# Patient Record
Sex: MALE | Race: WHITE | Employment: OTHER | URBAN - METROPOLITAN AREA
[De-identification: names, ages, dates, MRNs, and addresses within clinical notes are randomized per-mention and may not be internally consistent; named-entity substitution may affect disease eponyms.]

---

## 2022-03-14 ENCOUNTER — HOSPITAL ENCOUNTER (INPATIENT)
Age: 62
LOS: 16 days | Discharge: HOME | DRG: 853 | End: 2022-03-30
Attending: EMERGENCY MEDICINE | Admitting: STUDENT IN AN ORGANIZED HEALTH CARE EDUCATION/TRAINING PROGRAM
Payer: COMMERCIAL

## 2022-03-14 ENCOUNTER — APPOINTMENT (OUTPATIENT)
Dept: GENERAL RADIOLOGY | Age: 62
DRG: 853 | End: 2022-03-14
Payer: COMMERCIAL

## 2022-03-14 ENCOUNTER — APPOINTMENT (OUTPATIENT)
Dept: CT IMAGING | Age: 62
DRG: 853 | End: 2022-03-14
Payer: COMMERCIAL

## 2022-03-14 DIAGNOSIS — G89.18 ACUTE POST-OPERATIVE PAIN: ICD-10-CM

## 2022-03-14 DIAGNOSIS — R50.9 FEBRILE ILLNESS: ICD-10-CM

## 2022-03-14 DIAGNOSIS — Z98.890 S/P MVR (MITRAL VALVE REPAIR): ICD-10-CM

## 2022-03-14 DIAGNOSIS — I48.91 ATRIAL FIBRILLATION WITH RAPID VENTRICULAR RESPONSE (HCC): Primary | ICD-10-CM

## 2022-03-14 LAB
ALBUMIN SERPL-MCNC: 3.4 G/DL (ref 3.5–5.2)
ALP BLD-CCNC: 117 U/L (ref 40–129)
ALT SERPL-CCNC: 35 U/L (ref 0–40)
AMORPHOUS: ABNORMAL
ANION GAP SERPL CALCULATED.3IONS-SCNC: 13 MMOL/L (ref 7–16)
APTT: 30.2 SEC (ref 24.5–35.1)
AST SERPL-CCNC: 22 U/L (ref 0–39)
BACTERIA: ABNORMAL /HPF
BASOPHILS ABSOLUTE: 0.02 E9/L (ref 0–0.2)
BASOPHILS RELATIVE PERCENT: 0.2 % (ref 0–2)
BILIRUB SERPL-MCNC: 1 MG/DL (ref 0–1.2)
BILIRUBIN DIRECT: 0.4 MG/DL (ref 0–0.3)
BILIRUBIN URINE: NEGATIVE
BILIRUBIN, INDIRECT: 0.6 MG/DL (ref 0–1)
BLOOD, URINE: NEGATIVE
BUN BLDV-MCNC: 20 MG/DL (ref 6–23)
C-REACTIVE PROTEIN: 5.9 MG/DL (ref 0–0.4)
CALCIUM SERPL-MCNC: 9 MG/DL (ref 8.6–10.2)
CHLORIDE BLD-SCNC: 97 MMOL/L (ref 98–107)
CLARITY: CLEAR
CO2: 24 MMOL/L (ref 22–29)
COLOR: YELLOW
CREAT SERPL-MCNC: 1.3 MG/DL (ref 0.7–1.2)
EOSINOPHILS ABSOLUTE: 0.02 E9/L (ref 0.05–0.5)
EOSINOPHILS RELATIVE PERCENT: 0.2 % (ref 0–6)
FINE CASTS, UA: ABNORMAL /LPF (ref 0–2)
GFR AFRICAN AMERICAN: >60
GFR NON-AFRICAN AMERICAN: 56 ML/MIN/1.73
GLUCOSE BLD-MCNC: 156 MG/DL (ref 74–99)
GLUCOSE URINE: NEGATIVE MG/DL
HCT VFR BLD CALC: 33.4 % (ref 37–54)
HEMOGLOBIN: 10.7 G/DL (ref 12.5–16.5)
IMMATURE GRANULOCYTES #: 0.08 E9/L
IMMATURE GRANULOCYTES %: 0.7 % (ref 0–5)
INR BLD: 1.5
KETONES, URINE: NEGATIVE MG/DL
LACTIC ACID, SEPSIS: 1.4 MMOL/L (ref 0.5–1.9)
LACTIC ACID, SEPSIS: 2.6 MMOL/L (ref 0.5–1.9)
LEUKOCYTE ESTERASE, URINE: NEGATIVE
LYMPHOCYTES ABSOLUTE: 0.75 E9/L (ref 1.5–4)
LYMPHOCYTES RELATIVE PERCENT: 6.1 % (ref 20–42)
MAGNESIUM: 2.4 MG/DL (ref 1.6–2.6)
MCH RBC QN AUTO: 27.6 PG (ref 26–35)
MCHC RBC AUTO-ENTMCNC: 32 % (ref 32–34.5)
MCV RBC AUTO: 86.1 FL (ref 80–99.9)
MONOCYTES ABSOLUTE: 0.5 E9/L (ref 0.1–0.95)
MONOCYTES RELATIVE PERCENT: 4.1 % (ref 2–12)
NEUTROPHILS ABSOLUTE: 10.85 E9/L (ref 1.8–7.3)
NEUTROPHILS RELATIVE PERCENT: 88.7 % (ref 43–80)
NITRITE, URINE: NEGATIVE
PDW BLD-RTO: 13.8 FL (ref 11.5–15)
PH UA: 5 (ref 5–9)
PLATELET # BLD: 193 E9/L (ref 130–450)
PMV BLD AUTO: 11.3 FL (ref 7–12)
POTASSIUM REFLEX MAGNESIUM: 4.5 MMOL/L (ref 3.5–5)
PRO-BNP: 6000 PG/ML (ref 0–125)
PROCALCITONIN: 0.31 NG/ML (ref 0–0.08)
PROTEIN UA: ABNORMAL MG/DL
PROTHROMBIN TIME: 15.9 SEC (ref 9.3–12.4)
RBC # BLD: 3.88 E12/L (ref 3.8–5.8)
RBC UA: ABNORMAL /HPF (ref 0–2)
SARS-COV-2, NAAT: NOT DETECTED
SODIUM BLD-SCNC: 134 MMOL/L (ref 132–146)
SPECIFIC GRAVITY UA: 1.02 (ref 1–1.03)
TOTAL PROTEIN: 7.9 G/DL (ref 6.4–8.3)
TROPONIN, HIGH SENSITIVITY: 42 NG/L (ref 0–11)
TROPONIN, HIGH SENSITIVITY: 43 NG/L (ref 0–11)
UROBILINOGEN, URINE: 4 E.U./DL
WBC # BLD: 12.2 E9/L (ref 4.5–11.5)
WBC UA: ABNORMAL /HPF (ref 0–5)

## 2022-03-14 PROCEDURE — 6360000004 HC RX CONTRAST MEDICATION: Performed by: RADIOLOGY

## 2022-03-14 PROCEDURE — 6360000002 HC RX W HCPCS: Performed by: STUDENT IN AN ORGANIZED HEALTH CARE EDUCATION/TRAINING PROGRAM

## 2022-03-14 PROCEDURE — 71045 X-RAY EXAM CHEST 1 VIEW: CPT

## 2022-03-14 PROCEDURE — 83605 ASSAY OF LACTIC ACID: CPT

## 2022-03-14 PROCEDURE — 85610 PROTHROMBIN TIME: CPT

## 2022-03-14 PROCEDURE — 86140 C-REACTIVE PROTEIN: CPT

## 2022-03-14 PROCEDURE — 87040 BLOOD CULTURE FOR BACTERIA: CPT

## 2022-03-14 PROCEDURE — 83735 ASSAY OF MAGNESIUM: CPT

## 2022-03-14 PROCEDURE — 84145 PROCALCITONIN (PCT): CPT

## 2022-03-14 PROCEDURE — 6370000000 HC RX 637 (ALT 250 FOR IP): Performed by: STUDENT IN AN ORGANIZED HEALTH CARE EDUCATION/TRAINING PROGRAM

## 2022-03-14 PROCEDURE — 2140000000 HC CCU INTERMEDIATE R&B

## 2022-03-14 PROCEDURE — 96366 THER/PROPH/DIAG IV INF ADDON: CPT

## 2022-03-14 PROCEDURE — 87635 SARS-COV-2 COVID-19 AMP PRB: CPT

## 2022-03-14 PROCEDURE — 80048 BASIC METABOLIC PNL TOTAL CA: CPT

## 2022-03-14 PROCEDURE — 0202U NFCT DS 22 TRGT SARS-COV-2: CPT

## 2022-03-14 PROCEDURE — 2500000003 HC RX 250 WO HCPCS: Performed by: STUDENT IN AN ORGANIZED HEALTH CARE EDUCATION/TRAINING PROGRAM

## 2022-03-14 PROCEDURE — 96375 TX/PRO/DX INJ NEW DRUG ADDON: CPT

## 2022-03-14 PROCEDURE — 85025 COMPLETE CBC W/AUTO DIFF WBC: CPT

## 2022-03-14 PROCEDURE — 80076 HEPATIC FUNCTION PANEL: CPT

## 2022-03-14 PROCEDURE — 96365 THER/PROPH/DIAG IV INF INIT: CPT

## 2022-03-14 PROCEDURE — 36415 COLL VENOUS BLD VENIPUNCTURE: CPT

## 2022-03-14 PROCEDURE — 84443 ASSAY THYROID STIM HORMONE: CPT

## 2022-03-14 PROCEDURE — 96376 TX/PRO/DX INJ SAME DRUG ADON: CPT

## 2022-03-14 PROCEDURE — 99285 EMERGENCY DEPT VISIT HI MDM: CPT

## 2022-03-14 PROCEDURE — 96374 THER/PROPH/DIAG INJ IV PUSH: CPT

## 2022-03-14 PROCEDURE — 87088 URINE BACTERIA CULTURE: CPT

## 2022-03-14 PROCEDURE — 83880 ASSAY OF NATRIURETIC PEPTIDE: CPT

## 2022-03-14 PROCEDURE — 2580000003 HC RX 258: Performed by: STUDENT IN AN ORGANIZED HEALTH CARE EDUCATION/TRAINING PROGRAM

## 2022-03-14 PROCEDURE — 85730 THROMBOPLASTIN TIME PARTIAL: CPT

## 2022-03-14 PROCEDURE — 81001 URINALYSIS AUTO W/SCOPE: CPT

## 2022-03-14 PROCEDURE — 71275 CT ANGIOGRAPHY CHEST: CPT

## 2022-03-14 PROCEDURE — 93005 ELECTROCARDIOGRAM TRACING: CPT | Performed by: STUDENT IN AN ORGANIZED HEALTH CARE EDUCATION/TRAINING PROGRAM

## 2022-03-14 PROCEDURE — 84484 ASSAY OF TROPONIN QUANT: CPT

## 2022-03-14 PROCEDURE — 85651 RBC SED RATE NONAUTOMATED: CPT

## 2022-03-14 RX ORDER — DILTIAZEM HYDROCHLORIDE 5 MG/ML
10 INJECTION INTRAVENOUS ONCE
Status: COMPLETED | OUTPATIENT
Start: 2022-03-14 | End: 2022-03-14

## 2022-03-14 RX ORDER — SODIUM CHLORIDE 9 MG/ML
25 INJECTION, SOLUTION INTRAVENOUS PRN
Status: DISCONTINUED | OUTPATIENT
Start: 2022-03-14 | End: 2022-03-19 | Stop reason: SDUPTHER

## 2022-03-14 RX ORDER — ONDANSETRON 2 MG/ML
4 INJECTION INTRAMUSCULAR; INTRAVENOUS EVERY 6 HOURS PRN
Status: DISCONTINUED | OUTPATIENT
Start: 2022-03-14 | End: 2022-03-23

## 2022-03-14 RX ORDER — ACETAMINOPHEN 325 MG/1
650 TABLET ORAL ONCE
Status: COMPLETED | OUTPATIENT
Start: 2022-03-14 | End: 2022-03-14

## 2022-03-14 RX ORDER — SODIUM CHLORIDE 0.9 % (FLUSH) 0.9 %
5-40 SYRINGE (ML) INJECTION EVERY 12 HOURS SCHEDULED
Status: DISCONTINUED | OUTPATIENT
Start: 2022-03-14 | End: 2022-03-19 | Stop reason: SDUPTHER

## 2022-03-14 RX ORDER — SODIUM CHLORIDE 0.9 % (FLUSH) 0.9 %
5-40 SYRINGE (ML) INJECTION PRN
Status: DISCONTINUED | OUTPATIENT
Start: 2022-03-14 | End: 2022-03-19 | Stop reason: SDUPTHER

## 2022-03-14 RX ORDER — ASPIRIN 81 MG/1
81 TABLET ORAL DAILY
Status: DISCONTINUED | OUTPATIENT
Start: 2022-03-15 | End: 2022-03-15

## 2022-03-14 RX ORDER — POLYETHYLENE GLYCOL 3350 17 G/17G
17 POWDER, FOR SOLUTION ORAL DAILY PRN
Status: DISCONTINUED | OUTPATIENT
Start: 2022-03-14 | End: 2022-03-23

## 2022-03-14 RX ORDER — 0.9 % SODIUM CHLORIDE 0.9 %
1000 INTRAVENOUS SOLUTION INTRAVENOUS ONCE
Status: COMPLETED | OUTPATIENT
Start: 2022-03-14 | End: 2022-03-14

## 2022-03-14 RX ORDER — ACETAMINOPHEN 650 MG/1
650 SUPPOSITORY RECTAL EVERY 6 HOURS PRN
Status: DISCONTINUED | OUTPATIENT
Start: 2022-03-14 | End: 2022-03-23

## 2022-03-14 RX ORDER — ONDANSETRON 4 MG/1
4 TABLET, ORALLY DISINTEGRATING ORAL EVERY 8 HOURS PRN
Status: DISCONTINUED | OUTPATIENT
Start: 2022-03-14 | End: 2022-03-23

## 2022-03-14 RX ORDER — HYDROXYCHLOROQUINE SULFATE 200 MG/1
200 TABLET, FILM COATED ORAL
Status: ON HOLD | COMMUNITY
End: 2022-03-15

## 2022-03-14 RX ORDER — MAGNESIUM SULFATE IN WATER 40 MG/ML
4000 INJECTION, SOLUTION INTRAVENOUS ONCE
Status: COMPLETED | OUTPATIENT
Start: 2022-03-14 | End: 2022-03-14

## 2022-03-14 RX ORDER — ATORVASTATIN CALCIUM 40 MG/1
40 TABLET, FILM COATED ORAL NIGHTLY
Status: DISCONTINUED | OUTPATIENT
Start: 2022-03-14 | End: 2022-03-26

## 2022-03-14 RX ORDER — POTASSIUM CHLORIDE 7.45 MG/ML
10 INJECTION INTRAVENOUS PRN
Status: DISCONTINUED | OUTPATIENT
Start: 2022-03-14 | End: 2022-03-23

## 2022-03-14 RX ORDER — MAGNESIUM SULFATE IN WATER 40 MG/ML
2000 INJECTION, SOLUTION INTRAVENOUS PRN
Status: DISCONTINUED | OUTPATIENT
Start: 2022-03-14 | End: 2022-03-23

## 2022-03-14 RX ORDER — ACETAMINOPHEN 325 MG/1
650 TABLET ORAL EVERY 6 HOURS PRN
Status: DISCONTINUED | OUTPATIENT
Start: 2022-03-14 | End: 2022-03-23

## 2022-03-14 RX ADMIN — DEXTROSE MONOHYDRATE 10 MG/HR: 50 INJECTION, SOLUTION INTRAVENOUS at 21:30

## 2022-03-14 RX ADMIN — CEFEPIME HYDROCHLORIDE 2000 MG: 2 INJECTION, POWDER, FOR SOLUTION INTRAVENOUS at 23:24

## 2022-03-14 RX ADMIN — DEXTROSE MONOHYDRATE 12.5 MG/HR: 50 INJECTION, SOLUTION INTRAVENOUS at 22:50

## 2022-03-14 RX ADMIN — MAGNESIUM SULFATE HEPTAHYDRATE 4000 MG: 40 INJECTION, SOLUTION INTRAVENOUS at 20:23

## 2022-03-14 RX ADMIN — SODIUM CHLORIDE 1000 ML: 9 INJECTION, SOLUTION INTRAVENOUS at 20:33

## 2022-03-14 RX ADMIN — ACETAMINOPHEN 650 MG: 325 TABLET ORAL at 20:55

## 2022-03-14 RX ADMIN — IOPAMIDOL 75 ML: 755 INJECTION, SOLUTION INTRAVENOUS at 22:38

## 2022-03-14 RX ADMIN — DEXTROSE MONOHYDRATE 2.5 MG/HR: 50 INJECTION, SOLUTION INTRAVENOUS at 20:23

## 2022-03-14 RX ADMIN — DILTIAZEM HYDROCHLORIDE 10 MG: 5 INJECTION INTRAVENOUS at 20:22

## 2022-03-14 ASSESSMENT — ENCOUNTER SYMPTOMS
ABDOMINAL PAIN: 0
VOMITING: 0
SINUS PRESSURE: 0
SHORTNESS OF BREATH: 0
DIARRHEA: 0
NAUSEA: 0
BACK PAIN: 0
SORE THROAT: 0
WHEEZING: 0
EYE PAIN: 0
COUGH: 0
EYE DISCHARGE: 0

## 2022-03-14 NOTE — LETTER
David Ga  Phone: [phone]        March 22, 2022     Patient: Peggy Qucik   YOB: 1960   Date of Visit: 3/14/2022       To Whom it May Concern:    Peggy Quick was present at this facility for a procedure on 3/23/22 with an extensive hospital stay.     Sincerely,

## 2022-03-15 PROBLEM — N17.9 AKI (ACUTE KIDNEY INJURY) (HCC): Status: ACTIVE | Noted: 2022-03-15

## 2022-03-15 PROBLEM — A41.9 SEPSIS (HCC): Status: ACTIVE | Noted: 2022-03-15

## 2022-03-15 LAB
ACINETOBACTER CALCOAC BAUMANNII COMPLEX BY PCR: NOT DETECTED
ADENOVIRUS BY PCR: NOT DETECTED
ANION GAP SERPL CALCULATED.3IONS-SCNC: 12 MMOL/L (ref 7–16)
BACTEROIDES FRAGILIS BY PCR: NOT DETECTED
BASOPHILS ABSOLUTE: 0.02 E9/L (ref 0–0.2)
BASOPHILS RELATIVE PERCENT: 0.2 % (ref 0–2)
BORDETELLA PARAPERTUSSIS BY PCR: NOT DETECTED
BORDETELLA PERTUSSIS BY PCR: NOT DETECTED
BOTTLE TYPE: ABNORMAL
BUN BLDV-MCNC: 19 MG/DL (ref 6–23)
CALCIUM SERPL-MCNC: 8.1 MG/DL (ref 8.6–10.2)
CANDIDA ALBICANS BY PCR: NOT DETECTED
CANDIDA AURIS BY PCR: NOT DETECTED
CANDIDA GLABRATA BY PCR: NOT DETECTED
CANDIDA KRUSEI BY PCR: NOT DETECTED
CANDIDA PARAPSILOSIS BY PCR: NOT DETECTED
CANDIDA TROPICALIS BY PCR: NOT DETECTED
CHLAMYDOPHILIA PNEUMONIAE BY PCR: NOT DETECTED
CHLORIDE BLD-SCNC: 101 MMOL/L (ref 98–107)
CHOLESTEROL, TOTAL: 133 MG/DL (ref 0–199)
CO2: 21 MMOL/L (ref 22–29)
CORONAVIRUS 229E BY PCR: NOT DETECTED
CORONAVIRUS HKU1 BY PCR: NOT DETECTED
CORONAVIRUS NL63 BY PCR: NOT DETECTED
CORONAVIRUS OC43 BY PCR: NOT DETECTED
CREAT SERPL-MCNC: 1.1 MG/DL (ref 0.7–1.2)
CRYPTOCOCCUS NEOFORMANS/GATTII BY PCR: NOT DETECTED
EKG ATRIAL RATE: 122 BPM
EKG Q-T INTERVAL: 264 MS
EKG QRS DURATION: 86 MS
EKG QTC CALCULATION (BAZETT): 406 MS
EKG R AXIS: 36 DEGREES
EKG T AXIS: 66 DEGREES
EKG VENTRICULAR RATE: 142 BPM
ENTEROBACTER CLOACAE COMPLEX BY PCR: NOT DETECTED
ENTEROBACTERALES BY PCR: NOT DETECTED
ENTEROCOCCUS FAECALIS BY PCR: NOT DETECTED
ENTEROCOCCUS FAECIUM BY PCR: NOT DETECTED
EOSINOPHILS ABSOLUTE: 0.11 E9/L (ref 0.05–0.5)
EOSINOPHILS RELATIVE PERCENT: 0.9 % (ref 0–6)
ESCHERICHIA COLI BY PCR: NOT DETECTED
GFR AFRICAN AMERICAN: >60
GFR NON-AFRICAN AMERICAN: >60 ML/MIN/1.73
GLUCOSE BLD-MCNC: 129 MG/DL (ref 74–99)
HAEMOPHILUS INFLUENZAE BY PCR: NOT DETECTED
HBA1C MFR BLD: 5.6 % (ref 4–5.6)
HCT VFR BLD CALC: 28.4 % (ref 37–54)
HDLC SERPL-MCNC: 20 MG/DL
HEMOGLOBIN: 9.2 G/DL (ref 12.5–16.5)
HUMAN METAPNEUMOVIRUS BY PCR: NOT DETECTED
HUMAN RHINOVIRUS/ENTEROVIRUS BY PCR: NOT DETECTED
IMMATURE GRANULOCYTES #: 0.09 E9/L
IMMATURE GRANULOCYTES %: 0.7 % (ref 0–5)
INFLUENZA A BY PCR: NOT DETECTED
INFLUENZA B BY PCR: NOT DETECTED
KLEBSIELLA AEROGENES BY PCR: NOT DETECTED
KLEBSIELLA OXYTOCA BY PCR: NOT DETECTED
KLEBSIELLA PNEUMONIAE GROUP BY PCR: NOT DETECTED
LDL CHOLESTEROL CALCULATED: 90 MG/DL (ref 0–99)
LISTERIA MONOCYTOGENES BY PCR: NOT DETECTED
LV EF: 53 %
LVEF MODALITY: NORMAL
LYMPHOCYTES ABSOLUTE: 1.25 E9/L (ref 1.5–4)
LYMPHOCYTES RELATIVE PERCENT: 10.3 % (ref 20–42)
MCH RBC QN AUTO: 27.6 PG (ref 26–35)
MCHC RBC AUTO-ENTMCNC: 32.4 % (ref 32–34.5)
MCV RBC AUTO: 85.3 FL (ref 80–99.9)
MONOCYTES ABSOLUTE: 0.96 E9/L (ref 0.1–0.95)
MONOCYTES RELATIVE PERCENT: 7.9 % (ref 2–12)
MYCOPLASMA PNEUMONIAE BY PCR: NOT DETECTED
NEISSERIA MENINGITIDIS BY PCR: NOT DETECTED
NEUTROPHILS ABSOLUTE: 9.68 E9/L (ref 1.8–7.3)
NEUTROPHILS RELATIVE PERCENT: 80 % (ref 43–80)
ORDER NUMBER: ABNORMAL
PARAINFLUENZA VIRUS 1 BY PCR: NOT DETECTED
PARAINFLUENZA VIRUS 2 BY PCR: NOT DETECTED
PARAINFLUENZA VIRUS 3 BY PCR: NOT DETECTED
PARAINFLUENZA VIRUS 4 BY PCR: NOT DETECTED
PDW BLD-RTO: 13.5 FL (ref 11.5–15)
PLATELET # BLD: 175 E9/L (ref 130–450)
PMV BLD AUTO: 10.8 FL (ref 7–12)
POTASSIUM REFLEX MAGNESIUM: 4.2 MMOL/L (ref 3.5–5)
PROCALCITONIN: 0.23 NG/ML (ref 0–0.08)
PROTEUS SPECIES BY PCR: NOT DETECTED
PSEUDOMONAS AERUGINOSA BY PCR: NOT DETECTED
RBC # BLD: 3.33 E12/L (ref 3.8–5.8)
RESPIRATORY SYNCYTIAL VIRUS BY PCR: NOT DETECTED
SALMONELLA SPECIES BY PCR: NOT DETECTED
SARS-COV-2, PCR: NOT DETECTED
SEDIMENTATION RATE, ERYTHROCYTE: 97 MM/HR (ref 0–15)
SERRATIA MARCESCENS BY PCR: NOT DETECTED
SODIUM BLD-SCNC: 134 MMOL/L (ref 132–146)
SOURCE OF BLOOD CULTURE: ABNORMAL
STAPHYLOCOCCUS AUREUS BY PCR: NOT DETECTED
STAPHYLOCOCCUS EPIDERMIDIS BY PCR: NOT DETECTED
STAPHYLOCOCCUS LUGDUNENSIS BY PCR: NOT DETECTED
STAPHYLOCOCCUS SPECIES BY PCR: NOT DETECTED
STENOTROPHOMONAS MALTOPHILIA BY PCR: NOT DETECTED
STREPTOCOCCUS AGALACTIAE BY PCR: NOT DETECTED
STREPTOCOCCUS PNEUMONIAE BY PCR: NOT DETECTED
STREPTOCOCCUS PYOGENES  BY PCR: NOT DETECTED
STREPTOCOCCUS SPECIES BY PCR: DETECTED
TRIGL SERPL-MCNC: 115 MG/DL (ref 0–149)
TSH SERPL DL<=0.05 MIU/L-ACNC: 1.88 UIU/ML (ref 0.27–4.2)
VLDLC SERPL CALC-MCNC: 23 MG/DL
WBC # BLD: 12.1 E9/L (ref 4.5–11.5)

## 2022-03-15 PROCEDURE — 80048 BASIC METABOLIC PNL TOTAL CA: CPT

## 2022-03-15 PROCEDURE — 85025 COMPLETE CBC W/AUTO DIFF WBC: CPT

## 2022-03-15 PROCEDURE — 6370000000 HC RX 637 (ALT 250 FOR IP): Performed by: NURSE PRACTITIONER

## 2022-03-15 PROCEDURE — 2580000003 HC RX 258: Performed by: INTERNAL MEDICINE

## 2022-03-15 PROCEDURE — 2580000003 HC RX 258: Performed by: STUDENT IN AN ORGANIZED HEALTH CARE EDUCATION/TRAINING PROGRAM

## 2022-03-15 PROCEDURE — 6360000002 HC RX W HCPCS: Performed by: INTERNAL MEDICINE

## 2022-03-15 PROCEDURE — 99255 IP/OBS CONSLTJ NEW/EST HI 80: CPT | Performed by: INTERNAL MEDICINE

## 2022-03-15 PROCEDURE — 6360000002 HC RX W HCPCS: Performed by: NURSE PRACTITIONER

## 2022-03-15 PROCEDURE — 2700000000 HC OXYGEN THERAPY PER DAY

## 2022-03-15 PROCEDURE — 93306 TTE W/DOPPLER COMPLETE: CPT

## 2022-03-15 PROCEDURE — 2500000003 HC RX 250 WO HCPCS: Performed by: STUDENT IN AN ORGANIZED HEALTH CARE EDUCATION/TRAINING PROGRAM

## 2022-03-15 PROCEDURE — 83036 HEMOGLOBIN GLYCOSYLATED A1C: CPT

## 2022-03-15 PROCEDURE — 87150 DNA/RNA AMPLIFIED PROBE: CPT

## 2022-03-15 PROCEDURE — 6370000000 HC RX 637 (ALT 250 FOR IP): Performed by: INTERNAL MEDICINE

## 2022-03-15 PROCEDURE — 2140000000 HC CCU INTERMEDIATE R&B

## 2022-03-15 PROCEDURE — 87077 CULTURE AEROBIC IDENTIFY: CPT

## 2022-03-15 PROCEDURE — 87040 BLOOD CULTURE FOR BACTERIA: CPT

## 2022-03-15 PROCEDURE — 80061 LIPID PANEL: CPT

## 2022-03-15 PROCEDURE — 87186 SC STD MICRODIL/AGAR DIL: CPT

## 2022-03-15 PROCEDURE — APPSS60 APP SPLIT SHARED TIME 46-60 MINUTES: Performed by: NURSE PRACTITIONER

## 2022-03-15 PROCEDURE — 6360000002 HC RX W HCPCS: Performed by: STUDENT IN AN ORGANIZED HEALTH CARE EDUCATION/TRAINING PROGRAM

## 2022-03-15 PROCEDURE — 36415 COLL VENOUS BLD VENIPUNCTURE: CPT

## 2022-03-15 PROCEDURE — 84145 PROCALCITONIN (PCT): CPT

## 2022-03-15 PROCEDURE — 93010 ELECTROCARDIOGRAM REPORT: CPT | Performed by: INTERNAL MEDICINE

## 2022-03-15 RX ORDER — METOPROLOL SUCCINATE 25 MG/1
25 TABLET, EXTENDED RELEASE ORAL 2 TIMES DAILY
Status: DISCONTINUED | OUTPATIENT
Start: 2022-03-15 | End: 2022-03-16

## 2022-03-15 RX ADMIN — Medication 10 ML: at 00:51

## 2022-03-15 RX ADMIN — VANCOMYCIN HYDROCHLORIDE 1250 MG: 10 INJECTION, POWDER, LYOPHILIZED, FOR SOLUTION INTRAVENOUS at 15:58

## 2022-03-15 RX ADMIN — METOPROLOL SUCCINATE 25 MG: 25 TABLET, EXTENDED RELEASE ORAL at 11:18

## 2022-03-15 RX ADMIN — DEXTROSE MONOHYDRATE 15 MG/HR: 50 INJECTION, SOLUTION INTRAVENOUS at 04:05

## 2022-03-15 RX ADMIN — ASPIRIN 81 MG: 81 TABLET, COATED ORAL at 09:15

## 2022-03-15 RX ADMIN — ENOXAPARIN SODIUM 80 MG: 100 INJECTION SUBCUTANEOUS at 20:08

## 2022-03-15 RX ADMIN — DEXTROSE MONOHYDRATE 12.5 MG/HR: 50 INJECTION, SOLUTION INTRAVENOUS at 11:21

## 2022-03-15 RX ADMIN — DEXTROSE MONOHYDRATE 10 MG/HR: 50 INJECTION, SOLUTION INTRAVENOUS at 14:36

## 2022-03-15 RX ADMIN — ATORVASTATIN CALCIUM 40 MG: 40 TABLET, FILM COATED ORAL at 20:08

## 2022-03-15 RX ADMIN — ATORVASTATIN CALCIUM 40 MG: 40 TABLET, FILM COATED ORAL at 00:25

## 2022-03-15 RX ADMIN — ENOXAPARIN SODIUM 40 MG: 100 INJECTION SUBCUTANEOUS at 09:15

## 2022-03-15 RX ADMIN — METOPROLOL SUCCINATE 25 MG: 25 TABLET, EXTENDED RELEASE ORAL at 20:08

## 2022-03-15 RX ADMIN — VANCOMYCIN HYDROCHLORIDE 1500 MG: 10 INJECTION, POWDER, LYOPHILIZED, FOR SOLUTION INTRAVENOUS at 00:47

## 2022-03-15 RX ADMIN — DEXTROSE MONOHYDRATE 15 MG/HR: 50 INJECTION, SOLUTION INTRAVENOUS at 00:09

## 2022-03-15 RX ADMIN — DEXTROSE MONOHYDRATE 10 MG/HR: 50 INJECTION, SOLUTION INTRAVENOUS at 20:01

## 2022-03-15 RX ADMIN — ENOXAPARIN SODIUM 40 MG: 100 INJECTION SUBCUTANEOUS at 11:18

## 2022-03-15 ASSESSMENT — PAIN SCALES - GENERAL
PAINLEVEL_OUTOF10: 0

## 2022-03-15 NOTE — PROGRESS NOTES
Notified Dr. Crispin Almazan of patient's positive blood cultures.  4/6 bottles gram+ cocci in chains

## 2022-03-15 NOTE — CARE COORDINATION
Care Coordination: attempted to see pt x 2 today to discuss transition of care upon discharge, currently with bedside echo being performed. Will follow for dc needs. Pt is listed as [facility], No pcp on file and is listed as meds to beds.      Rudy Mae

## 2022-03-15 NOTE — PROGRESS NOTES
Comprehensive Nutrition Assessment    Type and Reason for Visit:  Initial,Positive Nutrition Screen    Nutrition Recommendations/Plan: Continue current diet and start ONS to promote adequate oral intake; will continue to monitor. Nutrition Assessment:  pt adm d/t fatigue & AFIB; hx of tabacco use; per discussion w/ the pt he states he has had about 15-20# wt loss in ~1-3 months (no evidence in the EMR to support this); pt states his appetite is \"great\" and that he is not in pain when eating or experiencing N/V; he says he eats well but keeps losing wt; note EMR stating pt losing taste and smell 2/1; pt willing to try Boost and MC on trays; will start ONS & monitor. Malnutrition Assessment:  Malnutrition Status: At risk for malnutrition (Comment)    Context:  Acute Illness     Findings of the 6 clinical characteristics of malnutrition:  Energy Intake:  Mild decrease in energy intake (Comment)  Weight Loss:  Unable to assess (d/t no measured wt hx in EMR; note subjective wt loss of 15-20# in 1-3 months)     Body Fat Loss:  No significant body fat loss     Muscle Mass Loss:  No significant muscle mass loss    Fluid Accumulation:  No significant fluid accumulation     Strength:  Not Performed    Estimated Daily Nutrient Needs:  Energy (kcal):  7899-4692; Weight Used for Energy Requirements:  Current     Protein (g):  1.2-1. 4xIBW=90-105g; Weight Used for Protein Requirements:  Ideal        Fluid (ml/day):  0719-6029; Method Used for Fluid Requirements:  1 ml/kcal      Nutrition Related Findings:  alert; abd WNL; no edema; +I/O      Wounds:  None       Current Nutrition Therapies:    ADULT DIET; Regular  ADULT ORAL NUTRITION SUPPLEMENT; Breakfast, Lunch;  Fortified Pudding Oral Supplement  ADULT ORAL NUTRITION SUPPLEMENT; Lunch, Dinner; Frozen Oral Supplement    Anthropometric Measures:  · Height: 5' 9\" (175.3 cm)  · Current Body Weight: 179 lb (81.2 kg) (3/15- [facility])   · Admission Body Weight: 179 lb (81.2 kg) (3/15- [facility]scale; first measured)    · Usual Body Weight:  (UTO d/t lack of wt hx per EMR)     · Ideal Body Weight: 160 lbs; % Ideal Body Weight 111.9 %   · BMI: 26.4  · Adjusted Body Weight:  ; No Adjustment    · BMI Categories: Overweight (BMI 25.0-29. 9)       Nutrition Diagnosis:   · Inadequate oral intake related to inadequate protein-energy intake as evidenced by poor intake prior to admission      Nutrition Interventions:   Food and/or Nutrient Delivery:  Continue Current Diet,Start Oral Nutrition Supplement (Boost BID and magic cup BID)  Nutrition Education/Counseling:  Education not indicated   Coordination of Nutrition Care:  Continue to monitor while inpatient    Goals:  Consumes >50% meals/ONS. Nutrition Monitoring and Evaluation:   Behavioral-Environmental Outcomes:  None Identified   Food/Nutrient Intake Outcomes:  Food and Nutrient Intake,Supplement Intake  Physical Signs/Symptoms Outcomes:  Biochemical Data,Nutrition Focused Physical Findings,Skin,Weight,Chewing or Swallowing,GI Status,Fluid Status or Edema     Discharge Planning:     Too soon to determine     Electronically signed by Debra Montalvo RD on 3/15/22 at 2:02 PM EDT    Contact: [phone]

## 2022-03-15 NOTE — ED PROVIDER NOTES
52-year-old male presented emerged from for A. fib RVR, has been feeling fatigued for the last month, went to an urgent care today, found to be in A. fib, sent here for further evaluation. He denies being in A. fib before, he has some dyspnea on exertion associated with the fatigue, symptoms moderate in severity, nothing make his symptoms better, worsened with exertion, have been persistent, gradual in onset. Denies any history of A. fib, denies any recent travel, injuries, surgeries, history of blood clots, denies seeing a cardiologist in the past.           Review of Systems   Constitutional: Positive for fatigue. Negative for chills and fever. HENT: Negative for ear pain, sinus pressure and sore throat. Eyes: Negative for pain and discharge. Respiratory: Negative for cough, shortness of breath and wheezing. Cardiovascular: Negative for chest pain. Gastrointestinal: Negative for abdominal pain, diarrhea, nausea and vomiting. Genitourinary: Negative for dysuria and frequency. Musculoskeletal: Negative for arthralgias and back pain. Skin: Negative for rash and wound. Neurological: Negative for weakness and headaches. Hematological: Negative for adenopathy. All other systems reviewed and are negative. Physical Exam  Vitals and nursing note reviewed. Constitutional:       Appearance: He is well-developed. HENT:      Head: Normocephalic and atraumatic. Right Ear: External ear normal.      Left Ear: External ear normal.      Nose: Nose normal.      Mouth/Throat:      Mouth: Mucous membranes are moist.   Eyes:      Extraocular Movements: Extraocular movements intact. Conjunctiva/sclera: Conjunctivae normal.      Pupils: Pupils are equal, round, and reactive to light. Cardiovascular:      Rate and Rhythm: Tachycardia present. Rhythm irregular. Heart sounds: Normal heart sounds. No murmur heard.       Pulmonary:      Effort: Pulmonary effort is normal. No respiratory distress. Breath sounds: Normal breath sounds. No wheezing or rales. Abdominal:      General: Bowel sounds are normal.      Palpations: Abdomen is soft. Tenderness: There is no abdominal tenderness. There is no guarding or rebound. Musculoskeletal:         General: No tenderness or deformity. Cervical back: Normal range of motion and neck supple. Skin:     General: Skin is warm and dry. Neurological:      Mental Status: He is alert and oriented to person, place, and time. Cranial Nerves: No cranial nerve deficit. Coordination: Coordination normal.          Procedures     MDM     ED Course as of 03/14/22 2315   Mon Mar 14, 2022   2017 EKG: This EKG is signed by emergency department physician. Rate: 142  Rhythm: Atrial fibrillation  Interpretation: A. fib RVR with PVCs  Comparison: stable as compared to patient's most recent EKG      [JG]   2130 Patient had to be febrile as well, given Tylenol, will check sepsis labs [JG]   2305 Patient heart rate in the 120s, will admit, CTAs negative, patient agreeable to admission, informed of plan and findings today. [JG]   2313 Spoke to Dr. Sanjeev Kowalski accepted patient for admission, he recommended to give the patient antibiotics due to sepsis concerns. [JG]   8785 71-year-old male presented emergency department for irregular heartbeat, sent from [facility] due to a EKG which showed A. fib RVR, he has not been in A. fib before, patient states he is only complaining of fatigue for the last month. Found a heart rate in the 160s, started on Cardizem and given mag, heart rate down to the 120s, he is also found to be febrile, obtain 3 blood cultures drawn endocardial cause of fever as patient was in new rhythm with a fever, however patient was not immunocompromise no IV drug use, patient was given Vanco and cefepime. Started on a Cardizem drip, CTA did not show any PE, but in the hospital for your help management of A. fib RVR.    [JG]      ED Course User Index  [JG] Raina Giordano MD      72-year-old male presented emergency department for irregular heartbeat, sent from [facility] due to a EKG which showed A. fib RVR, he has not been in A. fib before, patient states he is only complaining of fatigue for the last month. Found a heart rate in the 160s, started on Cardizem and given mag, heart rate down to the 120s, he is also found to be febrile, obtain 3 blood cultures drawn endocardial cause of fever as patient was in new rhythm with a fever, however patient was not immunocompromise no IV drug use, patient was given Vanco and cefepime. Started on a Cardizem drip, CTA did not show any PE, but in the hospital for your help management of A. fib RVR. ED Course as of 22 2316   Mon Mar 14, 2022   2017 EKG: This EKG is signed by emergency department physician. Rate: 142  Rhythm: Atrial fibrillation  Interpretation: A. fib RVR with PVCs  Comparison: stable as compared to patient's most recent EKG      [JG]   2130 Patient had to be febrile as well, given Tylenol, will check sepsis labs [JG]   2305 Patient heart rate in the 120s, will admit, CTAs negative, patient agreeable to admission, informed of plan and findings today. [JG]   2313 Spoke to Dr. Evone Apgar accepted patient for admission, he recommended to give the patient antibiotics due to sepsis concerns. [JG]    72-year-old male presented emergency department for irregular heartbeat, sent from [facility] due to a EKG which showed A. fib RVR, he has not been in A. fib before, patient states he is only complaining of fatigue for the last month. Found a heart rate in the 160s, started on Cardizem and given mag, heart rate down to the 120s, he is also found to be febrile, obtain 3 blood cultures drawn endocardial cause of fever as patient was in new rhythm with a fever, however patient was not immunocompromise no IV drug use, patient was given Vanco and cefepime.   Started on a Cardizem drip, CTA did not show any PE, but in the hospital for your help management of ROSE oconnell RVR. [JG]      ED Course User Index  [JG] Peter Altamirano MD       --------------------------------------------- PAST HISTORY ---------------------------------------------  Past Medical History:  has no past medical history on file. Past Surgical History:  has no past surgical history on file. Social History:  reports that he has never smoked. He has never used smokeless tobacco. He reports current alcohol use. He reports that he does not use drugs. Family History: family history is not on file. The patients home medications have been reviewed. Allergies: Patient has no known allergies.     -------------------------------------------------- RESULTS -------------------------------------------------    Lab  Results for orders placed or performed during the hospital encounter of 03/14/22   COVID-19, Rapid    Specimen: Nasopharyngeal Swab   Result Value Ref Range    SARS-CoV-2, NAAT Not Detected Not Detected   CBC with Auto Differential   Result Value Ref Range    WBC 12.2 (H) 4.5 - 11.5 E9/L    RBC 3.88 3.80 - 5.80 E12/L    Hemoglobin 10.7 (L) 12.5 - 16.5 g/dL    Hematocrit 33.4 (L) 37.0 - 54.0 %    MCV 86.1 80.0 - 99.9 fL    MCH 27.6 26.0 - 35.0 pg    MCHC 32.0 32.0 - 34.5 %    RDW 13.8 11.5 - 15.0 fL    Platelets 748 311 - 177 E9/L    MPV 11.3 7.0 - 12.0 fL    Neutrophils % 88.7 (H) 43.0 - 80.0 %    Immature Granulocytes % 0.7 0.0 - 5.0 %    Lymphocytes % 6.1 (L) 20.0 - 42.0 %    Monocytes % 4.1 2.0 - 12.0 %    Eosinophils % 0.2 0.0 - 6.0 %    Basophils % 0.2 0.0 - 2.0 %    Neutrophils Absolute 10.85 (H) 1.80 - 7.30 E9/L    Immature Granulocytes # 0.08 E9/L    Lymphocytes Absolute 0.75 (L) 1.50 - 4.00 E9/L    Monocytes Absolute 0.50 0.10 - 0.95 E9/L    Eosinophils Absolute 0.02 (L) 0.05 - 0.50 E9/L    Basophils Absolute 0.02 0.00 - 0.20 P5/O   Basic Metabolic Panel w/ Reflex to MG   Result Value Ref Range    Sodium 134 132 - 146 mmol/L    Potassium reflex Magnesium 4.5 3.5 - 5.0 mmol/L    Chloride 97 (L) 98 - 107 mmol/L    CO2 24 22 - 29 mmol/L    Anion Gap 13 7 - 16 mmol/L    Glucose 156 (H) 74 - 99 mg/dL    BUN 20 6 - 23 mg/dL    CREATININE 1.3 (H) 0.7 - 1.2 mg/dL    GFR Non-African American 56 >=60 mL/min/1.73    GFR African American >60     Calcium 9.0 8.6 - 10.2 mg/dL   Hepatic Function Panel   Result Value Ref Range    Total Protein 7.9 6.4 - 8.3 g/dL    Albumin 3.4 (L) 3.5 - 5.2 g/dL    Alkaline Phosphatase 117 40 - 129 U/L    ALT 35 0 - 40 U/L    AST 22 0 - 39 U/L    Total Bilirubin 1.0 0.0 - 1.2 mg/dL    Bilirubin, Direct 0.4 (H) 0.0 - 0.3 mg/dL    Bilirubin, Indirect 0.6 0.0 - 1.0 mg/dL   Troponin   Result Value Ref Range    Troponin, High Sensitivity 42 (H) 0 - 11 ng/L   Brain Natriuretic Peptide   Result Value Ref Range    Pro-BNP 6,000 (H) 0 - 125 pg/mL   Magnesium   Result Value Ref Range    Magnesium 2.4 1.6 - 2.6 mg/dL   Protime-INR   Result Value Ref Range    Protime 15.9 (H) 9.3 - 12.4 sec    INR 1.5    APTT   Result Value Ref Range    aPTT 30.2 24.5 - 35.1 sec   Lactate, Sepsis   Result Value Ref Range    Lactic Acid, Sepsis 2.6 (H) 0.5 - 1.9 mmol/L   Lactate, Sepsis   Result Value Ref Range    Lactic Acid, Sepsis 1.4 0.5 - 1.9 mmol/L   C-Reactive Protein   Result Value Ref Range    CRP 5.9 (H) 0.0 - 0.4 mg/dL   Procalcitonin   Result Value Ref Range    Procalcitonin 0.31 (H) 0.00 - 0.08 ng/mL   Urinalysis with Microscopic   Result Value Ref Range    Color, UA Yellow Straw/Yellow    Clarity, UA Clear Clear    Glucose, Ur Negative Negative mg/dL    Bilirubin Urine Negative Negative    Ketones, Urine Negative Negative mg/dL    Specific Gravity, UA 1.025 1.005 - 1.030    Blood, Urine Negative Negative    pH, UA 5.0 5.0 - 9.0    Protein, UA TRACE Negative mg/dL    Urobilinogen, Urine 4.0 (A) <2.0 E.U./dL    Nitrite, Urine Negative Negative    Leukocyte Esterase, Urine Negative Negative    Fine Casts, UA 0-2 0 - 2 /LPF    WBC, UA 0-1 0 - 5 /HPF    RBC, UA 0-1 0 - 2 /HPF    Bacteria, UA MODERATE (A) None Seen /HPF    Amorphous, UA FEW    Troponin   Result Value Ref Range    Troponin, High Sensitivity 43 (H) 0 - 11 ng/L       Radiology  CTA PULMONARY W CONTRAST   Final Result   No evidence of pulmonary embolism or acute pulmonary abnormality. RECOMMENDATIONS:   Unavailable         XR CHEST PORTABLE   Final Result   No acute airspace opacity.                 ------------------------- NURSING NOTES AND VITALS REVIEWED ---------------------------  Date / Time Roomed:  3/14/2022  8:06 PM  ED Bed Assignment:  21/21    The nursing notes within the ED encounter and vital signs as below have been reviewed. Patient Vitals for the past 24 hrs:   BP Temp Temp src Pulse Resp SpO2 Height Weight   03/14/22 2249 122/77 -- -- 122 20 96 % -- --   03/14/22 2128 (!) 116/94 -- -- 129 17 97 % -- --   03/14/22 2035 106/81 101.1 °F (38.4 °C) Oral -- -- -- -- --   03/14/22 2019 110/89 -- -- 162 24 97 % 5' 9\" (1.753 m) 170 lb (77.1 kg)       Oxygen Saturation Interpretation: Normal      ------------------------------------------ PROGRESS NOTES ------------------------------------------      I have spoken with the patient and discussed todays results, in addition to providing specific details for the plan of care and counseling regarding the diagnosis and prognosis. Their questions are answered at this time and they are agreeable with the plan.      --------------------------------- ADDITIONAL PROVIDER NOTES ---------------------------------  Consultations:  Spoke with Dr. Omid Ford,  They will admit this patient.     This patient's ED course included: a personal history and physicial examination, re-evaluation prior to disposition, multiple bedside re-evaluations, IV medications, cardiac monitoring, continuous pulse oximetry and complex medical decision making and emergency management    This patient has remained hemodynamically stable and been closely monitored during their ED course. Please note that the withdrawal or failure to initiate urgent interventions for this patient would likely result in a life threatening deterioration or permanent disability. Accordingly this patient received 33 minutes of critical care time, excluding separately billable procedures. Clinical Impression  1. Atrial fibrillation with rapid ventricular response ([address]    2. Febrile illness          Disposition  Patient's disposition: Admit to telemetry  Patient's condition is stable.          Tricia Vang MD  Resident  03/14/22 8729

## 2022-03-15 NOTE — PROGRESS NOTES
 Vancomycin has been discontinued   [address] to [address] Physician to re-consult pharmacy if future dosing is needed    Thank you for the consult,  Ginna Melgar.  Nick Canales PharmD 3/15/2022 1:18 PM

## 2022-03-15 NOTE — PROGRESS NOTES
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 CT surgery consult and ANITRA tomorrow.     Riri Reyes MD

## 2022-03-15 NOTE — CONSULTS
Inpatient Cardiology Consultation      Reason for Consult: New onset of A. fib with RVR    Consulting Physician: Dr. Rah Antonio    Requesting Physician: Dr. Yasir Reddy    Date of Consultation: 3/15/2022    HISTORY OF PRESENT ILLNESS:   Mr. Laron Carbajal is a 62year old male who is new to [facility] cardiology physicians. Patient has not followed with a PCP > 20 years. PMH: Tobacco use (2-3 cigars per day) and arthritis. Patient reports that on 2/1/2022 he began having symptoms of fever, chills and body aches. 1 day later he began to lose his sense of taste and smell with episodes of lightheadedness and generalized weakness. After 4 days his symptoms of fever and chills had subsided but he continued to have generalized weakness and a new onset of mild FULTON (ambulating up and down his basement steps). He stated that prior to February 2022 he worked out daily (3 sets of jumping jacks, push-ups, body squats and arm weights) without any prior symptoms of FULTON or chest pain. He stated that over the past month he has had intermittent episodes of \"fluttering in my chest\" with symptoms lasting anywhere from 5-10 minutes. He denies any lightheadedness or dizziness, near-syncope or syncope. He does report having a 20 pound weight loss (unintentional) over the past 3 weeks despite having a normal appetite. He denies any hematuria, dysuria, black bloody or tarry stools. Due to worsening symptoms of generalized weakness and progressive shortness of breath with mild exertion he presented to [facility] on 3/14/2022. Upon arrival to the ED: Blood pressure 110/89, temperature 101.1 °F, 97% on room air. Labs: Sodium 134, potassium 4.5, BUN 20, creatinine 1.3>>1.1 (today), magnesium 2.4, lactic acid 2.6>> repeat 1.4. Procalcitonin 0.31. CRP 5.9 sed rate 97. proBNP 6000 High-sensitivity troponin 42, 43. Albumin 3.4. TSH 1.880 Albumin 3.4. Hemoglobin A1c 5.6%.   WBC 12.2>>12.1 (today), H/H 10.7/33.4>>9.2/28.4 (today), platelet count 193.  Blood cultures pending. Respiratory panel: Negative. SARS-CoV-2: Negative. UA: Moderate bacteria Cholesterol panel: Total cholesterol 133, HDL 20, LDL 90, triglycerides 115. CXR: No acute airspace opacity. CTA pulmonary with contrast: No evidence of PE or acute pulmonary abnormality. EKG: A. fib with RVR, rate 142 bpm.  ER medications: 1 L IV fluid bolus, Tylenol, cefepime IV, magnesium sulfate 4 g. Patient was started on IV Cardizem drip with bolus. He was admitted to a telemetry monitoring for further evaluation and management. Cardiology was consulted for new onset of Afib with RVR. Patient is currently sitting up in bed and appears to be in no acute distress. He denies any chest pain or shortness of breath. Telemetry currently showing A. fib with intermittent RVR and is on Cardizem IV drip at 15 mg/hr. Please note: past medical records were reviewed per electronic medical record (EMR) - see detailed reports under Past Medical/ Surgical History. Past Medical History:    1. Tobacco use: 2-3 cigars per day x 30 years. 2. No prior history of HTN, HLD, DM, CVA, MI, Blood clots or surgical history. Medications Prior to admit:  Prior to Admission medications    Medication Sig Start Date End Date Taking?  Authorizing Provider   Ascorbic Acid (VITAMIN C PO) Take by mouth daily   Yes Historical Provider, MD   VITAMIN D PO Take by mouth daily   Yes Historical Provider, MD   ZINC PO Take by mouth daily   Yes Historical Provider, MD       Current Medications:    Current Facility-Administered Medications: enoxaparin (LOVENOX) injection 40 mg, 40 mg, SubCUTAneous, Daily  [COMPLETED] dilTIAZem injection 10 mg, 10 mg, IntraVENous, Once **FOLLOWED BY** dilTIAZem 100 mg in dextrose 5 % 100 mL infusion ([facility]), 2.5-15 mg/hr, IntraVENous, Continuous  sodium chloride flush 0.9 % injection 5-40 mL, 5-40 mL, IntraVENous, 2 times per day  sodium chloride flush 0.9 % injection 5-40 mL, 5-40 mL, IntraVENous, PRN  0.9 % sodium chloride infusion, 25 mL, IntraVENous, PRN  ondansetron (ZOFRAN-ODT) disintegrating tablet 4 mg, 4 mg, Oral, Q8H PRN **OR** ondansetron (ZOFRAN) injection 4 mg, 4 mg, IntraVENous, Q6H PRN  polyethylene glycol (GLYCOLAX) packet 17 g, 17 g, Oral, Daily PRN  acetaminophen (TYLENOL) tablet 650 mg, 650 mg, Oral, Q6H PRN **OR** acetaminophen (TYLENOL) suppository 650 mg, 650 mg, Rectal, Q6H PRN  potassium chloride 10 mEq/100 mL IVPB (Peripheral Line), 10 mEq, IntraVENous, PRN  magnesium sulfate 2000 mg in 50 mL IVPB premix, 2,000 mg, IntraVENous, PRN  perflutren lipid microspheres (DEFINITY) injection 1.65 mg, 1.5 mL, IntraVENous, ONCE PRN  aspirin EC tablet 81 mg, 81 mg, Oral, Daily  atorvastatin (LIPITOR) tablet 40 mg, 40 mg, Oral, Nightly    Allergies:  Patient has no known allergies. Social History:    Lives in a ranch style home alone  Denies using a walker or cane   He has 13 steps that go into his basement (with no CP but occasionally feels SOB). Denies illicit drug use   ETOH: 1-2 drinks per month (beer)  Tobacco use: smokes 2-3 cigars per day x 30 years. Family History: Mother: No known medical history   Father: CHF (diagnosed early 52's), Lung CA. Siblings: No known history. REVIEW OF SYSTEMS:     · Constitutional: See HPI. · Eyes: Denies visual changes or drainage  · ENT: Denies headaches or hearing loss. No mouth sores or sore throat. No epistaxis   · Cardiovascular: See HPI. No lower extremity swelling. · Respiratory: +FULTON. + cough, orthopnea or PND. No hemoptysis   · Gastrointestinal: Denies hematemesis or anorexia. No hematochezia or melena    · Genitourinary: Denies urgency, dysuria or hematuria. · Musculoskeletal: Denies gait disturbance, weakness or joint complaints  · Integumentary: Denies rash, hives or pruritis   · Neurological: Denies dizziness, headaches or seizures.  No numbness or tingling  · Psychiatric: Denies anxiety or depression. · Endocrine: Denies temperature intolerance. +20 lb weight loss x 3 weeks   · Hematologic/Lymphatic: Denies abnormal bruising or bleeding. No swollen lymph nodes    PHYSICAL EXAM:   /77   Pulse 102   Temp 98.9 °F (37.2 °C) (Oral)   Resp 18   Ht 5' 9\" (1.753 m)   Wt 170 lb (77.1 kg)   SpO2 99%   BMI 25.10 kg/m²   CONST:  Well developed, well nourished middle aged male who appears of stated age. Awake, alert and cooperative. No apparent distress. HEENT:   Head- Normocephalic, atraumatic   Eyes- Conjunctivae pink, anicteric  Throat- Oral mucosa pink and moist  Neck-  No stridor, trachea midline, no jugular venous distention. No carotid bruit. CHEST: Chest symmetrical and non-tender to palpation. No accessory muscle use or intercostal retractions  RESPIRATORY: Lung sounds - clear throughout fields. On RA. CARDIOVASCULAR:     Heart Inspection- shows no noted pulsations  Heart Palpation- no heaves or thrills; PMI is non-displaced   Heart Ausculation- Tachycardic, IRR, 3/6 EARNESTINE. No s3 or rub   PV: No lower extremity edema. No varicosities. Pedal pulses palpable, no clubbing or cyanosis   ABDOMEN: Soft, non-tender to light palpation. Bowel sounds present. No palpable masses no organomegaly; no abdominal bruit  MS: Good muscle strength and tone. No atrophy or abnormal movements. : Deferred  SKIN: Warm and dry no statis dermatitis or ulcers   NEURO / PSYCH: Oriented to person, place and time. Speech clear and appropriate. Follows all commands. Pleasant affect     DATA:    ECG: AF with RVR, rate 142 bpm.   Tele strips: AF with intermittent RVR (rate 110-120's) while on Cardizem gtt at 15 mg/hr.    Diagnostic:      Intake/Output Summary (Last 24 hours) at 3/15/2022 0706  Last data filed at 3/14/2022 2249  Gross per 24 hour   Intake 1100 ml   Output --   Net 1100 ml       Labs:   CBC:   Recent Labs     03/14/22  2017 03/15/22  0322   WBC 12.2* 12.1*   HGB 10.7* 9.2*   HCT 33.4* 28.4*    175     BMP:   Recent Labs     03/14/22  2017 03/15/22  0331    134   K 4.5 4.2   CO2 24 21*   BUN 20 19   CREATININE 1.3* 1.1   LABGLOM 56 >60   CALCIUM 9.0 8.1*     Mag:   Recent Labs     03/14/22 2017   MG 2.4     Phos: No results for input(s): PHOS in the last 72 hours. TFT:   Lab Results   Component Value Date    TSH 1.880 03/14/2022      HgA1c:   Lab Results   Component Value Date    LABA1C 5.6 03/15/2022     No results found for: EAG  proBNP:   Recent Labs     03/14/22 2017   PROBNP 6,000*     PT/INR:   Recent Labs     03/14/22 2017   PROTIME 15.9*   INR 1.5     APTT:  Recent Labs     03/14/22 2017   APTT 30.2     CARDIAC ENZYMES:  Recent Labs     03/14/22 2017 03/14/22  2213   TROPHS 42* 43*     FASTING LIPID PANEL:  Lab Results   Component Value Date    CHOL 133 03/15/2022    HDL 20 03/15/2022    LDLCALC 90 03/15/2022    TRIG 115 03/15/2022     LIVER PROFILE:  Recent Labs     03/14/22 2017   AST 22   ALT 35   LABALBU 3.4*       CXR: 3/14/2022 No acute airspace opacity. CTA pulmonary with contrast: 3/14/2022 No evidence of PE or acute pulmonary abnormality. ECHO: pending. The 10-year ASCVD risk score (Mickie De Jesus., et al., 2013) is: 8.7%    Values used to calculate the score:      Age: 62 years      Sex: Male      Is Non- : No      Diabetic: No      Tobacco smoker: No      Systolic Blood Pressure: 591 mmHg      Is BP treated: No      HDL Cholesterol: 20 mg/dL      Total Cholesterol: 133 mg/dL      Assessment/Plan:  1. New onset of AF with RVR (duration unknown, captured on EKG 3/14/2022). · [id number] score at least 0.   · Currently on IV Cardizem gtt  · TSH: normal.   2. Borderline elevated hs-cTnT in the setting of AF with RVR. Pattern not consistent with ACS. No acute EKG changes. 3. +Murmur on clinical exam   4. No evidence of hypervolemia despite given elevated proBNP (6000)  5. ASCVD risk 8.7%.    6. Anemia: (baseline unknown): Hgb 10.7>>9.2 (today) 7. Leukocytosis  8. Lactic acidosis   9. JEANA: resolved. 10. Tobacco use: (2-3 cigars per day x 30 years)        · Start Toprol-XL 25 mg BID: Up-titrate as BP/HR allow. · Wean off Cardizem IV gtt as tolerated   · Start Lovenox 1 mg/kg BID   · Check ECHO to assess LV function and VHD  · Pending clinical course, consider ANITRA/DCCV if patient does not spontaneously convert to SR. · Continue statin therapy   · Tobacco cessation   · Will follow       The above assessment and plan was made in collaboration with Dr. Caio Vega. Electronically signed by ROX Quiñones CNP on 3/15/22 at 10:19 AM EDT    ______________________________________________________________________  Cardiology attending attestation:  I have independently interviewed and examined the patient. I personally reviewed pertinent laboratory values and diagnostic testing (including, if applicable, chest xray, electrocardiogram, telemetry, echocardiogram, stress testing, and coronary angiography). I have reviewed the above documentation completed by ROX Cope CNP including past medical, social, and family history and agree with the findings, assessment and plan except where noted. Plan formulated under my direct supervision. I participated in all aspects of the medical decision making. Please see my additional contributions to the HPI, physical exam, and assessment / medical decision making:  _______________________________________________________________________    56-year-old male presenting with history of fevers chills and abnormal sense of smell about a month and a half ago. Treated himself with a \"Trump cocktail\" of ivermectin and hydroxychloroquine. Since then feeling weak, fatigue, shortness breath with exertion which became severe. Presented for evaluation found to be in A. fib RVR. He denies chest pain or palpitations. Start on diltiazem infusion. Was febrile on admission at 101.   Inflammatory markers elevated. Denies recent dental work, denies IV drug use, no skin lesions rashes or abscesses. Has not seen a doctor in many years. Mentions long history of heart murmur but has never been evaluated never had an echo. Physical Exam:  BP 98/79   Pulse 87   Temp 97.9 °F (36.6 °C) (Temporal)   Resp 20   Ht 5' 9\" (1.753 m)   Wt 179 lb (81.2 kg)   SpO2 96%   BMI 26.43 kg/m²   General appearance: Awake, alert, no acute respiratory distress  Skin: Intact, no rash  ENMT: Moist mucous membranes  Neck: Supple, no carotid bruits. Elevated jugular venous pressure  Lungs: Clear to auscultation bilaterally. No wheezes, rales, or rhonchi  Cardiac: Irregular rhythm with a tachycardic rate, normal S1&S2, no murmurs apparent. Prominent apical impulse. 3/6 holosystolic murmur at left sternal border. Abdomen: Soft, positive bowel sounds, nontender  Extremities: Moves all extremities x 4, no lower extremity edema  Neurologic: No focal motor deficits apparent, normal mood and affect  No peripheral exam findings of endocarditis    Telemetry: Atrial fibrillation 100s 120s  EKG:  A. fib RVR    Impression:   1. New onset A. fib RVR, unknown duration  2. 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 Streptococcal bacteremia, unclear source  4. Fever, sepsis    Recommendations:  Suspect A. fib due to valvular disease due to endocarditis.  Transthoracic today, may need ANITRA subsequently   Consider ID consult   Oral metoprolol   Continue diltiazem infusion, wean as able   Anticoagulation for now though [id number] score is low   Aggressive risk factor modification    Above explained at length to patient and family member at the bedside    Thank you for the consultation. Please do not hesitate to call with questions.     Davonte Shafer MD, [facility] Cardiology

## 2022-03-15 NOTE — PROGRESS NOTES
Pharmacy Consultation Note  (Antibiotic Dosing and Monitoring)    Initial consult date: 3/15/2022  Consulting physician/provider: Ev Ramirez MD   Drug: Vancomycin  Indication: Sepsis of Unknown Etiology    Age/  Gender Height Weight IBW  Allergy Information   61 y.o./male 5' 9\" (175.3 cm) 170 lb (77.1 kg)     Ideal body weight: 70.7 kg (155 lb 13.8 oz)  Adjusted ideal body weight: 73.3 kg (161 lb 8.3 oz)   Patient has no known allergies. Renal Function:  Recent Labs     03/14/22  2017   BUN 20   CREATININE 1.3*       Intake/Output Summary (Last 24 hours) at 3/15/2022 0002  Last data filed at 3/14/2022 2249  Gross per 24 hour   Intake 1100 ml   Output --   Net 1100 ml       Vancomycin Monitoring:  Trough:  No results for input(s): VANCOTROUGH in the last 72 hours. Random:  No results for input(s): VANCORANDOM in the last 72 hours. Vancomycin Administration Times:  Recent vancomycin administrations      No vancomycin IV orders with administrations found. Orders not given:          vancomycin 1500 mg in dextrose 5% 300 mL IVPB                Assessment:  · Patient is a 62 y.o. male who has been initiated on vancomycin  · Estimated Creatinine Clearance: 60 mL/min (A) (based on SCr of 1.3 mg/dL (H)).   Plan:  · Will check vancomycin levels when appropriate  · Will continue to monitor renal function   · Clinical pharmacy to follow      Myra Dinero PharmD, [facility], BCPS 3/15/2022 12:03 AM

## 2022-03-15 NOTE — PROGRESS NOTES
Patient admitted to floor with Afib. Cardizem IV currently running at 15ml/hr. Admission completed. Patient A&Ox4, denies pain, feels pretty good right now, just fatigued. Vital signs stable at this time.

## 2022-03-15 NOTE — PROGRESS NOTES
Hospitalist Progress Note      Synopsis: Patient admitted on 114/2022 69-year-old male with no known past medical history except childhood murmur, went to the urgent care today for evaluation of fatigue and was found to have A. fib with RVR and was sent to the main hospital emergency room. Patient states his symptoms of fatigue were on and off since past 1 month. Denies any palpitations. However did report some exertional shortness of breath. Patient consulted, as per him, telemedicine doctor, who prescribed him ivermectin and hydroxychloroquine earlier this month for suspicion of COVID-19 infection. Patient is unvaccinated against Covid. Patient completed 5 days of ivermectin, however, did not finish 14-day course of hydroxychloroquine which he stopped about 3 days ago. Patient denies any recent fever, chills, cough, shortness of breath, chest pain, abdominal pain, nausea, vomiting, diarrhea constipation. No presyncopal or syncopal event. Upon arrival in the emergency room, patient was noted to be febrile with T-max 101.1 °F, WBC count slightly elevated at 12.2, CRP elevated at 5.9, proBNP elevated at 6000, lactate level elevated at 2.6, troponin elevated at 42, 43. Patient was started on Cardizem drip. Patient had CTA chest done which did not reveal any acute PE.       Subjective    Clinically improving. Feeling better. Stable overnight. No other overnight issues reported. No CP, SOB, palpitations, blurred vision, HA, lightheadedness, LOC or focal neurological deficits    Exam:  /62   Pulse 95   Temp 97.7 °F (36.5 °C) (Oral)   Resp 15   Ht 5' 9\" (1.753 m)   Wt 170 lb (77.1 kg)   SpO2 97%   BMI 25.10 kg/m²   General appearance: No apparent distress, appears stated age and cooperative. HEENT: Pupils equal, round, and reactive to light. Conjunctivae/corneas clear. Neck: Supple. No jugular venous distention. Trachea midline. Respiratory:  Normal respiratory effort.  Clear to auscultation, bilaterally without Rales/Wheezes/Rhonchi. Cardiovascular: Regular rate and rhythm with normal S1/S2 without murmurs, rubs or gallops. Abdomen: Soft, non-tender, non-distended with normal bowel sounds. Musculoskeletal: No clubbing, cyanosis or edema bilaterally. Brisk capillary refill. 2+ lower extremity pulses (dorsalis pedis). Skin:  No rashes    Neurologic: awake, alert and following commands     Medications:  Reviewed    Infusion Medications    dilTIAZem 15 mg/hr (03/15/22 0405)    sodium chloride       Scheduled Medications    enoxaparin  40 mg SubCUTAneous Daily    [START ON 3/16/2022] vancomycin  1,500 mg IntraVENous Q24H    sodium chloride flush  5-40 mL IntraVENous 2 times per day    aspirin  81 mg Oral Daily    atorvastatin  40 mg Oral Nightly     PRN Meds: sodium chloride flush, sodium chloride, ondansetron **OR** ondansetron, polyethylene glycol, acetaminophen **OR** acetaminophen, potassium chloride, magnesium sulfate, perflutren lipid microspheres    I/O    Intake/Output Summary (Last 24 hours) at 3/15/2022 0909  Last data filed at 3/14/2022 2249  Gross per 24 hour   Intake 1100 ml   Output --   Net 1100 ml       Labs:   Recent Labs     03/14/22  2017 03/15/22  0322   WBC 12.2* 12.1*   HGB 10.7* 9.2*   HCT 33.4* 28.4*    175       Recent Labs     03/14/22  2017 03/15/22  0331    134   K 4.5 4.2   CL 97* 101   CO2 24 21*   BUN 20 19   CREATININE 1.3* 1.1   CALCIUM 9.0 8.1*       Recent Labs     03/14/22 2017   PROT 7.9   ALKPHOS 117   ALT 35   AST 22   BILITOT 1.0       Recent Labs     03/14/22  2017   INR 1.5       No results for input(s): Darshan Martinez in the last 72 hours.     Chronic labs:  Lab Results   Component Value Date    CHOL 133 03/15/2022    TRIG 115 03/15/2022    HDL 20 03/15/2022    LDLCALC 90 03/15/2022    TSH 1.880 03/14/2022    INR 1.5 03/14/2022    LABA1C 5.6 03/15/2022       Radiology:  Imaging studies reviewed today.    ASSESSMENT:    Principal Problem:    Atrial fibrillation with rapid ventricular response (HCC)  Active Problems:    Sepsis ([address] present on admission    JANA (acute kidney injury) ([facility] [address] [facility]    Bacteremia  Resolved Problems:    * No resolved hospital problems. *       PLAN:    Atrial fibrillation RVR   admit to intermediate care  Monitor on telemetry  Rate control diltiazem drip  Anticoagulation  Echocardiogram  Cardiology consult    Sepsis  Fever 101.1 on admission, afebrile since  Treated cefepime and vancomycin-  CTA chest without evidence of pneumonia  UA without evidence of UTI  Urine culture  Blood cultures 2 of 2+ GPC in chains  CRP 5.9  Procalcitonin 0.31, repeat  Infectious disease consult    JANA  IVF  Hold nephrotoxins  Urine labs  Monitor renal function, electrolytes, urine output    Medications for other co morbidities cont as appropriate w dosage adjustments as necessary       Diet: Diet NPO Exceptions are: Sips of Water with Meds  Code Status: Full Code  PT/OT Eval Status: As needed     DVT Prophylaxis:   Lovenox  Recommended disposition at discharge:   Anticipate home 24 to 48 hours    +++++++++++++++++++++++++++++++++++++++++++++++++  Cherie Dior MD   [facility].  +++++++++++++++++++++++++++++++++++++++++++++++++  NOTE: This report was transcribed using voice recognition software. Every effort was made to ensure accuracy; however, inadvertent computerized transcription errors may be present.

## 2022-03-15 NOTE — H&P
Hospitalist History & Physical      PCP: No primary care provider on file. Date of Admission: 3/14/2022    Date of Service: Pt seen/examined on 3/14/2022 and is admitted to Inpatient with expected LOS greater than two midnights due to medical therapy. Chief Complaint:  had concerns including Irregular Heart Beat (sent from [facility] for afib rvr new onset). History Of Present Illness:      Patient, 28-year-old male with no known past medical history except childhood murmur, went to the urgent care today for evaluation of fatigue and was found to have A. fib with RVR and was sent to the [facility] hospital emergency room. Patient states his symptoms of fatigue were on and off since past 1 month. Denies any palpitations. However did report some exertional shortness of breath. Patient consulted, as per him, telemedicine doctor, who prescribed him ivermectin and hydroxychloroquine earlier this month for suspicion of COVID-19 infection. Patient is unvaccinated against Covid. Patient completed 5 days of ivermectin, however, did not finish 14-day course of hydroxychloroquine which he stopped about 3 days ago. Patient denies any recent fever, chills, cough, shortness of breath, chest pain, abdominal pain, nausea, vomiting, diarrhea constipation. No presyncopal or syncopal event. Upon arrival in the emergency room, patient was noted to be febrile with T-max 101.1 °F, WBC count slightly elevated at 12.2, CRP elevated at 5.9, proBNP elevated at 6000, lactate level elevated at 2.6, troponin elevated at 42, 43. Patient was started on Cardizem drip. Patient had CTA chest done which did not reveal any acute PE. Past Medical History:    History reviewed. No pertinent past medical history. Past Surgical History:    History reviewed. No pertinent surgical history. Medications Prior to Admission:      Prior to Admission medications    Medication Sig Start Date End Date Taking?  Authorizing Provider   hydroxychloroquine (PLAQUENIL) 200 MG tablet Take 200 mg by mouth    Yes Historical Provider, MD   Ascorbic Acid (VITAMIN C PO) Take by mouth daily   Yes Historical Provider, MD   VITAMIN D PO Take by mouth daily   Yes Historical Provider, MD   ZINC PO Take by mouth daily   Yes Historical Provider, MD       Allergies:  Patient has no known allergies. Social History:    TOBACCO:   reports that he has never smoked. He has never used smokeless tobacco.  ETOH:   reports current alcohol use. Family History:    Reviewed in detail and negative for DM, CAD, Cancer, CVA. Positive as follows\"  History reviewed. No pertinent family history. REVIEW OF SYSTEMS:   Pertinent positives as noted in the HPI. All other systems reviewed and negative. PHYSICAL EXAM:  /77   Pulse 122   Temp 101.1 °F (38.4 °C) (Oral)   Resp 20   Ht 5' 9\" (1.753 m)   Wt 170 lb (77.1 kg)   SpO2 96%   BMI 25.10 kg/m²   General appearance: No apparent distress, appears stated age and cooperative. HEENT: Normal cephalic, atraumatic without obvious deformity. Neck: Supple  Respiratory: Bilateral air entry fair. No obvious wheezing or crackles. Cardiovascular: S1-S2, tachycardic, irregularly irregular, systolic murmur grade 4/6  Abdomen: Soft, bowel sounds present, nontender, nondistended  Musculoskeletal: No peripheral edema  Neurologic: AAOx3. No gross focal neurological deficits      CBC:   Recent Labs     03/14/22 2017   WBC 12.2*   RBC 3.88   HGB 10.7*   HCT 33.4*   MCV 86.1   RDW 13.8        BMP:   Recent Labs     03/14/22 2017      K 4.5   CL 97*   CO2 24   BUN 20   CREATININE 1.3*   MG 2.4     LFT:  Recent Labs     03/14/22 2017   PROT 7.9   ALKPHOS 117   ALT 35   AST 22   BILITOT 1.0     CE:  No results for input(s): Zo Divine in the last 72 hours. PT/INR:   Recent Labs     03/14/22 2017   INR 1.5   APTT 30.2     BNP: No results for input(s): BNP in the last 72 hours.   ESR: No results found for: SEDRATE  CRP:   Lab Results   Component Value Date    CRP 5.9 (H) 03/14/2022     D Dimer: No results found for: DDIMER   Folate and B12: No results found for: [id number], No results found for: FOLATE  Lactic Acid: No results found for: LACTA  Thyroid Studies: No results found for: TSH, Durel Bud    Oupatient labs:  Lab Results   Component Value Date    INR 1.5 03/14/2022       Urinalysis:    Lab Results   Component Value Date    NITRU Negative 03/14/2022    WBCUA 0-1 03/14/2022    BACTERIA MODERATE 03/14/2022    RBCUA 0-1 03/14/2022    BLOODU Negative 03/14/2022    SPECGRAV 1.025 03/14/2022    GLUCOSEU Negative 03/14/2022       Imaging:  XR CHEST PORTABLE    Result Date: 3/14/2022  EXAMINATION: ONE XRAY VIEW OF THE CHEST 3/14/2022 8:53 pm COMPARISON: None. HISTORY: ORDERING SYSTEM PROVIDED HISTORY: r/o pna TECHNOLOGIST PROVIDED HISTORY: Reason for exam:->r/o pna What reading provider will be dictating this exam?->CRC FINDINGS: There is cardiomegaly. The lungs are clear without focal consolidation or howard edema. No pneumothorax or pleural effusion. No acute airspace opacity. CTA PULMONARY W CONTRAST    Result Date: 3/14/2022  EXAMINATION: CTA OF THE CHEST 3/14/2022 10:22 pm TECHNIQUE: CTA of the chest was performed after the administration of intravenous contrast.  Multiplanar reformatted images are provided for review. MIP images are provided for review. Dose modulation, iterative reconstruction, and/or weight based adjustment of the mA/kV was utilized to reduce the radiation dose to as low as reasonably achievable. COMPARISON: None.  HISTORY: ORDERING SYSTEM PROVIDED HISTORY: r/o pe TECHNOLOGIST PROVIDED HISTORY: Reason for exam:->r/o pe Decision Support Exception - unselect if not a suspected or confirmed emergency medical condition->Emergency Medical Condition (MA) What reading provider will be dictating this exam?->CRC FINDINGS: Pulmonary Arteries: Pulmonary arteries are adequately opacified for evaluation. No evidence of intraluminal filling defect to suggest pulmonary embolism. Main pulmonary artery is normal in caliber. Mediastinum: No evidence of mediastinal lymphadenopathy. The heart and pericardium demonstrate no acute abnormality. There is no acute abnormality of the thoracic aorta. Lungs/pleura: The lungs are without acute process. No focal consolidation or pulmonary edema. No evidence of pleural effusion or pneumothorax. Upper Abdomen: Limited images of the upper abdomen are unremarkable. Soft Tissues/Bones: No acute bone or soft tissue abnormality. No evidence of pulmonary embolism or acute pulmonary abnormality. RECOMMENDATIONS: Unavailable     Assessment and plan    Patient, 52-year-old male with no known past medical history went to the urgent care today for evaluation of fatigue and was found to have A. fib with RVR and was sent to the [facility] hospital emergency room. Upon arrival in the emergency room, patient was noted to be febrile with T-max 101.1 °F, WBC count slightly elevated at 12.2, CRP elevated at 5.9, random blood sugar elevated at 156 mg/dL, proBNP elevated at 6000, lactate level elevated at 2.6, troponin elevated at 42, 43. Patient was started on Cardizem drip. CTA chest did not reveal any acute PE      ASSESSMENT:    Atrial fibrillation with RVR-new onset  JANA  Sepsis of unknown origin  Hyperglycemia  Recent use of ivermectin/hydroxychloroquine    PLAN:    Continue Cardizem drip  Telemetry monitoring  Monitor renal panel. Avoid nephrotoxins. Patient did receive 1 L NS bolus. N.p.o. past midnight  Cardiology evaluation. Defer decision of anticoagulation to cardiology. Calculated [id number] score given available data is 0. Start aspirin 81 mg p.o. daily. Start Lipitor 40 mg p.o. nightly  Check echocardiogram  Check hemoglobin A1c. Check lipid panel  Monitor electrolytes  Start empiric IV antibiotics cefepime/vancomycin.   Blood cultures x2 sent from the ED. Discontinue antibiotic if cultures are negative. Procalcitonin level minimally elevated 0.31. DVT prophylaxis-Lovenox subcu    Diet: Diet NPO Exceptions are: Sips of Water with Meds  Code Status: Full Code    Surrogate decision maker confirmed with patient:  Primary Emergency Contact: Adam Damon    DVT Prophylaxis: [x]Lovenox []Heparin []PCD [] [address] []Encouraged ambulation    Disposition: []Med/Surg [x] Intermediate [] ICU/CCU  Admit status: [] Observation [x] Inpatient     +++++++++++++++++++++++++++++++++++++++++++++++++  Vanesa Cisneros MD  [address]  +++++++++++++++++++++++++++++++++++++++++++++++++  NOTE: This report was transcribed using voice recognition software. Every effort was made to ensure accuracy; however, inadvertent computerized transcription errors may be present.

## 2022-03-15 NOTE — PLAN OF CARE
Problem: Infection:  Goal: Will remain free from infection  Description: Will remain free from infection  Outcome: Ongoing     Problem: Safety:  Goal: Free from accidental physical injury  Description: Free from accidental physical injury  Outcome: Ongoing  Goal: Free from intentional harm  Description: Free from intentional harm  Outcome: Ongoing     Problem: Daily Care:  Goal: Daily care needs are met  Description: Daily care needs are met  Outcome: Ongoing     Problem: Pain:  Goal: Patient's pain/discomfort is manageable  Description: Patient's pain/discomfort is manageable  Outcome: Met This Shift     Problem: Discharge Planning:  Goal: Patients continuum of care needs are met  Description: Patients continuum of care needs are met  Outcome: Ongoing

## 2022-03-15 NOTE — CONSULTS
Department of Internal Medicine  Infectious Diseases   Consult Note      Reason for Consult:  Bacteremia , fever       Requesting Physician:  Dr Pérez Middleton:               This is a 62 yrs old male who went to the urgent care 1 day ago  due to fatigue and weakness . He reported SOB with exertion , denied chest pain, cough or sputum expectoration. Apparently, he was empirically treated with ivermectin and hydroxychloroquine for suspected COVID infection earlier this month . Abbey Urban  He was found to be in A fib with RVR - he was sent to the ER   He had temp of  101 F , WBC was 12 K   Lactic acid 2.7  Blood cx - GPC in chains - 2 out of 2 sets of blood cx       Past Medical History:      Heart murmur     Past Surgical History:      None     Current Medications:      Current Facility-Administered Medications   Medication Dose Route Frequency Provider Last Rate Last Admin    enoxaparin (LOVENOX) injection 80 mg  1 mg/kg SubCUTAneous BID ROX Jarvis - DIMITRIS        metoprolol succinate (TOPROL XL) extended release tablet 25 mg  25 mg Oral BID ROX Jarvis - CNP   25 mg at 03/15/22 1118    dilTIAZem 100 mg in dextrose 5 % 100 mL infusion (ADD-Bosler)  2.5-15 mg/hr IntraVENous Continuous Bobbi Landry MD 12.5 mL/hr at 03/15/22 1121 12.5 mg/hr at 03/15/22 1121    sodium chloride flush 0.9 % injection 5-40 mL  5-40 mL IntraVENous 2 times per day Da Esquivel MD   10 mL at 03/15/22 0051    sodium chloride flush 0.9 % injection 5-40 mL  5-40 mL IntraVENous PRN Da Esquivel MD        0.9 % sodium chloride infusion  25 mL IntraVENous PRN Da Esquivel MD        ondansetron (ZOFRAN-ODT) disintegrating tablet 4 mg  4 mg Oral Q8H PRN Da Esquivel MD        Or    ondansetron (ZOFRAN) injection 4 mg  4 mg IntraVENous Q6H PRN Da Esquivel MD        polyethylene glycol (GLYCOLAX) packet 17 g  17 g Oral Daily PRN Da Esquivel MD        acetaminophen (TYLENOL) tablet 650 mg  650 mg Oral Q6H PRN Donna Guevara MD        Or    acetaminophen (TYLENOL) suppository 650 mg  650 mg Rectal Q6H PRN Donna Guevara MD        potassium chloride 10 mEq/100 mL IVPB (Peripheral Line)  10 mEq IntraVENous PRN Donna Guevara MD        magnesium sulfate 2000 mg in 50 mL IVPB premix  2,000 mg IntraVENous PRN Donna Guevara MD        perflutren lipid microspheres (DEFINITY) injection 1.65 mg  1.5 mL IntraVENous ONCE PRN Donna Guevara MD        atorvastatin (LIPITOR) tablet 40 mg  40 mg Oral Nightly Donna Guevara MD   40 mg at 03/15/22 0025       Allergies:  Patient has no known allergies. Social History:      Denies tobacco use or alcohol use             Family History:     F - heart disease     REVIEW OF SYSTEMS:    CONSTITUTIONAL:  Denies fever, chill or rigors. HEENT: denies blurring of vision or double vision, denies hearing problem  RESPIRATORY: SOB with exertion   CARDIOVASCULAR:  Denies palpitation  GASTROINTESTINAL:  Denies abdomen pain, diarrhea or constipation. GENITOURINARY:  Denies burning urination or frequency of urination  INTEGUMENT: denies wound , rash  HEMATOLOGIC/LYMPHATIC:  Denies lymph node swelling, gum bleeding or easy bruising. MUSCULOSKELETAL:  Denies leg pain , joint pain , joint swelling  NEUROLOGICAL:  Fatigue , weakness     PHYSICAL EXAM:      Vitals:     BP 98/79   Pulse 87   Temp 97.9 °F (36.6 °C) (Temporal)   Resp 20   Ht 5' 9\" (1.753 m)   Wt 179 lb (81.2 kg)   SpO2 96%   BMI 26.43 kg/m²       General Appearance:    Awake, alert , no acute distress. Head:    Normocephalic, atraumatic   Eyes:    No pallor, no icterus,   Ears:    No obvious deformity or drainage.    Nose:   No nasal drainage   Throat:   Mucosa moist, no oral thrush   Neck:   Supple, no lymphadenopathy   Back:     no CVA tenderness   Lungs:     Clear to auscultation bilaterally, no wheeze    Heart:    Tachycardic, irregular, systolic murmur    Abdomen:     Soft, non-tender, bowel sounds present Extremities:   No edema, no cyanosis    Pulses:   Dorsalis pedis palpable    Skin:   no rashes       Lab Results   Component Value Date    WBC 12.1 03/15/2022    RBC 3.33 03/15/2022    HGB 9.2 03/15/2022    HCT 28.4 03/15/2022     03/15/2022    MCV 85.3 03/15/2022    MCH 27.6 03/15/2022    MCHC 32.4 03/15/2022    RDW 13.5 03/15/2022    LYMPHOPCT 10.3 03/15/2022    MONOPCT 7.9 03/15/2022    BASOPCT 0.2 03/15/2022    MONOSABS 0.96 03/15/2022    LYMPHSABS 1.25 03/15/2022    EOSABS 0.11 03/15/2022    BASOSABS 0.02 03/15/2022       CMP     Lab Results   Component Value Date     03/15/2022    K 4.2 03/15/2022     03/15/2022    CO2 21 03/15/2022    BUN 19 03/15/2022    CREATININE 1.1 03/15/2022    GFRAA >60 03/15/2022    LABGLOM >60 03/15/2022    GLUCOSE 129 03/15/2022    PROT 7.9 03/14/2022    LABALBU 3.4 03/14/2022    CALCIUM 8.1 03/15/2022    BILITOT 1.0 03/14/2022    ALKPHOS 117 03/14/2022    AST 22 03/14/2022    ALT 35 03/14/2022         Hepatic Function Panel:    Lab Results   Component Value Date    ALKPHOS 117 03/14/2022    ALT 35 03/14/2022    AST 22 03/14/2022    PROT 7.9 03/14/2022    BILITOT 1.0 03/14/2022    BILIDIR 0.4 03/14/2022    IBILI 0.6 03/14/2022    LABALBU 3.4 03/14/2022       PT/INR:    Lab Results   Component Value Date    PROTIME 15.9 03/14/2022    INR 1.5 03/14/2022       TSH:    Lab Results   Component Value Date    TSH 1.880 03/14/2022       U/A:    Lab Results   Component Value Date    COLORU Yellow 03/14/2022    PHUR 5.0 03/14/2022    WBCUA 0-1 03/14/2022    RBCUA 0-1 03/14/2022    BACTERIA MODERATE 03/14/2022    CLARITYU Clear 03/14/2022    SPECGRAV 1.025 03/14/2022    LEUKOCYTESUR Negative 03/14/2022    UROBILINOGEN 4.0 03/14/2022    BILIRUBINUR Negative 03/14/2022    BLOODU Negative 03/14/2022    GLUCOSEU Negative 03/14/2022    AMORPHOUS FEW 03/14/2022       ABG:  No results found for: LEE0DOY, BEART, Y9AZLUJK, PHART, THGBART, IQN3TPD, PO2ART, NPD2NBW    MICROBIOLOGY:    Blood culture - GBC in chain       Radiology :    CTA chest       Impression:        No evidence of pulmonary embolism or acute pulmonary abnormality. IMPRESSION:     1.  GPC (chain ) bacteremia, heart murmur ( old ) ,fever r/o endocarditis     RECOMMENDATIONS:      1. Vancomycin 1250 mg IV q 12 hrs , await final blood cx, follow up blood cx   2. Echo     Thank you Dr Sonja Howard for the consult

## 2022-03-16 ENCOUNTER — APPOINTMENT (OUTPATIENT)
Dept: ULTRASOUND IMAGING | Age: 62
DRG: 853 | End: 2022-03-16
Payer: COMMERCIAL

## 2022-03-16 ENCOUNTER — APPOINTMENT (OUTPATIENT)
Dept: CARDIAC CATH/INVASIVE PROCEDURES | Age: 62
DRG: 853 | End: 2022-03-16
Payer: COMMERCIAL

## 2022-03-16 ENCOUNTER — ANESTHESIA (OUTPATIENT)
Dept: CARDIAC CATH/INVASIVE PROCEDURES | Age: 62
DRG: 853 | End: 2022-03-16
Payer: COMMERCIAL

## 2022-03-16 ENCOUNTER — APPOINTMENT (OUTPATIENT)
Dept: INTERVENTIONAL RADIOLOGY/VASCULAR | Age: 62
DRG: 853 | End: 2022-03-16
Payer: COMMERCIAL

## 2022-03-16 ENCOUNTER — TELEPHONE (OUTPATIENT)
Dept: CARDIOLOGY CLINIC | Age: 62
End: 2022-03-16

## 2022-03-16 ENCOUNTER — ANESTHESIA EVENT (OUTPATIENT)
Dept: CARDIAC CATH/INVASIVE PROCEDURES | Age: 62
DRG: 853 | End: 2022-03-16
Payer: COMMERCIAL

## 2022-03-16 VITALS
RESPIRATION RATE: 28 BRPM | DIASTOLIC BLOOD PRESSURE: 64 MMHG | SYSTOLIC BLOOD PRESSURE: 86 MMHG | OXYGEN SATURATION: 96 %

## 2022-03-16 LAB
LV EF: 55 %
LVEF MODALITY: NORMAL

## 2022-03-16 PROCEDURE — 2580000003 HC RX 258

## 2022-03-16 PROCEDURE — 93325 DOPPLER ECHO COLOR FLOW MAPG: CPT

## 2022-03-16 PROCEDURE — 2709999900 HC NON-CHARGEABLE SUPPLY

## 2022-03-16 PROCEDURE — 6360000002 HC RX W HCPCS: Performed by: INTERNAL MEDICINE

## 2022-03-16 PROCEDURE — 93312 ECHO TRANSESOPHAGEAL: CPT

## 2022-03-16 PROCEDURE — 2580000003 HC RX 258: Performed by: INTERNAL MEDICINE

## 2022-03-16 PROCEDURE — 6370000000 HC RX 637 (ALT 250 FOR IP): Performed by: INTERNAL MEDICINE

## 2022-03-16 PROCEDURE — 93325 DOPPLER ECHO COLOR FLOW MAPG: CPT | Performed by: INTERNAL MEDICINE

## 2022-03-16 PROCEDURE — 93321 DOPPLER ECHO F-UP/LMTD STD: CPT

## 2022-03-16 PROCEDURE — 6360000002 HC RX W HCPCS

## 2022-03-16 PROCEDURE — 93922 UPR/L XTREMITY ART 2 LEVELS: CPT

## 2022-03-16 PROCEDURE — 2500000003 HC RX 250 WO HCPCS: Performed by: STUDENT IN AN ORGANIZED HEALTH CARE EDUCATION/TRAINING PROGRAM

## 2022-03-16 PROCEDURE — 93312 ECHO TRANSESOPHAGEAL: CPT | Performed by: INTERNAL MEDICINE

## 2022-03-16 PROCEDURE — 2140000000 HC CCU INTERMEDIATE R&B

## 2022-03-16 PROCEDURE — 94729 DIFFUSING CAPACITY: CPT

## 2022-03-16 PROCEDURE — 99222 1ST HOSP IP/OBS MODERATE 55: CPT | Performed by: STUDENT IN AN ORGANIZED HEALTH CARE EDUCATION/TRAINING PROGRAM

## 2022-03-16 PROCEDURE — 99291 CRITICAL CARE FIRST HOUR: CPT | Performed by: INTERNAL MEDICINE

## 2022-03-16 PROCEDURE — 94375 RESPIRATORY FLOW VOLUME LOOP: CPT

## 2022-03-16 PROCEDURE — 93320 DOPPLER ECHO COMPLETE: CPT | Performed by: INTERNAL MEDICINE

## 2022-03-16 PROCEDURE — 93880 EXTRACRANIAL BILAT STUDY: CPT

## 2022-03-16 PROCEDURE — 93880 EXTRACRANIAL BILAT STUDY: CPT | Performed by: RADIOLOGY

## 2022-03-16 PROCEDURE — 3700000000 HC ANESTHESIA ATTENDED CARE

## 2022-03-16 PROCEDURE — 6360000002 HC RX W HCPCS: Performed by: NURSE PRACTITIONER

## 2022-03-16 PROCEDURE — 3700000001 HC ADD 15 MINUTES (ANESTHESIA)

## 2022-03-16 RX ORDER — PROPOFOL 10 MG/ML
INJECTION, EMULSION INTRAVENOUS PRN
Status: DISCONTINUED | OUTPATIENT
Start: 2022-03-16 | End: 2022-03-16 | Stop reason: SDUPTHER

## 2022-03-16 RX ORDER — MAGNESIUM SULFATE 1 G/100ML
INJECTION INTRAVENOUS
Status: DISPENSED
Start: 2022-03-16 | End: 2022-03-16

## 2022-03-16 RX ORDER — SODIUM CHLORIDE 9 MG/ML
INJECTION, SOLUTION INTRAVENOUS CONTINUOUS PRN
Status: DISCONTINUED | OUTPATIENT
Start: 2022-03-16 | End: 2022-03-16 | Stop reason: SDUPTHER

## 2022-03-16 RX ORDER — METOPROLOL SUCCINATE 50 MG/1
50 TABLET, EXTENDED RELEASE ORAL 2 TIMES DAILY
Status: DISCONTINUED | OUTPATIENT
Start: 2022-03-16 | End: 2022-03-19

## 2022-03-16 RX ORDER — DIGOXIN 0.25 MG/ML
125 INJECTION INTRAMUSCULAR; INTRAVENOUS ONCE
Status: COMPLETED | OUTPATIENT
Start: 2022-03-16 | End: 2022-03-16

## 2022-03-16 RX ADMIN — ACETAMINOPHEN 650 MG: 325 TABLET ORAL at 22:47

## 2022-03-16 RX ADMIN — ENOXAPARIN SODIUM 80 MG: 100 INJECTION SUBCUTANEOUS at 08:42

## 2022-03-16 RX ADMIN — METOPROLOL SUCCINATE 50 MG: 50 TABLET, EXTENDED RELEASE ORAL at 08:43

## 2022-03-16 RX ADMIN — VANCOMYCIN HYDROCHLORIDE 1250 MG: 10 INJECTION, POWDER, LYOPHILIZED, FOR SOLUTION INTRAVENOUS at 03:36

## 2022-03-16 RX ADMIN — ATORVASTATIN CALCIUM 40 MG: 40 TABLET, FILM COATED ORAL at 21:08

## 2022-03-16 RX ADMIN — DEXTROSE MONOHYDRATE 15 MG/HR: 50 INJECTION, SOLUTION INTRAVENOUS at 17:04

## 2022-03-16 RX ADMIN — Medication 10 ML: at 08:43

## 2022-03-16 RX ADMIN — DEXTROSE MONOHYDRATE 15 MG/HR: 50 INJECTION, SOLUTION INTRAVENOUS at 04:52

## 2022-03-16 RX ADMIN — VANCOMYCIN HYDROCHLORIDE 1250 MG: 10 INJECTION, POWDER, LYOPHILIZED, FOR SOLUTION INTRAVENOUS at 16:30

## 2022-03-16 RX ADMIN — DEXTROSE MONOHYDRATE 12.5 MG/HR: 50 INJECTION, SOLUTION INTRAVENOUS at 08:43

## 2022-03-16 RX ADMIN — PROPOFOL 450 MG: 10 INJECTION, EMULSION INTRAVENOUS at 13:39

## 2022-03-16 RX ADMIN — METOPROLOL SUCCINATE 50 MG: 50 TABLET, EXTENDED RELEASE ORAL at 21:07

## 2022-03-16 RX ADMIN — SODIUM CHLORIDE: 9 INJECTION, SOLUTION INTRAVENOUS at 13:30

## 2022-03-16 RX ADMIN — ENOXAPARIN SODIUM 80 MG: 100 INJECTION SUBCUTANEOUS at 21:08

## 2022-03-16 RX ADMIN — METOPROLOL SUCCINATE 50 MG: 50 TABLET, EXTENDED RELEASE ORAL at 01:44

## 2022-03-16 RX ADMIN — DIGOXIN 125 MCG: 0.25 INJECTION INTRAMUSCULAR; INTRAVENOUS at 01:45

## 2022-03-16 ASSESSMENT — ENCOUNTER SYMPTOMS
ALLERGIC/IMMUNOLOGIC NEGATIVE: 1
RESPIRATORY NEGATIVE: 1
EYES NEGATIVE: 1
GASTROINTESTINAL NEGATIVE: 1

## 2022-03-16 ASSESSMENT — PAIN SCALES - GENERAL
PAINLEVEL_OUTOF10: 0

## 2022-03-16 NOTE — PROGRESS NOTES
INPATIENT CARDIOLOGY FOLLOW-UP    Name: Parker Mckeon    Age: 62 y.o. Date of Admission: 3/14/2022  8:06 PM    Date of Service: 3/16/2022    Primary Cardiologist: Known to me    Chief Complaint: Follow-up for Severe MR, endocarditis, AF    Interim History:  Patient anxious and states he needs to leave the hospital to take care of some things before he can have any sort of surgery. He remains in atrial fibrillation on 15 mg/h diltiazem. He denies shortness of breath or chest pain. He remains hemodynamically stable despite findings on his echo yesterday showing a partial flail mitral valve with very severe eccentric MR with a ruptured cord and likely vegetation.     Review of Systems:   Negative except as described above    Problem List:  Patient Active Problem List   Diagnosis    [facility]rial fibrillation with rapid ventricular response ([facility]    Sepsis ([facility] present on admission    JANA (acute kidney injury) ([facility]    Bacteremia       Current Medications:    Current Facility-Administered Medications:     metoprolol succinate (TOPROL XL) extended release tablet 50 mg, 50 mg, Oral, BID, Soren Pisano DO, 50 mg at 03/16/22 0144    enoxaparin (LOVENOX) injection 80 mg, 1 mg/kg, SubCUTAneous, BID, Delight Gaastra, APRN - CNP, 80 mg at 03/15/22 2008    vancomycin (VANCOCIN) 1,250 mg in dextrose 5 % 250 mL IVPB, 1,250 mg, IntraVENous, Q12H, Faustino Archer MD, Stopped at 03/16/22 0510    [COMPLETED] dilTIAZem injection 10 mg, 10 mg, IntraVENous, Once, 10 mg at 03/14/22 2022 **FOLLOWED BY** dilTIAZem 100 mg in dextrose 5 % 100 mL infusion (ADD-Lagrange), 2.5-15 mg/hr, IntraVENous, Continuous, Andi Arnold MD, Last Rate: 15 mL/hr at 03/16/22 0452, 15 mg/hr at 03/16/22 0452    sodium chloride flush 0.9 % injection 5-40 mL, 5-40 mL, IntraVENous, 2 times per day, Gwendolyn Galaviz MD, 10 mL at 03/15/22 0051    sodium chloride flush 0.9 % injection 5-40 mL, 5-40 mL, IntraVENous, PRN, Gwendolyn Galaviz MD    0.9 % sodium chloride infusion, 25 mL, IntraVENous, PRN, Jade Mcfarlane MD    ondansetron (ZOFRAN-ODT) disintegrating tablet 4 mg, 4 mg, Oral, Q8H PRN **OR** ondansetron (ZOFRAN) injection 4 mg, 4 mg, IntraVENous, Q6H PRN, Jade Mcfarlane MD    polyethylene glycol (GLYCOLAX) packet 17 g, 17 g, Oral, Daily PRN, Jade Mcfarlane MD    acetaminophen (TYLENOL) tablet 650 mg, 650 mg, Oral, Q6H PRN **OR** acetaminophen (TYLENOL) suppository 650 mg, 650 mg, Rectal, Q6H PRN, Jade Mcfarlane MD    potassium chloride 10 mEq/100 mL IVPB (Peripheral Line), 10 mEq, IntraVENous, PRN, Jade Mcfarlane MD    magnesium sulfate 2000 mg in 50 mL IVPB premix, 2,000 mg, IntraVENous, PRN, Jade Mcfarlane MD    perflutren lipid microspheres (DEFINITY) injection 1.65 mg, 1.5 mL, IntraVENous, ONCE PRN, Jade Mcfarlane MD    atorvastatin (LIPITOR) tablet 40 mg, 40 mg, Oral, Nightly, Jade Mcfarlane MD, 40 mg at 03/15/22 2008    Physical Exam:  /88   Pulse 147   Temp 98.9 °F (37.2 °C) (Oral)   Resp 20   Ht 5' 9\" (1.753 m)   Wt 179 lb (81.2 kg)   SpO2 99%   BMI 26.43 kg/m²   Wt Readings from Last 3 Encounters:   03/15/22 179 lb (81.2 kg)     Appearance: Awake, alert, no acute respiratory distress  Skin: Intact, no rash  Head: Normocephalic, atraumatic  Eyes: EOMI, no conjunctival erythema  ENMT: No pharyngeal erythema, MMM, no rhinorrhea  Neck: Supple, no elevated JVP, no carotid bruits  Lungs: Trace basilar rales  Cardiac: PMI nondisplaced, irregular rhythm with a tachycardic rate, S1 & S2 normal, 4/6 holosystolic murmur left sternal border  Abdomen: Soft, nontender, +bowel sounds  Extremities: Moves all extremities x 4, no lower extremity edema  Neurologic: No focal motor deficits apparent, normal mood and affect  Peripheral Pulses: Intact posterior tibial pulses bilaterally    Intake/Output:    Intake/Output Summary (Last 24 hours) at 3/16/2022 0735  Last data filed at 3/16/2022 0152  Gross per 24 hour   Intake 0 ml   Output 950 ml   Net -950 ml     No intake/output data recorded. Laboratory Tests:  Recent Labs     03/14/22  2017 03/15/22  0331    134   K 4.5 4.2   CL 97* 101   CO2 24 21*   BUN 20 19   CREATININE 1.3* 1.1   GLUCOSE 156* 129*   CALCIUM 9.0 8.1*     Lab Results   Component Value Date    MG 2.4 03/14/2022     Recent Labs     03/14/22 2017   ALKPHOS 117   ALT 35   AST 22   PROT 7.9   BILITOT 1.0   BILIDIR 0.4*   LABALBU 3.4*     Recent Labs     03/14/22  2017 03/15/22  0322   WBC 12.2* 12.1*   RBC 3.88 3.33*   HGB 10.7* 9.2*   HCT 33.4* 28.4*   MCV 86.1 85.3   MCH 27.6 27.6   MCHC 32.0 32.4   RDW 13.8 13.5    175   MPV 11.3 10.8     No results found for: CKTOTAL, CKMB, CKMBINDEX, TROPONINI  Lab Results   Component Value Date    INR 1.5 03/14/2022    PROTIME 15.9 (H) 03/14/2022     Lab Results   Component Value Date    TSH 1.880 03/14/2022     Lab Results   Component Value Date    LABA1C 5.6 03/15/2022     No results found for: EAG  Lab Results   Component Value Date    CHOL 133 03/15/2022     Lab Results   Component Value Date    TRIG 115 03/15/2022     Lab Results   Component Value Date    HDL 20 03/15/2022     Lab Results   Component Value Date    LDLCALC 90 03/15/2022     Lab Results   Component Value Date    LABVLDL 23 03/15/2022     No results found for: CHOLHDLRATIO  Recent Labs     03/14/22 2017   PROBNP 6,000*       Cardiac Tests:    EKG:   3/14/2022: Atrial fibrillation  bpm.  PVC or aberrant complexes     Telemetry: Atrial fibrillation 100 120s    Chest X-ray:   Impression   No evidence of pulmonary embolism or acute pulmonary abnormality. Echocardiogram:   TTE 3/15/22   Summary   Atrial fibrillation noted. Ejection fraction is visually estimated at 50-55%. Right ventricle global systolic function is reduced. Severe biatrial dilation. Mild aortic valve regurgitation. Mild tricuspid regurgitation.       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 Absolutely torrential, eccentric mitral regurgitation directed anteriorly. There is a small mobile echodensity noted on the ventricular aspect of the   posterior mitral valve, likely ruptured chordae or papillary muscle,   though in the context of positive blood cultures a vegetation is also   suspected which likely led to chordal rupture. Recommend: CT surgery consult, ANITRA    Stress test:      Cardiac catheterization:     ----------------------------------------------------------------------------------------------------------------------------------------------------------------  IMPRESSION:  1. 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 Streptococcal bacteremia, unclear source  3. New onset atrial fibrillation due to the above. [id number] 0-1.  4. Sepsis    RECOMMENDATIONS:  Patient again stating he is adamant that he needs to leave and take care of some things before he can come back to the hospital in a few days for further treatment. I stated this is extremely high risk, and is at high risk of decompensation and death. He is currently hemodynamically stable, and tolerating the severe MR due to relatively young age and good physical condition, however with currently low normal LV function (EF less than 65% is considered a high risk finding with a flail leaflet).      He is agreeable for ANITRA today   CT surgery consult has been placed, I discussed with Dr. Perry Hollins diltiazem infusion, though would not strive for very strict rate control at this moment   Continue anticoagulation for now   Will further discuss with Dr. Merril Hatchet once ANITRA complete, but if he leaves the hospital I feel it would have to be [address] [place] [address] Drive Monitor closely for decompensation   ID input appreciated   Discussed with Dr. Lory Roe    Due to the immediate potential for life-threatening deterioration due to flail mitral leaflet with severe MR, I spent 31 minutes providing critical care. Baldev Ash MD, [facility]    NOTE: This report was transcribed using voice recognition software. Every effort was made to ensure accuracy; however, inadvertent computerized transcription errors may be present.

## 2022-03-16 NOTE — PROGRESS NOTES
Spoke to patient's sister Juve Sands with an update at her request, and with the patient consent. She was very concerned that the patient was considering leaving AMA. After the ANITRA today, I personally showed the pictures of his flail mitral valve to the patient and reiterated how serious this problem is, and strongly urged him to stay in the hospital for further treatment. He seemed agreeable to stay when I last spoke to him.     Irena Vogel MD

## 2022-03-16 NOTE — PROCEDURES
PRELIMINARY TRANSESOPHAGEAL ECHOCARDIOGRAPHY REPORT    Date of Procedure: 3/16/2022    Indication:  MR    Sedation: Propofol    Complications: None    Preliminary findings:  Low normal LV function  Mild TR/AR    Severe prolapse of the entire posterior mitral valve leaflet with failure of leaflet coaptation  Flail segment at P1/P2 likely due to ruptured chordae  Small mobile echodensity can not rule out vegetation at P1/P2  Very severe eccentric, anteriorly directed mitral regurgitation    No other valvular vegetations    Full report to follow    Alaina Franco MD, [address] [facility]

## 2022-03-16 NOTE — PROGRESS NOTES
Department of Internal Medicine  Infectious Diseases  Progress  Note      C/C : Streptococcus bacteremia , fever     Denied fever , chills   SOB with exertion   Afebrile     Current Facility-Administered Medications   Medication Dose Route Frequency Provider Last Rate Last Admin    metoprolol succinate (TOPROL XL) extended release tablet 50 mg  50 mg Oral BID Elham StefanDO   50 mg at 03/16/22 0843    magnesium sulfate 1-5 GM/100ML-% IVPB (premix)             enoxaparin (LOVENOX) injection 80 mg  1 mg/kg SubCUTAneous BID ROX Martin - CNP   80 mg at 03/16/22 0842    vancomycin (VANCOCIN) 1,250 mg in dextrose 5 % 250 mL IVPB  1,250 mg IntraVENous Q12H Triston Alonso MD   Stopped at 03/16/22 0510    dilTIAZem 100 mg in dextrose 5 % 100 mL infusion ([place])  2.5-15 mg/hr IntraVENous Continuous Sanjana Ward MD 15 mL/hr at 03/16/22 1328 15 mg/hr at 03/16/22 1328    sodium chloride flush 0.9 % injection 5-40 mL  5-40 mL IntraVENous 2 times per day Vanesa Cisneros MD   10 mL at 03/16/22 0843    sodium chloride flush 0.9 % injection 5-40 mL  5-40 mL IntraVENous PRN Vanesa Cisneros MD        0.9 % sodium chloride infusion  25 mL IntraVENous PRN Vanesa Cisneros MD        ondansetron (ZOFRAN-ODT) disintegrating tablet 4 mg  4 mg Oral Q8H PRN Vanesa Cisneros MD        Or    ondansetron (ZOFRAN) injection 4 mg  4 mg IntraVENous Q6H PRN Vanesa Cisneros MD        polyethylene glycol (GLYCOLAX) packet 17 g  17 g Oral Daily PRN Vanesa Cisneros MD        acetaminophen (TYLENOL) tablet 650 mg  650 mg Oral Q6H PRN Vanesa Cisneros MD        Or    acetaminophen (TYLENOL) suppository 650 mg  650 mg Rectal Q6H PRN Vanesa Cisneros MD        potassium chloride 10 mEq/100 mL IVPB (Peripheral Line)  10 mEq IntraVENous PRN Vanesa Cisneros MD        magnesium sulfate 2000 mg in 50 mL IVPB premix  2,000 mg IntraVENous PRN Vanesa Cisneros MD        perflutren lipid microspheres (DEFINITY) injection 1.65 mg  1.5 mL IntraVENous ONCE PRN Bear Gupta MD        atorvastatin (LIPITOR) tablet 40 mg  40 mg Oral Nightly Bear Gupta MD   40 mg at 03/15/22 2008         REVIEW OF SYSTEMS:    CONSTITUTIONAL:  Denies fever, chill or rigors. HEENT: denies blurring of vision or double vision, denies hearing problem  RESPIRATORY: SOB with exertion   CARDIOVASCULAR:  Denies palpitation  GASTROINTESTINAL:  Denies abdomen pain, diarrhea or constipation. GENITOURINARY:  Denies burning urination or frequency of urination  INTEGUMENT: denies wound , rash  HEMATOLOGIC/LYMPHATIC:  Denies lymph node swelling, gum bleeding or easy bruising. MUSCULOSKELETAL:  Denies leg pain , joint pain , joint swelling  NEUROLOGICAL:  Fatigue , weakness     PHYSICAL EXAM:      Vitals:     /68   Pulse 90   Temp 97.4 °F (36.3 °C) (Temporal)   Resp 20   Ht 5' 9\" (1.753 m)   Wt 179 lb (81.2 kg)   SpO2 96%   BMI 26.43 kg/m²       General Appearance:    Awake, alert , no acute distress. Head:    Normocephalic, atraumatic   Eyes:    No pallor, no icterus,   Ears:    No obvious deformity or drainage.    Nose:   No nasal drainage   Throat:   Mucosa moist, no oral thrush   Neck:   Supple, no lymphadenopathy   Back:     no CVA tenderness   Lungs:     Clear to auscultation bilaterally, no wheeze    Heart:    Tachycardic, irregular, systolic murmur    Abdomen:     Soft, non-tender, bowel sounds present    Extremities:   No edema, no cyanosis    Pulses:   Dorsalis pedis palpable    Skin:   no rashes       Lab Results   Component Value Date    WBC 12.1 03/15/2022    RBC 3.33 03/15/2022    HGB 9.2 03/15/2022    HCT 28.4 03/15/2022     03/15/2022    MCV 85.3 03/15/2022    MCH 27.6 03/15/2022    MCHC 32.4 03/15/2022    RDW 13.5 03/15/2022    LYMPHOPCT 10.3 03/15/2022    MONOPCT 7.9 03/15/2022    BASOPCT 0.2 03/15/2022    MONOSABS 0.96 03/15/2022    LYMPHSABS 1.25 03/15/2022    EOSABS 0.11 03/15/2022    BASOSABS 0.02 03/15/2022       CMP     Lab Results   Component Value Date     03/15/2022    K 4.2 03/15/2022     03/15/2022    CO2 21 03/15/2022    BUN 19 03/15/2022    CREATININE 1.1 03/15/2022    GFRAA >60 03/15/2022    LABGLOM >60 03/15/2022    GLUCOSE 129 03/15/2022    PROT 7.9 03/14/2022    LABALBU 3.4 03/14/2022    CALCIUM 8.1 03/15/2022    BILITOT 1.0 03/14/2022    ALKPHOS 117 03/14/2022    AST 22 03/14/2022    ALT 35 03/14/2022         Hepatic Function Panel:    Lab Results   Component Value Date    ALKPHOS 117 03/14/2022    ALT 35 03/14/2022    AST 22 03/14/2022    PROT 7.9 03/14/2022    BILITOT 1.0 03/14/2022    BILIDIR 0.4 03/14/2022    IBILI 0.6 03/14/2022    LABALBU 3.4 03/14/2022       PT/INR:    Lab Results   Component Value Date    PROTIME 15.9 03/14/2022    INR 1.5 03/14/2022       TSH:    Lab Results   Component Value Date    TSH 1.880 03/14/2022       U/A:    Lab Results   Component Value Date    COLORU Yellow 03/14/2022    PHUR 5.0 03/14/2022    WBCUA 0-1 03/14/2022    RBCUA 0-1 03/14/2022    BACTERIA MODERATE 03/14/2022    CLARITYU Clear 03/14/2022    SPECGRAV 1.025 03/14/2022    LEUKOCYTESUR Negative 03/14/2022    UROBILINOGEN 4.0 03/14/2022    BILIRUBINUR Negative 03/14/2022    BLOODU Negative 03/14/2022    GLUCOSEU Negative 03/14/2022    AMORPHOUS FEW 03/14/2022       ABG:  No results found for: WLV7VTQ, BEART, H8TSQXUF, PHART, THGBART, XGO6UBF, PO2ART, SJK2VTJ    MICROBIOLOGY:    Blood culture - GBC in chain       Radiology :    CTA chest       Impression:        No evidence of pulmonary embolism or acute pulmonary abnormality. Echo -  Summary    Atrial fibrillation noted.    Ejection fraction is visually estimated at 50-55%.  Right ventricle global systolic function is reduced.    Severe biatrial dilation.    Mild aortic valve regurgitation.    Mild tricuspid regurgitation. IMPRESSION:     1.   Streptococcus bacteremia, heart murmur ( old ) ,fever r/o endocarditis     RECOMMENDATIONS:      1. Vancomycin 1250 mg IV q 12 hrs- vanco trough , follow up cx

## 2022-03-16 NOTE — PLAN OF CARE
Problem: Infection:  Goal: Will remain free from infection  Description: Will remain free from infection  Outcome: Ongoing     Problem: Safety:  Goal: Free from accidental physical injury  Description: Free from accidental physical injury  Outcome: Ongoing  Goal: Free from intentional harm  Description: Free from intentional harm  Outcome: Ongoing     Problem: Daily Care:  Goal: Daily care needs are met  Description: Daily care needs are met  Outcome: Ongoing     Problem: Pain:  Goal: Patient's pain/discomfort is manageable  Description: Patient's pain/discomfort is manageable  Outcome: Ongoing     Problem: Discharge Planning:  Goal: Patients continuum of care needs are met  Description: Patients continuum of care needs are met  Outcome: Ongoing     Problem: Cardiac:  Goal: Ability to maintain an adequate cardiac output will improve  Description: Ability to maintain an adequate cardiac output will improve  Outcome: Ongoing  Goal: Complications related to the disease process, condition or treatment will be avoided or minimized  Description: Complications related to the disease process, condition or treatment will be avoided or minimized  Outcome: Ongoing  Goal: Hemodynamic stability will improve  Description: Hemodynamic stability will improve  Outcome: Ongoing

## 2022-03-16 NOTE — CARE COORDINATION
Care Coordination: Attempted to visit pt again, pt is currently out of room for [facility]. Per notes on chart, planning for continued inpatient stay and LHC in preparation for surgical  Intervention.  Will continue to follow    Mohit Butler

## 2022-03-16 NOTE — PROGRESS NOTES
Hospitalist Progress Note      Synopsis: Patient admitted on 114/2022 52-year-old male with no known past medical history except childhood murmur, went to the urgent care today for evaluation of fatigue and was found to have A. fib with RVR and was sent to the main hospital emergency room. Patient states his symptoms of fatigue were on and off since past 1 month. Denies any palpitations. However did report some exertional shortness of breath. Patient consulted, as per him, telemedicine doctor, who prescribed him ivermectin and hydroxychloroquine earlier this month for suspicion of COVID-19 infection. Patient is unvaccinated against Covid. Patient completed 5 days of ivermectin, however, did not finish 14-day course of hydroxychloroquine which he stopped about 3 days ago. Patient denies any recent fever, chills, cough, shortness of breath, chest pain, abdominal pain, nausea, vomiting, diarrhea constipation. No presyncopal or syncopal event. Upon arrival in the emergency room, patient was noted to be febrile with T-max 101.1 °F, WBC count slightly elevated at 12.2, CRP elevated at 5.9, proBNP elevated at 6000, lactate level elevated at 2.6, troponin elevated at 42, 43. Patient was started on Cardizem drip. Patient had CTA chest done which did not reveal acute PE. Blood cultures positive for GPC's in chains.       Subjective    Clinically improving. Feeling better. No complaints  Stable overnight. No other overnight issues reported. No CP, SOB, palpitations, blurred vision, HA, lightheadedness, LOC or focal neurological deficits    Exam:  /65   Pulse 115   Temp 98.7 °F (37.1 °C) (Oral)   Resp 18   Ht 5' 9\" (1.753 m)   Wt 179 lb (81.2 kg)   SpO2 99%   BMI 26.43 kg/m²   General appearance: No apparent distress, appears stated age and cooperative. HEENT: Pupils equal, round, and reactive to light. Conjunctivae/corneas clear. Neck: Supple. No jugular venous distention.  Trachea midline. Respiratory:  Normal respiratory effort. Clear to auscultation, bilaterally without Rales/Wheezes/Rhonchi. Cardiovascular: Regular rate and rhythm with normal S1/S2 +3 of 6 EARNESTINE murmurs, rubs or gallops. Abdomen: Soft, non-tender, non-distended with normal bowel sounds. Musculoskeletal: No clubbing, cyanosis or edema bilaterally. Brisk capillary refill. 2+ lower extremity pulses (dorsalis pedis). Skin:  No rashes    Neurologic: awake, alert and following commands     Medications:  Reviewed    Infusion Medications    dilTIAZem 10 mg/hr (03/16/22 0844)    sodium chloride       Scheduled Medications    metoprolol succinate  50 mg Oral BID    magnesium sulfate        enoxaparin  1 mg/kg SubCUTAneous BID    vancomycin  1,250 mg IntraVENous Q12H    sodium chloride flush  5-40 mL IntraVENous 2 times per day    atorvastatin  40 mg Oral Nightly     PRN Meds: sodium chloride flush, sodium chloride, ondansetron **OR** ondansetron, polyethylene glycol, acetaminophen **OR** acetaminophen, potassium chloride, magnesium sulfate, perflutren lipid microspheres    I/O    Intake/Output Summary (Last 24 hours) at 3/16/2022 0911  Last data filed at 3/16/2022 0152  Gross per 24 hour   Intake 0 ml   Output 950 ml   Net -950 ml       Labs:   Recent Labs     03/14/22  2017 03/15/22  0322   WBC 12.2* 12.1*   HGB 10.7* 9.2*   HCT 33.4* 28.4*    175       Recent Labs     03/14/22  2017 03/15/22  0331    134   K 4.5 4.2   CL 97* 101   CO2 24 21*   BUN 20 19   CREATININE 1.3* 1.1   CALCIUM 9.0 8.1*       Recent Labs     03/14/22 2017   PROT 7.9   ALKPHOS 117   ALT 35   AST 22   BILITOT 1.0       Recent Labs     03/14/22 2017   INR 1.5       No results for input(s): Sudeep Tran in the last 72 hours.     Chronic labs:  Lab Results   Component Value Date    CHOL 133 03/15/2022    TRIG 115 03/15/2022    HDL 20 03/15/2022    LDLCALC 90 03/15/2022    TSH 1.880 03/14/2022    INR 1.5 03/14/2022    LABA1C 5.6 03/15/2022       Radiology:  Imaging studies reviewed today. ASSESSMENT:    Principal Problem:    Atrial fibrillation with rapid ventricular response (HCC)  Active Problems:    Sepsis ([address] present on admission    JANA (acute kidney injury) ([address]    Bacteremia  Resolved Problems:    * No resolved hospital problems. *       PLAN:    Atrial fibrillation RVR   admit to intermediate care  Monitor on telemetry  Rate control diltiazem drip  Anticoagulation Lovenox  Echocardiogram  Cardiology consult    Sepsis, bacteremia, suspected endocarditis  Fever 101.1 on admission, afebrile since  Treated cefepime and vancomycin-continue vancomycin  Echocardiogram with partial flail mitral valve, ruptured chordae and likely vegetation  ANITRA today  Urine culture  Blood cultures 2 of 2+ GPC in chains--streptococcal species by PCR  CRP 5.9  Procalcitonin 0.31, repeat  Infectious disease consult    Mitral regurgitation  Ruptured chordae, sever MR, ?vegitation TTE  ANITRA today  CTS surgery    JANA  IVF  Hold nephrotoxins  Urine labs  Monitor renal function, electrolytes, urine output    Medications for other co morbidities cont as appropriate w dosage adjustments as necessary       Diet: Diet NPO Exceptions are: Sips of Water with Meds  Code Status: Full Code  PT/OT Eval Status: Ordered  DVT Prophylaxis:   Lovenox  Recommended disposition at discharge:   Anticipate home 24 to 48 hours    +++++++++++++++++++++++++++++++++++++++++++++++++  Celi Romano MD   [facility].  +++++++++++++++++++++++++++++++++++++++++++++++++  NOTE: This report was transcribed using voice recognition software. Every effort was made to ensure accuracy; however, inadvertent computerized transcription errors may be present.

## 2022-03-16 NOTE — CONSULTS
CTS Consult    Patient name: Mariola Huizar    Reason for consult: flail posterior MV leaflet, torrential MR, endocarditis    Referring Physician: Ramiro Leigh MD    Primary Care Physician: No primary care provider on file. Date of service: 3/16/2022    Chief Complaint: fatigue    HPI: 66yo M with no significant PMH presented to the ED 3/14/2022 with CC of 1 month history of fatigue. While in the ED, he was noted to have fever of 101.1F, WBC 12, BNP 6000, lactic acidosis 2.6. He was found to be in atrial fibrillation with RVR as well as bacteremic with Streptococcus. He was started on a diltiazem gtt. Cardiology and ID were consulted for recommendations. CTA chest did not reveal PE. Since admission, underwent TTE which revealed severe MR with possible flail posterior MV leaflet, possible endocarditis. He is awaiting ANITRA. Currently, he denies complaints. He does admit that he was ill in the beginning of February, felt better for a short period of time, then developed worsening fatigue. He denies any drug use including inhalants or IVDU. Denies source of infection including dental or skin. He has not seen a dentist in quite a while. He does have some bruising under the nail of the right great toe which does not appear to be a source of infection. He is extremely anxious and states that he must leave the hospital for \"3 days\" to handle personal issues. He would not further elaborate and denied social work assistance. He requested clearance for discharge which I stated I would not be willing to do. Advised that leaving the hospital would be against medical advice.      Allergies: No Known Allergies    Home medications:    Current Facility-Administered Medications   Medication Dose Route Frequency Provider Last Rate Last Admin    metoprolol succinate (TOPROL XL) extended release tablet 50 mg  50 mg Oral BID Yoandy Almeida DO   50 mg at 03/16/22 0843    magnesium sulfate 1-5 GM/100ML-% IVPB (premix)             enoxaparin (LOVENOX) injection 80 mg  1 mg/kg SubCUTAneous BID ROX Mayberry - CNP   80 mg at 03/16/22 0842    vancomycin (VANCOCIN) 1,250 mg in dextrose 5 % 250 mL IVPB  1,250 mg IntraVENous Q12H Efren Farmer MD   Stopped at 03/16/22 0510    dilTIAZem 100 mg in dextrose 5 % 100 mL infusion (ADD-[place])  2.5-15 mg/hr IntraVENous Continuous Farhat Rothman MD 12.5 mL/hr at 03/16/22 1012 12.5 mg/hr at 03/16/22 1012    sodium chloride flush 0.9 % injection 5-40 mL  5-40 mL IntraVENous 2 times per day Vivian Correa MD   10 mL at 03/16/22 0843    sodium chloride flush 0.9 % injection 5-40 mL  5-40 mL IntraVENous PRN Vivian Correa MD        0.9 % sodium chloride infusion  25 mL IntraVENous PRN Vivian Correa MD        ondansetron (ZOFRAN-ODT) disintegrating tablet 4 mg  4 mg Oral Q8H PRN Vivian Correa MD        Or    ondansetron (ZOFRAN) injection 4 mg  4 mg IntraVENous Q6H PRN Vivian Correa MD        polyethylene glycol (GLYCOLAX) packet 17 g  17 g Oral Daily PRN Vivian Correa MD        acetaminophen (TYLENOL) tablet 650 mg  650 mg Oral Q6H PRN Vivian Correa MD        Or    acetaminophen (TYLENOL) suppository 650 mg  650 mg Rectal Q6H PRN Vivian Correa MD        potassium chloride 10 mEq/100 mL IVPB (Peripheral Line)  10 mEq IntraVENous PRN Vivian Correa MD        magnesium sulfate 2000 mg in 50 mL IVPB premix  2,000 mg IntraVENous PRN Vivian Correa MD        perflutren lipid microspheres (DEFINITY) injection 1.65 mg  1.5 mL IntraVENous ONCE PRN Vivian Correa MD        atorvastatin (LIPITOR) tablet 40 mg  40 mg Oral Nightly Vivian Correa MD   40 mg at 03/15/22 2008       Past Medical History:  History reviewed. No pertinent past medical history. Past Surgical History:  History reviewed. No pertinent surgical history.     Social History:  Social History     Socioeconomic History    Marital status: Single     Spouse name: Not on file    Number of children: Not on file    Years of education: Not on file    Highest education level: Not on file   Occupational History    Not on file   Tobacco Use    Smoking status: Never Smoker    Smokeless tobacco: Never Used   Substance and Sexual Activity    Alcohol use: Yes     Comment: rarely    Drug use: Never    Sexual activity: Not on file   Other Topics Concern    Not on file   Social History Narrative    Not on file     Social Determinants of Health     Financial Resource Strain:     Difficulty of Paying Living Expenses: Not on file   Food Insecurity:     Worried About Running Out of Food in the Last Year: Not on file    Laureen of Food in the Last Year: Not on file   Transportation Needs:     Lack of Transportation (Medical): Not on file    Lack of Transportation (Non-Medical): Not on file   Physical Activity:     Days of Exercise per Week: Not on file    Minutes of Exercise per Session: Not on file   Stress:     Feeling of Stress : Not on file   Social Connections:     Frequency of Communication with Friends and Family: Not on file    Frequency of Social Gatherings with Friends and Family: Not on file    Attends Je[employer] witness Services: Not on file    Active Member of [address] [place] [address] or Organizations: Not on file    Attends Club or Organization Meetings: Not on file    Marital Status: Not on file   Intimate Partner Violence:     Fear of Current or Ex-Partner: Not on file    Emotionally Abused: Not on file    Physically Abused: Not on file    Sexually Abused: Not on file   Housing Stability:     Unable to Pay for Housing in the Last Year: Not on file    Number of [place] in the Last Year: Not on file    Unstable Housing in the Last Year: Not on file       Family History:  History reviewed. No pertinent family history. Review of Systems   Constitutional: Positive for fatigue and fever. HENT: Negative. Eyes: Negative. Respiratory: Negative. Cardiovascular: Negative. Gastrointestinal: Negative. Endocrine: Negative. Genitourinary: Negative. Musculoskeletal: Negative. Skin: Negative. Allergic/Immunologic: Negative. Neurological: Negative. Hematological: Negative. Psychiatric/Behavioral: The patient is nervous/anxious. Labs:  Recent Labs     03/14/22  2017 03/15/22  0322   WBC 12.2* 12.1*   HGB 10.7* 9.2*   HCT 33.4* 28.4*    175      Recent Labs     03/14/22  2017 03/15/22  0331   BUN 20 19   CREATININE 1.3* 1.1       Objective:  Vitals /65   Pulse 115   Temp 98.7 °F (37.1 °C) (Oral)   Resp 18   Ht 5' 9\" (1.753 m)   Wt 179 lb (81.2 kg)   SpO2 99%   BMI 26.43 kg/m²   Physical Exam       Transthoracic Echocardiogram 3/15/22  Transthoracic Echocardiography Report (TTE)      Demographics      Patient Name       [facility]        Gender               Male                      [address] [facility] Record     [id number]       Room Number          7761   Number      Account #          [de-identified]      Procedure Date       03/15/2022      Corporate ID                      Ordering Physician      Accession Number   [id number]     Referring Physician      Date of Birth      1960     [address] [facility]      Age                62 year(s)     Interpreting         Tera Zaman MD                                     Physician                                        Any Other     Procedure     Type of Study      TTE procedure:Echo Complete W/Doppler & Color Flow. Procedure Date  Date: 03/15/2022 Start: 03:04 PM     Study Location: Portable  Technical Quality: Adequate visualization     Indications:R/O Endocarditis.     Patient Status: Routine     Height: 69 inches Weight: 179 pounds BSA: 1.97 m^2 BMI: 26.43 kg/m^2      Findings      Left Ventricle   Left ventricle is mildly dilated. LVEDD 6.2 cm. LV systolic function is low normal.   Ejection fraction is visually estimated at 50-55%. Indeterminate diastolic function. No regional wall motion abnormalities seen.    Normal left ventricular wall thickness. Right Ventricle   Normal right ventricular size. Right ventricle global systolic function is reduced. TAPSE 1.3 cm. Left Atrium   The left atrium is severely dilated. MARVIN 93 ml/m2. The interatrial septum appears intact. Right Atrium   Markedly enlarged right atrium. Mitral Valve   Myxomatous degeneration of the mitral valve. Partial flail posterior leaflet with failure of leaflet coaptation, with   evidence of ruptured chordae tendineae. There is a small mobile echodensity noted on the ventricular aspect of the   posterior mitral valve, vegetation vs ruptured chordae or papillary   muscle. Absolutely torrential, eccentric mitral regurgitation directed anteriorly. Tricuspid Valve   The tricuspid valve appears structurally normal.   Mild tricuspid regurgitation. RVSP is 40 mmHg. Aortic Valve   Structurally normal aortic valve. The aortic valve is trileaflet. No hemodynamically significant aortic stenosis is present. Mild aortic valve regurgitation. Pulmonic Valve   The pulmonic valve was not well visualized. No evidence of pulmonic valve stenosis. Mild pulmonic regurgitation. Pericardial Effusion   No evidence of pericardial effusion. Aorta   Aortic root dimension within normal limits. Miscellaneous   Normal Inferior Vena Cava diameter and respiratory variation. Conclusions      Summary   Atrial fibrillation noted. Ejection fraction is visually estimated at 50-55%. Right ventricle global systolic function is reduced. Severe biatrial dilation. Mild aortic valve regurgitation. Mild tricuspid regurgitation. *Critical findings*   Partial flail posterior mitral leaflet with failure of leaflet coaptation,   and evidence of ruptured chordae tendineae. Absolutely torrential, eccentric mitral regurgitation directed anteriorly.    There is a small mobile echodensity noted on the ventricular aspect of the   posterior mitral valve, likely ruptured chordae or papillary muscle,   though in the context of positive blood cultures a vegetation is also   suspected which likely led to chordal rupture. Recommend: CT surgery consult, ANITRA       Left Heart Catheterization pending    Assessment: 66yo M with no significant PMH presented to the ED 3/14/2022 with CC of 1 month history of fatigue. He was found to have Streptococcus bacteremia and severe MR with possible flail posterior MV leaflet, possible endocarditis. CTS was consulted for additional recommendations.       Plan:   No emergent surgical intervention as patient is currently fairly well compensated without overt symptoms of heart failure   However, given unclear timing of findings (acute vs subacute), would recommend continued inpatient work-up and monitoring; will defer current management to ID and cardiology  Will follow-up findings of ANITRA  Recommend LHC in preparation for possible surgical intervention, discussed with cardiology  Will order remaining pre-operative testing  Discussed with patient, if decides to leave hospital will be against medical advice    Electronically signed by Odalys Godinez DO on 3/16/2022 at 10:17 AM

## 2022-03-16 NOTE — TELEPHONE ENCOUNTER
Patient's sister would like you to contact her to discuss his case. Patient adament about leaving [facility] may be reached at [phone]

## 2022-03-16 NOTE — ANESTHESIA POSTPROCEDURE EVALUATION
Department of Anesthesiology  Postprocedure Note    Patient: Sridhar Gasca  MRN: [id number]  YOB: 1960  Date of evaluation: 3/16/2022  Time:  2:22 PM     Procedure Summary     Date: 03/16/22 Room / Location: [facility] CATH LAB; [facility] ECHO    Anesthesia Start: 4622 Anesthesia Stop:     Procedure: SEY ANITRA Diagnosis:     Scheduled Providers: Janie Omalley DO; ROX Castaneda - CRNA Responsible Provider: Janie Omalley DO    Anesthesia Type: MAC ASA Status: 3          Anesthesia Type: No value filed. Alexa Phase I:      Alexa Phase II:      Last vitals: Reviewed and per EMR flowsheets.        Anesthesia Post Evaluation    Patient location during evaluation: bedside  Patient participation: complete - patient cannot participate  Level of consciousness: awake and alert  Airway patency: patent  Nausea & Vomiting: no nausea and no vomiting  Complications: no  Cardiovascular status: blood pressure returned to baseline  Respiratory status: acceptable  Hydration status: euvolemic

## 2022-03-16 NOTE — ANESTHESIA PRE PROCEDURE
Department of Anesthesiology  Preprocedure Note       Name:  Delia Torres   Age:  62 y.o.  :  1960                                          MRN:  [id number]         Date:  3/16/2022      Surgeon: * No surgeons listed *    Procedure: ANITRA    Medications prior to admission:   Prior to Admission medications    Medication Sig Start Date End Date Taking?  Authorizing Provider   Ascorbic Acid (VITAMIN C PO) Take by mouth daily   Yes Historical Provider, MD   VITAMIN D PO Take by mouth daily   Yes Historical Provider, MD   ZINC PO Take by mouth daily   Yes Historical Provider, MD       Current medications:    Current Facility-Administered Medications   Medication Dose Route Frequency Provider Last Rate Last Admin    metoprolol succinate (TOPROL XL) extended release tablet 50 mg  50 mg Oral BID Nguyễn Chiquito, DO   50 mg at 22 0843    magnesium sulfate 1-5 GM/100ML-% IVPB (premix)             enoxaparin (LOVENOX) injection 80 mg  1 mg/kg SubCUTAneous BID Renella Rene, APRN - CNP   80 mg at 22 0842    vancomycin (VANCOCIN) 1,250 mg in dextrose 5 % 250 mL IVPB  1,250 mg IntraVENous Q12H Raiza Rosa MD   Stopped at 22 0510    dilTIAZem 100 mg in dextrose 5 % 100 mL infusion (ADD-[place])  2.5-15 mg/hr IntraVENous Continuous Edilberto Rubin MD 15 mL/hr at 22 1100 15 mg/hr at 22 1100    sodium chloride flush 0.9 % injection 5-40 mL  5-40 mL IntraVENous 2 times per day Yandy Zamudio MD   10 mL at 22 0843    sodium chloride flush 0.9 % injection 5-40 mL  5-40 mL IntraVENous PRN Yandy Zamudio MD        0.9 % sodium chloride infusion  25 mL IntraVENous PRN Yandy Zamudio MD        ondansetron (ZOFRAN-ODT) disintegrating tablet 4 mg  4 mg Oral Q8H PRN Yandy Zamudio MD        Or    ondansetron (ZOFRAN) injection 4 mg  4 mg IntraVENous Q6H PRN Yandy Zamudio MD        polyethylene glycol (GLYCOLAX) packet 17 g  17 g Oral Daily PRN Yandy Zamudio MD        acetaminophen (TYLENOL) tablet 650 mg  650 mg Oral Q6H PRN Deloris Khan MD        Or    acetaminophen (TYLENOL) suppository 650 mg  650 mg Rectal Q6H PRN Deloris Khan MD        potassium chloride 10 mEq/100 mL IVPB (Peripheral Line)  10 mEq IntraVENous PRN Deloris Khan MD        magnesium sulfate 2000 mg in 50 mL IVPB premix  2,000 mg IntraVENous PRN Deloris Khan MD        perflutren lipid microspheres (DEFINITY) injection 1.65 mg  1.5 mL IntraVENous ONCE PRN Deloris Khan MD        atorvastatin (LIPITOR) tablet 40 mg  40 mg Oral Nightly Deloris Khan MD   40 mg at 03/15/22 2008       Allergies:  No Known Allergies    Problem List:    Patient Active Problem List   Diagnosis Code    Atrial fibrillation with rapid ventricular response (HCC) I48.91    Sepsis ([facility] [address] present on admission A41.9    JANA (acute kidney injury) ([facility] [address] N17.9    Bacteremia R78.81       Past Medical History:  History reviewed. No pertinent past medical history. Past Surgical History:  History reviewed. No pertinent surgical history. Social History:    Social History     Tobacco Use    Smoking status: Never Smoker    Smokeless tobacco: Never Used   Substance Use Topics    Alcohol use: Yes     Comment: rarely                                Counseling given: Not Answered      Vital Signs (Current):   Vitals:    03/16/22 0144 03/16/22 0700 03/16/22 0840 03/16/22 1220   BP:   107/65 (!) 132/90   Pulse: 147 100 115 121   Resp:   18 16   Temp:   37.1 °C (98.7 °F) 36.3 °C (97.4 °F)   TempSrc:   Oral Temporal   SpO2:   99% 98%   Weight:       Height:                                                  BP Readings from Last 3 Encounters:   03/16/22 (!) 132/90       NPO Status:  > 12 hours                                                                                BMI:   Wt Readings from Last 3 Encounters:   03/15/22 179 lb (81.2 kg)     Body mass index is 26.43 kg/m².     CBC:   Lab Results   Component Value Date    WBC 12.1 03/15/2022    RBC 3.33 03/15/2022 HGB 9.2 03/15/2022    HCT 28.4 03/15/2022    MCV 85.3 03/15/2022    RDW 13.5 03/15/2022     03/15/2022       CMP:   Lab Results   Component Value Date     03/15/2022    K 4.2 03/15/2022     03/15/2022    CO2 21 03/15/2022    BUN 19 03/15/2022    CREATININE 1.1 03/15/2022    GFRAA >60 03/15/2022    LABGLOM >60 03/15/2022    GLUCOSE 129 03/15/2022    PROT 7.9 03/14/2022    CALCIUM 8.1 03/15/2022    BILITOT 1.0 03/14/2022    ALKPHOS 117 03/14/2022    AST 22 03/14/2022    ALT 35 03/14/2022       POC Tests: No results for input(s): POCGLU, POCNA, POCK, POCCL, POCBUN, POCHEMO, POCHCT in the last 72 hours. Coags:   Lab Results   Component Value Date    PROTIME 15.9 03/14/2022    INR 1.5 03/14/2022    APTT 30.2 03/14/2022       HCG (If Applicable): No results found for: PREGTESTUR, PREGSERUM, HCG, HCGQUANT     ABGs: No results found for: PHART, PO2ART, AFS8RFU, JQJ9ZZQ, BEART, X7QXBZTR     Type & Screen (If Applicable):  No results found for: LABABO, LABRH    Drug/Infectious Status (If Applicable):  No results found for: HIV, HEPCAB    COVID-19 Screening (If Applicable):   Lab Results   Component Value Date    COVID19 Not Detected 03/14/2022    COVID19 Not Detected 03/14/2022           Anesthesia Evaluation  Patient summary reviewed and Nursing notes reviewed no history of anesthetic complications:   Airway: Mallampati: II  TM distance: >3 FB   Neck ROM: full  Mouth opening: > = 3 FB Dental: normal exam         Pulmonary:Negative Pulmonary ROS                              Cardiovascular:  Exercise tolerance: poor (<4 METS),   (+) valvular problems/murmurs:, dysrhythmias (RVR): atrial fibrillation, CHF:,       ECG reviewed  Rhythm: irregular  Rate: abnormal  Echocardiogram reviewed                  Neuro/Psych:   Negative Neuro/Psych ROS              GI/Hepatic/Renal:   (+) renal disease: ARF,           Endo/Other:                     Abdominal:             Vascular:           Other Findings: Anesthesia Plan      MAC     ASA 3       Induction: intravenous. Anesthetic plan and risks discussed with patient. Plan discussed with attending.                   ROX Delgado - CRNA   3/16/2022

## 2022-03-17 ENCOUNTER — APPOINTMENT (OUTPATIENT)
Dept: GENERAL RADIOLOGY | Age: 62
DRG: 853 | End: 2022-03-17
Payer: COMMERCIAL

## 2022-03-17 LAB
ANION GAP SERPL CALCULATED.3IONS-SCNC: 11 MMOL/L (ref 7–16)
BUN BLDV-MCNC: 24 MG/DL (ref 6–23)
CALCIUM SERPL-MCNC: 8.3 MG/DL (ref 8.6–10.2)
CHLORIDE BLD-SCNC: 103 MMOL/L (ref 98–107)
CO2: 22 MMOL/L (ref 22–29)
CREAT SERPL-MCNC: 1.2 MG/DL (ref 0.7–1.2)
CULTURE, BLOOD 2: ABNORMAL
GFR AFRICAN AMERICAN: >60
GFR NON-AFRICAN AMERICAN: >60 ML/MIN/1.73
GLUCOSE BLD-MCNC: 128 MG/DL (ref 74–99)
ORGANISM: ABNORMAL
ORGANISM: ABNORMAL
POTASSIUM SERPL-SCNC: 4.2 MMOL/L (ref 3.5–5)
PRO-BNP: 3436 PG/ML (ref 0–125)
SODIUM BLD-SCNC: 136 MMOL/L (ref 132–146)
URINE CULTURE, ROUTINE: NORMAL
VANCOMYCIN TROUGH: 19.4 MCG/ML (ref 5–16)

## 2022-03-17 PROCEDURE — 99291 CRITICAL CARE FIRST HOUR: CPT | Performed by: INTERNAL MEDICINE

## 2022-03-17 PROCEDURE — 2580000003 HC RX 258: Performed by: INTERNAL MEDICINE

## 2022-03-17 PROCEDURE — 2700000000 HC OXYGEN THERAPY PER DAY

## 2022-03-17 PROCEDURE — 80202 ASSAY OF VANCOMYCIN: CPT

## 2022-03-17 PROCEDURE — 6360000002 HC RX W HCPCS: Performed by: INTERNAL MEDICINE

## 2022-03-17 PROCEDURE — 6370000000 HC RX 637 (ALT 250 FOR IP): Performed by: INTERNAL MEDICINE

## 2022-03-17 PROCEDURE — 2500000003 HC RX 250 WO HCPCS: Performed by: STUDENT IN AN ORGANIZED HEALTH CARE EDUCATION/TRAINING PROGRAM

## 2022-03-17 PROCEDURE — 36415 COLL VENOUS BLD VENIPUNCTURE: CPT

## 2022-03-17 PROCEDURE — 80048 BASIC METABOLIC PNL TOTAL CA: CPT

## 2022-03-17 PROCEDURE — 2140000000 HC CCU INTERMEDIATE R&B

## 2022-03-17 PROCEDURE — 6360000002 HC RX W HCPCS: Performed by: NURSE PRACTITIONER

## 2022-03-17 PROCEDURE — 71045 X-RAY EXAM CHEST 1 VIEW: CPT

## 2022-03-17 PROCEDURE — 87040 BLOOD CULTURE FOR BACTERIA: CPT

## 2022-03-17 PROCEDURE — 83880 ASSAY OF NATRIURETIC PEPTIDE: CPT

## 2022-03-17 PROCEDURE — 99232 SBSQ HOSP IP/OBS MODERATE 35: CPT | Performed by: STUDENT IN AN ORGANIZED HEALTH CARE EDUCATION/TRAINING PROGRAM

## 2022-03-17 RX ORDER — HYDRALAZINE HYDROCHLORIDE 10 MG/1
10 TABLET, FILM COATED ORAL ONCE
Status: COMPLETED | OUTPATIENT
Start: 2022-03-17 | End: 2022-03-17

## 2022-03-17 RX ORDER — FUROSEMIDE 10 MG/ML
40 INJECTION INTRAMUSCULAR; INTRAVENOUS ONCE
Status: COMPLETED | OUTPATIENT
Start: 2022-03-17 | End: 2022-03-17

## 2022-03-17 RX ORDER — HYDRALAZINE HYDROCHLORIDE 10 MG/1
10 TABLET, FILM COATED ORAL EVERY 8 HOURS SCHEDULED
Status: DISCONTINUED | OUTPATIENT
Start: 2022-03-17 | End: 2022-03-19

## 2022-03-17 RX ADMIN — DEXTROSE MONOHYDRATE 15 MG/HR: 50 INJECTION, SOLUTION INTRAVENOUS at 21:54

## 2022-03-17 RX ADMIN — VANCOMYCIN HYDROCHLORIDE 1000 MG: 1 INJECTION, POWDER, LYOPHILIZED, FOR SOLUTION INTRAVENOUS at 17:14

## 2022-03-17 RX ADMIN — HYDRALAZINE HYDROCHLORIDE 10 MG: 10 TABLET, FILM COATED ORAL at 14:11

## 2022-03-17 RX ADMIN — ATORVASTATIN CALCIUM 40 MG: 40 TABLET, FILM COATED ORAL at 20:46

## 2022-03-17 RX ADMIN — ENOXAPARIN SODIUM 80 MG: 100 INJECTION SUBCUTANEOUS at 08:40

## 2022-03-17 RX ADMIN — DEXTROSE MONOHYDRATE 15 MG/HR: 50 INJECTION, SOLUTION INTRAVENOUS at 13:53

## 2022-03-17 RX ADMIN — METOPROLOL SUCCINATE 50 MG: 50 TABLET, EXTENDED RELEASE ORAL at 20:46

## 2022-03-17 RX ADMIN — FUROSEMIDE 40 MG: 10 INJECTION, SOLUTION INTRAMUSCULAR; INTRAVENOUS at 10:36

## 2022-03-17 RX ADMIN — METOPROLOL SUCCINATE 50 MG: 50 TABLET, EXTENDED RELEASE ORAL at 08:40

## 2022-03-17 RX ADMIN — DEXTROSE MONOHYDRATE 15 MG/HR: 50 INJECTION, SOLUTION INTRAVENOUS at 06:40

## 2022-03-17 RX ADMIN — HYDRALAZINE HYDROCHLORIDE 10 MG: 10 TABLET, FILM COATED ORAL at 10:36

## 2022-03-17 RX ADMIN — VANCOMYCIN HYDROCHLORIDE 1250 MG: 10 INJECTION, POWDER, LYOPHILIZED, FOR SOLUTION INTRAVENOUS at 04:54

## 2022-03-17 RX ADMIN — ENOXAPARIN SODIUM 80 MG: 100 INJECTION SUBCUTANEOUS at 20:45

## 2022-03-17 ASSESSMENT — PAIN SCALES - GENERAL: PAINLEVEL_OUTOF10: 0

## 2022-03-17 NOTE — PROGRESS NOTES
Department of Internal Medicine  Infectious Diseases  Progress  Note      C/C : Streptococcus bacteremia , fever     Denied fever , chills   SOB with exertion   Afebrile     Current Facility-Administered Medications   Medication Dose Route Frequency Provider Last Rate Last Admin    hydrALAZINE (APRESOLINE) tablet 10 mg  10 mg Oral 3 times per day Lore Wong MD        metoprolol succinate (TOPROL XL) extended release tablet 50 mg  50 mg Oral BID Yamil Douglas, DO   50 mg at 03/17/22 0840    enoxaparin (LOVENOX) injection 80 mg  1 mg/kg SubCUTAneous BID Yvette ThompsonROX - CNP   80 mg at 03/17/22 0840    vancomycin (VANCOCIN) 1,250 mg in dextrose 5 % 250 mL IVPB  1,250 mg IntraVENous Q12H Fausitno Archer .7 mL/hr at 03/17/22 0454 1,250 mg at 03/17/22 0454    dilTIAZem 100 mg in dextrose 5 % 100 mL infusion ([facility])  2.5-15 mg/hr IntraVENous Continuous Brittney MD Blue 15 mL/hr at 03/17/22 1353 15 mg/hr at 03/17/22 1353    sodium chloride flush 0.9 % injection 5-40 mL  5-40 mL IntraVENous 2 times per day Reyna Snyder MD   10 mL at 03/16/22 0843    sodium chloride flush 0.9 % injection 5-40 mL  5-40 mL IntraVENous PRN Reyna Snyder MD        0.9 % sodium chloride infusion  25 mL IntraVENous PRN Reyna Snyder MD        ondansetron (ZOFRAN-ODT) disintegrating tablet 4 mg  4 mg Oral Q8H PRN Reyna Snyder MD        Or    ondansetron (ZOFRAN) injection 4 mg  4 mg IntraVENous Q6H PRN Reyna Snyder MD        polyethylene glycol (GLYCOLAX) packet 17 g  17 g Oral Daily PRN Reyna Snyder MD        acetaminophen (TYLENOL) tablet 650 mg  650 mg Oral Q6H PRN Reyna Snyder MD   650 mg at 03/16/22 2247    Or    acetaminophen (TYLENOL) suppository 650 mg  650 mg Rectal Q6H PRN Reyna Snyder MD        potassium chloride 10 mEq/100 mL IVPB (Peripheral Line)  10 mEq IntraVENous PRN Reyna Snyder MD        magnesium sulfate 2000 mg in 50 mL IVPB premix  2,000 mg IntraVENous PRN Reyna Snyder MD  perflutren lipid microspheres (DEFINITY) injection 1.65 mg  1.5 mL IntraVENous ONCE PRN Vivian Correa MD        atorvastatin (LIPITOR) tablet 40 mg  40 mg Oral Nightly Vivian Correa MD   40 mg at 03/16/22 2108         REVIEW OF SYSTEMS:    CONSTITUTIONAL:  Denies fever, chill or rigors. HEENT: denies blurring of vision or double vision, denies hearing problem  RESPIRATORY: SOB with exertion   CARDIOVASCULAR:  Denies palpitation  GASTROINTESTINAL:  Denies abdomen pain, diarrhea or constipation. GENITOURINARY:  Denies burning urination or frequency of urination  INTEGUMENT: denies wound , rash  HEMATOLOGIC/LYMPHATIC:  Denies lymph node swelling, gum bleeding or easy bruising. MUSCULOSKELETAL:  Denies leg pain , joint pain , joint swelling  NEUROLOGICAL:  Fatigue , weakness     PHYSICAL EXAM:      Vitals:     /77   Pulse 84   Temp 99 °F (37.2 °C) (Oral)   Resp 18   Ht 5' 9\" (1.753 m)   Wt 179 lb (81.2 kg)   SpO2 97%   BMI 26.43 kg/m²       General Appearance:    Awake, alert , no acute distress. Head:    Normocephalic, atraumatic   Eyes:    No pallor, no icterus,   Ears:    No obvious deformity or drainage.    Nose:   No nasal drainage   Throat:   Mucosa moist, no oral thrush   Neck:   Supple, no lymphadenopathy   Back:     no CVA tenderness   Lungs:     Clear to auscultation bilaterally, no wheeze    Heart:    Tachycardic, irregular, systolic murmur    Abdomen:     Soft, non-tender, bowel sounds present    Extremities:   No edema, no cyanosis    Pulses:   Dorsalis pedis palpable    Skin:   no rashes       Lab Results   Component Value Date    WBC 12.1 03/15/2022    RBC 3.33 03/15/2022    HGB 9.2 03/15/2022    HCT 28.4 03/15/2022     03/15/2022    MCV 85.3 03/15/2022    MCH 27.6 03/15/2022    MCHC 32.4 03/15/2022    RDW 13.5 03/15/2022    LYMPHOPCT 10.3 03/15/2022    MONOPCT 7.9 03/15/2022    BASOPCT 0.2 03/15/2022    MONOSABS 0.96 03/15/2022    LYMPHSABS 1.25 03/15/2022    EOSABS 0.11 03/15/2022    BASOSABS 0.02 03/15/2022       CMP     Lab Results   Component Value Date     03/17/2022    K 4.2 03/17/2022    K 4.2 03/15/2022     03/17/2022    CO2 22 03/17/2022    BUN 24 03/17/2022    CREATININE 1.2 03/17/2022    GFRAA >60 03/17/2022    LABGLOM >60 03/17/2022    GLUCOSE 128 03/17/2022    PROT 7.9 03/14/2022    LABALBU 3.4 03/14/2022    CALCIUM 8.3 03/17/2022    BILITOT 1.0 03/14/2022    ALKPHOS 117 03/14/2022    AST 22 03/14/2022    ALT 35 03/14/2022         Hepatic Function Panel:    Lab Results   Component Value Date    ALKPHOS 117 03/14/2022    ALT 35 03/14/2022    AST 22 03/14/2022    PROT 7.9 03/14/2022    BILITOT 1.0 03/14/2022    BILIDIR 0.4 03/14/2022    IBILI 0.6 03/14/2022    LABALBU 3.4 03/14/2022       PT/INR:    Lab Results   Component Value Date    PROTIME 15.9 03/14/2022    INR 1.5 03/14/2022       TSH:    Lab Results   Component Value Date    TSH 1.880 03/14/2022       U/A:    Lab Results   Component Value Date    COLORU Yellow 03/14/2022    PHUR 5.0 03/14/2022    WBCUA 0-1 03/14/2022    RBCUA 0-1 03/14/2022    BACTERIA MODERATE 03/14/2022    CLARITYU Clear 03/14/2022    SPECGRAV 1.025 03/14/2022    LEUKOCYTESUR Negative 03/14/2022    UROBILINOGEN 4.0 03/14/2022    BILIRUBINUR Negative 03/14/2022    BLOODU Negative 03/14/2022    GLUCOSEU Negative 03/14/2022    AMORPHOUS FEW 03/14/2022       ABG:  No results found for: VJR0JTH, BEART, W1SEVEXC, PHART, THGBART, SJS7JMP, PO2ART, RDX8ZSB    MICROBIOLOGY:    Blood culture - Strep mitis /oralis       Radiology :    CTA chest       Impression:        No evidence of pulmonary embolism or acute pulmonary abnormality.         Echo -       Ejection fraction is visually estimated at 55%.    Severe holosystolic prolapse of the posterior leaflet with a flail P1/P2    segment due to ruptured chordae tendineae.    Small mobile echodensity suspicious for vegetation on ventricular surface    of posterior leaflet.    Failure of leaflet coaptation.    No definitive papillary muscle rupture.    Torrential mitral regurgitation, eccentric jet directed anteriorly. IMPRESSION:     1.   Streptococcus bacteremia ( 3/14 and 3/15) ,    RECOMMENDATIONS:      1. Vancomycin to 1000 mg IV q 12 hrs-  , follow up cx

## 2022-03-17 NOTE — PROGRESS NOTES
INPATIENT CARDIOLOGY FOLLOW-UP    Name: Nusrat Marr    Age: 62 y.o. Date of Admission: 3/14/2022  8:06 PM    Date of Service: 3/17/2022    Primary Cardiologist: Known to me from his admission    Chief Complaint: Follow-up for Severe MR with flail leaflet, endocarditis, AF    Interim History:  Has no shortness of breath last night, currently on nasal cannula feels better this morning. He realizes he needs to stay in the hospital and now states he does not know what he was thinking what he was talking about leaving. Remains on diltiazem infusion with fairly well-controlled heart rates.     Culture showing strep mitis/oralis, and admits to having some dental problems but not seeing a dentist.    Review of Systems:   Negative except as described above    Problem List:  Patient Active Problem List   Diagnosis    [facility] ventricular [facility]    Sepsis ([facility] present on admission    JANA (acute kidney injury) ([facility]    Bacteremia    Severe mitral regurgitation    Suspected endocarditis    Subacute bacterial endocarditis    Ruptured chordae tendineae (HCC)       Current Medications:    Current Facility-Administered Medications:     metoprolol succinate (TOPROL XL) extended release tablet 50 mg, 50 mg, Oral, BID, Chewelah Chloe, DO, 50 mg at 03/16/22 2107    enoxaparin (LOVENOX) injection 80 mg, 1 mg/kg, SubCUTAneous, BID, ROX Quiñones CNP, 80 mg at 03/16/22 2108    vancomycin (VANCOCIN) 1,250 mg in dextrose 5 % 250 mL IVPB, 1,250 mg, IntraVENous, Q12H, Faustino Archer MD, Last Rate: 166.7 mL/hr at 03/17/22 0454, 1,250 mg at 03/17/22 0454    [COMPLETED] dilTIAZem injection 10 mg, 10 mg, IntraVENous, Once, 10 mg at 03/14/22 2022 **FOLLOWED BY** dilTIAZem 100 mg in dextrose 5 % 100 mL infusion ([facility]), 2.5-15 mg/hr, IntraVENous, Continuous, Ty Contreras MD, Last Rate: 15 mL/hr at 03/17/22 0640, 15 mg/hr at 03/17/22 0640    sodium chloride flush 0.9 % injection 5-40 mL, 5-40 mL, IntraVENous, 2 times per day, Drew Edmonds MD, 10 mL at 03/16/22 0843    sodium chloride flush 0.9 % injection 5-40 mL, 5-40 mL, IntraVENous, PRN, Drew Edmonds MD    0.9 % sodium chloride infusion, 25 mL, IntraVENous, PRN, Drew Edmonds MD    ondansetron (ZOFRAN-ODT) disintegrating tablet 4 mg, 4 mg, Oral, Q8H PRN **OR** ondansetron (ZOFRAN) injection 4 mg, 4 mg, IntraVENous, Q6H PRN, Drew Edmonds MD    polyethylene glycol (GLYCOLAX) packet 17 g, 17 g, Oral, Daily PRN, Drwe Edmonds MD    acetaminophen (TYLENOL) tablet 650 mg, 650 mg, Oral, Q6H PRN, 650 mg at 03/16/22 2247 **OR** acetaminophen (TYLENOL) suppository 650 mg, 650 mg, Rectal, Q6H PRN, Drew Edmonds MD    potassium chloride 10 mEq/100 mL IVPB (Peripheral Line), 10 mEq, IntraVENous, PRN, Drew Edmonds MD    magnesium sulfate 2000 mg in 50 mL IVPB premix, 2,000 mg, IntraVENous, PRN, Drew Edmonds MD    perflutren lipid microspheres (DEFINITY) injection 1.65 mg, 1.5 mL, IntraVENous, ONCE PRN, Drew Edmonds MD    atorvastatin (LIPITOR) tablet 40 mg, 40 mg, Oral, Nightly, Drew Edmonds MD, 40 mg at 03/16/22 2108    Physical Exam:  /77   Pulse 84   Temp 99 °F (37.2 °C) (Oral)   Resp 18   Ht 5' 9\" (1.753 m)   Wt 179 lb (81.2 kg)   SpO2 97%   BMI 26.43 kg/m²   Wt Readings from Last 3 Encounters:   03/15/22 179 lb (81.2 kg)     Appearance: Awake, alert, no acute respiratory distress  Skin: Intact, no rash  Head: Normocephalic, atraumatic  Eyes: EOMI, no conjunctival erythema  ENMT: No pharyngeal erythema, MMM, no rhinorrhea  Neck: Supple, no elevated JVP, no carotid bruits  Lungs: Trace basilar rales  Cardiac: PMI nondisplaced, irregular rhythm with a tachycardic rate, S1 & S2 normal, 4/6 holosystolic murmur left sternal border  Abdomen: Soft, nontender, +bowel sounds  Extremities: Moves all extremities x 4, no lower extremity edema  Neurologic: No focal motor deficits apparent, normal mood and affect  Peripheral Pulses: Intact posterior tibial pulses bilaterally    Intake/Output:    Intake/Output Summary (Last 24 hours) at 3/17/2022 0822  Last data filed at 3/17/2022 0456  Gross per 24 hour   Intake 450 ml   Output 350 ml   Net 100 ml     No intake/output data recorded. Laboratory Tests:  Recent Labs     03/14/22  2017 03/15/22  0331    134   K 4.5 4.2   CL 97* 101   CO2 24 21*   BUN 20 19   CREATININE 1.3* 1.1   GLUCOSE 156* 129*   CALCIUM 9.0 8.1*     Lab Results   Component Value Date    MG 2.4 03/14/2022     Recent Labs     03/14/22 2017   ALKPHOS 117   ALT 35   AST 22   PROT 7.9   BILITOT 1.0   BILIDIR 0.4*   LABALBU 3.4*     Recent Labs     03/14/22  2017 03/15/22  0322   WBC 12.2* 12.1*   RBC 3.88 3.33*   HGB 10.7* 9.2*   HCT 33.4* 28.4*   MCV 86.1 85.3   MCH 27.6 27.6   MCHC 32.0 32.4   RDW 13.8 13.5    175   MPV 11.3 10.8     No results found for: CKTOTAL, CKMB, CKMBINDEX, TROPONINI  Lab Results   Component Value Date    INR 1.5 03/14/2022    PROTIME 15.9 (H) 03/14/2022     Lab Results   Component Value Date    TSH 1.880 03/14/2022     Lab Results   Component Value Date    LABA1C 5.6 03/15/2022     No results found for: EAG  Lab Results   Component Value Date    CHOL 133 03/15/2022     Lab Results   Component Value Date    TRIG 115 03/15/2022     Lab Results   Component Value Date    HDL 20 03/15/2022     Lab Results   Component Value Date    LDLCALC 90 03/15/2022     Lab Results   Component Value Date    LABVLDL 23 03/15/2022     No results found for: CHOLHDLRATIO  Recent Labs     03/14/22 2017   PROBNP 6,000*       Cardiac Tests:    EKG:   3/14/2022: Atrial fibrillation  bpm.  PVC or aberrant complexes     Telemetry: Atrial fibrillation 100 120s    Chest X-ray:   Impression   No evidence of pulmonary embolism or acute pulmonary abnormality. Echocardiogram:   TTE 3/15/22   Summary   Atrial fibrillation noted. Ejection fraction is visually estimated at 50-55%. Right ventricle global systolic function is reduced. Severe biatrial dilation. Mild aortic valve regurgitation. Mild tricuspid regurgitation. 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 Absolutely torrential, eccentric mitral regurgitation directed anteriorly. There is a small mobile echodensity noted on the ventricular aspect of the   posterior mitral valve, likely ruptured chordae or papillary muscle,   though in the context of positive blood cultures a vegetation is also   suspected which likely led to chordal rupture. Recommend: CT surgery consult, ANITRA    ANITRA 3/16/22   Summary   Ejection fraction is visually estimated at 55%. Severe holosystolic prolapse of the posterior leaflet with a flail P1/P2   segment due to ruptured chordae tendineae. Small mobile echodensity suspicious for vegetation on ventricular surface   of posterior leaflet. Failure of leaflet coaptation. No definitive papillary muscle rupture. Torrential mitral regurgitation, eccentric jet directed anteriorly. Stress test:      Cardiac catheterization:     ----------------------------------------------------------------------------------------------------------------------------------------------------------------  IMPRESSION:  1. Flail posterior mitral leaflet with very severe eccentric MR likely due to ruptured chordae due to endocarditis  2. Streptococcal mitis/oralis bacteremia, likely from dental source  3. New onset atrial fibrillation due to the above. [id number] 1 (developing CHF)  4. Acute HFpEF due to above  5. Sepsis    RECOMMENDATIONS:  Patient now seems to realize the severity of his problem and has agreed to remain inpatient for ongoing treatment. Currently remains hemodynamically stable but tenuous, but certainly remains at high risk of decompensation.      Left heart catheterization planned for tomorrow, to rule out CAD prior to surgery   Timing of mitral valve surgery to be determined   Ongoing discussion with Dr. Efren De La Cruz Will touch base with ID regarding heart catheterization for tomorrow   Give a dose of IV furosemide   Start low-dose hydralazine for afterload reduction   Chest x-ray, BMP and proBNP this morning   Continue anticoagulation for now   Monitor closely for decompensation, low threshold to transfer to higher level of care and/or for intravenous nitroprusside   Dental consult pending    Due to the immediate potential for life-threatening deterioration due to flail mitral leaflet with severe MR, I spent 31 minutes providing critical care. Chaz Bolton MD, [id number]    NOTE: This report was transcribed using voice recognition software. Every effort was made to ensure accuracy; however, inadvertent computerized transcription errors may be present.

## 2022-03-17 NOTE — PROGRESS NOTES
CC: fatigue    Brief HPI:  Awake, alert. No complaints. History reviewed. No pertinent past medical history. Past Surgical History:   Procedure Laterality Date    TRANSESOPHAGEAL ECHOCARDIOGRAM  03/16/2022    Dr. Dowell Linear History     Socioeconomic History    Marital status: Single     Spouse name: Not on file    Number of children: Not on file    Years of education: Not on file    Highest education level: Not on file   Occupational History    Not on file   Tobacco Use    Smoking status: Never Smoker    Smokeless tobacco: Never Used   Substance and Sexual Activity    Alcohol use: Yes     Comment: rarely    Drug use: Never    Sexual activity: Not on file   Other Topics Concern    Not on file   Social History Narrative    Not on file     Social Determinants of Health     Financial Resource Strain:     Difficulty of Paying Living Expenses: Not on file   Food Insecurity:     Worried About Running Out of Food in the Last Year: Not on file    Laureen of Food in the Last Year: Not on file   Transportation Needs:     Lack of Transportation (Medical): Not on file    Lack of Transportation (Non-Medical):  Not on file   Physical Activity:     Days of Exercise per Week: Not on file    Minutes of Exercise per Session: Not on file   Stress:     Feeling of Stress : Not on file   Social Connections:     Frequency of Communication with Friends and Family: Not on file    Frequency of Social Gatherings with Friends and Family: Not on file    Attends Synagogue Services: Not on file    Active Member of Clubs or Organizations: Not on file    Attends Club or Organization Meetings: Not on file    Marital Status: Not on file   Intimate Partner Violence:     Fear of Current or Ex-Partner: Not on file    Emotionally Abused: Not on file    Physically Abused: Not on file    Sexually Abused: Not on file   Housing Stability:     Unable to Pay for Housing in the Last Year: Not on file    Number of Places Lived in the Last Year: Not on file    Unstable Housing in the Last Year: Not on file     History reviewed. No pertinent family history. Vitals:    03/16/22 2000 03/16/22 2245 03/17/22 0500 03/17/22 0800   BP: 107/73  95/73 106/77   Pulse: 89  57 84   Resp: 20  18 18   Temp: 99.9 °F (37.7 °C) 100.6 °F (38.1 °C) 99.9 °F (37.7 °C) 99 °F (37.2 °C)   TempSrc: Oral  Oral Oral   SpO2: 94%  98% 97%   Weight:       Height:             Intake/Output Summary (Last 24 hours) at 3/17/2022 1232  Last data filed at 3/17/2022 0800  Gross per 24 hour   Intake 690 ml   Output 350 ml   Net 340 ml       Recent Labs     03/14/22  2017 03/15/22  0322   WBC 12.2* 12.1*   HGB 10.7* 9.2*   HCT 33.4* 28.4*    175      Recent Labs     03/14/22  2017 03/15/22  0331 03/17/22  1003   BUN 20 19 24*   CREATININE 1.3* 1.1 1.2       ROS:   Negative for CP, palpitations, SOB at rest, dizziness/lightheadedness. Physical Exam   Constitutional: Oriented to person, place, and time. Appears well-developed. No distress. CARDIOVASCULAR:  Normal apical impulse, regular rate and rhythm, normal S1 and S2, no S3 or S4, and no murmur noted  Pulmonary/Chest: Effort normal. No respiratory distress. Abdominal: Soft. Bowel sounds are normal.   Musculoskeletal: Normal range of motion. Neurological: alert and oriented to person, place, and time. Skin: Skin is warm and dry. Psychiatric: normal mood and affect.        ANITRA 3/16  Transesophageal Echocardiography Report (ANITRA)      Demographics      Patient Name       [facility]        Gender               Male                      [address] [place] [address]     [id number]       Room Number          1590   Number      Account #          [de-identified]      Procedure Date       03/16/2022      Corporate ID                      Ordering Physician   Vania Navarro MD      Accession Number   [id number]     Referring Physician      Date of Birth      1960     [address]      Age 61 year(s)     Interpreting         Lexa King MD                                     Physician                                        Any Other     Procedure     Type of Study      ANITRA procedure:Transesophageal Echo (ANITRA). Procedure Date  Date: 03/16/2022 Start: 01:31 PM     Study Location: Portable  Technical Quality: Good visualization     Indications:Mitral regurgitation.     Patient Status: Routine     Contrast Medium: Bubble Study.     Height: 69 inches Weight: 179 pounds BSA: 1.97 m^2 BMI: 26.43 kg/m^2     ANITRA Performed By: the attending and the sonographer      Type of Anesthesia: Moderate sedation     Allergies    - No known allergies.      Findings      Left Ventricle   Normal left ventricular size and systolic function. Ejection fraction is visually estimated at 55%. Right Ventricle   Right ventricle global systolic function is mildly reduced. Left Atrium   Dilated left atrium. No evidence of thrombus within left atrium or appendage. The interatrial septum appears intact. Agitated saline injected for shunt evaluation. No evidence of atrial septal defect or PFO. Right Atrium   Dilated right atrium. No evidence of thrombus or mass in the right atrium. Mitral Valve   Severe holosystolic prolapse of the posterior leaflet with a flail P1/P2   segment due to ruptured chordae tendineae. Small mobile echodensity suspicious for vegetation on ventricular surface   of posterior leaflet. Failure of leaflet coaptation. No definitive papillary muscle rupture. Torrential mitral regurgitation, eccentric jet directed anteriorly. Tricuspid Valve   The tricuspid valve appears structurally normal.   Mild tricuspid regurgitation. No vegetation. Aortic Valve   Structurally normal aortic valve. The aortic valve is trileaflet. No hemodynamically significant aortic stenosis is present. Mild aortic valve regurgitation. No vegetation.       Pulmonic Valve The pulmonic valve was not well visualized. No evidence of pulmonic valve stenosis. Physiologic and/or trace pulmonic regurgitation present. No vegetation. Pericardial Effusion   No evidence of pericardial effusion. Aorta   The aorta is within normal limits. Miscellaneous   Prominent systolic flow reversal 4/4 pulmonary veins. Conclusions      Summary   Ejection fraction is visually estimated at 55%. Severe holosystolic prolapse of the posterior leaflet with a flail P1/P2   segment due to ruptured chordae tendineae. Small mobile echodensity suspicious for vegetation on ventricular surface   of posterior leaflet. Failure of leaflet coaptation. No definitive papillary muscle rupture. Torrential mitral regurgitation, eccentric jet directed anteriorly. ASSESSMENT:   Patient Active Problem List   Diagnosis    Atrial fibrillation with rapid ventricular response ([address]    Sepsis ([address] present on admission    JANA (acute kidney injury) ([address]    Bacteremia    Severe mitral regurgitation    Suspected endocarditis    Subacute bacterial endocarditis    Ruptured chordae tendineae ([address]         66yo M with no significant PMH presented to the ED 3/14/2022 with CC of 1 month history of fatigue. He was found to have Streptococcus bacteremia and severe MR with possible flail posterior MV leaflet, possible endocarditis. CTS was consulted for additional recommendations.       PLAN:  No emergent surgical intervention as patient is currently fairly well compensated without overt symptoms of heart failure   However, given unclear timing of findings (acute vs subacute), would recommend continued inpatient work-up and monitoring; will defer current management to ID and cardiology  ANITRA report above  [facility] tomorrow  Pre-operative testing ordered, dental clearance pending, suspect dental source  Tentative plans for OR Wednesday (not yet discussed with patient) pending clinical course, work-up    Note: 25 minutes was spent providing face-to-face patient care, including:  and coordinating care, reviewing the chart, labs, and diagnostics, as well as medical decision making. Greater than 50% of this time was spent instructing and counseling the patient face to face regarding findings and recommendations.

## 2022-03-17 NOTE — PROGRESS NOTES
Hospitalist Progress Note      Synopsis: Patient admitted on 114/2022 62-year-old male with no known past medical history except childhood murmur, went to the urgent care today for evaluation of fatigue and was found to have A. fib with RVR and was sent to the main hospital emergency room. Patient states his symptoms of fatigue were on and off since past 1 month. Denies any palpitations. However did report some exertional shortness of breath. Patient consulted, as per him, telemedicine doctor, who prescribed him ivermectin and hydroxychloroquine earlier this month for suspicion of COVID-19 infection. Patient is unvaccinated against Covid. Patient completed 5 days of ivermectin, however, did not finish 14-day course of hydroxychloroquine which he stopped about 3 days ago. Patient denies any recent fever, chills, cough, shortness of breath, chest pain, abdominal pain, nausea, vomiting, diarrhea constipation. No presyncopal or syncopal event. Upon arrival in the emergency room, patient was noted to be febrile with T-max 101.1 °F, WBC count slightly elevated at 12.2, CRP elevated at 5.9, proBNP elevated at 6000, lactate level elevated at 2.6, troponin elevated at 42, 43. Patient was started on Cardizem drip. Patient had CTA chest done which did not reveal acute PE. Blood cultures positive for GPC's in chains.       Subjective    Clinically improving. Feeling better. No complaints  Stable overnight. No other overnight issues reported. No CP, SOB, palpitations, blurred vision, HA, lightheadedness, LOC or focal neurological deficits    Exam:  /77   Pulse 84   Temp 99 °F (37.2 °C) (Oral)   Resp 18   Ht 5' 9\" (1.753 m)   Wt 179 lb (81.2 kg)   SpO2 97%   BMI 26.43 kg/m²   General appearance: No apparent distress, appears stated age and cooperative. HEENT: Pupils equal, round, and reactive to light. Conjunctivae/corneas clear. Neck: Supple. No jugular venous distention.  Trachea midline. Respiratory:  Normal respiratory effort. Clear to auscultation, bilaterally without Rales/Wheezes/Rhonchi. Cardiovascular: Regular rate and rhythm with normal S1/S2 +3 of 6 EARNESTINE murmurs, rubs or gallops. Abdomen: Soft, non-tender, non-distended with normal bowel sounds. Musculoskeletal: No clubbing, cyanosis or edema bilaterally. Brisk capillary refill. 2+ lower extremity pulses (dorsalis pedis). Skin:  No rashes    Neurologic: awake, alert and following commands     Medications:  Reviewed    Infusion Medications    dilTIAZem 15 mg/hr (03/17/22 0640)    sodium chloride       Scheduled Medications    metoprolol succinate  50 mg Oral BID    enoxaparin  1 mg/kg SubCUTAneous BID    vancomycin  1,250 mg IntraVENous Q12H    sodium chloride flush  5-40 mL IntraVENous 2 times per day    atorvastatin  40 mg Oral Nightly     PRN Meds: sodium chloride flush, sodium chloride, ondansetron **OR** ondansetron, polyethylene glycol, acetaminophen **OR** acetaminophen, potassium chloride, magnesium sulfate, perflutren lipid microspheres    I/O    Intake/Output Summary (Last 24 hours) at 3/17/2022 0812  Last data filed at 3/17/2022 0456  Gross per 24 hour   Intake 450 ml   Output 350 ml   Net 100 ml       Labs:   Recent Labs     03/14/22  2017 03/15/22  0322   WBC 12.2* 12.1*   HGB 10.7* 9.2*   HCT 33.4* 28.4*    175       Recent Labs     03/14/22  2017 03/15/22  0331    134   K 4.5 4.2   CL 97* 101   CO2 24 21*   BUN 20 19   CREATININE 1.3* 1.1   CALCIUM 9.0 8.1*       Recent Labs     03/14/22 2017   PROT 7.9   ALKPHOS 117   ALT 35   AST 22   BILITOT 1.0       Recent Labs     03/14/22 2017   INR 1.5       No results for input(s): Estrellita Bryant in the last 72 hours.     Chronic labs:  Lab Results   Component Value Date    CHOL 133 03/15/2022    TRIG 115 03/15/2022    HDL 20 03/15/2022    LDLCALC 90 03/15/2022    TSH 1.880 03/14/2022    INR 1.5 03/14/2022    LABA1C 5.6 03/15/2022 Radiology:  Imaging studies reviewed today. ASSESSMENT:    Principal Problem:    Atrial fibrillation with rapid ventricular response (HCC)  Active Problems:    Sepsis ([address] present on admission    JANA (acute kidney injury) ([address]    Bacteremia    Severe mitral regurgitation    Suspected endocarditis    Subacute bacterial endocarditis    Ruptured chordae tendineae (HCC)  Resolved Problems:    * No resolved hospital problems. *       PLAN:    Atrial fibrillation RVR   admit to intermediate care  Monitor on telemetry  Rate control diltiazem drip  Anticoagulation Lovenox  Echocardiogram  Cardiology Consult appreciated discussed case    Sepsis, bacteremia, suspected endocarditis  Fever 101.1 on admission, then afebrile until T-max 100.6 today  Treated cefepime and vancomycin-continue vancomycin  Echocardiogram with partial flail mitral valve, ruptured chordae and likely vegetation  ANITRA 3/16 confirming severe MR with flail leaflet and ruptured chordae possible small vegetation  Urine culture  Blood cultures 2 of 2+ GPC in chains--alpha streptococcus-strep mitis/oralis  CRP 5.9  Procalcitonin 0.31, repeat  Infectious disease Consult appreciated-discussed case    Mitral regurgitation-severe  ANITRA ruptured chordae, sever MR, suspected vegitation   [facility] tomorrow   CTS Consult appreciated possibly valve repair/replacement Wednesday today    JANA  IVF  Hold nephrotoxins  Urine labs  Monitor renal function, electrolytes, urine output    Medications for other co morbidities cont as appropriate w dosage adjustments as necessary       Diet: ADULT DIET; Regular;  Low Sodium (2 gm)  Code Status: Full Code  PT/OT Eval Status: Ordered  DVT Prophylaxis:   Lovenox  Recommended disposition at discharge:   Anticipate home 24 to 48 hours    +++++++++++++++++++++++++++++++++++++++++++++++++  Elio Flores MD   [facility].  +++++++++++++++++++++++++++++++++++++++++++++++++  NOTE: This report was transcribed using voice recognition software. Every effort was made to ensure accuracy; however, inadvertent computerized transcription errors may be present.

## 2022-03-17 NOTE — PROGRESS NOTES
Reached out to Cardiology via perfect serve to notify that patient is complaining of SOB and patients heart rhythm is going in and out of afib/ aflutter. Vitals signs stable, tylenol given for temp of 100.6. No new orders given. Will continue to monitor patient.  Cardiology to see patient in am.

## 2022-03-18 PROCEDURE — 2580000003 HC RX 258: Performed by: INTERNAL MEDICINE

## 2022-03-18 PROCEDURE — C1894 INTRO/SHEATH, NON-LASER: HCPCS

## 2022-03-18 PROCEDURE — 4A023N7 MEASUREMENT OF CARDIAC SAMPLING AND PRESSURE, LEFT HEART, PERCUTANEOUS APPROACH: ICD-10-PCS | Performed by: INTERNAL MEDICINE

## 2022-03-18 PROCEDURE — 2500000003 HC RX 250 WO HCPCS

## 2022-03-18 PROCEDURE — B2111ZZ FLUOROSCOPY OF MULTIPLE CORONARY ARTERIES USING LOW OSMOLAR CONTRAST: ICD-10-PCS | Performed by: INTERNAL MEDICINE

## 2022-03-18 PROCEDURE — 2700000000 HC OXYGEN THERAPY PER DAY

## 2022-03-18 PROCEDURE — 2140000000 HC CCU INTERMEDIATE R&B

## 2022-03-18 PROCEDURE — 6360000002 HC RX W HCPCS

## 2022-03-18 PROCEDURE — 2709999900 HC NON-CHARGEABLE SUPPLY

## 2022-03-18 PROCEDURE — 93458 L HRT ARTERY/VENTRICLE ANGIO: CPT

## 2022-03-18 PROCEDURE — 2500000003 HC RX 250 WO HCPCS: Performed by: INTERNAL MEDICINE

## 2022-03-18 PROCEDURE — 6370000000 HC RX 637 (ALT 250 FOR IP): Performed by: INTERNAL MEDICINE

## 2022-03-18 PROCEDURE — C1769 GUIDE WIRE: HCPCS

## 2022-03-18 PROCEDURE — 2500000003 HC RX 250 WO HCPCS: Performed by: STUDENT IN AN ORGANIZED HEALTH CARE EDUCATION/TRAINING PROGRAM

## 2022-03-18 PROCEDURE — 99233 SBSQ HOSP IP/OBS HIGH 50: CPT | Performed by: INTERNAL MEDICINE

## 2022-03-18 PROCEDURE — 6360000002 HC RX W HCPCS: Performed by: INTERNAL MEDICINE

## 2022-03-18 PROCEDURE — C1887 CATHETER, GUIDING: HCPCS

## 2022-03-18 RX ORDER — ACETAMINOPHEN 325 MG/1
650 TABLET ORAL EVERY 4 HOURS PRN
Status: DISCONTINUED | OUTPATIENT
Start: 2022-03-18 | End: 2022-03-18 | Stop reason: SDUPTHER

## 2022-03-18 RX ADMIN — HYDRALAZINE HYDROCHLORIDE 10 MG: 10 TABLET, FILM COATED ORAL at 14:43

## 2022-03-18 RX ADMIN — Medication 10 ML: at 14:30

## 2022-03-18 RX ADMIN — ENOXAPARIN SODIUM 80 MG: 100 INJECTION SUBCUTANEOUS at 20:02

## 2022-03-18 RX ADMIN — ATORVASTATIN CALCIUM 40 MG: 40 TABLET, FILM COATED ORAL at 20:01

## 2022-03-18 RX ADMIN — DEXTROSE MONOHYDRATE 12.5 MG/HR: 50 INJECTION, SOLUTION INTRAVENOUS at 14:30

## 2022-03-18 RX ADMIN — Medication 10 ML: at 21:00

## 2022-03-18 RX ADMIN — AMPICILLIN SODIUM 2000 MG: 2 INJECTION, POWDER, FOR SOLUTION INTRAVENOUS at 18:51

## 2022-03-18 RX ADMIN — DEXTROSE MONOHYDRATE 12.5 MG/HR: 50 INJECTION, SOLUTION INTRAVENOUS at 22:00

## 2022-03-18 RX ADMIN — AMPICILLIN SODIUM 2000 MG: 2 INJECTION, POWDER, FOR SOLUTION INTRAVENOUS at 22:00

## 2022-03-18 RX ADMIN — AMPICILLIN SODIUM 2000 MG: 2 INJECTION, POWDER, FOR SOLUTION INTRAVENOUS at 14:30

## 2022-03-18 RX ADMIN — DEXTROSE MONOHYDRATE 12.5 MG/HR: 50 INJECTION, SOLUTION INTRAVENOUS at 05:21

## 2022-03-18 RX ADMIN — SODIUM CHLORIDE, PRESERVATIVE FREE 10 ML: 5 INJECTION INTRAVENOUS at 15:23

## 2022-03-18 ASSESSMENT — PAIN SCALES - GENERAL
PAINLEVEL_OUTOF10: 0

## 2022-03-18 NOTE — PROGRESS NOTES
Hospitalist Progress Note      Synopsis: Patient admitted on 114/2022 50-year-old male with no known past medical history except childhood murmur, went to the urgent care today for evaluation of fatigue and was found to have A. fib with RVR and was sent to the main hospital emergency room. Patient states his symptoms of fatigue were on and off since past 1 month. Denies any palpitations. However did report some exertional shortness of breath. Patient consulted, as per him, telemedicine doctor, who prescribed him ivermectin and hydroxychloroquine earlier this month for suspicion of COVID-19 infection. Patient is unvaccinated against Covid. Patient completed 5 days of ivermectin, however, did not finish 14-day course of hydroxychloroquine which he stopped about 3 days ago. Patient denies any recent fever, chills, cough, shortness of breath, chest pain, abdominal pain, nausea, vomiting, diarrhea constipation. No presyncopal or syncopal event. Upon arrival in the emergency room, patient was noted to be febrile with T-max 101.1 °F, WBC count slightly elevated at 12.2, CRP elevated at 5.9, proBNP elevated at 6000, lactate level elevated at 2.6, troponin elevated at 42, 43. Patient was started on Cardizem drip. Patient had CTA chest done which did not reveal acute PE. Blood cultures positive for strep mitis. Echocardiogram reveals severe mitral regurgitation with flail leaflet and ruptured chordae tendon line and concern for vegetation. Patient had left heart catheterization demonstrating no significant coronary artery disease. Plan for valve replacement 3/23.       Subjective    Clinically improving. Feeling better. No complaints  Stable overnight. No other overnight issues reported.      No CP, SOB, palpitations, blurred vision, HA, lightheadedness, LOC or focal neurological deficits    Exam:  /73   Pulse 109   Temp 97.9 °F (36.6 °C) (Oral)   Resp 16   Ht 5' 9\" (1.753 m)   Wt 179 lb (81.2 kg)   SpO2 95%   BMI 26.43 kg/m²   General appearance: No apparent distress, appears stated age and cooperative. HEENT: Pupils equal, round, and reactive to light. Conjunctivae/corneas clear. Neck: Supple. No jugular venous distention. Trachea midline. Respiratory:  Normal respiratory effort. Clear to auscultation, bilaterally without Rales/Wheezes/Rhonchi. Cardiovascular: Regular rate and rhythm with normal S1/S2 +3 of 6 EARNESTINE murmurs, rubs or gallops. Abdomen: Soft, non-tender, non-distended with normal bowel sounds. Musculoskeletal: No clubbing, cyanosis or edema bilaterally. Brisk capillary refill. 2+ lower extremity pulses (dorsalis pedis). Skin:  No rashes    Neurologic: awake, alert and following commands     Medications:  Reviewed    Infusion Medications    dilTIAZem 12.5 mg/hr (03/18/22 0521)    sodium chloride       Scheduled Medications    hydrALAZINE  10 mg Oral 3 times per day    vancomycin  1,000 mg IntraVENous Q12H    metoprolol succinate  50 mg Oral BID    enoxaparin  1 mg/kg SubCUTAneous BID    sodium chloride flush  5-40 mL IntraVENous 2 times per day    atorvastatin  40 mg Oral Nightly     PRN Meds: sodium chloride flush, sodium chloride, ondansetron **OR** ondansetron, polyethylene glycol, acetaminophen **OR** acetaminophen, potassium chloride, magnesium sulfate, perflutren lipid microspheres    I/O    Intake/Output Summary (Last 24 hours) at 3/18/2022 0907  Last data filed at 3/18/2022 0805  Gross per 24 hour   Intake 620 ml   Output 2300 ml   Net -1680 ml       Labs:   No results for input(s): WBC, HGB, HCT, PLT in the last 72 hours. Recent Labs     03/17/22  1003      K 4.2      CO2 22   BUN 24*   CREATININE 1.2   CALCIUM 8.3*       No results for input(s): PROT, ALB, ALKPHOS, ALT, AST, BILITOT, AMYLASE, LIPASE in the last 72 hours. No results for input(s): INR in the last 72 hours.     No results for input(s): Ethelene Soulier in the last 72 hours.    Chronic labs:  Lab Results   Component Value Date    CHOL 133 03/15/2022    TRIG 115 03/15/2022    HDL 20 03/15/2022    LDLCALC 90 03/15/2022    TSH 1.880 03/14/2022    INR 1.5 03/14/2022    LABA1C 5.6 03/15/2022       Radiology:  Imaging studies reviewed today. ASSESSMENT:    Principal Problem:    Atrial fibrillation with rapid ventricular response (HCC)  Active Problems:    Sepsis ([facility] [facility] present on admission    JANA (acute kidney injury) ([facility] [address]    Bacteremia    Severe mitral regurgitation    Suspected endocarditis    Subacute bacterial endocarditis    Ruptured chordae tendineae (HCC)  Resolved Problems:    * No resolved hospital problems. *       PLAN:    Atrial fibrillation RVR   admit to intermediate care  Monitor on telemetry  Rate control diltiazem drip--> Toprol-XL  Anticoagulation Lovenox  Cardiology Consult appreciated discussed case    Sepsis, bacteremia, suspected endocarditis  Fever 101.1 on admission, then afebrile until T-max 100.6 today  Treated cefepime and vancomycin-continue vancomycin  Echocardiogram with partial flail mitral valve, ruptured chordae and likely vegetation  ANITRA 3/16 confirming severe MR with flail leaflet and ruptured chordae possible small vegetation  Urine culture  Blood cultures 2 of 2+ GPC in chains--alpha streptococcus-strep mitis/oralis  CRP 5.9  Procalcitonin 0.31, repeat  Infectious disease Consult appreciated-discussed case    Mitral regurgitation-severe  ANITRA ruptured chordae, sever MR, suspected vegitation   [facility] tomorrow   CTS Consult appreciated possibly valve repair/replacement Wednesday today  Dental consult pending--no consult note, will reconsult     JANA-improving  IVF completed  Hold nephrotoxins  Urine labs  Monitor renal function, electrolytes, urine output    Medications for other co morbidities cont as appropriate w dosage adjustments as necessary       Diet: ADULT DIET; Regular;  Low Sodium (2 gm)  Code Status: Full Code  PT/OT Eval Status: Ordered  DVT Prophylaxis:   Lovenox  Recommended disposition at discharge:       +++++++++++++++++++++++++++++++++++++++++++++++++  Faby Can MD   [facility].  +++++++++++++++++++++++++++++++++++++++++++++++++  NOTE: This report was transcribed using voice recognition software. Every effort was made to ensure accuracy; however, inadvertent computerized transcription errors may be present.

## 2022-03-18 NOTE — PLAN OF CARE
Problem: Infection:  Goal: Will remain free from infection  Description: Will remain free from infection  Outcome: Met This Shift     Problem: Safety:  Goal: Free from accidental physical injury  Description: Free from accidental physical injury  Outcome: Met This Shift  Goal: Free from intentional harm  Description: Free from intentional harm  Outcome: Met This Shift     Problem: Daily Care:  Goal: Daily care needs are met  Description: Daily care needs are met  Outcome: Met This Shift     Problem: Pain:  Goal: Patient's pain/discomfort is manageable  Description: Patient's pain/discomfort is manageable  Outcome: Met This Shift     Problem: Discharge Planning:  Goal: Patients continuum of care needs are met  Description: Patients continuum of care needs are met  Outcome: Met This Shift     Problem: Cardiac:  Goal: Ability to maintain an adequate cardiac output will improve  Description: Ability to maintain an adequate cardiac output will improve  Outcome: Met This Shift  Goal: Complications related to the disease process, condition or treatment will be avoided or minimized  Description: Complications related to the disease process, condition or treatment will be avoided or minimized  Outcome: Met This Shift  Goal: Hemodynamic stability will improve  Description: Hemodynamic stability will improve  Outcome: Met This Shift

## 2022-03-18 NOTE — PROGRESS NOTES
Spoke with Pat Boyd in the dental clinic regarding consult and need for evaluation. Per Pat Boyd she will speak to doctor regarding consult.

## 2022-03-18 NOTE — CARE COORDINATION
Met with patient at bedside for transition of care planning. Patient lives alone in a home, is independent without a device. No home DME, or hx of home care or JARROD. Uses [facility] in [facility]. Patient currently has no PCP and agreeable to be set up prior to discharge. Patient plans to discharge to his sister, Grazyna's home and she will provide transport. Home care offered; patient states he will not need it.     Brynn Mandujano, MSW, LSW [phone]

## 2022-03-18 NOTE — PROGRESS NOTES
CC:fatigue     Brief HPI:  Patient seen   Awake, alert. No complaints. History reviewed. No pertinent past medical history. Past Surgical History:   Procedure Laterality Date    TRANSESOPHAGEAL ECHOCARDIOGRAM  03/16/2022    Dr. Felton Weldon History     Socioeconomic History    Marital status: Single     Spouse name: Not on file    Number of children: Not on file    Years of education: Not on file    Highest education level: Not on file   Occupational History    Not on file   Tobacco Use    Smoking status: Never Smoker    Smokeless tobacco: Never Used   Substance and Sexual Activity    Alcohol use: Yes     Comment: rarely    Drug use: Never    Sexual activity: Not on file   Other Topics Concern    Not on file   Social History Narrative    Not on file     Social Determinants of Health     Financial Resource Strain:     Difficulty of Paying Living Expenses: Not on file   Food Insecurity:     Worried About Running Out of Food in the Last Year: Not on file    Laureen of Food in the Last Year: Not on file   Transportation Needs:     Lack of Transportation (Medical): Not on file    Lack of Transportation (Non-Medical):  Not on file   Physical Activity:     Days of Exercise per Week: Not on file    Minutes of Exercise per Session: Not on file   Stress:     Feeling of Stress : Not on file   Social Connections:     Frequency of Communication with Friends and Family: Not on file    Frequency of Social Gatherings with Friends and Family: Not on file    Attends Buddhism Services: Not on file    Active Member of Clubs or Organizations: Not on file    Attends Club or Organization Meetings: Not on file    Marital Status: Not on file   Intimate Partner Violence:     Fear of Current or Ex-Partner: Not on file    Emotionally Abused: Not on file    Physically Abused: Not on file    Sexually Abused: Not on file   Housing Stability:     Unable to Pay for Housing in the Last Year: Not on file    Number of Places Lived in the Last Year: Not on file    Unstable Housing in the Last Year: Not on file     History reviewed. No pertinent family history. Vitals:    03/17/22 2250 03/18/22 0317 03/18/22 0345 03/18/22 0545   BP: 104/77 102/62 109/82 112/84   Pulse: 83 90 101 100   Resp: 20 18 18   Temp: 97.7 °F (36.5 °C)  97.9 °F (36.6 °C)    TempSrc: Oral  Oral    SpO2: 98%  98% 98%   Weight:       Height:               Intake/Output Summary (Last 24 hours) at 3/18/2022 0726  Last data filed at 3/17/2022 2250  Gross per 24 hour   Intake 860 ml   Output 1900 ml   Net -1040 ml         No results for input(s): WBC, HGB, HCT, PLT in the last 72 hours. Recent Labs     03/17/22  1003   BUN 24*   CREATININE 1.2         ROS:   Negative for CP, palpitations, SOB at rest, dizziness/lightheadedness. Physical Exam   Constitutional: Oriented to person, place, and time. Appears well-developed. No distress. Cardiovascular: Normal rate, regular rhythm and normal heart sounds positive murmur  Pulmonary/Chest: Effort normal. No respiratory distress. Abdominal: Soft. Bowel sounds are normal.   Musculoskeletal: Normal range of motion. Neurological: alert and oriented to person, place, and time. Skin: Skin is warm and dry. Psychiatric: normal mood and affect. ANITRA Performed By: the attending and the sonographer      Type of Anesthesia: Moderate sedation     Allergies    - No known allergies.      Findings      Left Ventricle   Normal left ventricular size and systolic function.   Ejection fraction is visually estimated at 55%.      Right Ventricle   Right ventricle global systolic function is mildly reduced.      Left Atrium   Dilated left atrium.   No evidence of thrombus within left atrium or appendage.   The interatrial septum appears intact.   Agitated saline injected for shunt evaluation.   No evidence of atrial septal defect or PFO.       Right Atrium   Dilated right atrium.   No evidence of thrombus or mass in the right atrium.      Mitral Valve   Severe holosystolic prolapse of the posterior leaflet with a flail P1/P2   segment due to ruptured chordae tendineae.   Small mobile echodensity suspicious for vegetation on ventricular surface   of posterior leaflet.   Failure of leaflet coaptation.   No definitive papillary muscle rupture.   Torrential mitral regurgitation, eccentric jet directed anteriorly.      Tricuspid Valve   The tricuspid valve appears structurally normal.   Mild tricuspid regurgitation.   No vegetation.      Aortic Valve   Structurally normal aortic valve.   The aortic valve is trileaflet.   No hemodynamically significant aortic stenosis is present.   Mild aortic valve regurgitation.   No vegetation.      Pulmonic Valve   The pulmonic valve was not well visualized.   No evidence of pulmonic valve stenosis.   Physiologic and/or trace pulmonic regurgitation present.   No vegetation.      Pericardial Effusion   No evidence of pericardial effusion.      Aorta   The aorta is within normal limits.   Miscellaneous   Prominent systolic flow reversal 4/4 pulmonary veins.      Conclusions      Summary   Ejection fraction is visually estimated at 55%.   Severe holosystolic prolapse of the posterior leaflet with a flail P1/P2   segment due to ruptured chordae tendineae.   Small mobile echodensity suspicious for vegetation on ventricular surface   of posterior leaflet.   Failure of leaflet coaptation.   No definitive papillary muscle rupture.   Torrential mitral regurgitation, eccentric jet directed anteriorly.     ASSESSMENT:  Patient Active Problem List   Diagnosis    Atrial fibrillation with rapid ventricular response ([address]    Sepsis ([address] present on admission    JANA (acute kidney injury) ([address]    Bacteremia    Severe mitral regurgitation    Suspected endocarditis    Subacute bacterial endocarditis    Ruptured chordae tendineae ([address]     66yo M with no significant PMH presented to the ED 3/14/2022 with CC of 1 month history of fatigue. He was found to have Streptococcus bacteremia and severe MR with possible flail posterior MV leaflet, possible endocarditis. CTS was consulted for additional recommendations. PLAN:  No emergent surgical intervention as patient is currently fairly well compensated without overt symptoms of heart failure   [facility] today  Pre-operative testing ordered, dental clearance pending, suspect dental source  Tentative plans for OR Wednesdaypending clinical course, work-up  Abx per ID         Pre-operative testing review:   --carotids with no significant stenosis  --viet with good flow bilaterally  --ct chest reviewed  --ANITRA findings above   --pfts - WILL NEED REVIEWED   --hgA1c 5.6         Recent Labs     03/15/22  0322   HGB 9.2*   HCT 28.4*        Recent Labs     03/17/22  1003   BUN 24*   CREATININE 1.2     Lab Results   Component Value Date/Time    LABURIN Growth not present 03/14/2022 08:45 PM         [id number] Score for Atrial Fibrillation Stroke Risk   Risk   Factors  Component Value   C CHF  0   H HTN  0   A2 Age >= 75  0   D DM  0   S2 Prior Stroke/TIA  0   V Vascular Disease  0   A Age 74-69  0   Sc Sex  0    [id number]  Score  0       Disclaimer:   Definition and scores for [id number]:      [id number] acronym     Score  Congestive HF      1  Hypertension       1  Age ? 75 years      2  Diabetes mellitus      1  Stroke/TIA/TE      2  Vascular disease (prior MI, PAD, or aortic plaque) 1  Age 72 to 76 years      1  Sex category       female=1, male=0    Maximum score      9    Risk Score calculation is dependent on accuracy of patient problem list and past encounter diagnosis. Note: 25 minutes was spent providing face-to-face patient care, including:  and coordinating care, reviewing the chart, labs, and diagnostics, as well as medical decision making.  Greater than 50% of this time was spent instructing and counseling the patient face to face regarding findings and recommendations.

## 2022-03-18 NOTE — PROGRESS NOTES
INPATIENT CARDIOLOGY FOLLOW-UP    Name: Alisa Mesa    Age: 62 y.o. Date of Admission: 3/14/2022  8:06 PM    Date of Service: 3/18/2022    Primary Cardiologist: Known to me from his admission    Chief Complaint: Follow-up for Severe MR with flail leaflet, endocarditis, AF    Interim History:  Feeling better. Off oxygen, breathing improved. Decent diuresis with furosemide. Remains on diltiazem infusion with fairly well-controlled heart rates.     Culture showing strep mitis/oralis, and admits to having some dental problems but not seeing a dentist.    Review of Systems:   Negative except as described above    Problem List:  Patient Active Problem List   Diagnosis    Atrial fibrillation with rapid ventricular response ([facility]    Sepsis ([facility] present on admission    JANA (acute kidney injury) ([facility]    Bacteremia    Severe mitral regurgitation    Suspected endocarditis    Subacute bacterial endocarditis    Ruptured chordae tendineae (HCC)       Current Medications:    Current Facility-Administered Medications:     hydrALAZINE (APRESOLINE) tablet 10 mg, 10 mg, Oral, 3 times per day, Severiano Gilmore, MD, 10 mg at 03/17/22 1411    vancomycin 1000 mg IVPB in 250 mL D5W addavial, 1,000 mg, IntraVENous, Q12H, Faustino Archer MD, Stopped at 03/17/22 1836    metoprolol succinate (TOPROL XL) extended release tablet 50 mg, 50 mg, Oral, BID, Veronica Olivia DO, 50 mg at 03/17/22 2046    enoxaparin (LOVENOX) injection 80 mg, 1 mg/kg, SubCUTAneous, BID, ROX Katz CNP, 80 mg at 03/17/22 2045    [COMPLETED] dilTIAZem injection 10 mg, 10 mg, IntraVENous, Once, 10 mg at 03/14/22 2022 **FOLLOWED BY** dilTIAZem 100 mg in dextrose 5 % 100 mL infusion ([facility]), 2.5-15 mg/hr, IntraVENous, Continuous, Zoe Saleem MD, Last Rate: 12.5 mL/hr at 03/18/22 0521, 12.5 mg/hr at 03/18/22 0521    sodium chloride flush 0.9 % injection 5-40 mL, 5-40 mL, IntraVENous, 2 times per day, Jean Edwards MD, 10 mL at 03/16/22 0843    sodium chloride flush 0.9 % injection 5-40 mL, 5-40 mL, IntraVENous, PRN, Jade Mcfarlane MD    0.9 % sodium chloride infusion, 25 mL, IntraVENous, PRN, Jade Mcfarlane MD    ondansetron (ZOFRAN-ODT) disintegrating tablet 4 mg, 4 mg, Oral, Q8H PRN **OR** ondansetron (ZOFRAN) injection 4 mg, 4 mg, IntraVENous, Q6H PRN, Jdae Mcfarlane MD    polyethylene glycol (GLYCOLAX) packet 17 g, 17 g, Oral, Daily PRN, Jade Mcfarlane MD    acetaminophen (TYLENOL) tablet 650 mg, 650 mg, Oral, Q6H PRN, 650 mg at 03/16/22 2247 **OR** acetaminophen (TYLENOL) suppository 650 mg, 650 mg, Rectal, Q6H PRN, Jade Mcfarlane MD    potassium chloride 10 mEq/100 mL IVPB (Peripheral Line), 10 mEq, IntraVENous, PRN, Jade Mcfarlane MD    magnesium sulfate 2000 mg in 50 mL IVPB premix, 2,000 mg, IntraVENous, PRN, Jade Mcfarlane MD    perflutren lipid microspheres (DEFINITY) injection 1.65 mg, 1.5 mL, IntraVENous, ONCE PRN, Jade Mcfarlane MD    atorvastatin (LIPITOR) tablet 40 mg, 40 mg, Oral, Nightly, Jade Mcfarlane MD, 40 mg at 03/17/22 2046    Physical Exam:  /73   Pulse 109   Temp 97.9 °F (36.6 °C) (Oral)   Resp 16   Ht 5' 9\" (1.753 m)   Wt 179 lb (81.2 kg)   SpO2 95%   BMI 26.43 kg/m²   Wt Readings from Last 3 Encounters:   03/15/22 179 lb (81.2 kg)     Appearance: Awake, alert, no acute respiratory distress  Skin: Intact, no rash  Head: Normocephalic, atraumatic  Eyes: EOMI, no conjunctival erythema  ENMT: No pharyngeal erythema, MMM, no rhinorrhea.   Oral cavity with poor dentition  Neck: Supple, no elevated JVP, no carotid bruits  Lungs: Trace basilar rales  Cardiac: PMI nondisplaced, irregular rhythm with a tachycardic rate, S1 & S2 normal, 4/6 holosystolic murmur left sternal border  Abdomen: Soft, nontender, +bowel sounds  Extremities: Moves all extremities x 4, no lower extremity edema  Neurologic: No focal motor deficits apparent, normal mood and affect  Peripheral Pulses: Intact posterior tibial pulses bilaterally    Intake/Output:    Intake/Output Summary (Last 24 hours) at 3/18/2022 0851  Last data filed at 3/18/2022 0805  Gross per 24 hour   Intake 620 ml   Output 2300 ml   Net -1680 ml     I/O this shift:  In: -   Out: 400 [Urine:400]    Laboratory Tests:  Recent Labs     03/17/22  1003      K 4.2      CO2 22   BUN 24*   CREATININE 1.2   GLUCOSE 128*   CALCIUM 8.3*     Lab Results   Component Value Date    MG 2.4 03/14/2022     No results for input(s): ALKPHOS, ALT, AST, PROT, BILITOT, BILIDIR, LABALBU in the last 72 hours. No results for input(s): WBC, RBC, HGB, HCT, MCV, MCH, MCHC, RDW, PLT, MPV in the last 72 hours. No results found for: CKTOTAL, CKMB, CKMBINDEX, TROPONINI  Lab Results   Component Value Date    INR 1.5 03/14/2022    PROTIME 15.9 (H) 03/14/2022     Lab Results   Component Value Date    TSH 1.880 03/14/2022     Lab Results   Component Value Date    LABA1C 5.6 03/15/2022     No results found for: EAG  Lab Results   Component Value Date    CHOL 133 03/15/2022     Lab Results   Component Value Date    TRIG 115 03/15/2022     Lab Results   Component Value Date    HDL 20 03/15/2022     Lab Results   Component Value Date    LDLCALC 90 03/15/2022     Lab Results   Component Value Date    LABVLDL 23 03/15/2022     No results found for: CHOLHDLRATIO  Recent Labs     03/17/22  1003   PROBNP 3,436*       Cardiac Tests:    EKG:   3/14/2022: Atrial fibrillation  bpm.  PVC or aberrant complexes     Telemetry: Atrial fibrillation 100 120s    Chest X-ray:   Impression   No evidence of pulmonary embolism or acute pulmonary abnormality. Echocardiogram:   TTE 3/15/22   Summary   Atrial fibrillation noted. Ejection fraction is visually estimated at 50-55%. Right ventricle global systolic function is reduced. Severe biatrial dilation. Mild aortic valve regurgitation. Mild tricuspid regurgitation.       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 Absolutely torrential, eccentric mitral regurgitation directed anteriorly. There is a small mobile echodensity noted on the ventricular aspect of the   posterior mitral valve, likely ruptured chordae or papillary muscle,   though in the context of positive blood cultures a vegetation is also   suspected which likely led to chordal rupture. Recommend: CT surgery consult, ANITRA    ANITRA 3/16/22   Summary   Ejection fraction is visually estimated at 55%. Severe holosystolic prolapse of the posterior leaflet with a flail P1/P2   segment due to ruptured chordae tendineae. Small mobile echodensity suspicious for vegetation on ventricular surface   of posterior leaflet. Failure of leaflet coaptation. No definitive papillary muscle rupture. Torrential mitral regurgitation, eccentric jet directed anteriorly. Stress test:      Cardiac catheterization:     ----------------------------------------------------------------------------------------------------------------------------------------------------------------  IMPRESSION:  1. Flail posterior mitral leaflet with very severe eccentric MR likely due to ruptured chordae due to endocarditis  2. Streptococcal mitis/oralis bacteremia, likely from dental source. Blood cultures from 3/14 and 3/15+. Repeat blood cultures have been negative to date  3. New onset atrial fibrillation due to the above. [id number] 1 (developing CHF)  4. Mild acute HFpEF due to above  5. Sepsis    RECOMMENDATIONS:  Remains compensated from a hemodynamic standpoint.      Left heart catheterization today (repeat blood cultures have been negative)   Timing of mitral valve surgery tentatively next Wednesday, discussed with Dr. Lacie Nino Vancomycin per ID   Diuretics as needed   Continue low-dose hydralazine for afterload reduction   Continue anticoagulation for now   Monitor closely for decompensation, low threshold to transfer to higher level of care and/or for intravenous nitroprusside   Dental consult pending   Aggressive risk factor modification   Discussed with Dr. Antonio Jamison    Risks and benefits of left heart catheterization explained to patient, including risk of MI, CVA, death, bleeding complications, vascular injury, renal failure requiring dialysis, and requiring emergency surgery. Possible outcomes including need for medical management, PCI, bypass surgery explained to patient. Patient voiced understanding and agrees to proceed. AUC criteria: Preop valve surgery    Memo Brown MD, [address] [place] [address] Cardiology    NOTE: This report was transcribed using voice recognition software. Every effort was made to ensure accuracy; however, inadvertent computerized transcription errors may be present.

## 2022-03-18 NOTE — PROGRESS NOTES
Vasc band removed at this time. Site clean, dry, intact with no bruising/bleeding/swelling/discoloration noted. RUE with +2 radial pulse, warm, and full sensation. 2x2 gauze, op-site, and arm board applied. Explained post procedure restrictions. Pt verbalized an understanding at this time.

## 2022-03-18 NOTE — PROGRESS NOTES
Spoke with Dr. Aida Dinh regarding pt BP post-cath and pt HR while on cardizem gtt at 12.5ml/hr.  Per Dr. Aida Dinh okay to hold am dose of toprol

## 2022-03-18 NOTE — PROCEDURES
Procedure:    1. Left heart cath    Physician: Mónica Champion DO. Assistant: none    Indication: Mitral regurgitation  AUC: 7  AUC indication: 70    Complication: None. Sedation: Intravenous Versed. Anesthesia: Xylocaine, fentanyl     Sedation time: I was present for sedation and ministration at 09 53 and I ended sedation at 1007 for a total face-to-face sedation time of 14 minutes. Estimated blood loss: Minimal    Specimens: none    Contrast used: 45 cc    Hemodynamics:  Opening Aortic pressure: 20/37  LV systolic pressure: 95  LVEDP: 6  No significant gradient across the aortic valve    Angiographic Results/findings:  Left Main: No angiographically significant stenosis. LAD: No angiographically significant stenosis. D1: Bifurcating vessel. No angiographically significant stenosis. Cx: Dominant vessel. Mid to distal diffuse 10% stenosis. OM1: Bifurcating vessel. No angiographically significant stenosis. OM2: Proximal 10% stenosis. OM 3: No angiographically significant stenosis. OM 4: No angiographically significant stenosis. Ramus: Absent. RCA: non-Dominant. Mild diffuse luminal irregularities. Procedure:   After obtaining informed consent the patient was taken to the cardiac Cath Lab where the area over the right radial artery was prepped and draped in a sterile fashion. Using ultrasound guidance and a micropuncture technique a 6 Welsh slender rain sheath was placed in the right radial artery. This was aspirated & flushed several times throughout the procedure. This was medicated with verapamil and nitroglycerin. They were given heparin systemically. A 5 [place] TIG catheter was advanced over a wire to the root of the aorta. It was aspirated & flushed with saline. Pressures were obtained. It was filled with contrast.  This was then manipulated into the left main coronary artery. 5 orthogonal views were obtained.   A order for catheter was then manipulated into the right coronary artery and an LEXA view was obtained. A 5 Chinese angled pigtail was then advanced & manipulated into the left ventricle. This was then aspirated & flushed with saline & pressures were obtained. An REINOSO view was then obtained. The catheter was then aspirated & flushed with saline once again & pull back pressures were then obtained across the aortic vlave. The right radial artery sheath was removed and a vasc band was then placed with good patent hemostasis. They tolerated the procedure well with no complications. Note: This report was completed using computerized voice recognition software. Every effort has been made to ensure accuracy, however; and invert and computerized transcription errors may be present.

## 2022-03-18 NOTE — PROGRESS NOTES
Department of Internal Medicine  Infectious Diseases  Progress  Note      C/C : Streptococcus bacteremia , fever     Denied fever , chills   SOB with exertion   Afebrile     Current Facility-Administered Medications   Medication Dose Route Frequency Provider Last Rate Last Admin    hydrALAZINE (APRESOLINE) tablet 10 mg  10 mg Oral 3 times per day [facility] Son, DO   10 mg at 03/17/22 1411    vancomycin 1000 mg IVPB in 250 mL D5W addavial  1,000 mg IntraVENous Q12H [facility] Son, DO   Stopped at 03/17/22 1836    metoprolol succinate (TOPROL XL) extended release tablet 50 mg  50 mg Oral BID [facility] Son, DO   50 mg at 03/17/22 2046    enoxaparin (LOVENOX) injection 80 mg  1 mg/kg SubCUTAneous BID [facility] Son, DO   80 mg at 03/17/22 2045    dilTIAZem 100 mg in dextrose 5 % 100 mL infusion ([facility])  2.5-15 mg/hr IntraVENous Continuous [facility], [facility] 12.5 mL/hr at 03/18/22 0521 12.5 mg/hr at 03/18/22 0521    sodium chloride flush 0.9 % injection 5-40 mL  5-40 mL IntraVENous 2 times per day [facility], [facility]   10 mL at 03/16/22 1789    sodium chloride flush 0.9 % injection 5-40 mL  5-40 mL IntraVENous PRN [facility] Son, DO        0.9 % sodium chloride infusion  25 mL IntraVENous PRN [facility] Son, DO        ondansetron (ZOFRAN-ODT) disintegrating tablet 4 mg  4 mg Oral Q8H PRN [facility] Son, DO        Or    ondansetron [facility]) injection 4 mg  4 mg IntraVENous Q6H PRN [facility] Son, DO        polyethylene glycol (GLYCOLAX) packet 17 g  17 g Oral Daily PRN [facility] Son, DO        acetaminophen (TYLENOL) tablet 650 mg  650 mg Oral Q6H PRN [facility] Son, DO   650 mg at 03/16/22 2247    Or    acetaminophen (TYLENOL) suppository 650 mg  650 mg Rectal Q6H PRN [facility] Son, DO        potassium chloride 10 mEq/100 mL IVPB (Peripheral Line)  10 mEq IntraVENous PRN [facility] Son, DO        magnesium sulfate 2000 mg in 50 mL IVPB premix  2,000 mg IntraVENous PRN Luli Needs, DO        perflutren lipid microspheres (DEFINITY) injection 1.65 mg  1.5 mL IntraVENous ONCE PRN Luli Needs, DO        atorvastatin (LIPITOR) tablet 40 mg  40 mg Oral Nightly Luli Needs, DO   40 mg at 03/17/22 2046         REVIEW OF SYSTEMS:    CONSTITUTIONAL:  Denies fever, chill or rigors. HEENT: denies blurring of vision or double vision, denies hearing problem  RESPIRATORY: SOB with exertion   CARDIOVASCULAR:  Denies palpitation  GASTROINTESTINAL:  Denies abdomen pain, diarrhea or constipation. GENITOURINARY:  Denies burning urination or frequency of urination  INTEGUMENT: denies wound , rash  HEMATOLOGIC/LYMPHATIC:  Denies lymph node swelling, gum bleeding or easy bruising. MUSCULOSKELETAL:  Denies leg pain , joint pain , joint swelling  NEUROLOGICAL:  Fatigue , weakness     PHYSICAL EXAM:      Vitals:     /80   Pulse 110   Temp 98.8 °F (37.1 °C) (Oral)   Resp 16   Ht 5' 9\" (1.753 m)   Wt 179 lb (81.2 kg)   SpO2 97%   BMI 26.43 kg/m²       General Appearance:    Awake, alert , no acute distress. Head:    Normocephalic, atraumatic   Eyes:    No pallor, no icterus,   Ears:    No obvious deformity or drainage.    Nose:   No nasal drainage   Throat:   Mucosa moist, no oral thrush   Neck:   Supple, no lymphadenopathy   Back:     no CVA tenderness   Lungs:     Clear to auscultation bilaterally, no wheeze    Heart:    Tachycardic, irregular, systolic murmur    Abdomen:     Soft, non-tender, bowel sounds present    Extremities:   No edema, no cyanosis    Pulses:   Dorsalis pedis palpable    Skin:   no rashes       Lab Results   Component Value Date    WBC 12.1 03/15/2022    RBC 3.33 03/15/2022    HGB 9.2 03/15/2022    HCT 28.4 03/15/2022     03/15/2022    MCV 85.3 03/15/2022    MCH 27.6 03/15/2022    MCHC 32.4 03/15/2022    RDW 13.5 03/15/2022    LYMPHOPCT 10.3 03/15/2022    MONOPCT 7.9 03/15/2022    BASOPCT 0.2 03/15/2022    MONOSABS 0.96 03/15/2022    LYMPHSABS 1.25 03/15/2022    EOSABS 0.11 03/15/2022    BASOSABS 0.02 03/15/2022       CMP     Lab Results   Component Value Date     03/17/2022    K 4.2 03/17/2022    K 4.2 03/15/2022     03/17/2022    CO2 22 03/17/2022    BUN 24 03/17/2022    CREATININE 1.2 03/17/2022    GFRAA >60 03/17/2022    LABGLOM >60 03/17/2022    GLUCOSE 128 03/17/2022    PROT 7.9 03/14/2022    LABALBU 3.4 03/14/2022    CALCIUM 8.3 03/17/2022    BILITOT 1.0 03/14/2022    ALKPHOS 117 03/14/2022    AST 22 03/14/2022    ALT 35 03/14/2022         Hepatic Function Panel:    Lab Results   Component Value Date    ALKPHOS 117 03/14/2022    ALT 35 03/14/2022    AST 22 03/14/2022    PROT 7.9 03/14/2022    BILITOT 1.0 03/14/2022    BILIDIR 0.4 03/14/2022    IBILI 0.6 03/14/2022    LABALBU 3.4 03/14/2022       PT/INR:    Lab Results   Component Value Date    PROTIME 15.9 03/14/2022    INR 1.5 03/14/2022       TSH:    Lab Results   Component Value Date    TSH 1.880 03/14/2022       U/A:    Lab Results   Component Value Date    COLORU Yellow 03/14/2022    PHUR 5.0 03/14/2022    WBCUA 0-1 03/14/2022    RBCUA 0-1 03/14/2022    BACTERIA MODERATE 03/14/2022    CLARITYU Clear 03/14/2022    SPECGRAV 1.025 03/14/2022    LEUKOCYTESUR Negative 03/14/2022    UROBILINOGEN 4.0 03/14/2022    BILIRUBINUR Negative 03/14/2022    BLOODU Negative 03/14/2022    GLUCOSEU Negative 03/14/2022    AMORPHOUS FEW 03/14/2022       ABG:  No results found for: RCF1ZBU, BEART, E1SLVCML, PHART, THGBART, NIB2ZNM, PO2ART, WXF6GJD    MICROBIOLOGY:    Blood culture -     Streptococcus mitis / Streptococcus oralis (1)    Antibiotic Interpretation Microscan  Method Status    ampicillin Sensitive <=^0.25 mcg/mL BACTERIAL SUSCEPTIBILITY PANEL BY LANEY     benzylpenicillin Sensitive <=^0.06 mcg/mL BACTERIAL SUSCEPTIBILITY PANEL BY LANEY     cefotaxime Sensitive <=^0.12 mcg/mL BACTERIAL SUSCEPTIBILITY PANEL BY LANEY     cefTRIAXone Sensitive <=^0.12 mcg/mL BACTERIAL SUSCEPTIBILITY PANEL BY LANEY clindamycin Sensitive <=^0.25 mcg/mL BACTERIAL SUSCEPTIBILITY PANEL BY LANEY     levofloxacin Sensitive ^0.5 mcg/mL BACTERIAL SUSCEPTIBILITY PANEL BY LANEY     linezolid Sensitive <=^2 mcg/mL BACTERIAL SUSCEPTIBILITY PANEL BY LANEY     vancomycin Sensitive ^0.5 mcg/mL BACTERIAL SUSCEPTIBILITY PANEL BY LANEY             Radiology :    CTA chest       Impression:        No evidence of pulmonary embolism or acute pulmonary abnormality. Echo -       Ejection fraction is visually estimated at 55%. Severe holosystolic prolapse of the posterior leaflet with a flail P1/P2   segment due to ruptured chordae tendineae. Small mobile echodensity suspicious for vegetation on ventricular surface   of posterior leaflet. Failure of leaflet coaptation. No definitive papillary muscle rupture. Torrential mitral regurgitation, eccentric jet directed anteriorly. Signature    IMPRESSION:     1. Streptococcus bacteremia ( 3/14 and 3/15) , Mitral valve endocarditis ,    RECOMMENDATIONS:      1. Stop vancomycin   2.  Ampicillin 2 grams IV q 4 hrs  ( PCN LANEY <0.06 )   3, PICC line tomorrow   4, Dental consult

## 2022-03-19 ENCOUNTER — APPOINTMENT (OUTPATIENT)
Dept: GENERAL RADIOLOGY | Age: 62
DRG: 853 | End: 2022-03-19
Payer: COMMERCIAL

## 2022-03-19 LAB
ANION GAP SERPL CALCULATED.3IONS-SCNC: 14 MMOL/L (ref 7–16)
BUN BLDV-MCNC: 19 MG/DL (ref 6–23)
CALCIUM SERPL-MCNC: 8.4 MG/DL (ref 8.6–10.2)
CHLORIDE BLD-SCNC: 100 MMOL/L (ref 98–107)
CO2: 21 MMOL/L (ref 22–29)
CREAT SERPL-MCNC: 1 MG/DL (ref 0.7–1.2)
GFR AFRICAN AMERICAN: >60
GFR NON-AFRICAN AMERICAN: >60 ML/MIN/1.73
GLUCOSE BLD-MCNC: 121 MG/DL (ref 74–99)
HCT VFR BLD CALC: 34.3 % (ref 37–54)
HEMOGLOBIN: 11 G/DL (ref 12.5–16.5)
MCH RBC QN AUTO: 27.7 PG (ref 26–35)
MCHC RBC AUTO-ENTMCNC: 32.1 % (ref 32–34.5)
MCV RBC AUTO: 86.4 FL (ref 80–99.9)
PDW BLD-RTO: 14.3 FL (ref 11.5–15)
PLATELET # BLD: 300 E9/L (ref 130–450)
PMV BLD AUTO: 10.8 FL (ref 7–12)
POTASSIUM SERPL-SCNC: 4 MMOL/L (ref 3.5–5)
RBC # BLD: 3.97 E12/L (ref 3.8–5.8)
SODIUM BLD-SCNC: 135 MMOL/L (ref 132–146)
WBC # BLD: 11.2 E9/L (ref 4.5–11.5)

## 2022-03-19 PROCEDURE — 2500000003 HC RX 250 WO HCPCS: Performed by: INTERNAL MEDICINE

## 2022-03-19 PROCEDURE — 2580000003 HC RX 258: Performed by: INTERNAL MEDICINE

## 2022-03-19 PROCEDURE — 36415 COLL VENOUS BLD VENIPUNCTURE: CPT

## 2022-03-19 PROCEDURE — 71045 X-RAY EXAM CHEST 1 VIEW: CPT

## 2022-03-19 PROCEDURE — 6370000000 HC RX 637 (ALT 250 FOR IP): Performed by: INTERNAL MEDICINE

## 2022-03-19 PROCEDURE — 80048 BASIC METABOLIC PNL TOTAL CA: CPT

## 2022-03-19 PROCEDURE — 6360000002 HC RX W HCPCS: Performed by: INTERNAL MEDICINE

## 2022-03-19 PROCEDURE — 76937 US GUIDE VASCULAR ACCESS: CPT

## 2022-03-19 PROCEDURE — 2140000000 HC CCU INTERMEDIATE R&B

## 2022-03-19 PROCEDURE — 36569 INSJ PICC 5 YR+ W/O IMAGING: CPT

## 2022-03-19 PROCEDURE — 85027 COMPLETE CBC AUTOMATED: CPT

## 2022-03-19 PROCEDURE — 99232 SBSQ HOSP IP/OBS MODERATE 35: CPT | Performed by: STUDENT IN AN ORGANIZED HEALTH CARE EDUCATION/TRAINING PROGRAM

## 2022-03-19 PROCEDURE — C1751 CATH, INF, PER/CENT/MIDLINE: HCPCS

## 2022-03-19 PROCEDURE — 99233 SBSQ HOSP IP/OBS HIGH 50: CPT | Performed by: INTERNAL MEDICINE

## 2022-03-19 RX ORDER — HEPARIN SODIUM (PORCINE) LOCK FLUSH IV SOLN 100 UNIT/ML 100 UNIT/ML
3 SOLUTION INTRAVENOUS EVERY 12 HOURS SCHEDULED
Status: DISCONTINUED | OUTPATIENT
Start: 2022-03-19 | End: 2022-03-23

## 2022-03-19 RX ORDER — SODIUM CHLORIDE 0.9 % (FLUSH) 0.9 %
5-40 SYRINGE (ML) INJECTION PRN
Status: DISCONTINUED | OUTPATIENT
Start: 2022-03-19 | End: 2022-03-23

## 2022-03-19 RX ORDER — LIDOCAINE HYDROCHLORIDE 10 MG/ML
5 INJECTION, SOLUTION EPIDURAL; INFILTRATION; INTRACAUDAL; PERINEURAL ONCE
Status: COMPLETED | OUTPATIENT
Start: 2022-03-19 | End: 2022-03-19

## 2022-03-19 RX ORDER — HEPARIN SODIUM (PORCINE) LOCK FLUSH IV SOLN 100 UNIT/ML 100 UNIT/ML
3 SOLUTION INTRAVENOUS PRN
Status: DISCONTINUED | OUTPATIENT
Start: 2022-03-19 | End: 2022-03-23

## 2022-03-19 RX ORDER — SODIUM CHLORIDE 0.9 % (FLUSH) 0.9 %
5-40 SYRINGE (ML) INJECTION EVERY 12 HOURS SCHEDULED
Status: DISCONTINUED | OUTPATIENT
Start: 2022-03-19 | End: 2022-03-23

## 2022-03-19 RX ORDER — SODIUM CHLORIDE 9 MG/ML
25 INJECTION, SOLUTION INTRAVENOUS PRN
Status: DISCONTINUED | OUTPATIENT
Start: 2022-03-19 | End: 2022-03-23

## 2022-03-19 RX ADMIN — HEPARIN 300 UNITS: 100 SYRINGE at 20:23

## 2022-03-19 RX ADMIN — LIDOCAINE HYDROCHLORIDE 5 ML: 10 INJECTION, SOLUTION EPIDURAL; INFILTRATION; INTRACAUDAL; PERINEURAL at 13:30

## 2022-03-19 RX ADMIN — METOPROLOL SUCCINATE 75 MG: 50 TABLET, EXTENDED RELEASE ORAL at 20:22

## 2022-03-19 RX ADMIN — AMPICILLIN SODIUM 2000 MG: 2 INJECTION, POWDER, FOR SOLUTION INTRAVENOUS at 22:30

## 2022-03-19 RX ADMIN — Medication 10 ML: at 21:54

## 2022-03-19 RX ADMIN — ATORVASTATIN CALCIUM 40 MG: 40 TABLET, FILM COATED ORAL at 20:23

## 2022-03-19 RX ADMIN — AMPICILLIN SODIUM 2000 MG: 2 INJECTION, POWDER, FOR SOLUTION INTRAVENOUS at 10:33

## 2022-03-19 RX ADMIN — Medication 10 ML: at 08:52

## 2022-03-19 RX ADMIN — AMPICILLIN SODIUM 2000 MG: 2 INJECTION, POWDER, FOR SOLUTION INTRAVENOUS at 02:35

## 2022-03-19 RX ADMIN — AMPICILLIN SODIUM 2000 MG: 2 INJECTION, POWDER, FOR SOLUTION INTRAVENOUS at 05:46

## 2022-03-19 RX ADMIN — SODIUM CHLORIDE, PRESERVATIVE FREE 10 ML: 5 INJECTION INTRAVENOUS at 18:37

## 2022-03-19 RX ADMIN — AMPICILLIN SODIUM 2000 MG: 2 INJECTION, POWDER, FOR SOLUTION INTRAVENOUS at 15:51

## 2022-03-19 RX ADMIN — DEXTROSE MONOHYDRATE 12.5 MG/HR: 50 INJECTION, SOLUTION INTRAVENOUS at 11:30

## 2022-03-19 RX ADMIN — METOPROLOL SUCCINATE 75 MG: 50 TABLET, EXTENDED RELEASE ORAL at 08:53

## 2022-03-19 RX ADMIN — AMPICILLIN SODIUM 2000 MG: 2 INJECTION, POWDER, FOR SOLUTION INTRAVENOUS at 18:37

## 2022-03-19 RX ADMIN — DEXTROSE MONOHYDRATE 12.5 MG/HR: 50 INJECTION, SOLUTION INTRAVENOUS at 03:59

## 2022-03-19 RX ADMIN — ENOXAPARIN SODIUM 80 MG: 100 INJECTION SUBCUTANEOUS at 20:23

## 2022-03-19 RX ADMIN — DEXTROSE MONOHYDRATE 12.5 MG/HR: 50 INJECTION, SOLUTION INTRAVENOUS at 20:42

## 2022-03-19 RX ADMIN — SODIUM CHLORIDE, PRESERVATIVE FREE 10 ML: 5 INJECTION INTRAVENOUS at 15:52

## 2022-03-19 RX ADMIN — ENOXAPARIN SODIUM 80 MG: 100 INJECTION SUBCUTANEOUS at 08:53

## 2022-03-19 ASSESSMENT — PAIN SCALES - GENERAL
PAINLEVEL_OUTOF10: 0

## 2022-03-19 NOTE — PROGRESS NOTES
Department of Internal Medicine  Infectious Diseases  Progress  Note      C/C : Streptococcus bacteremia , fever     Denied fever , chills   SOB with exertion   Afebrile     Current Facility-Administered Medications   Medication Dose Route Frequency Provider Last Rate Last Admin    metoprolol succinate (TOPROL XL) extended release tablet 75 mg  75 mg Oral BID Zelda Meckel, MD   75 mg at 03/19/22 0853    lidocaine PF 1 % injection 5 mL  5 mL IntraDERmal Once Ihsan Trinh MD        sodium chloride flush 0.9 % injection 5-40 mL  5-40 mL IntraVENous 2 times per day Ihsan Trinh MD        sodium chloride flush 0.9 % injection 5-40 mL  5-40 mL IntraVENous PRN Faustino P MD Gianni        0.9 % sodium chloride infusion  25 mL IntraVENous PRN Faustino P MD Gianni        heparin flush 100 UNIT/ML injection 300 Units  3 mL IntraVENous 2 times per day Ihsan Trinh MD        heparin flush 100 UNIT/ML injection 300 Units  3 mL IntraCATHeter PRN Ihsan Trinh MD        ampicillin 2000 mg ivpb mini bag  2,000 mg IntraVENous 6 times per day Ihsan Trinh  mL/hr at 03/19/22 1033 2,000 mg at 03/19/22 1033    enoxaparin (LOVENOX) injection 80 mg  1 mg/kg SubCUTAneous BID Fred Sas, DO   80 mg at 03/19/22 0853    dilTIAZem 100 mg in dextrose 5 % 100 mL infusion ([facility])  2.5-15 mg/hr IntraVENous Continuous Fred Sas, DO 12.5 mL/hr at 03/19/22 0359 12.5 mg/hr at 03/19/22 0359    ondansetron (ZOFRAN-ODT) disintegrating tablet 4 mg  4 mg Oral Q8H PRN Fred Sas, DO        Or    ondansetron [facility]) injection 4 mg  4 mg IntraVENous Q6H PRN Fred Sas, DO        polyethylene glycol (GLYCOLAX) packet 17 g  17 g Oral Daily PRN Fred Sas, DO        acetaminophen (TYLENOL) tablet 650 mg  650 mg Oral Q6H PRN Fred Sas, DO   650 mg at 03/16/22 2477    Or    acetaminophen (TYLENOL) suppository 650 mg  650 mg Rectal Q6H PRN Fred Sas, DO        potassium chloride 10 mEq/100 mL IVPB (Peripheral Line)  10 mEq IntraVENous PRN Jessenia Charlie, DO        magnesium sulfate 2000 mg in 50 mL IVPB premix  2,000 mg IntraVENous PRN Jessenia Charlie, DO        perflutren lipid microspheres (DEFINITY) injection 1.65 mg  1.5 mL IntraVENous ONCE PRN Jessenia Charlie, DO        atorvastatin (LIPITOR) tablet 40 mg  40 mg Oral Nightly Jessenia Charlie, DO   40 mg at 03/18/22 2001         REVIEW OF SYSTEMS:    CONSTITUTIONAL:  Denies fever, chill or rigors. HEENT: denies blurring of vision or double vision, denies hearing problem  RESPIRATORY: SOB with exertion   CARDIOVASCULAR:  Denies palpitation  GASTROINTESTINAL:  Denies abdomen pain, diarrhea or constipation. GENITOURINARY:  Denies burning urination or frequency of urination  INTEGUMENT: denies wound , rash  HEMATOLOGIC/LYMPHATIC:  Denies lymph node swelling, gum bleeding or easy bruising. MUSCULOSKELETAL:  Denies leg pain , joint pain , joint swelling  NEUROLOGICAL:  Fatigue , weakness     PHYSICAL EXAM:      Vitals:     /76   Pulse 109   Temp 99.3 °F (37.4 °C) (Oral)   Resp 16   Ht 5' 9\" (1.753 m)   Wt 179 lb (81.2 kg)   SpO2 98%   BMI 26.43 kg/m²       General Appearance:    Awake, alert , no acute distress. Head:    Normocephalic, atraumatic   Eyes:    No pallor, no icterus,   Ears:    No obvious deformity or drainage.    Nose:   No nasal drainage   Throat:   Mucosa moist, multiple dental caries    Neck:   Supple, no lymphadenopathy   Back:     no CVA tenderness   Lungs:     Clear to auscultation bilaterally, no wheeze    Heart:     Irregular, systolic murmur    Abdomen:     Soft, non-tender, bowel sounds present    Extremities:   No edema, no cyanosis    Pulses:   Dorsalis pedis palpable    Skin:   no rashes       Lab Results   Component Value Date    WBC 11.2 03/19/2022    RBC 3.97 03/19/2022    HGB 11.0 03/19/2022    HCT 34.3 03/19/2022     03/19/2022    MCV 86.4 03/19/2022    MCH 27.7 03/19/2022    [facility] 32.1 03/19/2022    RDW 14.3 03/19/2022    LYMPHOPCT 10.3 03/15/2022    MONOPCT 7.9 03/15/2022    BASOPCT 0.2 03/15/2022    MONOSABS 0.96 03/15/2022    LYMPHSABS 1.25 03/15/2022    EOSABS 0.11 03/15/2022    BASOSABS 0.02 03/15/2022       CMP     Lab Results   Component Value Date     03/19/2022    K 4.0 03/19/2022    K 4.2 03/15/2022     03/19/2022    CO2 21 03/19/2022    BUN 19 03/19/2022    CREATININE 1.0 03/19/2022    GFRAA >60 03/19/2022    LABGLOM >60 03/19/2022    GLUCOSE 121 03/19/2022    PROT 7.9 03/14/2022    LABALBU 3.4 03/14/2022    CALCIUM 8.4 03/19/2022    BILITOT 1.0 03/14/2022    ALKPHOS 117 03/14/2022    AST 22 03/14/2022    ALT 35 03/14/2022         Hepatic Function Panel:    Lab Results   Component Value Date    ALKPHOS 117 03/14/2022    ALT 35 03/14/2022    AST 22 03/14/2022    PROT 7.9 03/14/2022    BILITOT 1.0 03/14/2022    BILIDIR 0.4 03/14/2022    IBILI 0.6 03/14/2022    LABALBU 3.4 03/14/2022       PT/INR:    Lab Results   Component Value Date    PROTIME 15.9 03/14/2022    INR 1.5 03/14/2022       TSH:    Lab Results   Component Value Date    TSH 1.880 03/14/2022       U/A:    Lab Results   Component Value Date    COLORU Yellow 03/14/2022    PHUR 5.0 03/14/2022    WBCUA 0-1 03/14/2022    RBCUA 0-1 03/14/2022    BACTERIA MODERATE 03/14/2022    CLARITYU Clear 03/14/2022    SPECGRAV 1.025 03/14/2022    LEUKOCYTESUR Negative 03/14/2022    UROBILINOGEN 4.0 03/14/2022    BILIRUBINUR Negative 03/14/2022    BLOODU Negative 03/14/2022    GLUCOSEU Negative 03/14/2022    AMORPHOUS FEW 03/14/2022       ABG:  No results found for: QFV5XOI, BEART, E1GKJCDQ, PHART, THGBART, WRX1TDH, PO2ART, WQT6DVB    MICROBIOLOGY:    Blood culture -     Streptococcus mitis / Streptococcus oralis (1)    Antibiotic Interpretation Microscan  Method Status    ampicillin Sensitive <=^0.25 mcg/mL BACTERIAL SUSCEPTIBILITY PANEL BY LANEY     benzylpenicillin Sensitive <=^0.06 mcg/mL BACTERIAL SUSCEPTIBILITY PANEL BY LANEY cefotaxime Sensitive <=^0.12 mcg/mL BACTERIAL SUSCEPTIBILITY PANEL BY LANEY     cefTRIAXone Sensitive <=^0.12 mcg/mL BACTERIAL SUSCEPTIBILITY PANEL BY LANEY     clindamycin Sensitive <=^0.25 mcg/mL BACTERIAL SUSCEPTIBILITY PANEL BY LANEY     levofloxacin Sensitive ^0.5 mcg/mL BACTERIAL SUSCEPTIBILITY PANEL BY LANEY     linezolid Sensitive <=^2 mcg/mL BACTERIAL SUSCEPTIBILITY PANEL BY LANEY     vancomycin Sensitive ^0.5 mcg/mL BACTERIAL SUSCEPTIBILITY PANEL BY LANEY             Radiology :    CTA chest       Impression:        No evidence of pulmonary embolism or acute pulmonary abnormality. Echo -       Ejection fraction is visually estimated at 55%. Severe holosystolic prolapse of the posterior leaflet with a flail P1/P2   segment due to ruptured chordae tendineae. Small mobile echodensity suspicious for vegetation on ventricular surface   of posterior leaflet. Failure of leaflet coaptation. No definitive papillary muscle rupture. Torrential mitral regurgitation, eccentric jet directed anteriorly. IMPRESSION:     1. Streptococcus bacteremia ( 3/14 and 3/15) , Mitral valve endocarditis ,follow up blood cx neg on ( 3/17)     RECOMMENDATIONS:      1. Ampicillin 2 grams IV q 4 hrs  ( PCN LANEY <0.06 )   2.  PICC insertion

## 2022-03-19 NOTE — PROGRESS NOTES
CC:fatigue     Brief HPI:  Patient seen   Awake, alert. No complaints. History reviewed. No pertinent past medical history. Past Surgical History:   Procedure Laterality Date    TRANSESOPHAGEAL ECHOCARDIOGRAM  03/16/2022    Dr. Zina Alcazar History     Socioeconomic History    Marital status: Single     Spouse name: Not on file    Number of children: Not on file    Years of education: Not on file    Highest education level: Not on file   Occupational History    Not on file   Tobacco Use    Smoking status: Never Smoker    Smokeless tobacco: Never Used   Substance and Sexual Activity    Alcohol use: Yes     Comment: rarely    Drug use: Never    Sexual activity: Not on file   Other Topics Concern    Not on file   Social History Narrative    Not on file     Social Determinants of Health     Financial Resource Strain:     Difficulty of Paying Living Expenses: Not on file   Food Insecurity:     Worried About Running Out of Food in the Last Year: Not on file    Laureen of Food in the Last Year: Not on file   Transportation Needs:     Lack of Transportation (Medical): Not on file    Lack of Transportation (Non-Medical):  Not on file   Physical Activity:     Days of Exercise per Week: Not on file    Minutes of Exercise per Session: Not on file   Stress:     Feeling of Stress : Not on file   Social Connections:     Frequency of Communication with Friends and Family: Not on file    Frequency of Social Gatherings with Friends and Family: Not on file    Attends Caoda[employer] Services: Not on file    Active Member of Clubs or Organizations: Not on file    Attends Club or Organization Meetings: Not on file    Marital Status: Not on file   Intimate Partner Violence:     Fear of Current or Ex-Partner: Not on file    Emotionally Abused: Not on file    Physically Abused: Not on file    Sexually Abused: Not on file   Housing Stability:     Unable to Pay for Housing in the Last Year: Not on file    Number of Places Lived in the Last Year: Not on file    Unstable Housing in the Last Year: Not on file     History reviewed. No pertinent family history. Vitals:    03/19/22 0230 03/19/22 0400 03/19/22 0545 03/19/22 0850   BP: 98/66 104/70 101/67 104/76   Pulse: 100 97 93 109   Resp:   18 16   Temp:   98.8 °F (37.1 °C) 99.3 °F (37.4 °C)   TempSrc:   Oral Oral   SpO2:   97% 98%   Weight:       Height:               Intake/Output Summary (Last 24 hours) at 3/19/2022 1141  Last data filed at 3/19/2022 0901  Gross per 24 hour   Intake 1420 ml   Output 1075 ml   Net 345 ml         Recent Labs     03/19/22  0837   WBC 11.2   HGB 11.0*   HCT 34.3*         Recent Labs     03/17/22  1003 03/19/22  0837   BUN 24* 19   CREATININE 1.2 1.0         ROS:   Negative for CP, palpitations, SOB at rest, dizziness/lightheadedness. Physical Exam   Constitutional: Oriented to person, place, and time. Appears well-developed. No distress. Cardiovascular: Normal rate, regular rhythm and normal heart sounds positive murmur  Pulmonary/Chest: Effort normal. No respiratory distress. Abdominal: Soft. Bowel sounds are normal.   Musculoskeletal: Normal range of motion. Neurological: alert and oriented to person, place, and time. Skin: Skin is warm and dry. Psychiatric: normal mood and affect. ANITRA Performed By: the attending and the sonographer      Type of Anesthesia: Moderate sedation     Allergies    - No known allergies.      Findings      Left Ventricle   Normal left ventricular size and systolic function.   Ejection fraction is visually estimated at 55%.      Right Ventricle   Right ventricle global systolic function is mildly reduced.      Left Atrium   Dilated left atrium.   No evidence of thrombus within left atrium or appendage.   The interatrial septum appears intact.   Agitated saline injected for shunt evaluation.   No evidence of atrial septal defect or PFO.       Right Atrium   Dilated right atrium.   No evidence of thrombus or mass in the right atrium.      Mitral Valve   Severe holosystolic prolapse of the posterior leaflet with a flail P1/P2   segment due to ruptured chordae tendineae.   Small mobile echodensity suspicious for vegetation on ventricular surface   of posterior leaflet.   Failure of leaflet coaptation.   No definitive papillary muscle rupture.   Torrential mitral regurgitation, eccentric jet directed anteriorly.      Tricuspid Valve   The tricuspid valve appears structurally normal.   Mild tricuspid regurgitation.   No vegetation.      Aortic Valve   Structurally normal aortic valve.   The aortic valve is trileaflet.   No hemodynamically significant aortic stenosis is present.   Mild aortic valve regurgitation.   No vegetation.      Pulmonic Valve   The pulmonic valve was not well visualized.   No evidence of pulmonic valve stenosis.   Physiologic and/or trace pulmonic regurgitation present.   No vegetation.      Pericardial Effusion   No evidence of pericardial effusion.      Aorta   The aorta is within normal limits.   Miscellaneous   Prominent systolic flow reversal 4/4 pulmonary veins.      Conclusions      Summary   Ejection fraction is visually estimated at 55%.   Severe holosystolic prolapse of the posterior leaflet with a flail P1/P2   segment due to ruptured chordae tendineae.   Small mobile echodensity suspicious for vegetation on ventricular surface   of posterior leaflet.   Failure of leaflet coaptation.   No definitive papillary muscle rupture.   Torrential mitral regurgitation, eccentric jet directed anteriorly. [facility] 3/18  Procedure:    1. Left heart cath     Physician: Ravin Gomes.  Apoorva Bacon DO.     Assistant: none     Indication: Mitral regurgitation  AUC: 7  AUC indication: 70     Complication: None.     Sedation: Intravenous Versed.     Anesthesia: Xylocaine, fentanyl      Sedation time: I was present for sedation and ministration at 09 53 and I ended sedation at 1007 for a total face-to-face sedation time of 14 minutes.     Estimated blood loss: Minimal     Specimens: none     Contrast used: 45 cc     Hemodynamics:  Opening Aortic pressure: 19/55  LV systolic pressure: 95  LVEDP: 6  No significant gradient across the aortic valve     Angiographic Results/findings:  Left Main: No angiographically significant stenosis. LAD: No angiographically significant stenosis. D1: Bifurcating vessel. No angiographically significant stenosis. Cx: Dominant vessel. Mid to distal diffuse 10% stenosis. OM1: Bifurcating vessel. No angiographically significant stenosis. OM2: Proximal 10% stenosis. OM 3: No angiographically significant stenosis. OM 4: No angiographically significant stenosis. Ramus: Absent. RCA: non-Dominant. Mild diffuse luminal irregularities. ASSESSMENT:  Patient Active Problem List   Diagnosis    Atrial fibrillation with rapid ventricular response ([address]    Sepsis ([address] present on admission    JANA (acute kidney injury) ([address]    Bacteremia    Severe mitral regurgitation    Suspected endocarditis    Subacute bacterial endocarditis    Ruptured chordae tendineae ([address]     64yo M with no significant PMH presented to the ED 3/14/2022 with CC of 1 month history of fatigue. He was found to have Streptococcus bacteremia and severe MR with possible flail posterior MV leaflet, possible endocarditis. CTS was consulted for additional recommendations.         PLAN:  No emergent surgical intervention as patient is currently fairly well compensated without overt symptoms of heart failure   [facility] with mild CAD, report above  Pre-operative testing ordered, dental clearance pending, suspect dental source  Tentative plans for OR Wednesday, pending clinical course, work-up and dental clearance  Abx per ID         Pre-operative testing review:   --carotids with no significant stenosis  --viet with good flow bilaterally  --ct chest reviewed  --ANITRA findings above   --pfts - WILL NEED REVIEWED --hgA1c 5.6         Recent Labs     03/19/22  0837   HGB 11.0*   HCT 34.3*        Recent Labs     03/19/22  0837   BUN 19   CREATININE 1.0     Lab Results   Component Value Date/Time    LABURIN Growth not present 03/14/2022 08:45 PM         [id number] Score for Atrial Fibrillation Stroke Risk   Risk   Factors  Component Value   C CHF  0   H HTN  0   A2 Age >= 75  0   D DM  0   S2 Prior Stroke/TIA  0   V Vascular Disease  0   A Age 74-69  0   Sc Sex  0    [id number]  Score  0       Disclaimer:   Definition and scores for [id number]:      [id number] acronym     Score  Congestive HF      1  Hypertension       1  Age ? 75 years      2  Diabetes mellitus      1  Stroke/TIA/TE      2  Vascular disease (prior MI, PAD, or aortic plaque) 1  Age 72 to 76 years      1  Sex category       female=1, male=0    Maximum score      9    Risk Score calculation is dependent on accuracy of patient problem list and past encounter diagnosis. Note: 25 minutes was spent providing face-to-face patient care, including:  and coordinating care, reviewing the chart, labs, and diagnostics, as well as medical decision making. Greater than 50% of this time was spent instructing and counseling the patient face to face regarding findings and recommendations.

## 2022-03-19 NOTE — PROGRESS NOTES
Notified Nimo Astorga with cardiology that PICC was placed in right arm which patient had a heart cath in yesterday.

## 2022-03-19 NOTE — PROGRESS NOTES
INPATIENT CARDIOLOGY FOLLOW-UP    Name: Bethany Vuong    Age: 62 y.o. Date of Admission: 3/14/2022  8:06 PM    Date of Service: 3/19/2022    Chief Complaint: Follow-up for mitral valve disease secondary to endocarditis, paroxysmal atrial fibrillation, acute diastolic heart failure    Interim History: The patient presently remains compensated from cardiovascular standpoint with no active cardiovascular symptomatology. Coronary angiography demonstrated no evidence of significant coronary atherosclerosis as part of assessment prior to his mitral valve intervention. He remains in atrial fibrillation presently with ongoing needs of intravenous diltiazem for rate stabilization. He remains afebrile in the face of antibiotic treatment of his suspected endocarditis. Review of Systems: The remainder of a complete multisystem review including consitutional, central nervous, respiratory, circulatory, gastrointestinal, genitourinary, endocrinologic, hematologic, musculoskeletal and psychiatric are negative.     Problem List:  Patient Active Problem List   Diagnosis    Atrial fibrillation with rapid ventricular response ([address]    Sepsis ([address] present on admission    JANA (acute kidney injury) ([address]    Bacteremia    Severe mitral regurgitation    Suspected endocarditis    Subacute bacterial endocarditis    Ruptured chordae tendineae (HCC)       Allergies:  No Known Allergies    Current Medications:  Current Facility-Administered Medications   Medication Dose Route Frequency Provider Last Rate Last Admin    ampicillin 2000 mg ivpb mini bag  2,000 mg IntraVENous 6 times per day Osiel Rudd MD   Stopped at 03/19/22 0622    hydrALAZINE (APRESOLINE) tablet 10 mg  10 mg Oral 3 times per day Amparo Hasting, DO   10 mg at 03/18/22 1443    metoprolol succinate (TOPROL XL) extended release tablet 50 mg  50 mg Oral BID Amparo Hasting, DO   50 mg at 03/17/22 2046    enoxaparin (LOVENOX) injection 80 mg  1 mg/kg SubCUTAneous BID Evita Given, DO   80 mg at 03/18/22 2002    dilTIAZem 100 mg in dextrose 5 % 100 mL infusion ([facility])  2.5-15 mg/hr IntraVENous Continuous Evita Given, DO 12.5 mL/hr at 03/19/22 0359 12.5 mg/hr at 03/19/22 0359    sodium chloride flush 0.9 % injection 5-40 mL  5-40 mL IntraVENous 2 times per day Evita Given, DO   10 mL at 03/18/22 2100    sodium chloride flush 0.9 % injection 5-40 mL  5-40 mL IntraVENous PRN Evita Given, DO   10 mL at 03/18/22 1523    0.9 % sodium chloride infusion  25 mL IntraVENous PRN Evita Given, DO        ondansetron (ZOFRAN-ODT) disintegrating tablet 4 mg  4 mg Oral Q8H PRN Evita Given, DO        Or    ondansetron [facility]) injection 4 mg  4 mg IntraVENous Q6H PRN Evita Given, DO        polyethylene glycol [facility]) packet 17 g  17 g Oral Daily PRN Evita Given, DO        acetaminophen (TYLENOL) tablet 650 mg  650 mg Oral Q6H PRN Evita Given, DO   650 mg at 03/16/22 2247    Or    acetaminophen (TYLENOL) suppository 650 mg  650 mg Rectal Q6H PRN Evita Given, DO        potassium chloride 10 mEq/100 mL IVPB (Peripheral Line)  10 mEq IntraVENous PRN Evita Given, DO        magnesium sulfate 2000 mg in 50 mL IVPB premix  2,000 mg IntraVENous PRN Evita Given, DO        perflutren lipid microspheres (DEFINITY) injection 1.65 mg  1.5 mL IntraVENous ONCE PRN Evita Given, DO        atorvastatin (LIPITOR) tablet 40 mg  40 mg Oral Nightly Evita Given, DO   40 mg at 03/18/22 2001      dilTIAZem 12.5 mg/hr (03/19/22 0359)    sodium chloride         Physical Exam:  /67   Pulse 93   Temp 98.8 °F (37.1 °C) (Oral)   Resp 18   Ht 5' 9\" (1.753 m)   Wt 179 lb (81.2 kg)   SpO2 97%   BMI 26.43 kg/m²   Weight change: Wt Readings from Last 3 Encounters:   03/15/22 179 lb (81.2 kg)     The patient is awake, alert and in no discomfort or distress. No gross musculoskeletal deformity is present.  No significant skin or nail changes are present. Gross examination of head, eyes, nose and throat are negative. Jugular venous pressure is normal and no carotid bruits are present. Normal respiratory effort is noted with no accessory muscle usage present. Lung fields are clear to ascultation. Cardiac examination is notable for an irregular rhythm with no palpable thrill. No gallop rhythm and an apical holosystolic murmur are identified. A benign abdominal examination is present with no masses or organomegaly. Intact pulses are present throughout all extremities and no peripheral edema is present. No focal neurologic deficits are present. Intake/Output:    Intake/Output Summary (Last 24 hours) at 3/19/2022 0738  Last data filed at 3/19/2022 0002  Gross per 24 hour   Intake 690 ml   Output 850 ml   Net -160 ml     No intake/output data recorded. Laboratory Tests:  Lab Results   Component Value Date    CREATININE 1.2 03/17/2022    BUN 24 (H) 03/17/2022     03/17/2022    K 4.2 03/17/2022     03/17/2022    CO2 22 03/17/2022     No results for input(s): CKTOTAL, CKMB in the last 72 hours. Invalid input(s): TROPONONI  No results found for: BNP  Lab Results   Component Value Date    WBC 12.1 03/15/2022    RBC 3.33 03/15/2022    HGB 9.2 03/15/2022    HCT 28.4 03/15/2022    MCV 85.3 03/15/2022    MCH 27.6 03/15/2022    MCHC 32.4 03/15/2022    RDW 13.5 03/15/2022     03/15/2022    MPV 10.8 03/15/2022     No results for input(s): ALKPHOS, ALT, AST, PROT, BILITOT, BILIDIR, LABALBU in the last 72 hours.   Lab Results   Component Value Date    MG 2.4 03/14/2022     Lab Results   Component Value Date    PROTIME 15.9 03/14/2022    INR 1.5 03/14/2022     Lab Results   Component Value Date    TSH 1.880 03/14/2022     No components found for: CHLPL  Lab Results   Component Value Date    TRIG 115 03/15/2022     Lab Results   Component Value Date    HDL 20 03/15/2022     Lab Results   Component Value Date [address] [employer] 03/15/2022       Cardiac Tests:  Telemetry findings reviewed: atrial fibrillation with a mean ventricular response of approximately  bpm, no new tachy/bradyarrhythmias overnight      ASSESSMENT / PLAN: Clinical basis, the patient present remains compensated from a cardiovascular standpoint with persistent suboptimal rate control of atrial fibrillation necessitating continued intravenous diltiazem for stabilization in the face of his severe mitral regurgitation. Additional optimization of his medical regimen with further modification of his beta-blocker dosage and discontinuation of hydralazine will be initiated to assist ongoing arrhythmia stabilization. Coronary angiography demonstrated no evidence of significant coronary atherosclerosis with ongoing assessment including that of further dental evaluation pending mitral valve intervention. Continue careful monitoring of his volume status and compensation in the face of his severe mitral regurgitation will remain necessary. On long-term basis, appropriate risk factor modification of blood pressure and serum lipids will remain essential to reducing risk of future atherosclerotic development in addition to that of ongoing antibiotic prophylaxis particular time of dental interventions to reduce risk of recurrent bacterial endocarditis. Note: This report was completed utilizing computer voice recognition software. Every effort has been made to ensure accuracy, however; inadvertent computerized transcription errors may be present. Etienne Sanchez.  Temitope Fernandez, [facility]

## 2022-03-19 NOTE — PROGRESS NOTES
Hospitalist Progress Note      Synopsis: Patient admitted on 114/2022 71-year-old male with no known past medical history except childhood murmur, went to the urgent care today for evaluation of fatigue and was found to have A. fib with RVR and was sent to the main hospital emergency room. Patient states his symptoms of fatigue were on and off since past 1 month. Denies any palpitations. However did report some exertional shortness of breath. Patient consulted, as per him, telemedicine doctor, who prescribed him ivermectin and hydroxychloroquine earlier this month for suspicion of COVID-19 infection. Patient is unvaccinated against Covid. Patient completed 5 days of ivermectin, however, did not finish 14-day course of hydroxychloroquine which he stopped about 3 days ago. Patient denies any recent fever, chills, cough, shortness of breath, chest pain, abdominal pain, nausea, vomiting, diarrhea constipation. No presyncopal or syncopal event. Upon arrival in the emergency room, patient was noted to be febrile with T-max 101.1 °F, WBC count slightly elevated at 12.2, CRP elevated at 5.9, proBNP elevated at 6000, lactate level elevated at 2.6, troponin elevated at 42, 43. Patient was started on Cardizem drip. Patient had CTA chest done which did not reveal acute PE. Blood cultures positive for strep mitis. Echocardiogram reveals severe mitral regurgitation with flail leaflet and ruptured chordae tendon line and concern for vegetation. Patient had left heart catheterization demonstrating no significant coronary artery disease. Plan for valve replacement 3/23.       Subjective    Clinically improving. Feeling better. No complaints  Stable overnight. No other overnight issues reported.      No CP, SOB, palpitations, blurred vision, HA, lightheadedness, LOC or focal neurological deficits    Exam:  /67   Pulse 93   Temp 98.8 °F (37.1 °C) (Oral)   Resp 18   Ht 5' 9\" (1.753 m)   Wt 179 lb (81.2 kg)   SpO2 97%   BMI 26.43 kg/m²   General appearance: No apparent distress, appears stated age and cooperative. HEENT: Pupils equal, round, and reactive to light. Conjunctivae/corneas clear. Neck: Supple. No jugular venous distention. Trachea midline. Respiratory:  Normal respiratory effort. Clear to auscultation, bilaterally without Rales/Wheezes/Rhonchi. Cardiovascular: Regular rate and rhythm with normal S1/S2 +3 of 6 EARNESTINE murmurs, rubs or gallops. Abdomen: Soft, non-tender, non-distended with normal bowel sounds. Musculoskeletal: No clubbing, cyanosis or edema bilaterally. Brisk capillary refill. 2+ lower extremity pulses (dorsalis pedis). Skin:  No rashes    Neurologic: awake, alert and following commands     Medications:  Reviewed    Infusion Medications    dilTIAZem 12.5 mg/hr (03/19/22 0359)    sodium chloride       Scheduled Medications    metoprolol succinate  75 mg Oral BID    ampicillin IV  2,000 mg IntraVENous 6 times per day    enoxaparin  1 mg/kg SubCUTAneous BID    sodium chloride flush  5-40 mL IntraVENous 2 times per day    atorvastatin  40 mg Oral Nightly     PRN Meds: sodium chloride flush, sodium chloride, ondansetron **OR** ondansetron, polyethylene glycol, acetaminophen **OR** acetaminophen, potassium chloride, magnesium sulfate, perflutren lipid microspheres    I/O    Intake/Output Summary (Last 24 hours) at 3/19/2022 0827  Last data filed at 3/19/2022 0002  Gross per 24 hour   Intake 690 ml   Output 450 ml   Net 240 ml       Labs:   No results for input(s): WBC, HGB, HCT, PLT in the last 72 hours. Recent Labs     03/17/22  1003      K 4.2      CO2 22   BUN 24*   CREATININE 1.2   CALCIUM 8.3*       No results for input(s): PROT, ALB, ALKPHOS, ALT, AST, BILITOT, AMYLASE, LIPASE in the last 72 hours. No results for input(s): INR in the last 72 hours. No results for input(s): Zo Divine in the last 72 hours.     Chronic labs:  Lab Results   Component Value Date    CHOL 133 03/15/2022    TRIG 115 03/15/2022    HDL 20 03/15/2022    LDLCALC 90 03/15/2022    TSH 1.880 03/14/2022    INR 1.5 03/14/2022    LABA1C 5.6 03/15/2022       Radiology:  Imaging studies reviewed today. ASSESSMENT:    Principal Problem:    Atrial fibrillation with rapid ventricular response (HCC)  Active Problems:    Sepsis ([facility] [address] present on admission    JANA (acute kidney injury) ([facility] [address]    Bacteremia    Severe mitral regurgitation    Suspected endocarditis    Subacute bacterial endocarditis    Ruptured chordae tendineae (HCC)  Resolved Problems:    * No resolved hospital problems. *       PLAN:    Atrial fibrillation RVR   admit to intermediate care  Monitor on telemetry  Rate control diltiazem drip--> Toprol-XL  Anticoagulation Lovenox  Cardiology Consult appreciated discussed case    Sepsis, bacteremia, suspected endocarditis  Fever 101.1 on admission, then afebrile until T-max 100.6 today  Treated cefepime and vancomycin-> vancomycin--> ampicillin  Echocardiogram with partial flail mitral valve, ruptured chordae and likely vegetation  ANITRA 3/16 confirming severe MR with flail leaflet and ruptured chordae possible small vegetation  Urine culture  Blood cultures 2 of 2+ GPC in chains--alpha streptococcus-strep mitis/oralis  CRP 5.9  Procalcitonin 0.31, repeat 0.23  Infectious disease Consult appreciated-discussed case    Mitral regurgitation-severe  ANITRA flail leaflet, ruptured chordae, sever MR, suspected vegitation   [facility] 3/18 without evidence of significant CAD  CTS Consult appreciated possibly valve repair/replacement Wednesday 3/23  Dental consult pending--no consult note, will reconsult     JANA-improving  IVF completed  Avoid nephrotoxins  Urine labs  Monitor renal function, electrolytes, urine output    Medications for other co morbidities cont as appropriate w dosage adjustments as necessary       Diet: ADULT DIET; Regular;  Low Sodium (2 gm)  Code Status: Full Code  PT/OT Eval Status: Ordered  DVT Prophylaxis:   Lovenox  Recommended disposition at discharge:   Pending valve repair/ further medical stabilization    +++++++++++++++++++++++++++++++++++++++++++++++++  Nolan Garcia MD   [facility].  +++++++++++++++++++++++++++++++++++++++++++++++++  NOTE: This report was transcribed using voice recognition software. Every effort was made to ensure accuracy; however, inadvertent computerized transcription errors may be present.

## 2022-03-19 NOTE — PROGRESS NOTES
Chg double lumen picc Placement 3/19/2022    Product number: [id number]   Lot Number: [id number]      Ultrasound: yes   Right Brachial vein:                Upper Arm Circumference: 30cm    Size: 45cm    Exposed Length: 10cm    Internal Length: 35cm   Cut: 10cm   Vein Measurement: 0.60cm    Ethan Dexter RN  3/19/2022  1:20 PM    A-fib, enlarged heart  Chest xray

## 2022-03-20 LAB
BLOOD CULTURE, ROUTINE: NORMAL
CULTURE, BLOOD 2: NORMAL
CULTURE, BLOOD 3: NORMAL

## 2022-03-20 PROCEDURE — 2500000003 HC RX 250 WO HCPCS: Performed by: INTERNAL MEDICINE

## 2022-03-20 PROCEDURE — 6370000000 HC RX 637 (ALT 250 FOR IP): Performed by: INTERNAL MEDICINE

## 2022-03-20 PROCEDURE — 87081 CULTURE SCREEN ONLY: CPT

## 2022-03-20 PROCEDURE — 2580000003 HC RX 258: Performed by: INTERNAL MEDICINE

## 2022-03-20 PROCEDURE — 6360000002 HC RX W HCPCS: Performed by: INTERNAL MEDICINE

## 2022-03-20 PROCEDURE — 2140000000 HC CCU INTERMEDIATE R&B

## 2022-03-20 PROCEDURE — 99233 SBSQ HOSP IP/OBS HIGH 50: CPT | Performed by: INTERNAL MEDICINE

## 2022-03-20 PROCEDURE — 99232 SBSQ HOSP IP/OBS MODERATE 35: CPT | Performed by: STUDENT IN AN ORGANIZED HEALTH CARE EDUCATION/TRAINING PROGRAM

## 2022-03-20 RX ORDER — FUROSEMIDE 10 MG/ML
20 INJECTION INTRAMUSCULAR; INTRAVENOUS ONCE
Status: COMPLETED | OUTPATIENT
Start: 2022-03-20 | End: 2022-03-20

## 2022-03-20 RX ADMIN — DEXTROSE MONOHYDRATE 7.5 MG/HR: 50 INJECTION, SOLUTION INTRAVENOUS at 15:45

## 2022-03-20 RX ADMIN — AMPICILLIN SODIUM 2000 MG: 2 INJECTION, POWDER, FOR SOLUTION INTRAVENOUS at 18:51

## 2022-03-20 RX ADMIN — Medication 10 ML: at 09:59

## 2022-03-20 RX ADMIN — AMPICILLIN SODIUM 2000 MG: 2 INJECTION, POWDER, FOR SOLUTION INTRAVENOUS at 06:20

## 2022-03-20 RX ADMIN — SODIUM CHLORIDE, PRESERVATIVE FREE 10 ML: 5 INJECTION INTRAVENOUS at 14:23

## 2022-03-20 RX ADMIN — AMPICILLIN SODIUM 2000 MG: 2 INJECTION, POWDER, FOR SOLUTION INTRAVENOUS at 02:30

## 2022-03-20 RX ADMIN — Medication 10 ML: at 19:51

## 2022-03-20 RX ADMIN — METOPROLOL SUCCINATE 75 MG: 50 TABLET, EXTENDED RELEASE ORAL at 09:58

## 2022-03-20 RX ADMIN — ATORVASTATIN CALCIUM 40 MG: 40 TABLET, FILM COATED ORAL at 19:51

## 2022-03-20 RX ADMIN — SODIUM CHLORIDE, PRESERVATIVE FREE 10 ML: 5 INJECTION INTRAVENOUS at 10:07

## 2022-03-20 RX ADMIN — AMPICILLIN SODIUM 2000 MG: 2 INJECTION, POWDER, FOR SOLUTION INTRAVENOUS at 22:27

## 2022-03-20 RX ADMIN — SODIUM CHLORIDE, PRESERVATIVE FREE 10 ML: 5 INJECTION INTRAVENOUS at 10:06

## 2022-03-20 RX ADMIN — DEXTROSE MONOHYDRATE 12.5 MG/HR: 50 INJECTION, SOLUTION INTRAVENOUS at 04:43

## 2022-03-20 RX ADMIN — SODIUM CHLORIDE, PRESERVATIVE FREE 10 ML: 5 INJECTION INTRAVENOUS at 18:51

## 2022-03-20 RX ADMIN — FUROSEMIDE 20 MG: 20 INJECTION, SOLUTION INTRAMUSCULAR; INTRAVENOUS at 09:57

## 2022-03-20 RX ADMIN — HEPARIN 300 UNITS: 100 SYRINGE at 19:52

## 2022-03-20 RX ADMIN — HEPARIN 300 UNITS: 100 SYRINGE at 09:59

## 2022-03-20 RX ADMIN — ENOXAPARIN SODIUM 80 MG: 100 INJECTION SUBCUTANEOUS at 19:52

## 2022-03-20 RX ADMIN — SODIUM CHLORIDE, PRESERVATIVE FREE 10 ML: 5 INJECTION INTRAVENOUS at 10:05

## 2022-03-20 RX ADMIN — AMPICILLIN SODIUM 2000 MG: 2 INJECTION, POWDER, FOR SOLUTION INTRAVENOUS at 14:23

## 2022-03-20 RX ADMIN — POLYETHYLENE GLYCOL 3350 17 G: 17 POWDER, FOR SOLUTION ORAL at 19:52

## 2022-03-20 RX ADMIN — AMPICILLIN SODIUM 2000 MG: 2 INJECTION, POWDER, FOR SOLUTION INTRAVENOUS at 10:00

## 2022-03-20 RX ADMIN — ENOXAPARIN SODIUM 80 MG: 100 INJECTION SUBCUTANEOUS at 09:58

## 2022-03-20 RX ADMIN — METOPROLOL SUCCINATE 75 MG: 50 TABLET, EXTENDED RELEASE ORAL at 19:51

## 2022-03-20 ASSESSMENT — PAIN SCALES - GENERAL
PAINLEVEL_OUTOF10: 0

## 2022-03-20 NOTE — PROGRESS NOTES
Hospitalist Progress Note      Synopsis: Patient admitted on 114/2022 62-year-old male with no known past medical history except childhood murmur, went to the urgent care today for evaluation of fatigue and was found to have A. fib with RVR and was sent to the main hospital emergency room. Patient states his symptoms of fatigue were on and off since past 1 month. Denies any palpitations. However did report some exertional shortness of breath. Patient consulted, as per him, telemedicine doctor, who prescribed him ivermectin and hydroxychloroquine earlier this month for suspicion of COVID-19 infection. Patient is unvaccinated against Covid. Patient completed 5 days of ivermectin, however, did not finish 14-day course of hydroxychloroquine which he stopped about 3 days ago. Patient denies any recent fever, chills, cough, shortness of breath, chest pain, abdominal pain, nausea, vomiting, diarrhea constipation. No presyncopal or syncopal event. Upon arrival in the emergency room, patient was noted to be febrile with T-max 101.1 °F, WBC count slightly elevated at 12.2, CRP elevated at 5.9, proBNP elevated at 6000, lactate level elevated at 2.6, troponin elevated at 42, 43. Patient was started on Cardizem drip. Patient had CTA chest done which did not reveal acute PE. Blood cultures positive for strep mitis. Echocardiogram reveals severe mitral regurgitation with flail leaflet and ruptured chordae tendon line and concern for vegetation. Patient had left heart catheterization demonstrating no significant coronary artery disease.   Plan for valve replacement 3/23.       Subjective    Feeling good no complaints  No CP or SOB  No fever or chills   No uncontrolled pain  No vomiting or diarrhea   No events reported overnight     Exam:  /76   Pulse 91   Temp 98.2 °F (36.8 °C) (Oral)   Resp 17   Ht 5' 9\" (1.753 m)   Wt 179 lb (81.2 kg)   SpO2 98%   BMI 26.43 kg/m²   General appearance: No apparent distress, appears stated age and cooperative. HEENT: Pupils equal, round, and reactive to light. Conjunctivae/corneas clear. Neck: Supple. No jugular venous distention. Trachea midline. Respiratory:  Normal respiratory effort. Clear to auscultation, bilaterally without Rales/Wheezes/Rhonchi. Cardiovascular: Regular rate and rhythm with normal S1/S2 +3 of 6 EARNESTINE murmurs, rubs or gallops. Abdomen: Soft, non-tender, non-distended with normal bowel sounds. Musculoskeletal: No clubbing, cyanosis or edema bilaterally. Brisk capillary refill. 2+ lower extremity pulses (dorsalis pedis). Skin:  No rashes    Neurologic: awake, alert and following commands     Medications:  Reviewed    Infusion Medications    sodium chloride      dilTIAZem 10 mg/hr (03/20/22 0619)     Scheduled Medications    metoprolol succinate  75 mg Oral BID    sodium chloride flush  5-40 mL IntraVENous 2 times per day    heparin flush  3 mL IntraVENous 2 times per day    ampicillin IV  2,000 mg IntraVENous 6 times per day    enoxaparin  1 mg/kg SubCUTAneous BID    atorvastatin  40 mg Oral Nightly     PRN Meds: sodium chloride flush, sodium chloride, heparin flush, ondansetron **OR** ondansetron, polyethylene glycol, acetaminophen **OR** acetaminophen, potassium chloride, magnesium sulfate, perflutren lipid microspheres    I/O    Intake/Output Summary (Last 24 hours) at 3/20/2022 1017  Last data filed at 3/20/2022 1007  Gross per 24 hour   Intake 2350 ml   Output 1650 ml   Net 700 ml       Labs:   Recent Labs     03/19/22  0837   WBC 11.2   HGB 11.0*   HCT 34.3*          Recent Labs     03/19/22  0837      K 4.0      CO2 21*   BUN 19   CREATININE 1.0   CALCIUM 8.4*       No results for input(s): PROT, ALB, ALKPHOS, ALT, AST, BILITOT, AMYLASE, LIPASE in the last 72 hours. No results for input(s): INR in the last 72 hours. No results for input(s): Skippy Gault in the last 72 hours.     Chronic labs:  Lab Results Component Value Date    CHOL 133 03/15/2022    TRIG 115 03/15/2022    HDL 20 03/15/2022    LDLCALC 90 03/15/2022    TSH 1.880 03/14/2022    INR 1.5 03/14/2022    LABA1C 5.6 03/15/2022       Radiology:  Imaging studies reviewed today. ASSESSMENT:    Principal Problem:    Atrial fibrillation with rapid ventricular response (HCC)  Active Problems:    Sepsis ([address] present on admission    JANA (acute kidney injury) ([address]    Bacteremia    Severe mitral regurgitation    Suspected endocarditis    Subacute bacterial endocarditis    Ruptured chordae tendineae (HCC)  Resolved Problems:    * No resolved hospital problems. *       PLAN:    Atrial fibrillation RVR   admit to intermediate care  Monitor on telemetry  Rate control diltiazem drip--> Toprol-XL  Anticoagulation Lovenox  Cardiology Consult appreciated discussed case    Sepsis, bacteremia, suspected endocarditis  Fever 101.1 on admission, then afebrile until T-max 100.6 today  Treated cefepime and vancomycin-> vancomycin--> ampicillin  Echocardiogram with partial flail mitral valve, ruptured chordae and likely vegetation  ANITRA 3/16 confirming severe MR with flail leaflet and ruptured chordae possible small vegetation  Urine culture  Blood cultures 2 of 2+ GPC in chains--alpha streptococcus-strep mitis/oralis  CRP 5.9  Procalcitonin 0.31, repeat 0.23  Infectious disease Consult appreciated-discussed case    Mitral regurgitation-severe  ANITRA flail leaflet, ruptured chordae, sever MR, suspected vegitation   [facility] 3/18 without evidence of significant CAD  CTS Consult appreciated possibly valve repair/replacement Wednesday 3/23  Dental consult pending--no consult note, will reconsult     JANA-improved  IVF completed  Avoid nephrotoxins  Urine labs  Monitor renal function, electrolytes, urine output    Medications for other co morbidities cont as appropriate w dosage adjustments as necessary       Diet: ADULT DIET; Regular;  Low Sodium (2 gm)  Code Status: Full Code  PT/OT Eval Status: Ordered  DVT Prophylaxis:   Lovenox  Recommended disposition at discharge:   Pending valve repair/ further medical stabilization    Addendum: Received message the patient would like to be transferred to the [facility]. I contacted the [facility] transfer line and was notified that they are not set accepting any transfers to the main [place] due to their high census. I notify patient of this. I offered transfer to other [facility]. Patient declined saying that no he just wanted to see about the [facility] main and is not interested in other options. He will continue on with the current plan of valve surgery here.  +++++++++++++++++++++++++++++++++++++++++++++++++  Yamilet Pierre MD   [facility].  +++++++++++++++++++++++++++++++++++++++++++++++++  NOTE: This report was transcribed using voice recognition software. Every effort was made to ensure accuracy; however, inadvertent computerized transcription errors may be present.

## 2022-03-20 NOTE — PROGRESS NOTES
Notified Dr. Thomas Comes that patient would like transferred to [facility] for a second opinion.

## 2022-03-20 NOTE — PROGRESS NOTES
Pt HR maintaining upper 90's consistently. Unable to down titrate cardizem gtt any more at this time. Will continue to monitor.

## 2022-03-20 NOTE — PROGRESS NOTES
Department of Internal Medicine  Infectious Diseases  Progress  Note      C/C : Streptococcus bacteremia , fever     Denied fever , chills   SOB with exertion   Afebrile     Current Facility-Administered Medications   Medication Dose Route Frequency Provider Last Rate Last Admin    metoprolol succinate (TOPROL XL) extended release tablet 75 mg  75 mg Oral BID China Segura MD   75 mg at 03/20/22 0958    sodium chloride flush 0.9 % injection 5-40 mL  5-40 mL IntraVENous 2 times per day Xin Summers MD   10 mL at 03/20/22 0959    sodium chloride flush 0.9 % injection 5-40 mL  5-40 mL IntraVENous PRN Faustino Archer MD   10 mL at 03/20/22 1007    0.9 % sodium chloride infusion  25 mL IntraVENous PRN Faustino Archer MD        heparin flush 100 UNIT/ML injection 300 Units  3 mL IntraVENous 2 times per day Xin Summers MD   300 Units at 03/20/22 0959    heparin flush 100 UNIT/ML injection 300 Units  3 mL IntraCATHeter PRN Faustino Archer MD        ampicillin 2000 mg ivpb mini bag  2,000 mg IntraVENous 6 times per day Melany Archer  mL/hr at 03/20/22 1000 2,000 mg at 03/20/22 1000    enoxaparin (LOVENOX) injection 80 mg  1 mg/kg SubCUTAneous BID Princella Argue, DO   80 mg at 03/20/22 7298    dilTIAZem 100 mg in dextrose 5 % 100 mL infusion ([facility])  2.5-15 mg/hr IntraVENous Continuous Princella Argue, DO 10 mL/hr at 03/20/22 0619 10 mg/hr at 03/20/22 7898    ondansetron (ZOFRAN-ODT) disintegrating tablet 4 mg  4 mg Oral Q8H PRN Princella Argue, DO        Or    ondansetron [facility]) injection 4 mg  4 mg IntraVENous Q6H PRN Princella Argue, DO        polyethylene glycol [facility]) packet 17 g  17 g Oral Daily PRN Princella Argue, DO        acetaminophen (TYLENOL) tablet 650 mg  650 mg Oral Q6H PRN Princella Argue, DO   650 mg at 03/16/22 2247    Or    acetaminophen (TYLENOL) suppository 650 mg  650 mg Rectal Q6H PRN Princella Argue, DO        potassium chloride 10 mEq/100 mL IVPB (Peripheral Line)  10 mEq IntraVENous PRN Evita Given, DO        magnesium sulfate 2000 mg in 50 mL IVPB premix  2,000 mg IntraVENous PRN Evita Given, DO        perflutren lipid microspheres (DEFINITY) injection 1.65 mg  1.5 mL IntraVENous ONCE PRN Evita Given, DO        atorvastatin (LIPITOR) tablet 40 mg  40 mg Oral Nightly Evita Given, DO   40 mg at 03/19/22 2023         REVIEW OF SYSTEMS:    CONSTITUTIONAL:  Denies fever, chill or rigors. HEENT: denies blurring of vision or double vision, denies hearing problem  RESPIRATORY: SOB with exertion   CARDIOVASCULAR:  Denies palpitation  GASTROINTESTINAL:  Denies abdomen pain, diarrhea or constipation. GENITOURINARY:  Denies burning urination or frequency of urination  INTEGUMENT: denies wound , rash  HEMATOLOGIC/LYMPHATIC:  Denies lymph node swelling, gum bleeding or easy bruising. MUSCULOSKELETAL:  Denies leg pain , joint pain , joint swelling  NEUROLOGICAL:  Fatigue , weakness     PHYSICAL EXAM:      Vitals:     /76   Pulse 91   Temp 98.2 °F (36.8 °C) (Oral)   Resp 17   Ht 5' 9\" (1.753 m)   Wt 179 lb (81.2 kg)   SpO2 98%   BMI 26.43 kg/m²       General Appearance:    Awake, alert , no acute distress. Head:    Normocephalic, atraumatic   Eyes:    No pallor, no icterus,   Ears:    No obvious deformity or drainage.    Nose:   No nasal drainage   Throat:   Mucosa moist, multiple dental caries    Neck:   Supple, no lymphadenopathy   Back:     no CVA tenderness   Lungs:     Clear to auscultation bilaterally, no wheeze    Heart:     Irregular, systolic murmur    Abdomen:     Soft, non-tender, bowel sounds present    Extremities:   No edema, no cyanosis    Pulses:   Dorsalis pedis palpable    Skin:   no rashes       Lab Results   Component Value Date    WBC 11.2 03/19/2022    RBC 3.97 03/19/2022    HGB 11.0 03/19/2022    HCT 34.3 03/19/2022     03/19/2022    MCV 86.4 03/19/2022    MCH 27.7 03/19/2022    MCHC 32.1 03/19/2022 RDW 14.3 03/19/2022    LYMPHOPCT 10.3 03/15/2022    MONOPCT 7.9 03/15/2022    BASOPCT 0.2 03/15/2022    MONOSABS 0.96 03/15/2022    LYMPHSABS 1.25 03/15/2022    EOSABS 0.11 03/15/2022    BASOSABS 0.02 03/15/2022       CMP     Lab Results   Component Value Date     03/19/2022    K 4.0 03/19/2022    K 4.2 03/15/2022     03/19/2022    CO2 21 03/19/2022    BUN 19 03/19/2022    CREATININE 1.0 03/19/2022    GFRAA >60 03/19/2022    LABGLOM >60 03/19/2022    GLUCOSE 121 03/19/2022    PROT 7.9 03/14/2022    LABALBU 3.4 03/14/2022    CALCIUM 8.4 03/19/2022    BILITOT 1.0 03/14/2022    ALKPHOS 117 03/14/2022    AST 22 03/14/2022    ALT 35 03/14/2022         Hepatic Function Panel:    Lab Results   Component Value Date    ALKPHOS 117 03/14/2022    ALT 35 03/14/2022    AST 22 03/14/2022    PROT 7.9 03/14/2022    BILITOT 1.0 03/14/2022    BILIDIR 0.4 03/14/2022    IBILI 0.6 03/14/2022    LABALBU 3.4 03/14/2022       PT/INR:    Lab Results   Component Value Date    PROTIME 15.9 03/14/2022    INR 1.5 03/14/2022       TSH:    Lab Results   Component Value Date    TSH 1.880 03/14/2022       U/A:    Lab Results   Component Value Date    COLORU Yellow 03/14/2022    PHUR 5.0 03/14/2022    WBCUA 0-1 03/14/2022    RBCUA 0-1 03/14/2022    BACTERIA MODERATE 03/14/2022    CLARITYU Clear 03/14/2022    SPECGRAV 1.025 03/14/2022    LEUKOCYTESUR Negative 03/14/2022    UROBILINOGEN 4.0 03/14/2022    BILIRUBINUR Negative 03/14/2022    BLOODU Negative 03/14/2022    GLUCOSEU Negative 03/14/2022    AMORPHOUS FEW 03/14/2022       ABG:  No results found for: RLL2BTJ, BEART, H6UELYLE, PHART, THGBART, NXA1SQJ, PO2ART, WCW3BWU    MICROBIOLOGY:    Blood culture -     Streptococcus mitis / Streptococcus oralis (1)    Antibiotic Interpretation Microscan  Method Status    ampicillin Sensitive <=^0.25 mcg/mL BACTERIAL SUSCEPTIBILITY PANEL BY LANEY     benzylpenicillin Sensitive <=^0.06 mcg/mL BACTERIAL SUSCEPTIBILITY PANEL BY LANEY     cefotaxime Sensitive <=^0.12 mcg/mL BACTERIAL SUSCEPTIBILITY PANEL BY LANEY     cefTRIAXone Sensitive <=^0.12 mcg/mL BACTERIAL SUSCEPTIBILITY PANEL BY LANEY     clindamycin Sensitive <=^0.25 mcg/mL BACTERIAL SUSCEPTIBILITY PANEL BY LANEY     levofloxacin Sensitive ^0.5 mcg/mL BACTERIAL SUSCEPTIBILITY PANEL BY LANEY     linezolid Sensitive <=^2 mcg/mL BACTERIAL SUSCEPTIBILITY PANEL BY LANEY     vancomycin Sensitive ^0.5 mcg/mL BACTERIAL SUSCEPTIBILITY PANEL BY LANEY             Radiology :    CTA chest       Impression:        No evidence of pulmonary embolism or acute pulmonary abnormality. Echo -       Ejection fraction is visually estimated at 55%. Severe holosystolic prolapse of the posterior leaflet with a flail P1/P2   segment due to ruptured chordae tendineae. Small mobile echodensity suspicious for vegetation on ventricular surface   of posterior leaflet. Failure of leaflet coaptation. No definitive papillary muscle rupture. Torrential mitral regurgitation, eccentric jet directed anteriorly. IMPRESSION:     1. Streptococcus bacteremia ( 3/14 and 3/15) , Mitral valve endocarditis ,follow up blood cx neg on ( 3/17)     RECOMMENDATIONS:      1. Ampicillin 2 grams IV q 4 hrs  ( PCN LANEY <0.06 )   2.  Pt would like to have 2 nd opinion at [facility]

## 2022-03-20 NOTE — PROGRESS NOTES
Pt provided incentive spirometer and educated on use. Pt verbalizes an understanding. Pt demonstrates proper use and achieves 2500 ml with minimal effort and no coughing.

## 2022-03-20 NOTE — PROGRESS NOTES
CC:fatigue     Brief HPI:  Patient seen   Awake, alert. No complaints. History reviewed. No pertinent past medical history. Past Surgical History:   Procedure Laterality Date    TRANSESOPHAGEAL ECHOCARDIOGRAM  03/16/2022    Dr. Eunice Wood History     Socioeconomic History    Marital status: Single     Spouse name: Not on file    Number of children: Not on file    Years of education: Not on file    Highest education level: Not on file   Occupational History    Not on file   Tobacco Use    Smoking status: Never Smoker    Smokeless tobacco: Never Used   Substance and Sexual Activity    Alcohol use: Yes     Comment: rarely    Drug use: Never    Sexual activity: Not on file   Other Topics Concern    Not on file   Social History Narrative    Not on file     Social Determinants of Health     Financial Resource Strain:     Difficulty of Paying Living Expenses: Not on file   Food Insecurity:     Worried About Running Out of Food in the Last Year: Not on file    Laureen of Food in the Last Year: Not on file   Transportation Needs:     Lack of Transportation (Medical): Not on file    Lack of Transportation (Non-Medical):  Not on file   Physical Activity:     Days of Exercise per Week: Not on file    Minutes of Exercise per Session: Not on file   Stress:     Feeling of Stress : Not on file   Social Connections:     Frequency of Communication with Friends and Family: Not on file    Frequency of Social Gatherings with Friends and Family: Not on file    Attends Sabianist Services: Not on file    Active Member of Clubs or Organizations: Not on file    Attends Club or Organization Meetings: Not on file    Marital Status: Not on file   Intimate Partner Violence:     Fear of Current or Ex-Partner: Not on file    Emotionally Abused: Not on file    Physically Abused: Not on file    Sexually Abused: Not on file   Housing Stability:     Unable to Pay for Housing in the Last Year: Not on file    Number of Places Lived in the Last Year: Not on file    Unstable Housing in the Last Year: Not on file     History reviewed. No pertinent family history. Vitals:    03/19/22 1530 03/19/22 1550 03/19/22 2215 03/20/22 0545   BP: 109/68 105/79 110/64 97/62   Pulse: 95 99 89 90   Resp: 16 14 16 18   Temp: 98.6 °F (37 °C) 99.4 °F (37.4 °C) 98.1 °F (36.7 °C) 98 °F (36.7 °C)   TempSrc: Oral Oral Oral Oral   SpO2: 97% 99% 98% 98%   Weight:       Height:               Intake/Output Summary (Last 24 hours) at 3/20/2022 0938  Last data filed at 3/20/2022 0640  Gross per 24 hour   Intake 1947 ml   Output 1650 ml   Net 297 ml         Recent Labs     03/19/22  0837   WBC 11.2   HGB 11.0*   HCT 34.3*         Recent Labs     03/17/22  1003 03/19/22  0837   BUN 24* 19   CREATININE 1.2 1.0         ROS:   Negative for CP, palpitations, SOB at rest, dizziness/lightheadedness. Physical Exam   Constitutional: Oriented to person, place, and time. Appears well-developed. No distress. Cardiovascular: Normal rate, regular rhythm and normal heart sounds positive murmur  Pulmonary/Chest: Effort normal. No respiratory distress. Abdominal: Soft. Bowel sounds are normal.   Musculoskeletal: Normal range of motion. Neurological: alert and oriented to person, place, and time. Skin: Skin is warm and dry. Psychiatric: normal mood and affect.      ANITRA Performed By: the attending and the sonographer      Type of Anesthesia: Moderate sedation     Allergies    - No known allergies.      Findings      Left Ventricle   Normal left ventricular size and systolic function.   Ejection fraction is visually estimated at 55%.      Right Ventricle   Right ventricle global systolic function is mildly reduced.      Left Atrium   Dilated left atrium.   No evidence of thrombus within left atrium or appendage.   The interatrial septum appears intact.   Agitated saline injected for shunt evaluation.   No evidence of atrial septal defect or PFO.      Right Atrium   Dilated right atrium.   No evidence of thrombus or mass in the right atrium.      Mitral Valve   Severe holosystolic prolapse of the posterior leaflet with a flail P1/P2   segment due to ruptured chordae tendineae.   Small mobile echodensity suspicious for vegetation on ventricular surface   of posterior leaflet.   Failure of leaflet coaptation.   No definitive papillary muscle rupture.   Torrential mitral regurgitation, eccentric jet directed anteriorly.      Tricuspid Valve   The tricuspid valve appears structurally normal.   Mild tricuspid regurgitation.   No vegetation.      Aortic Valve   Structurally normal aortic valve.   The aortic valve is trileaflet.   No hemodynamically significant aortic stenosis is present.   Mild aortic valve regurgitation.   No vegetation.      Pulmonic Valve   The pulmonic valve was not well visualized.   No evidence of pulmonic valve stenosis.   Physiologic and/or trace pulmonic regurgitation present.   No vegetation.      Pericardial Effusion   No evidence of pericardial effusion.      Aorta   The aorta is within normal limits.   Miscellaneous   Prominent systolic flow reversal 4/4 pulmonary veins.      Conclusions      Summary   Ejection fraction is visually estimated at 55%.   Severe holosystolic prolapse of the posterior leaflet with a flail P1/P2   segment due to ruptured chordae tendineae.   Small mobile echodensity suspicious for vegetation on ventricular surface   of posterior leaflet.   Failure of leaflet coaptation.   No definitive papillary muscle rupture.   Torrential mitral regurgitation, eccentric jet directed anteriorly. [facility] 3/18  Procedure:    1. Left heart cath     Physician: Theresa Cr.  [facility].     Assistant: none     Indication: Mitral regurgitation  AUC: 7  AUC indication: 70     Complication: None.     Sedation: Intravenous Versed.     Anesthesia: Xylocaine, fentanyl      Sedation time: I was present for sedation and ministration at 09 53 and I ended sedation at 1007 for a total face-to-face sedation time of 14 minutes.     Estimated blood loss: Minimal     Specimens: none     Contrast used: 45 cc     Hemodynamics:  Opening Aortic pressure: 96/63  LV systolic pressure: 95  LVEDP: 6  No significant gradient across the aortic valve     Angiographic Results/findings:  Left Main: No angiographically significant stenosis. LAD: No angiographically significant stenosis. D1: Bifurcating vessel. No angiographically significant stenosis. Cx: Dominant vessel. Mid to distal diffuse 10% stenosis. OM1: Bifurcating vessel. No angiographically significant stenosis. OM2: Proximal 10% stenosis. OM 3: No angiographically significant stenosis. OM 4: No angiographically significant stenosis. Ramus: Absent. RCA: non-Dominant. Mild diffuse luminal irregularities. ASSESSMENT:  Patient Active Problem List   Diagnosis    Atrial fibrillation with rapid ventricular response ([address]    Sepsis ([address] present on admission    JANA (acute kidney injury) ([address]    Bacteremia    Severe mitral regurgitation    Suspected endocarditis    Subacute bacterial endocarditis    Ruptured chordae tendineae ([address]     64yo M with no significant PMH presented to the ED 3/14/2022 with CC of 1 month history of fatigue. He was found to have Streptococcus bacteremia and severe MR with possible flail posterior MV leaflet, possible endocarditis. CTS was consulted for additional recommendations.         PLAN:  No emergent surgical intervention as patient is currently fairly well compensated without overt symptoms of heart failure   [facility] with mild CAD, report above  Pre-operative testing ordered, dental clearance pending, suspect dental source  Tentative plans for OR Wednesday, pending clinical course, work-up and dental clearance  Abx per ID         Pre-operative testing review:   --carotids with no significant stenosis  --viet with good flow bilaterally  --ct chest reviewed  --ANITRA findings above   --pfts - WILL NEED REVIEWED   --hgA1c 5.6         Recent Labs     03/19/22  0837   HGB 11.0*   HCT 34.3*        Recent Labs     03/19/22  0837   BUN 19   CREATININE 1.0     Lab Results   Component Value Date/Time    LABURIN Growth not present 03/14/2022 08:45 PM         [id number] Score for Atrial Fibrillation Stroke Risk   Risk   Factors  Component Value   C CHF  0   H HTN  0   A2 Age >= 75  0   D DM  0   S2 Prior Stroke/TIA  0   V Vascular Disease  0   A Age 74-69  0   Sc Sex  0    [id number]  Score  0       Disclaimer:   Definition and scores for [id number]:      [id number] acronym     Score  Congestive HF      1  Hypertension       1  Age ? 75 years      2  Diabetes mellitus      1  Stroke/TIA/TE      2  Vascular disease (prior MI, PAD, or aortic plaque) 1  Age 72 to 76 years      1  Sex category       female=1, male=0    Maximum score      9    Risk Score calculation is dependent on accuracy of patient problem list and past encounter diagnosis. Note: 25 minutes was spent providing face-to-face patient care, including:  and coordinating care, reviewing the chart, labs, and diagnostics, as well as medical decision making. Greater than 50% of this time was spent instructing and counseling the patient face to face regarding findings and recommendations.

## 2022-03-20 NOTE — PROGRESS NOTES
INPATIENT CARDIOLOGY FOLLOW-UP    Name: Sayda Traore    Age: 62 y.o. Date of Admission: 3/14/2022  8:06 PM    Date of Service: 3/20/2022    Chief Complaint: Follow-up for mitral valve disease secondary to endocarditis, paroxysmal atrial fibrillation, acute diastolic heart failure    Interim History: The patient relates no active symptomatology with acceptable rate control of his atrial arrhythmias noted albeit with continued needs of intravenous diltiazem. Most recent radiographic assessment has been reviewed inclusive persistent interstitial infiltrates and a right upper lobe infiltrate in the face of his subacute bacterial endocarditis. He remains afebrile with no change of his leukocytosis. Review of Systems: The remainder of a complete multisystem review including consitutional, central nervous, respiratory, circulatory, gastrointestinal, genitourinary, endocrinologic, hematologic, musculoskeletal and psychiatric are negative.     Problem List:  Patient Active Problem List   Diagnosis    [facility] fibrillation with rapid ventricular response [facility]    Sepsis ([facility] present on admission    JANA (acute kidney injury) ([facility]    Bacteremia    Severe mitral regurgitation    Suspected endocarditis    Subacute bacterial endocarditis    Ruptured chordae tendineae (HCC)       Allergies:  No Known Allergies    Current Medications:  Current Facility-Administered Medications   Medication Dose Route Frequency Provider Last Rate Last Admin    metoprolol succinate (TOPROL XL) extended release tablet 75 mg  75 mg Oral BID Balbir Kim MD   75 mg at 03/19/22 2022    sodium chloride flush 0.9 % injection 5-40 mL  5-40 mL IntraVENous 2 times per day Angela Archer MD   10 mL at 03/19/22 2154    sodium chloride flush 0.9 % injection 5-40 mL  5-40 mL IntraVENous PRN Faustino Archer MD   10 mL at 03/19/22 1837    0.9 % sodium chloride infusion  25 mL IntraVENous PRN Faustino Archer MD        heparin flush 100 UNIT/ML injection 300 Units  3 mL IntraVENous 2 times per day Victor Manuel Vicente MD   300 Units at 03/19/22 2023    heparin flush 100 UNIT/ML injection 300 Units  3 mL IntraCATHeter PRN Victor Manuel Vicente MD        ampicillin 2000 mg ivpb mini bag  2,000 mg IntraVENous 6 times per day Victor Manuel Vicente MD   Stopped at 03/20/22 0640    enoxaparin (LOVENOX) injection 80 mg  1 mg/kg SubCUTAneous BID Levonia Cornea, DO   80 mg at 03/19/22 2023    dilTIAZem 100 mg in dextrose 5 % 100 mL infusion (ADD-[place])  2.5-15 mg/hr IntraVENous Continuous Levonia Cornea, DO 10 mL/hr at 03/20/22 0619 10 mg/hr at 03/20/22 0985    ondansetron (ZOFRAN-ODT) disintegrating tablet 4 mg  4 mg Oral Q8H PRN Levonia Cornea, DO        Or    ondansetron [facility]) injection 4 mg  4 mg IntraVENous Q6H PRN Levonia Cornea, DO        polyethylene glycol [facility]) packet 17 g  17 g Oral Daily PRN Levonia Cornea, DO        acetaminophen (TYLENOL) tablet 650 mg  650 mg Oral Q6H PRN Levonia Cornea, DO   650 mg at 03/16/22 2247    Or    acetaminophen (TYLENOL) suppository 650 mg  650 mg Rectal Q6H PRN Levonia Cornea, DO        potassium chloride 10 mEq/100 mL IVPB (Peripheral Line)  10 mEq IntraVENous PRN Levonia Cornea, DO        magnesium sulfate 2000 mg in 50 mL IVPB premix  2,000 mg IntraVENous PRN Levonia Cornea, DO        perflutren lipid microspheres (DEFINITY) injection 1.65 mg  1.5 mL IntraVENous ONCE PRN Levonia Cornea, DO        atorvastatin (LIPITOR) tablet 40 mg  40 mg Oral Nightly Levonia Cornea, DO   40 mg at 03/19/22 2023      sodium chloride      dilTIAZem 10 mg/hr (03/20/22 0619)       Physical Exam:  /64   Pulse 89   Temp 98.1 °F (36.7 °C) (Oral)   Resp 16   Ht 5' 9\" (1.753 m)   Wt 179 lb (81.2 kg)   SpO2 98%   BMI 26.43 kg/m²   Weight change: Wt Readings from Last 3 Encounters:   03/15/22 179 lb (81.2 kg)     The patient is awake, alert and in no discomfort or distress.  No gross musculoskeletal deformity is present. No significant skin or nail changes are present. Gross examination of head, eyes, nose and throat are negative. Jugular venous pressure is normal and no carotid bruits are present. Normal respiratory effort is noted with no accessory muscle usage present. Lung fields are clear to ascultation. Cardiac examination is notable for an irregular rhythm with no palpable thrill. No gallop rhythm and and apical holosystolic murmur are identified. A benign abdominal examination is present with no masses or organomegaly. Intact pulses are present throughout all extremities and no peripheral edema is present. No focal neurologic deficits are present. Intake/Output:    Intake/Output Summary (Last 24 hours) at 3/20/2022 0753  Last data filed at 3/20/2022 0046  Gross per 24 hour   Intake 2327 ml   Output 2275 ml   Net 52 ml     No intake/output data recorded. Laboratory Tests:  Lab Results   Component Value Date    CREATININE 1.0 03/19/2022    BUN 19 03/19/2022     03/19/2022    K 4.0 03/19/2022     03/19/2022    CO2 21 (L) 03/19/2022     No results for input(s): CKTOTAL, CKMB in the last 72 hours. Invalid input(s): TROPONONI  No results found for: BNP  Lab Results   Component Value Date    WBC 11.2 03/19/2022    RBC 3.97 03/19/2022    HGB 11.0 03/19/2022    HCT 34.3 03/19/2022    MCV 86.4 03/19/2022    MCH 27.7 03/19/2022    MCHC 32.1 03/19/2022    RDW 14.3 03/19/2022     03/19/2022    MPV 10.8 03/19/2022     No results for input(s): ALKPHOS, ALT, AST, PROT, BILITOT, BILIDIR, LABALBU in the last 72 hours.   Lab Results   Component Value Date    MG 2.4 03/14/2022     Lab Results   Component Value Date    PROTIME 15.9 03/14/2022    INR 1.5 03/14/2022     Lab Results   Component Value Date    TSH 1.880 03/14/2022     No components found for: CHLPL  Lab Results   Component Value Date    TRIG 115 03/15/2022     Lab Results   Component Value Date    HDL 20 03/15/2022 Lab Results   Component Value Date    LDLCALC 90 03/15/2022       Cardiac Tests:  Telemetry findings reviewed: atrial fibrillation with a mean ventricular response of approximately 90 bpm, no new tachy/bradyarrhythmias overnight  Chest X-ray: A repeat chest x-ray reviewed time evaluation demonstrates persistent evidence of cardiomegaly with a potential new right upper lobe infiltrate in addition to that of interstitial infiltrates      ASSESSMENT / PLAN: On a clinical basis, the patient appears subjectively compensated from a cardiovascular standpoint with continued rate control of his atrial fibrillation and adequate tolerance of increasing doses of his beta-blocker with present plans of attempting to wean intravenous diltiazem. His most recent radiographic findings are present with plans of cautious diuresis in the face of his persistent interstitial infiltrates and positive fluid balance over the past 24 hours with additional findings potentially indicative of that of infectious nature in view of his subacute endocarditis and ongoing management deferred to the infectious disease service. Ongoing assessment including that of dental evaluation is pending in regards to completion of preoperative assessment prior to mitral valve intervention. Note: This report was completed utilizing computer voice recognition software. Every effort has been made to ensure accuracy, however; inadvertent computerized transcription errors may be present. Jono Alejandro.  Hortensia Stone, [address] [place] [address] Cardiology

## 2022-03-21 ENCOUNTER — ANESTHESIA EVENT (OUTPATIENT)
Dept: OPERATING ROOM | Age: 62
DRG: 853 | End: 2022-03-21
Payer: COMMERCIAL

## 2022-03-21 LAB
ANION GAP SERPL CALCULATED.3IONS-SCNC: 6 MMOL/L (ref 7–16)
BASOPHILS ABSOLUTE: 0.05 E9/L (ref 0–0.2)
BASOPHILS RELATIVE PERCENT: 0.5 % (ref 0–2)
BUN BLDV-MCNC: 13 MG/DL (ref 6–23)
CALCIUM SERPL-MCNC: 8.3 MG/DL (ref 8.6–10.2)
CHLORIDE BLD-SCNC: 99 MMOL/L (ref 98–107)
CO2: 28 MMOL/L (ref 22–29)
CREAT SERPL-MCNC: 1.1 MG/DL (ref 0.7–1.2)
EOSINOPHILS ABSOLUTE: 0.32 E9/L (ref 0.05–0.5)
EOSINOPHILS RELATIVE PERCENT: 3.4 % (ref 0–6)
GFR AFRICAN AMERICAN: >60
GFR NON-AFRICAN AMERICAN: >60 ML/MIN/1.73
GLUCOSE BLD-MCNC: 122 MG/DL (ref 74–99)
HCT VFR BLD CALC: 32.1 % (ref 37–54)
HEMOGLOBIN: 9.9 G/DL (ref 12.5–16.5)
IMMATURE GRANULOCYTES #: 0.1 E9/L
IMMATURE GRANULOCYTES %: 1.1 % (ref 0–5)
LYMPHOCYTES ABSOLUTE: 1.86 E9/L (ref 1.5–4)
LYMPHOCYTES RELATIVE PERCENT: 20 % (ref 20–42)
MCH RBC QN AUTO: 27.4 PG (ref 26–35)
MCHC RBC AUTO-ENTMCNC: 30.8 % (ref 32–34.5)
MCV RBC AUTO: 88.9 FL (ref 80–99.9)
MONOCYTES ABSOLUTE: 0.54 E9/L (ref 0.1–0.95)
MONOCYTES RELATIVE PERCENT: 5.8 % (ref 2–12)
NEUTROPHILS ABSOLUTE: 6.44 E9/L (ref 1.8–7.3)
NEUTROPHILS RELATIVE PERCENT: 69.2 % (ref 43–80)
PDW BLD-RTO: 14.4 FL (ref 11.5–15)
PLATELET # BLD: 243 E9/L (ref 130–450)
PMV BLD AUTO: 11 FL (ref 7–12)
POTASSIUM SERPL-SCNC: 3.9 MMOL/L (ref 3.5–5)
RBC # BLD: 3.61 E12/L (ref 3.8–5.8)
SODIUM BLD-SCNC: 133 MMOL/L (ref 132–146)
WBC # BLD: 9.3 E9/L (ref 4.5–11.5)

## 2022-03-21 PROCEDURE — 99232 SBSQ HOSP IP/OBS MODERATE 35: CPT | Performed by: STUDENT IN AN ORGANIZED HEALTH CARE EDUCATION/TRAINING PROGRAM

## 2022-03-21 PROCEDURE — 6370000000 HC RX 637 (ALT 250 FOR IP): Performed by: INTERNAL MEDICINE

## 2022-03-21 PROCEDURE — 99233 SBSQ HOSP IP/OBS HIGH 50: CPT | Performed by: INTERNAL MEDICINE

## 2022-03-21 PROCEDURE — 2500000003 HC RX 250 WO HCPCS: Performed by: INTERNAL MEDICINE

## 2022-03-21 PROCEDURE — 36415 COLL VENOUS BLD VENIPUNCTURE: CPT

## 2022-03-21 PROCEDURE — 2580000003 HC RX 258: Performed by: INTERNAL MEDICINE

## 2022-03-21 PROCEDURE — 80048 BASIC METABOLIC PNL TOTAL CA: CPT

## 2022-03-21 PROCEDURE — 6360000002 HC RX W HCPCS: Performed by: INTERNAL MEDICINE

## 2022-03-21 PROCEDURE — 0CDWXZ0 EXTRACTION OF UPPER TOOTH, SINGLE, EXTERNAL APPROACH: ICD-10-PCS | Performed by: STUDENT IN AN ORGANIZED HEALTH CARE EDUCATION/TRAINING PROGRAM

## 2022-03-21 PROCEDURE — 0CDXXZ0 EXTRACTION OF LOWER TOOTH, SINGLE, EXTERNAL APPROACH: ICD-10-PCS | Performed by: STUDENT IN AN ORGANIZED HEALTH CARE EDUCATION/TRAINING PROGRAM

## 2022-03-21 PROCEDURE — 2140000000 HC CCU INTERMEDIATE R&B

## 2022-03-21 PROCEDURE — 85025 COMPLETE CBC W/AUTO DIFF WBC: CPT

## 2022-03-21 RX ADMIN — AMPICILLIN SODIUM 2000 MG: 2 INJECTION, POWDER, FOR SOLUTION INTRAVENOUS at 10:30

## 2022-03-21 RX ADMIN — Medication 10 ML: at 14:10

## 2022-03-21 RX ADMIN — AMPICILLIN SODIUM 2000 MG: 2 INJECTION, POWDER, FOR SOLUTION INTRAVENOUS at 14:15

## 2022-03-21 RX ADMIN — AMPICILLIN SODIUM 2000 MG: 2 INJECTION, POWDER, FOR SOLUTION INTRAVENOUS at 01:51

## 2022-03-21 RX ADMIN — METOPROLOL SUCCINATE 75 MG: 50 TABLET, EXTENDED RELEASE ORAL at 14:09

## 2022-03-21 RX ADMIN — ATORVASTATIN CALCIUM 40 MG: 40 TABLET, FILM COATED ORAL at 20:37

## 2022-03-21 RX ADMIN — ENOXAPARIN SODIUM 80 MG: 100 INJECTION SUBCUTANEOUS at 14:10

## 2022-03-21 RX ADMIN — ENOXAPARIN SODIUM 80 MG: 100 INJECTION SUBCUTANEOUS at 20:36

## 2022-03-21 RX ADMIN — SODIUM CHLORIDE 25 ML: 9 INJECTION, SOLUTION INTRAVENOUS at 23:41

## 2022-03-21 RX ADMIN — METOPROLOL SUCCINATE 75 MG: 50 TABLET, EXTENDED RELEASE ORAL at 20:36

## 2022-03-21 RX ADMIN — SODIUM CHLORIDE, PRESERVATIVE FREE 10 ML: 5 INJECTION INTRAVENOUS at 18:03

## 2022-03-21 RX ADMIN — Medication 10 ML: at 22:48

## 2022-03-21 RX ADMIN — AMPICILLIN SODIUM 2000 MG: 2 INJECTION, POWDER, FOR SOLUTION INTRAVENOUS at 22:48

## 2022-03-21 RX ADMIN — AMPICILLIN SODIUM 2000 MG: 2 INJECTION, POWDER, FOR SOLUTION INTRAVENOUS at 18:02

## 2022-03-21 RX ADMIN — AMPICILLIN SODIUM 2000 MG: 2 INJECTION, POWDER, FOR SOLUTION INTRAVENOUS at 06:15

## 2022-03-21 RX ADMIN — PSYLLIUM HUSK 1 PACKET: 3.4 POWDER ORAL at 20:36

## 2022-03-21 RX ADMIN — DEXTROSE MONOHYDRATE 10 MG/HR: 50 INJECTION, SOLUTION INTRAVENOUS at 22:46

## 2022-03-21 RX ADMIN — DEXTROSE MONOHYDRATE 10 MG/HR: 50 INJECTION, SOLUTION INTRAVENOUS at 01:50

## 2022-03-21 ASSESSMENT — PAIN SCALES - GENERAL: PAINLEVEL_OUTOF10: 0

## 2022-03-21 NOTE — PROGRESS NOTES
Department of Internal Medicine  Infectious Diseases  Progress  Note      C/C : Streptococcus bacteremia , fever     Denied fever , chills   Denies SOB   3 teeth extracted today     Afebrile     Current Facility-Administered Medications   Medication Dose Route Frequency Provider Last Rate Last Admin    metoprolol succinate (TOPROL XL) extended release tablet 75 mg  75 mg Oral BID Harper Rubin MD   75 mg at 03/21/22 1409    sodium chloride flush 0.9 % injection 5-40 mL  5-40 mL IntraVENous 2 times per day Chilo Candelaria MD   10 mL at 03/20/22 1951    sodium chloride flush 0.9 % injection 5-40 mL  5-40 mL IntraVENous PRN Faustino Archer MD   10 mL at 03/20/22 1851    0.9 % sodium chloride infusion  25 mL IntraVENous PRN Faustino Archer MD        heparin flush 100 UNIT/ML injection 300 Units  3 mL IntraVENous 2 times per day Chilo Candelaria MD   300 Units at 03/20/22 1952    heparin flush 100 UNIT/ML injection 300 Units  3 mL IntraCATHeter PRN Faustino Archer MD        ampicillin 2000 mg ivpb mini bag  2,000 mg IntraVENous 6 times per day Chilo Candelaria  mL/hr at 03/21/22 1415 2,000 mg at 03/21/22 1415    enoxaparin (LOVENOX) injection 80 mg  1 mg/kg SubCUTAneous BID Lorella Pester, DO   80 mg at 03/21/22 1410    dilTIAZem 100 mg in dextrose 5 % 100 mL infusion (ADD-[place])  2.5-15 mg/hr IntraVENous Continuous Lorella Pester, DO 10 mL/hr at 03/21/22 0150 10 mg/hr at 03/21/22 0150    ondansetron (ZOFRAN-ODT) disintegrating tablet 4 mg  4 mg Oral Q8H PRN Lorella Pester, DO        Or    ondansetron [facility]) injection 4 mg  4 mg IntraVENous Q6H PRN Lorella Pester, DO        polyethylene glycol (GLYCOLAX) packet 17 g  17 g Oral Daily PRN Lorella Pester, DO   17 g at 03/20/22 1952    acetaminophen (TYLENOL) tablet 650 mg  650 mg Oral Q6H PRN Lorella Pester, DO   650 mg at 03/16/22 2247    Or    acetaminophen (TYLENOL) suppository 650 mg  650 mg Rectal Q6H PRN DO Brittani Umana potassium chloride 10 mEq/100 mL IVPB (Peripheral Line)  10 mEq IntraVENous PRN Lorella Pester, DO        magnesium sulfate 2000 mg in 50 mL IVPB premix  2,000 mg IntraVENous PRN Lorella Pester, DO        perflutren lipid microspheres (DEFINITY) injection 1.65 mg  1.5 mL IntraVENous ONCE PRN Lorella Pester, DO        atorvastatin (LIPITOR) tablet 40 mg  40 mg Oral Nightly Lorella Pester, DO   40 mg at 03/20/22 1951         REVIEW OF SYSTEMS:    CONSTITUTIONAL:  Denies fever, chill or rigors. HEENT: denies blurring of vision or double vision, denies hearing problem  RESPIRATORY: SOB with exertion   CARDIOVASCULAR:  Denies palpitation  GASTROINTESTINAL:  Denies abdomen pain, diarrhea or constipation. GENITOURINARY:  Denies burning urination or frequency of urination  INTEGUMENT: denies wound , rash  HEMATOLOGIC/LYMPHATIC:  Denies lymph node swelling, gum bleeding or easy bruising. MUSCULOSKELETAL:  Denies leg pain , joint pain , joint swelling  NEUROLOGICAL:  Fatigue , weakness     PHYSICAL EXAM:      Vitals:     /82   Pulse 85   Temp 98.6 °F (37 °C)   Resp 18   Ht 5' 9\" (1.753 m)   Wt 179 lb (81.2 kg)   SpO2 98%   BMI 26.43 kg/m²       General Appearance:    Awake, alert , no acute distress. Head:    Normocephalic, atraumatic   Eyes:    No pallor, no icterus,   Ears:    No obvious deformity or drainage.    Nose:   No nasal drainage   Throat:   Mucosa moist, multiple dental caries    Neck:   Supple, no lymphadenopathy   Lungs:     Clear to auscultation bilaterally, no wheeze    Heart:     Irregular, systolic murmur    Abdomen:     Soft, non-tender, bowel sounds present    Extremities:   No edema, no cyanosis    Pulses:   Dorsalis pedis palpable    Skin:   no rashes       Lab Results   Component Value Date    WBC 9.3 03/21/2022    RBC 3.61 03/21/2022    HGB 9.9 03/21/2022    HCT 32.1 03/21/2022     03/21/2022    MCV 88.9 03/21/2022    MCH 27.4 03/21/2022    MCHC 30.8 03/21/2022    RDW 14.4 03/21/2022    LYMPHOPCT 20.0 03/21/2022    MONOPCT 5.8 03/21/2022    BASOPCT 0.5 03/21/2022    MONOSABS 0.54 03/21/2022    LYMPHSABS 1.86 03/21/2022    EOSABS 0.32 03/21/2022    BASOSABS 0.05 03/21/2022       CMP     Lab Results   Component Value Date     03/21/2022    K 3.9 03/21/2022    K 4.2 03/15/2022    CL 99 03/21/2022    CO2 28 03/21/2022    BUN 13 03/21/2022    CREATININE 1.1 03/21/2022    GFRAA >60 03/21/2022    LABGLOM >60 03/21/2022    GLUCOSE 122 03/21/2022    PROT 7.9 03/14/2022    LABALBU 3.4 03/14/2022    CALCIUM 8.3 03/21/2022    BILITOT 1.0 03/14/2022    ALKPHOS 117 03/14/2022    AST 22 03/14/2022    ALT 35 03/14/2022         Hepatic Function Panel:    Lab Results   Component Value Date    ALKPHOS 117 03/14/2022    ALT 35 03/14/2022    AST 22 03/14/2022    PROT 7.9 03/14/2022    BILITOT 1.0 03/14/2022    BILIDIR 0.4 03/14/2022    IBILI 0.6 03/14/2022    LABALBU 3.4 03/14/2022       PT/INR:    Lab Results   Component Value Date    PROTIME 15.9 03/14/2022    INR 1.5 03/14/2022       TSH:    Lab Results   Component Value Date    TSH 1.880 03/14/2022       U/A:    Lab Results   Component Value Date    COLORU Yellow 03/14/2022    PHUR 5.0 03/14/2022    WBCUA 0-1 03/14/2022    RBCUA 0-1 03/14/2022    BACTERIA MODERATE 03/14/2022    CLARITYU Clear 03/14/2022    SPECGRAV 1.025 03/14/2022    LEUKOCYTESUR Negative 03/14/2022    UROBILINOGEN 4.0 03/14/2022    BILIRUBINUR Negative 03/14/2022    BLOODU Negative 03/14/2022    GLUCOSEU Negative 03/14/2022    AMORPHOUS FEW 03/14/2022       ABG:  No results found for: DEU2FGT, BEART, O0QHBUYT, PHART, THGBART, QMH0LFQ, PO2ART, NVH4ETG    MICROBIOLOGY:    Blood culture -     Streptococcus mitis / Streptococcus oralis (1)    Antibiotic Interpretation Microscan  Method Status    ampicillin Sensitive <=^0.25 mcg/mL BACTERIAL SUSCEPTIBILITY PANEL BY LANEY     benzylpenicillin Sensitive <=^0.06 mcg/mL BACTERIAL SUSCEPTIBILITY PANEL BY LANEY     cefotaxime Sensitive <=^0.12 mcg/mL BACTERIAL SUSCEPTIBILITY PANEL BY LANEY     cefTRIAXone Sensitive <=^0.12 mcg/mL BACTERIAL SUSCEPTIBILITY PANEL BY LANEY     clindamycin Sensitive <=^0.25 mcg/mL BACTERIAL SUSCEPTIBILITY PANEL BY LANEY     levofloxacin Sensitive ^0.5 mcg/mL BACTERIAL SUSCEPTIBILITY PANEL BY LANEY     linezolid Sensitive <=^2 mcg/mL BACTERIAL SUSCEPTIBILITY PANEL BY LANEY     vancomycin Sensitive ^0.5 mcg/mL BACTERIAL SUSCEPTIBILITY PANEL BY LANEY             Radiology :    CTA chest       Impression:        No evidence of pulmonary embolism or acute pulmonary abnormality. Echo -       Ejection fraction is visually estimated at 55%. Severe holosystolic prolapse of the posterior leaflet with a flail P1/P2   segment due to ruptured chordae tendineae. Small mobile echodensity suspicious for vegetation on ventricular surface   of posterior leaflet. Failure of leaflet coaptation. No definitive papillary muscle rupture. Torrential mitral regurgitation, eccentric jet directed anteriorly. IMPRESSION:     1. Streptococcus bacteremia ( 3/14 and 3/15) , Mitral valve endocarditis ,follow up blood cx neg on ( 3/17)     RECOMMENDATIONS:      1. Ampicillin 2 grams IV q 4 hrs  ( PCN LANEY <0.06 )   2.  To OR this week

## 2022-03-21 NOTE — CONSULTS
ConsultationNote    Patient's Name/Date of Birth: Delia Torres / 1960 (8 y.o.)    Date: March 21, 2022     Reason for Consult:  Valve clearance    Requesting Physician:  MD CHELY Edgar      Past Medical History:   Diagnosis Date    Acute diastolic (congestive) heart failure [facility])        Past Surgical History:   Procedure Laterality Date    TRANSESOPHAGEAL ECHOCARDIOGRAM  03/16/2022    Dr. Hector Leon       Prior to Admission medications    Medication Sig Start Date End Date Taking? Authorizing Provider   Ascorbic Acid (VITAMIN C PO) Take by mouth daily   Yes Historical Provider, MD   VITAMIN D PO Take by mouth daily   Yes Historical Provider, MD   ZINC PO Take by mouth daily   Yes Historical Provider, MD       No Known Allergies    History reviewed. No pertinent family history. Social History     Socioeconomic History    Marital status: Single     Spouse name: Not on file    Number of children: Not on file    Years of education: Not on file    Highest education level: Not on file   Occupational History    Not on file   Tobacco Use    Smoking status: Never Smoker    Smokeless tobacco: Never Used   Substance and Sexual Activity    Alcohol use: Yes     Comment: rarely    Drug use: Never    Sexual activity: Not on file   Other Topics Concern    Not on file   Social History Narrative    Not on file     Social Determinants of Health     Financial Resource Strain:     Difficulty of Paying Living Expenses: Not on file   Food Insecurity:     Worried About [email or web address] in the Last Year: Not on file    Ran Out of Food in the Last Year: Not on file   Transportation Needs:     Lack of Transportation (Medical): Not on file    Lack of Transportation (Non-Medical):  Not on file   Physical Activity:     Days of Exercise per Week: Not on file    Minutes of Exercise per Session: Not on file   Stress:     Feeling of Stress : Not on file   Social Connections:     Frequency of Communication with Friends and Family: Not on file    Frequency of Social Gatherings with Friends and Family: Not on file    Attends Christianity Services: Not on file    Active Member of Clubs or Organizations: Not on file    Attends Club or Organization Meetings: Not on file    Marital Status: Not on file   Intimate Partner Violence:     Fear of Current or Ex-Partner: Not on file    Emotionally Abused: Not on file    Physically Abused: Not on file    Sexually Abused: Not on file   Housing Stability:     Unable to Pay for Housing in the Last Year: Not on file    Number of [place] in the Last Year: Not on file    Unstable Housing in the Last Year: Not on file       Review of Systems      Vitals:    03/20/22 1440 03/20/22 1930 03/21/22 0645 03/21/22 1057   BP: 106/75 103/80 93/74    Pulse: 55 77 87    Resp: 16 18 18    Temp: 99.1 °F (37.3 °C) 99.5 °F (37.5 °C) 98.2 °F (36.8 °C)    TempSrc: Oral Oral Oral    SpO2: 96% 97%     Weight:       Height:    5' 9\" (1.753 m)     Physical Exam      Assessment:  Principal Problem:    Atrial fibrillation with rapid ventricular response (HCC)  Active Problems:    Sepsis ([address] present on admission    JANA (acute kidney injury) ([address]    Bacteremia    Severe mitral regurgitation    Suspected endocarditis    Subacute bacterial endocarditis    Ruptured chordae tendineae (HCC)    Mitral valve disease    Paroxysmal atrial fibrillation (HCC)    Acute diastolic (congestive) heart failure (HCC)  Resolved Problems:    * No resolved hospital problems. *      Plan:  Pt presents to dental clinic for exam prior to valve surgery    PA and Panoramic radiograph reviewed. Tooth #30 (Lower right molar) - broken down root tip with chronic apical abscess  Tooth #14 (Upper left molar) - fractured to gum line, puts pt at risk for future infection     Treatment options for teeth #30 and 14 1) Ext, 2) No treatment. Risks and benefits of each treatment option discussed with patient. Patient elects extraction #14,30. Consent obtained. Applied 20% topical benzocaine. Local anesthesia achieved with 2 cartridges of 2% lidocaine w 1:100,000 epi and 1 cartridge of 4% septocaine w 1:100,000 epi. High speed hand piece used to remove bone surrounding teeth #30 and 14. Elevated and used appropriate forceps to extract teeth #30 and 14. All roots confirmed extracted and intact. Curetted and irrigated with sterile water. 3-0 chromic gut sutures placed. Gauze placed. Hemostasis obtained. Verbal and written post-op instructions given to patient. Please administer analgesics prn for post op pain following extractions    PT IS CLEAR FROM A DENTAL STANDPOINT FOR VALVE SURGERY       Inpatient Dental Consult/Treatment on 3/21/2022 : I saw and evaluated the patient and agree with the dental resident's findings and the plan of care as documented in the dental resident's note on 3/21/2022. Please see dental resident's inpatient dental consult/treatment note for complete details.       Teaching Attending:  Cathy Wilkinson DDS      Electronically signed by Bhanu Hernandez DMD on 3/21/22 at 1:07 PM EDT

## 2022-03-21 NOTE — PROGRESS NOTES
INPATIENT CARDIOLOGY FOLLOW-UP    Name: Uriah Guerra    Age: 62 y.o. Date of Admission: 3/14/2022  8:06 PM    Date of Service: 3/21/2022    Chief Complaint: Follow-up for mitral valve disease secondary to endocarditis, paroxysmal atrial fibrillation, acute diastolic heart failure    Interim History: The patient presently remains compensated from a cardiovascular standpoint with no change of his symptomatic status and a single episode of a low-grade fever. Persistent marginal rate control of his atrial arrhythmias are present in the face of his acute illness without evidence of hemodynamic compromise. He continues to undergo assessment prior to his proposed mitral valve intervention. Review of Systems: The remainder of a complete multisystem review including consitutional, central nervous, respiratory, circulatory, gastrointestinal, genitourinary, endocrinologic, hematologic, musculoskeletal and psychiatric are negative.     Problem List:  Patient Active Problem List   Diagnosis    Atrial fibrillation with rapid ventricular response ([address]    Sepsis ([facility] present on admission    JANA (acute kidney injury) ([facility]    Bacteremia    Severe mitral regurgitation    Suspected endocarditis    Subacute bacterial endocarditis    Ruptured chordae tendineae (HCC)       Allergies:  No Known Allergies    Current Medications:  Current Facility-Administered Medications   Medication Dose Route Frequency Provider Last Rate Last Admin    metoprolol succinate (TOPROL XL) extended release tablet 75 mg  75 mg Oral BID Kelly Posadas MD   75 mg at 03/20/22 1951    sodium chloride flush 0.9 % injection 5-40 mL  5-40 mL IntraVENous 2 times per day Madelyn Philip MD   10 mL at 03/20/22 1951    sodium chloride flush 0.9 % injection 5-40 mL  5-40 mL IntraVENous PRN Faustino Archer MD   10 mL at 03/20/22 1851    0.9 % sodium chloride infusion  25 mL IntraVENous PRN Faustino Archer MD        heparin flush 100 UNIT/ML injection 300 Units  3 mL IntraVENous 2 times per day Efren Farmer MD   300 Units at 03/20/22 1952    heparin flush 100 UNIT/ML injection 300 Units  3 mL IntraCATHeter PRN Efren Farmer MD        ampicillin 2000 mg ivpb mini bag  2,000 mg IntraVENous 6 times per day Efren Farmer  mL/hr at 03/21/22 0615 2,000 mg at 03/21/22 0615    enoxaparin (LOVENOX) injection 80 mg  1 mg/kg SubCUTAneous BID Darletta Query, DO   80 mg at 03/20/22 1952    dilTIAZem 100 mg in dextrose 5 % 100 mL infusion ([facility])  2.5-15 mg/hr IntraVENous Continuous Darletta Query, DO 10 mL/hr at 03/21/22 0150 10 mg/hr at 03/21/22 0150    ondansetron (ZOFRAN-ODT) disintegrating tablet 4 mg  4 mg Oral Q8H PRN Darletta Query, DO        Or    ondansetron [facility]) injection 4 mg  4 mg IntraVENous Q6H PRN Darletta Query, DO        polyethylene glycol [facility]) packet 17 g  17 g Oral Daily PRN Darletta Query, DO   17 g at 03/20/22 1952    acetaminophen (TYLENOL) tablet 650 mg  650 mg Oral Q6H PRN Darletta Query, DO   650 mg at 03/16/22 2247    Or    acetaminophen (TYLENOL) suppository 650 mg  650 mg Rectal Q6H PRN Darletta Query, DO        potassium chloride 10 mEq/100 mL IVPB (Peripheral Line)  10 mEq IntraVENous PRN Darletta Query, DO        magnesium sulfate 2000 mg in 50 mL IVPB premix  2,000 mg IntraVENous PRN Darletta Query, DO        perflutren lipid microspheres (DEFINITY) injection 1.65 mg  1.5 mL IntraVENous ONCE PRN Darletta Query, DO        atorvastatin (LIPITOR) tablet 40 mg  40 mg Oral Nightly Darletta Query, DO   40 mg at 03/20/22 1951      sodium chloride      dilTIAZem 10 mg/hr (03/21/22 0150)       Physical Exam:  BP 93/74   Pulse 87   Temp 98.2 °F (36.8 °C) (Oral)   Resp 18   Ht 5' 9\" (1.753 m)   Wt 179 lb (81.2 kg)   SpO2 97%   BMI 26.43 kg/m²   Weight change:    Wt Readings from Last 3 Encounters:   03/15/22 179 lb (81.2 kg)     The patient is awake, alert and in no discomfort or distress. No gross musculoskeletal deformity is present. No significant skin or nail changes are present. Gross examination of head, eyes, nose and throat are negative. Jugular venous pressure is normal and no carotid bruits are present. Normal respiratory effort is noted with no accessory muscle usage present. Lung fields are clear to ascultation. Cardiac examination is notable for an irregular rhythm with no palpable thrill. No gallop rhythm and a holosystolic murmur at the left sternal border rating to the ventricular are identified. A benign abdominal examination is present with no masses or organomegaly. Intact pulses are present throughout all extremities and no peripheral edema is present. No focal neurologic deficits are present. Intake/Output:    Intake/Output Summary (Last 24 hours) at 3/21/2022 0944  Last data filed at 3/21/2022 0617  Gross per 24 hour   Intake 998 ml   Output 2700 ml   Net -1702 ml     No intake/output data recorded. Laboratory Tests:  Lab Results   Component Value Date    CREATININE 1.1 03/21/2022    BUN 13 03/21/2022     03/21/2022    K 3.9 03/21/2022    CL 99 03/21/2022    CO2 28 03/21/2022     No results for input(s): CKTOTAL, CKMB in the last 72 hours. Invalid input(s): TROPONONI  No results found for: BNP  Lab Results   Component Value Date    WBC 9.3 03/21/2022    RBC 3.61 03/21/2022    HGB 9.9 03/21/2022    HCT 32.1 03/21/2022    MCV 88.9 03/21/2022    MCH 27.4 03/21/2022    MCHC 30.8 03/21/2022    RDW 14.4 03/21/2022     03/21/2022    MPV 11.0 03/21/2022     No results for input(s): ALKPHOS, ALT, AST, PROT, BILITOT, BILIDIR, LABALBU in the last 72 hours.   Lab Results   Component Value Date    MG 2.4 03/14/2022     Lab Results   Component Value Date    PROTIME 15.9 03/14/2022    INR 1.5 03/14/2022     Lab Results   Component Value Date    TSH 1.880 03/14/2022     No components found for: CHLPL  Lab Results   Component Value Date    TRIG 115 03/15/2022 Lab Results   Component Value Date    HDL 20 03/15/2022     Lab Results   Component Value Date    LDLCALC 90 03/15/2022       Cardiac Tests:  Telemetry findings reviewed: atrial fibrillation with a mean ventricular response of approximately 100-110 bpm, no new tachy/bradyarrhythmias overnight      ASSESSMENT / PLAN: On aclinical basis, the patient remains compensated from a cardiovascular standpoint with no symptomatology nor clinical manifestations of volume overload in the face of his endocarditis and mitral valve disease with persistent marginal rate control of his atrial fibrillation. Presently, his existing management will be maintained with need of careful monitoring of his clinical status during the continuation of his preoperative evaluation and no present indications for modification of his medical regimen. Ongoing continued recommendations of the infectious disease services were made in regards to management of his subacute endocarditis with plans of dental assessment later today prior to his proposed surgical intervention. Present low molecular weight heparin should be maintained until the night prior to his surgical procedure in the face of his atrial arrhythmias to reduce risk of embolic events. Note: This report was completed utilizing computer voice recognition software. Every effort has been made to ensure accuracy, however; inadvertent computerized transcription errors may be present. Lan Lópezo.  Gladys Hilario, [facility]

## 2022-03-21 NOTE — PROGRESS NOTES
CC:fatigue     Brief HPI:  Patient seen   Awake, alert. No complaints. Past Medical History:   Diagnosis Date    Acute diastolic (congestive) heart failure [facility])      Past Surgical History:   Procedure Laterality Date    TRANSESOPHAGEAL ECHOCARDIOGRAM  03/16/2022    Dr. Gee Sin History     Socioeconomic History    Marital status: Single     Spouse name: Not on file    Number of children: Not on file    Years of education: Not on file    Highest education level: Not on file   Occupational History    Not on file   Tobacco Use    Smoking status: Never Smoker    Smokeless tobacco: Never Used   Substance and Sexual Activity    Alcohol use: Yes     Comment: rarely    Drug use: Never    Sexual activity: Not on file   Other Topics Concern    Not on file   Social History Narrative    Not on file     Social Determinants of Health     Financial Resource Strain:     Difficulty of Paying Living Expenses: Not on file   Food Insecurity:     Worried About Running Out of Food in the Last Year: Not on file    Laureen of Food in the Last Year: Not on file   Transportation Needs:     Lack of Transportation (Medical): Not on file    Lack of Transportation (Non-Medical):  Not on file   Physical Activity:     Days of Exercise per Week: Not on file    Minutes of Exercise per Session: Not on file   Stress:     Feeling of Stress : Not on file   Social Connections:     Frequency of Communication with Friends and Family: Not on file    Frequency of Social Gatherings with Friends and Family: Not on file    Attends Ep[employer] Services: Not on file    Active Member of Clubs or Organizations: Not on file    Attends Club or Organization Meetings: Not on file    Marital Status: Not on file   Intimate Partner Violence:     Fear of Current or Ex-Partner: Not on file    Emotionally Abused: Not on file    Physically Abused: Not on file    Sexually Abused: Not on file   Housing Stability:     Unable to Pay for Housing in the Last Year: Not on file    Number of Places Lived in the Last Year: Not on file    Unstable Housing in the Last Year: Not on file     History reviewed. No pertinent family history. Vitals:    03/20/22 1440 03/20/22 1930 03/21/22 0645 03/21/22 1057   BP: 106/75 103/80 93/74    Pulse: 55 77 87    Resp: 16 18 18    Temp: 99.1 °F (37.3 °C) 99.5 °F (37.5 °C) 98.2 °F (36.8 °C)    TempSrc: Oral Oral Oral    SpO2: 96% 97%     Weight:       Height:    5' 9\" (1.753 m)           Intake/Output Summary (Last 24 hours) at 3/21/2022 1233  Last data filed at 3/21/2022 0617  Gross per 24 hour   Intake 715 ml   Output 2700 ml   Net -1985 ml         Recent Labs     03/19/22  0837 03/21/22  0500   WBC 11.2 9.3   HGB 11.0* 9.9*   HCT 34.3* 32.1*    243      Recent Labs     03/19/22  0837 03/21/22  0500   BUN 19 13   CREATININE 1.0 1.1         ROS:   Negative for CP, palpitations, SOB at rest, dizziness/lightheadedness. Physical Exam   Constitutional: Oriented to person, place, and time. Appears well-developed. No distress. Cardiovascular: Normal rate, regular rhythm and normal heart sounds positive murmur  Pulmonary/Chest: Effort normal. No respiratory distress. Abdominal: Soft. Bowel sounds are normal.   Musculoskeletal: Normal range of motion. Neurological: alert and oriented to person, place, and time. Skin: Skin is warm and dry. Psychiatric: normal mood and affect.      ANITRA Performed By: the attending and the sonographer      Type of Anesthesia: Moderate sedation     Allergies    - No known allergies.      Findings      Left Ventricle   Normal left ventricular size and systolic function.   Ejection fraction is visually estimated at 55%.      Right Ventricle   Right ventricle global systolic function is mildly reduced.      Left Atrium   Dilated left atrium.   No evidence of thrombus within left atrium or appendage.   The interatrial septum appears intact.   Agitated saline injected for shunt evaluation.   No evidence of atrial septal defect or PFO.     Right Atrium   Dilated right atrium.   No evidence of thrombus or mass in the right atrium.      Mitral Valve   Severe holosystolic prolapse of the posterior leaflet with a flail P1/P2   segment due to ruptured chordae tendineae.   Small mobile echodensity suspicious for vegetation on ventricular surface   of posterior leaflet.   Failure of leaflet coaptation.   No definitive papillary muscle rupture.   Torrential mitral regurgitation, eccentric jet directed anteriorly.      Tricuspid Valve   The tricuspid valve appears structurally normal.   Mild tricuspid regurgitation.   No vegetation.      Aortic Valve   Structurally normal aortic valve.   The aortic valve is trileaflet.   No hemodynamically significant aortic stenosis is present.   Mild aortic valve regurgitation.   No vegetation.      Pulmonic Valve   The pulmonic valve was not well visualized.   No evidence of pulmonic valve stenosis.   Physiologic and/or trace pulmonic regurgitation present.   No vegetation.      Pericardial Effusion   No evidence of pericardial effusion.      Aorta   The aorta is within normal limits.   Miscellaneous   Prominent systolic flow reversal 4/4 pulmonary veins.      Conclusions      Summary   Ejection fraction is visually estimated at 55%.   Severe holosystolic prolapse of the posterior leaflet with a flail P1/P2   segment due to ruptured chordae tendineae.   Small mobile echodensity suspicious for vegetation on ventricular surface   of posterior leaflet.   Failure of leaflet coaptation.   No definitive papillary muscle rupture.   Torrential mitral regurgitation, eccentric jet directed anteriorly. [facility] 3/18  Procedure:    1. Left heart cath     Physician: Chaz Champion DO.     Assistant: none     Indication: Mitral regurgitation  AUC: 7  AUC indication: 70     Complication: None.     Sedation: Intravenous Versed.     Anesthesia: Xylocaine, fentanyl    Sedation time: I was present for sedation and ministration at 09 53 and I ended sedation at 1007 for a total face-to-face sedation time of 14 minutes.     Estimated blood loss: Minimal     Specimens: none     Contrast used: 45 cc     Hemodynamics:  Opening Aortic pressure: 01/19  LV systolic pressure: 95  LVEDP: 6  No significant gradient across the aortic valve     Angiographic Results/findings:  Left Main: No angiographically significant stenosis. LAD: No angiographically significant stenosis. D1: Bifurcating vessel. No angiographically significant stenosis. Cx: Dominant vessel. Mid to distal diffuse 10% stenosis. OM1: Bifurcating vessel. No angiographically significant stenosis. OM2: Proximal 10% stenosis. OM 3: No angiographically significant stenosis. OM 4: No angiographically significant stenosis. Ramus: Absent. RCA: non-Dominant. Mild diffuse luminal irregularities. ASSESSMENT:  Patient Active Problem List   Diagnosis    Atrial fibrillation with rapid ventricular response ([address]    Sepsis ([address] present on admission    JANA (acute kidney injury) ([address]    Bacteremia    Severe mitral regurgitation    Suspected endocarditis    Subacute bacterial endocarditis    Ruptured chordae tendineae (HCC)    Mitral valve disease    Paroxysmal atrial fibrillation (HCC)    Acute diastolic (congestive) heart failure ([address]     64yo M with no significant PMH presented to the ED 3/14/2022 with CC of 1 month history of fatigue. He was found to have Streptococcus bacteremia and severe MR with possible flail posterior MV leaflet, possible endocarditis. CTS was consulted for additional recommendations.         PLAN:  No emergent surgical intervention as patient is currently fairly well compensated without overt symptoms of heart failure   [facility] with mild CAD, report above  Pre-operative testing ordered, dental clearance pending, suspect dental source  Tentative plans for OR Wednesday, pending clinical course, work-up and dental clearance  Abx per ID         Pre-operative testing review:   --carotids with no significant stenosis  --viet with good flow bilaterally  --ct chest reviewed  --ANITRA findings above   --pfts - WILL NEED REVIEWED   --hgA1c 5.6         Recent Labs     03/21/22  0500   HGB 9.9*   HCT 32.1*        Recent Labs     03/21/22  0500   BUN 13   CREATININE 1.1     Lab Results   Component Value Date/Time    LABURIN Growth not present 03/14/2022 08:45 PM         [id number] Score for Atrial Fibrillation Stroke Risk   Risk   Factors  Component Value   C CHF  0   H HTN  0   A2 Age >= 75  0   D DM  0   S2 Prior Stroke/TIA  0   V Vascular Disease  0   A Age 74-69  0   Sc Sex  0    [id number]  Score  0       Disclaimer:   Definition and scores for [id number]:      [id number] acronym     Score  Congestive HF      1  Hypertension       1  Age ? 75 years      2  Diabetes mellitus      1  Stroke/TIA/TE      2  Vascular disease (prior MI, PAD, or aortic plaque) 1  Age 72 to 76 years      1  Sex category       female=1, male=0    Maximum score      9    Risk Score calculation is dependent on accuracy of patient problem list and past encounter diagnosis. Note: 25 minutes was spent providing face-to-face patient care, including:  and coordinating care, reviewing the chart, labs, and diagnostics, as well as medical decision making. Greater than 50% of this time was spent instructing and counseling the patient face to face regarding findings and recommendations.

## 2022-03-21 NOTE — PROGRESS NOTES
Comprehensive Nutrition Assessment    Type and Reason for Visit:  Reassess    Nutrition Recommendations/Plan: No nutritional supplementation recommended at this time. Nutrition Assessment:  Patients po intake seems to be adequate, averaging % of meals served since admission ; adm w/ fatigue ; noted endocarditis and JANA ; noted sepsis and bacteremia ; s/p cardiac cath 3/18 ; s/p ANITRA 3/16 ; hx of CAD and CHF ; no ONS recommended at this time    Malnutrition Assessment:  Malnutrition Status:  Insufficient data    Context:  Acute Illness     Findings of the 6 clinical characteristics of malnutrition:  Energy Intake:  No significant decrease in energy intake  Weight Loss:  Unable to assess (d/t lack of weight history ; subjective weight loss noted although no evidence of this)     Body Fat Loss:  Unable to assess     Muscle Mass Loss:  Unable to assess    Fluid Accumulation:  No significant fluid accumulation     Strength:  Not Performed    Estimated Daily Nutrient Needs:  Energy (kcal):  5077-4587 (REE 1614 x 1.2 SF); Weight Used for Energy Requirements:  Current     Protein (g):  85-95 (1.2-1.4g/kg IBW); Weight Used for Protein Requirements:  Ideal        Fluid (ml/day):  1666-2202; Method Used for Fluid Requirements:  1 ml/kcal      Nutrition Related Findings:  -I&Os (-1.7 L), no edema, active BS, A&O x 4, puncture site      Wounds:  None       Current Nutrition Therapies:    ADULT DIET; Regular; Low Sodium (2 gm)    Anthropometric Measures:  · Height: 5' 9\" (175.3 cm)  · Current Body Weight: 179 lb (81.2 kg) (3/15, bedscale)   · Admission Body Weight: 179 lb (81.2 kg) (3/15- bedscale; first measured)    · Usual Body Weight:  (UTO ; no weights available in EMR hx from previous encounters ; subjective weight loss noted although no evidence of this)     · Ideal Body Weight: 160 lbs; % Ideal Body Weight 111.9 %   · BMI: 26.4  · BMI Categories: Overweight (BMI 25.0-29. 9)       Nutrition Diagnosis:   · No nutrition diagnosis at this time related to inadequate protein-energy intake as evidenced by poor intake prior to admission      Nutrition Interventions:   Food and/or Nutrient Delivery:  Continue Current Diet  Nutrition Education/Counseling:  Education not indicated   Coordination of Nutrition Care:  Continue to monitor while inpatient    Goals:  Patient will consume ~75% of meals served       Nutrition Monitoring and Evaluation:   Behavioral-Environmental Outcomes:  None Identified   Food/Nutrient Intake Outcomes:  Food and Nutrient Intake  Physical Signs/Symptoms Outcomes:  Biochemical Data,Chewing or Swallowing,GI Status,Fluid Status or Edema,Hemodynamic Status,Meal Time Behavior,Nutrition Focused Physical Findings,Skin,Weight     Discharge Planning:     Too soon to determine     Electronically signed by Alba Argueta RD, LD on 3/21/22 at 11:04 AM EDT    Contact: [phone]

## 2022-03-21 NOTE — PROGRESS NOTES
Hospitalist Progress Note      Synopsis: Patient admitted on 114/2022 71-year-old male with no known past medical history except childhood murmur, went to the urgent care today for evaluation of fatigue and was found to have A. fib with RVR and was sent to the main hospital emergency room. Patient states his symptoms of fatigue were on and off since past 1 month. Denies any palpitations. However did report some exertional shortness of breath. Patient consulted, as per him, telemedicine doctor, who prescribed him ivermectin and hydroxychloroquine earlier this month for suspicion of COVID-19 infection. Patient is unvaccinated against Covid. Patient completed 5 days of ivermectin, however, did not finish 14-day course of hydroxychloroquine which he stopped about 3 days ago. Patient denies any recent fever, chills, cough, shortness of breath, chest pain, abdominal pain, nausea, vomiting, diarrhea constipation. No presyncopal or syncopal event. Upon arrival in the emergency room, patient was noted to be febrile with T-max 101.1 °F, WBC count slightly elevated at 12.2, CRP elevated at 5.9, proBNP elevated at 6000, lactate level elevated at 2.6, troponin elevated at 42, 43. Patient was started on Cardizem drip. Patient had CTA chest done which did not reveal acute PE. Blood cultures positive for strep mitis. Echocardiogram reveals severe mitral regurgitation with flail leaflet and ruptured chordae tendon line and concern for vegetation. Patient had left heart catheterization demonstrating no significant coronary artery disease.   Plan for valve replacement 3/23.       Subjective    Feeling good no complaints  No CP or SOB  No fever or chills   No uncontrolled pain  No vomiting or diarrhea   No events reported overnight     Exam:  BP 93/74   Pulse 87   Temp 98.2 °F (36.8 °C) (Oral)   Resp 18   Ht 5' 9\" (1.753 m)   Wt 179 lb (81.2 kg)   SpO2 97%   BMI 26.43 kg/m²   General appearance: No apparent input(s): Jimmy Killings in the last 72 hours. Chronic labs:  Lab Results   Component Value Date    CHOL 133 03/15/2022    TRIG 115 03/15/2022    HDL 20 03/15/2022    LDLCALC 90 03/15/2022    TSH 1.880 03/14/2022    INR 1.5 03/14/2022    LABA1C 5.6 03/15/2022       Radiology:  Imaging studies reviewed today. ASSESSMENT:    Principal Problem:    Atrial fibrillation with rapid ventricular response (HCC)  Active Problems:    Sepsis ([address] present on admission    JANA (acute kidney injury) ([facility]    Bacteremia    Severe mitral regurgitation    Suspected endocarditis    Subacute bacterial endocarditis    Ruptured chordae tendineae ([facility])    Mitral valve disease    Paroxysmal atrial fibrillation ([facility])    Acute diastolic (congestive) heart failure ([facility]  Resolved Problems:    * No resolved hospital problems.  *       PLAN:    Atrial fibrillation RVR   admit to intermediate care  Monitor on telemetry  Rate control diltiazem drip--> Toprol-XL  Anticoagulation Lovenox  Cardiology Consult appreciated discussed case    Sepsis, bacteremia, suspected endocarditis  Fever 101.1 on admission, then afebrile until T-max 100.6 today  Treated cefepime and vancomycin-> vancomycin--> ampicillin  Echocardiogram with partial flail mitral valve, ruptured chordae and likely vegetation  ANITRA 3/16 confirming severe MR with flail leaflet and ruptured chordae possible small vegetation  Urine culture  Blood cultures 2 of 2+ GPC in chains--alpha streptococcus-strep mitis/oralis  CRP 5.9  Procalcitonin 0.31, repeat 0.23  Infectious disease Consult appreciated-discussed case    Mitral regurgitation-severe  ANITRA flail leaflet, ruptured chordae, sever MR, suspected vegitation   [facility] 3/18 without evidence of significant CAD  CTS Consult appreciated possibly valve repair/replacement Wednesday 3/23  Dental consult appreciated 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 I contacted the [facility] clinic transfer line 3/20 and was notified that they are not set accepting any transfers to the main [place] due to their high census. I notified patient of this. I offered transfer to other [facility]. Patient declined saying that no he just wanted to see about the [facility] clinic main and is not interested in other options. He will continue on with the current plan of valve surgery here.  +++++++++++++++++++++++++++++++++++++++++++++++++  Sima Hugo MD   [facility].  +++++++++++++++++++++++++++++++++++++++++++++++++  NOTE: This report was transcribed using voice recognition software. Every effort was made to ensure accuracy; however, inadvertent computerized transcription errors may be present.

## 2022-03-22 LAB
ABO/RH: NORMAL
ALBUMIN SERPL-MCNC: 2.9 G/DL (ref 3.5–5.2)
ALP BLD-CCNC: 168 U/L (ref 40–129)
ALT SERPL-CCNC: 99 U/L (ref 0–40)
ANION GAP SERPL CALCULATED.3IONS-SCNC: 8 MMOL/L (ref 7–16)
ANTIBODY SCREEN: NORMAL
APTT: 37.4 SEC (ref 24.5–35.1)
AST SERPL-CCNC: 57 U/L (ref 0–39)
BILIRUB SERPL-MCNC: 0.5 MG/DL (ref 0–1.2)
BLOOD CULTURE, ROUTINE: NORMAL
BUN BLDV-MCNC: 18 MG/DL (ref 6–23)
CALCIUM SERPL-MCNC: 8.8 MG/DL (ref 8.6–10.2)
CHLORIDE BLD-SCNC: 104 MMOL/L (ref 98–107)
CO2: 25 MMOL/L (ref 22–29)
CREAT SERPL-MCNC: 1.3 MG/DL (ref 0.7–1.2)
CULTURE, BLOOD 2: NORMAL
GFR AFRICAN AMERICAN: >60
GFR NON-AFRICAN AMERICAN: 56 ML/MIN/1.73
GLUCOSE BLD-MCNC: 92 MG/DL (ref 74–99)
HCT VFR BLD CALC: 33 % (ref 37–54)
HEMOGLOBIN: 10.3 G/DL (ref 12.5–16.5)
INR BLD: 1.3
MCH RBC QN AUTO: 27.5 PG (ref 26–35)
MCHC RBC AUTO-ENTMCNC: 31.2 % (ref 32–34.5)
MCV RBC AUTO: 88.2 FL (ref 80–99.9)
MRSA CULTURE ONLY: NORMAL
PDW BLD-RTO: 14.7 FL (ref 11.5–15)
PLATELET # BLD: 251 E9/L (ref 130–450)
PMV BLD AUTO: 10.7 FL (ref 7–12)
POTASSIUM SERPL-SCNC: 4.5 MMOL/L (ref 3.5–5)
PROTHROMBIN TIME: 14.4 SEC (ref 9.3–12.4)
RBC # BLD: 3.74 E12/L (ref 3.8–5.8)
SODIUM BLD-SCNC: 137 MMOL/L (ref 132–146)
TOTAL PROTEIN: 6.4 G/DL (ref 6.4–8.3)
WBC # BLD: 10 E9/L (ref 4.5–11.5)

## 2022-03-22 PROCEDURE — 6370000000 HC RX 637 (ALT 250 FOR IP): Performed by: INTERNAL MEDICINE

## 2022-03-22 PROCEDURE — 85610 PROTHROMBIN TIME: CPT

## 2022-03-22 PROCEDURE — 99233 SBSQ HOSP IP/OBS HIGH 50: CPT | Performed by: INTERNAL MEDICINE

## 2022-03-22 PROCEDURE — 86901 BLOOD TYPING SEROLOGIC RH(D): CPT

## 2022-03-22 PROCEDURE — 2500000003 HC RX 250 WO HCPCS: Performed by: INTERNAL MEDICINE

## 2022-03-22 PROCEDURE — 36415 COLL VENOUS BLD VENIPUNCTURE: CPT

## 2022-03-22 PROCEDURE — 2140000000 HC CCU INTERMEDIATE R&B

## 2022-03-22 PROCEDURE — 6370000000 HC RX 637 (ALT 250 FOR IP)

## 2022-03-22 PROCEDURE — 86923 COMPATIBILITY TEST ELECTRIC: CPT

## 2022-03-22 PROCEDURE — 2580000003 HC RX 258

## 2022-03-22 PROCEDURE — 86850 RBC ANTIBODY SCREEN: CPT

## 2022-03-22 PROCEDURE — 85027 COMPLETE CBC AUTOMATED: CPT

## 2022-03-22 PROCEDURE — 85730 THROMBOPLASTIN TIME PARTIAL: CPT

## 2022-03-22 PROCEDURE — 80053 COMPREHEN METABOLIC PANEL: CPT

## 2022-03-22 PROCEDURE — P9016 RBC LEUKOCYTES REDUCED: HCPCS

## 2022-03-22 PROCEDURE — 6360000002 HC RX W HCPCS: Performed by: INTERNAL MEDICINE

## 2022-03-22 PROCEDURE — 6360000002 HC RX W HCPCS: Performed by: PHYSICIAN ASSISTANT

## 2022-03-22 PROCEDURE — 6370000000 HC RX 637 (ALT 250 FOR IP): Performed by: PHYSICIAN ASSISTANT

## 2022-03-22 PROCEDURE — 2580000003 HC RX 258: Performed by: INTERNAL MEDICINE

## 2022-03-22 PROCEDURE — 86900 BLOOD TYPING SEROLOGIC ABO: CPT

## 2022-03-22 RX ORDER — SODIUM CHLORIDE 9 MG/ML
25 INJECTION, SOLUTION INTRAVENOUS PRN
Status: DISCONTINUED | OUTPATIENT
Start: 2022-03-22 | End: 2022-03-23

## 2022-03-22 RX ORDER — CHLORHEXIDINE GLUCONATE 4 G/100ML
SOLUTION TOPICAL SEE ADMIN INSTRUCTIONS
Status: DISCONTINUED | OUTPATIENT
Start: 2022-03-22 | End: 2022-03-23 | Stop reason: HOSPADM

## 2022-03-22 RX ORDER — SODIUM CHLORIDE 0.9 % (FLUSH) 0.9 %
10 SYRINGE (ML) INJECTION PRN
Status: DISCONTINUED | OUTPATIENT
Start: 2022-03-22 | End: 2022-03-23

## 2022-03-22 RX ORDER — SODIUM CHLORIDE 0.9 % (FLUSH) 0.9 %
5-40 SYRINGE (ML) INJECTION EVERY 12 HOURS SCHEDULED
Status: DISCONTINUED | OUTPATIENT
Start: 2022-03-22 | End: 2022-03-23

## 2022-03-22 RX ORDER — CHLORHEXIDINE GLUCONATE 0.12 MG/ML
15 RINSE ORAL ONCE
Status: COMPLETED | OUTPATIENT
Start: 2022-03-23 | End: 2022-03-23

## 2022-03-22 RX ADMIN — Medication 10 ML: at 23:44

## 2022-03-22 RX ADMIN — Medication 10 ML: at 11:19

## 2022-03-22 RX ADMIN — ATORVASTATIN CALCIUM 40 MG: 40 TABLET, FILM COATED ORAL at 20:35

## 2022-03-22 RX ADMIN — HEPARIN 300 UNITS: 100 SYRINGE at 09:22

## 2022-03-22 RX ADMIN — AMPICILLIN SODIUM 2000 MG: 2 INJECTION, POWDER, FOR SOLUTION INTRAVENOUS at 15:29

## 2022-03-22 RX ADMIN — AMPICILLIN SODIUM 2000 MG: 2 INJECTION, POWDER, FOR SOLUTION INTRAVENOUS at 22:57

## 2022-03-22 RX ADMIN — Medication 10 ML: at 09:22

## 2022-03-22 RX ADMIN — AMPICILLIN SODIUM 2000 MG: 2 INJECTION, POWDER, FOR SOLUTION INTRAVENOUS at 10:46

## 2022-03-22 RX ADMIN — HEPARIN 300 UNITS: 100 SYRINGE at 20:34

## 2022-03-22 RX ADMIN — SODIUM CHLORIDE, PRESERVATIVE FREE 10 ML: 5 INJECTION INTRAVENOUS at 00:08

## 2022-03-22 RX ADMIN — AMPICILLIN SODIUM 2000 MG: 2 INJECTION, POWDER, FOR SOLUTION INTRAVENOUS at 09:20

## 2022-03-22 RX ADMIN — HEPARIN 300 UNITS: 100 SYRINGE at 00:05

## 2022-03-22 RX ADMIN — ENOXAPARIN SODIUM 80 MG: 100 INJECTION SUBCUTANEOUS at 09:21

## 2022-03-22 RX ADMIN — METOPROLOL SUCCINATE 75 MG: 50 TABLET, EXTENDED RELEASE ORAL at 20:35

## 2022-03-22 RX ADMIN — MUPIROCIN: 20 OINTMENT TOPICAL at 22:50

## 2022-03-22 RX ADMIN — Medication 20 ML: at 22:50

## 2022-03-22 RX ADMIN — ENOXAPARIN SODIUM 80 MG: 100 INJECTION SUBCUTANEOUS at 20:34

## 2022-03-22 RX ADMIN — AMPICILLIN SODIUM 2000 MG: 2 INJECTION, POWDER, FOR SOLUTION INTRAVENOUS at 03:30

## 2022-03-22 RX ADMIN — CHLORHEXIDINE GLUCONATE: 213 SOLUTION TOPICAL at 20:00

## 2022-03-22 RX ADMIN — SODIUM CHLORIDE, PRESERVATIVE FREE 10 ML: 5 INJECTION INTRAVENOUS at 03:33

## 2022-03-22 RX ADMIN — SODIUM CHLORIDE 25 ML: 9 INJECTION, SOLUTION INTRAVENOUS at 04:28

## 2022-03-22 RX ADMIN — METOPROLOL SUCCINATE 75 MG: 50 TABLET, EXTENDED RELEASE ORAL at 09:21

## 2022-03-22 RX ADMIN — AMPICILLIN SODIUM 2000 MG: 2 INJECTION, POWDER, FOR SOLUTION INTRAVENOUS at 18:20

## 2022-03-22 RX ADMIN — DEXTROSE MONOHYDRATE 10 MG/HR: 50 INJECTION, SOLUTION INTRAVENOUS at 18:29

## 2022-03-22 ASSESSMENT — PAIN SCALES - GENERAL
PAINLEVEL_OUTOF10: 0
PAINLEVEL_OUTOF10: 0

## 2022-03-22 NOTE — PLAN OF CARE
Patient's chart updated to reflect:      .     - HF care plan, HF education points and HF discharge instructions.  -Orders: 2 gram sodium diet, daily weights, I/O.  -PCP and/or Cardiologist appointment to be scheduled within 7 days of hospital discharge.  -patient is currently fairly well compensated without overt symptoms of heart failure   Sofia Tirado RN RN, BSN  Heart Failure Navigator

## 2022-03-22 NOTE — PROGRESS NOTES
Department of Internal Medicine  Infectious Diseases  Progress  Note      C/C : Streptococcus bacteremia , fever     Denied fever , chills   Denies SOB   3 teeth extracted today     Afebrile     Current Facility-Administered Medications   Medication Dose Route Frequency Provider Last Rate Last Admin    sodium chloride flush 0.9 % injection 5-40 mL  5-40 mL IntraVENous 2 times per day ROX Brown CNP   10 mL at 03/22/22 1119    sodium chloride flush 0.9 % injection 10 mL  10 mL IntraVENous PRN ROX Lizama CNP        0.9 % sodium chloride infusion  25 mL IntraVENous PRN ROX Brown CNP        [START ON 3/23/2022] ceFAZolin (ANCEF) 2000 mg in sterile water 20 mL IV syringe  2,000 mg IntraVENous See Admin Instructions ROX Lizama CNP        mupirocin (BACTROBAN) 2 % ointment   Nasal BID ROX Lizama CNP        [START ON 3/23/2022] chlorhexidine (PERIDEX) 0.12 % solution 15 mL  15 mL Mouth/Throat Once ROX Garcia CNP        chlorhexidine (HIBICLENS) 4 % liquid   Topical See Admin Instructions ROX Brown CNP        psyllium (METAMUCIL) 58.12 % packet 1 packet  1 packet Oral Daily Geni Nicole MD   1 packet at 03/21/22 2036    metoprolol succinate (TOPROL XL) extended release tablet 75 mg  75 mg Oral BID ROX Brown CNP   75 mg at 03/22/22 0921    sodium chloride flush 0.9 % injection 5-40 mL  5-40 mL IntraVENous 2 times per day Esperanza Archer MD   10 mL at 03/22/22 3138    sodium chloride flush 0.9 % injection 5-40 mL  5-40 mL IntraVENous PRN Faustino Archer MD   10 mL at 03/22/22 0333    0.9 % sodium chloride infusion  25 mL IntraVENous PRN Faustino Archer  mL/hr at 03/22/22 0428 25 mL at 03/22/22 0428    heparin flush 100 UNIT/ML injection 300 Units  3 mL IntraVENous 2 times per day Celio Lugo MD   300 Units at 03/22/22 0922    heparin flush 100 UNIT/ML injection 300 Units  3 mL IntraCATHeter PRN Celio Lugo MD  ampicillin 2000 mg ivpb mini bag  2,000 mg IntraVENous 6 times per day Osiel Rudd MD   Stopped at 03/22/22 1119    enoxaparin (LOVENOX) injection 80 mg  1 mg/kg SubCUTAneous BID Amparo Hasting, DO   80 mg at 03/22/22 3514    dilTIAZem 100 mg in dextrose 5 % 100 mL infusion (ADD-[place])  2.5-15 mg/hr IntraVENous Continuous Amparo Hasting, DO 10 mL/hr at 03/21/22 2246 10 mg/hr at 03/21/22 2246    ondansetron (ZOFRAN-ODT) disintegrating tablet 4 mg  4 mg Oral Q8H PRN Amparo Hasting, DO        Or    ondansetron [facility]) injection 4 mg  4 mg IntraVENous Q6H PRN Amparo Hasting, DO        polyethylene glycol (GLYCOLAX) packet 17 g  17 g Oral Daily PRN Amparo Hasting, DO   17 g at 03/20/22 1952    acetaminophen (TYLENOL) tablet 650 mg  650 mg Oral Q6H PRN Amparo Hasting, DO   650 mg at 03/16/22 2247    Or    acetaminophen (TYLENOL) suppository 650 mg  650 mg Rectal Q6H PRN Amparo Hasting, DO        potassium chloride 10 mEq/100 mL IVPB (Peripheral Line)  10 mEq IntraVENous PRN Amparo Hasting, DO        magnesium sulfate 2000 mg in 50 mL IVPB premix  2,000 mg IntraVENous PRN Amparo Hasting, DO        perflutren lipid microspheres (DEFINITY) injection 1.65 mg  1.5 mL IntraVENous ONCE PRN Amparo Hasting, DO        atorvastatin (LIPITOR) tablet 40 mg  40 mg Oral Nightly Amparo Hasting, DO   40 mg at 03/21/22 2037         REVIEW OF SYSTEMS:    CONSTITUTIONAL:  Denies fever, chill or rigors. HEENT: denies blurring of vision or double vision, denies hearing problem  RESPIRATORY: SOB with exertion   CARDIOVASCULAR:  Denies palpitation  GASTROINTESTINAL:  Denies abdomen pain, diarrhea or constipation. GENITOURINARY:  Denies burning urination or frequency of urination  INTEGUMENT: denies wound , rash  HEMATOLOGIC/LYMPHATIC:  Denies lymph node swelling, gum bleeding or easy bruising.   MUSCULOSKELETAL:  Denies leg pain , joint pain , joint swelling  NEUROLOGICAL:  Fatigue , weakness PHYSICAL EXAM:      Vitals:     /78   Pulse 82   Temp 97.5 °F (36.4 °C) (Axillary)   Resp 18   Ht 5' 9\" (1.753 m)   Wt 179 lb (81.2 kg)   SpO2 99%   BMI 26.43 kg/m²       General Appearance:    Awake, alert , no acute distress. Head:    Normocephalic, atraumatic   Eyes:    No pallor, no icterus,   Ears:    No obvious deformity or drainage.    Nose:   No nasal drainage   Throat:   Mucosa moist, multiple dental caries    Neck:   Supple, no lymphadenopathy   Lungs:     Clear to auscultation bilaterally, no wheeze    Heart:     Irregular, systolic murmur    Abdomen:     Soft, non-tender, bowel sounds present    Extremities:   No edema, no cyanosis    Pulses:   Dorsalis pedis palpable    Skin:   no rashes       Lab Results   Component Value Date    WBC 10.0 03/22/2022    RBC 3.74 03/22/2022    HGB 10.3 03/22/2022    HCT 33.0 03/22/2022     03/22/2022    MCV 88.2 03/22/2022    MCH 27.5 03/22/2022    MCHC 31.2 03/22/2022    RDW 14.7 03/22/2022    LYMPHOPCT 20.0 03/21/2022    MONOPCT 5.8 03/21/2022    BASOPCT 0.5 03/21/2022    MONOSABS 0.54 03/21/2022    LYMPHSABS 1.86 03/21/2022    EOSABS 0.32 03/21/2022    BASOSABS 0.05 03/21/2022       CMP     Lab Results   Component Value Date     03/21/2022    K 3.9 03/21/2022    K 4.2 03/15/2022    CL 99 03/21/2022    CO2 28 03/21/2022    BUN 13 03/21/2022    CREATININE 1.1 03/21/2022    GFRAA >60 03/21/2022    LABGLOM >60 03/21/2022    GLUCOSE 122 03/21/2022    PROT 7.9 03/14/2022    LABALBU 3.4 03/14/2022    CALCIUM 8.3 03/21/2022    BILITOT 1.0 03/14/2022    ALKPHOS 117 03/14/2022    AST 22 03/14/2022    ALT 35 03/14/2022         Hepatic Function Panel:    Lab Results   Component Value Date    ALKPHOS 117 03/14/2022    ALT 35 03/14/2022    AST 22 03/14/2022    PROT 7.9 03/14/2022    BILITOT 1.0 03/14/2022    BILIDIR 0.4 03/14/2022    IBILI 0.6 03/14/2022    LABALBU 3.4 03/14/2022       PT/INR:    Lab Results   Component Value Date    PROTIME 14.4 anteriorly. IMPRESSION:     1. Streptococcus bacteremia ( 3/14 and 3/15) ,mitral valve endocarditis ,follow up blood cx neg on ( 3/17)     RECOMMENDATIONS:      1. Ampicillin 2 grams IV q 4 hrs  ( PCN LANEY <0.06 )   2.  Valve replacement tomorrow

## 2022-03-22 NOTE — PROGRESS NOTES
INPATIENT CARDIOLOGY FOLLOW-UP    Name: Javy Laurent    Age: 62 y.o. Date of Admission: 3/14/2022  8:06 PM    Date of Service: 3/22/2022    Chief Complaint: Follow-up for mitral valve disease secondary to endocarditis, paroxysmal atrial fibrillation, acute diastolic heart failure    Interim History: The patient presently remains clinically compensated from a cardiovascular standpoint with his dental assessment reviewed. Interim events including that of family concerns were extensively discussed with interim consideration of transfer to the [facility] for opinion regarding valvular intervention deferred on the basis of lack of bed availability. He remains in atrial fibrillation present with acceptable rate control but remaining dependent on intravenous diltiazem. Review of Systems: The remainder of a complete multisystem review including consitutional, central nervous, respiratory, circulatory, gastrointestinal, genitourinary, endocrinologic, hematologic, musculoskeletal and psychiatric are negative.     Problem List:  Patient Active Problem List   Diagnosis    Atrial fibrillation with rapid ventricular response ([address]    Sepsis ([address] present on admission    JANA [facility]) [facility]    Bacteremia    Severe mitral regurgitation    Suspected endocarditis    Subacute bacterial endocarditis    Ruptured chordae tendineae (HCC)    Mitral valve disease    Paroxysmal atrial fibrillation (HCC)    Acute diastolic (congestive) heart failure (HCC)       Allergies:  No Known Allergies    Current Medications:  Current Facility-Administered Medications   Medication Dose Route Frequency Provider Last Rate Last Admin    psyllium (METAMUCIL) 58.12 % packet 1 packet  1 packet Oral Daily Chelita Wallace MD   1 packet at 03/21/22 2036    metoprolol succinate (TOPROL XL) extended release tablet 75 mg  75 mg Oral BID Mayco Soares MD   75 mg at 03/21/22 2036    sodium chloride flush 0.9 % injection 5-40 mL  5-40 mL IntraVENous 2 times per day Celio Lugo MD   10 mL at 03/21/22 2248    sodium chloride flush 0.9 % injection 5-40 mL  5-40 mL IntraVENous PRN Faustino Archer MD   10 mL at 03/22/22 0333    0.9 % sodium chloride infusion  25 mL IntraVENous PRN Faustino Archer  mL/hr at 03/22/22 0428 25 mL at 03/22/22 0428    heparin flush 100 UNIT/ML injection 300 Units  3 mL IntraVENous 2 times per day Celio Lugo MD   300 Units at 03/22/22 0005    heparin flush 100 UNIT/ML injection 300 Units  3 mL IntraCATHeter PRN Faustino Archer MD        ampicillin 2000 mg ivpb mini bag  2,000 mg IntraVENous 6 times per day Celio Lugo MD   Stopped at 03/22/22 0427    enoxaparin (LOVENOX) injection 80 mg  1 mg/kg SubCUTAneous BID Berkley Kumar, DO   80 mg at 03/21/22 2036    dilTIAZem 100 mg in dextrose 5 % 100 mL infusion (ADD-[place])  2.5-15 mg/hr IntraVENous Continuous Berkley Kumar, DO 10 mL/hr at 03/21/22 2246 10 mg/hr at 03/21/22 2246    ondansetron (ZOFRAN-ODT) disintegrating tablet 4 mg  4 mg Oral Q8H PRN Berkley Kumar, DO        Or    ondansetron [facility]) injection 4 mg  4 mg IntraVENous Q6H PRN Berkley Kumar, DO        polyethylene glycol (GLYCOLAX) packet 17 g  17 g Oral Daily PRN Berkley Kumar, DO   17 g at 03/20/22 1952    acetaminophen (TYLENOL) tablet 650 mg  650 mg Oral Q6H PRN Berkley Kumar, DO   650 mg at 03/16/22 2247    Or    acetaminophen (TYLENOL) suppository 650 mg  650 mg Rectal Q6H PRN Berkley Kmuar, DO        potassium chloride 10 mEq/100 mL IVPB (Peripheral Line)  10 mEq IntraVENous PRN Berkley Kumar, DO        magnesium sulfate 2000 mg in 50 mL IVPB premix  2,000 mg IntraVENous PRN Berkley Kumar, DO        perflutren lipid microspheres (DEFINITY) injection 1.65 mg  1.5 mL IntraVENous ONCE PRN Berkley Kumar, DO        atorvastatin (LIPITOR) tablet 40 mg  40 mg Oral Nightly Berkley Kumar, DO   40 mg at 03/21/22 2037  sodium chloride 25 mL (03/22/22 0365)    dilTIAZem 10 mg/hr (03/21/22 2756)       Physical Exam:  /76   Pulse 81   Temp 98.2 °F (36.8 °C) (Oral)   Resp 18   Ht 5' 9\" (1.753 m)   Wt 179 lb (81.2 kg)   SpO2 99%   BMI 26.43 kg/m²   Weight change: Wt Readings from Last 3 Encounters:   03/15/22 179 lb (81.2 kg)     The patient is awake, alert and in no discomfort or distress. No gross musculoskeletal deformity is present. No significant skin or nail changes are present. Gross examination of head, eyes, nose and throat are negative. Jugular venous pressure is normal and no carotid bruits are present. Normal respiratory effort is noted with no accessory muscle usage present. Lung fields are clear to ascultation. Cardiac examination is notable for an irregular rhythm with no palpable thrill. No gallop rhythm and and apical holosystolic murmur are identified. A benign abdominal examination is present with no masses or organomegaly. Intact pulses are present throughout all extremities and no peripheral edema is present. No focal neurologic deficits are present. Intake/Output:    Intake/Output Summary (Last 24 hours) at 3/22/2022 0901  Last data filed at 3/21/2022 2154  Gross per 24 hour   Intake --   Output 1100 ml   Net -1100 ml     No intake/output data recorded. Laboratory Tests:  Lab Results   Component Value Date    CREATININE 1.1 03/21/2022    BUN 13 03/21/2022     03/21/2022    K 3.9 03/21/2022    CL 99 03/21/2022    CO2 28 03/21/2022     No results for input(s): CKTOTAL, CKMB in the last 72 hours.     Invalid input(s): TROPONONI  No results found for: BNP  Lab Results   Component Value Date    WBC 9.3 03/21/2022    RBC 3.61 03/21/2022    HGB 9.9 03/21/2022    HCT 32.1 03/21/2022    MCV 88.9 03/21/2022    MCH 27.4 03/21/2022    MCHC 30.8 03/21/2022    RDW 14.4 03/21/2022     03/21/2022    MPV 11.0 03/21/2022     No results for input(s): ALKPHOS, ALT, AST, PROT, BILITOT, BILIDIR, LABALBU in the last 72 hours. Lab Results   Component Value Date    MG 2.4 03/14/2022     Lab Results   Component Value Date    PROTIME 15.9 03/14/2022    INR 1.5 03/14/2022     Lab Results   Component Value Date    TSH 1.880 03/14/2022     No components found for: CHLPL  Lab Results   Component Value Date    TRIG 115 03/15/2022     Lab Results   Component Value Date    HDL 20 03/15/2022     Lab Results   Component Value Date    LDLCALC 90 03/15/2022       Cardiac Tests:  Telemetry findings reviewed: atrial fibrillation with a mean ventricular response of approximately 80-90 beats per, no new tachy/bradyarrhythmias overnight      ASSESSMENT / PLAN: On a clinical basis, the patient remains compensated from a cardiovascular standpoint in the face of his subacute endocarditis and mitral valve involvement as well as that of persistent atrial fibrillation. His condition was extensively discussed with family the previous day with request of assessment at the [facility] noted but the lack of bed availability and plans to continue with preoperative evaluation with tentative mitral valve surgical intervention tomorrow. Presently continued medical management inclusive rate control will be maintained with needs of discontinuation of his low molecular weight heparin on the night prior to his surgical intervention. Continued careful monitoring of his volume status will be necessary with ongoing nutritional support essential to fluid mobilization reducing risk of progressive debilitation. Additional management will be deferred to the cardiothoracic surgical and infectious disease services. The duration of discussion counseling with the patient and family in conjunction with the present encounter exceeded 35-minutes      Note: This report was completed utilizing computer voice recognition software.  Every effort has been made to ensure accuracy, however; inadvertent computerized transcription errors may be present. Kaitlin Nam.  Ekaterina Farrell, [id number] [facility]

## 2022-03-22 NOTE — PROGRESS NOTES
Pt presents to dental clinic with large liver clots in area of previous extractions of lower right and upper left. Removed liver clots with suction and gauze. 3 carps 3% carbocaine administered via infiltration in areas of previous  #30 (lower right) and #14 (upper left). Attempted to control bleeding with firm pressure on gauze and silver nitrate. Unable to obtain complete hemostasis. Removed previous sutures in area of #30 and curetted socket. Gel foam, blood stop, and  3-0 chromic gut sutures placed. Also curetted socket of #14, placed gel foam followed by blood stop. Had pt apply firm pressure with gauze for 15 minutes. Hemostasis was obtained. Instructed pt to continue to apply firm pressure with gauze for the next 30 min to 1 hour depending on bleeding. Patient is to follow up with dental clinic if any further issues arise. I personally evaluated the patient with the above resident and agree with the assessment and treatment provided.   Electronically signed by Vee Rios DDS on 3/24/2022 at 9:34 AM

## 2022-03-22 NOTE — PROGRESS NOTES
Pt's nurse called the dental clinic stating that the pt has large blood clots in area of recent extraction sites. Evaluated pt on hospital floor. Large liver clot seen in lower right region where recent tooth was extracted. Explained to pt he will need to be transferred to dental clinic for removal of clots and follow up treatment. I personally evaluated the patient with the above resident and agree with the assessment and treatment plan.   Electronically signed by Samir Hollis DDS on 3/24/2022 at 9:32 AM

## 2022-03-22 NOTE — PROGRESS NOTES
Hospitalist Progress Note      Synopsis: Patient admitted on 114/2022 45-year-old male with no known past medical history except childhood murmur, went to the urgent care today for evaluation of fatigue and was found to have A. fib with RVR and was sent to the main hospital emergency room. Patient states his symptoms of fatigue were on and off since past 1 month. Denies any palpitations. However did report some exertional shortness of breath. Patient consulted, as per him, telemedicine doctor, who prescribed him ivermectin and hydroxychloroquine earlier this month for suspicion of COVID-19 infection. Patient is unvaccinated against Covid. Patient completed 5 days of ivermectin, however, did not finish 14-day course of hydroxychloroquine which he stopped about 3 days ago. Patient denies any recent fever, chills, cough, shortness of breath, chest pain, abdominal pain, nausea, vomiting, diarrhea constipation. No presyncopal or syncopal event. Upon arrival in the emergency room, patient was noted to be febrile with T-max 101.1 °F, WBC count slightly elevated at 12.2, CRP elevated at 5.9, proBNP elevated at 6000, lactate level elevated at 2.6, troponin elevated at 42, 43. Patient was started on Cardizem drip. Patient had CTA chest done which did not reveal acute PE. Blood cultures positive for strep mitis. Echocardiogram reveals severe mitral regurgitation with flail leaflet and ruptured chordae tendon line and concern for vegetation. Patient had left heart catheterization demonstrating no significant coronary artery disease.   Plan for valve replacement 3/23.       Subjective    Feeling good no complaints  No CP or SOB  No fever or chills   No uncontrolled pain  No vomiting or diarrhea   No events reported overnight     Exam:  /76   Pulse 81   Temp 98.2 °F (36.8 °C) (Oral)   Resp 18   Ht 5' 9\" (1.753 m)   Wt 179 lb (81.2 kg)   SpO2 99%   BMI 26.43 kg/m²   General appearance: No apparent distress, appears stated age and cooperative. HEENT: Pupils equal, round, and reactive to light. Conjunctivae/corneas clear. Neck: Supple. No jugular venous distention. Trachea midline. Respiratory:  Normal respiratory effort. Clear to auscultation, bilaterally without Rales/Wheezes/Rhonchi. Cardiovascular: Regular rate and rhythm with normal S1/S2 +3 of 6 EARNESTINE murmurs, rubs or gallops. Abdomen: Soft, non-tender, non-distended with normal bowel sounds. Musculoskeletal: No clubbing, cyanosis or edema bilaterally. Brisk capillary refill. 2+ lower extremity pulses (dorsalis pedis). Skin:  No rashes    Neurologic: awake, alert and following commands     Medications:  Reviewed    Infusion Medications    sodium chloride 25 mL (03/22/22 5765)    dilTIAZem 10 mg/hr (03/21/22 2986)     Scheduled Medications    psyllium  1 packet Oral Daily    metoprolol succinate  75 mg Oral BID    sodium chloride flush  5-40 mL IntraVENous 2 times per day    heparin flush  3 mL IntraVENous 2 times per day    ampicillin IV  2,000 mg IntraVENous 6 times per day    enoxaparin  1 mg/kg SubCUTAneous BID    atorvastatin  40 mg Oral Nightly     PRN Meds: sodium chloride flush, sodium chloride, heparin flush, ondansetron **OR** ondansetron, polyethylene glycol, acetaminophen **OR** acetaminophen, potassium chloride, magnesium sulfate, perflutren lipid microspheres    I/O    Intake/Output Summary (Last 24 hours) at 3/22/2022 0802  Last data filed at 3/21/2022 2154  Gross per 24 hour   Intake --   Output 1100 ml   Net -1100 ml       Labs:   Recent Labs     03/19/22  0837 03/21/22  0500   WBC 11.2 9.3   HGB 11.0* 9.9*   HCT 34.3* 32.1*    243       Recent Labs     03/19/22  0837 03/21/22  0500    133   K 4.0 3.9    99   CO2 21* 28   BUN 19 13   CREATININE 1.0 1.1   CALCIUM 8.4* 8.3*       No results for input(s): PROT, ALB, ALKPHOS, ALT, AST, BILITOT, AMYLASE, LIPASE in the last 72 hours.     No results for input(s): INR in the last 72 hours. No results for input(s): Latanya Saunders in the last 72 hours. Chronic labs:  Lab Results   Component Value Date    CHOL 133 03/15/2022    TRIG 115 03/15/2022    HDL 20 03/15/2022    LDLCALC 90 03/15/2022    TSH 1.880 03/14/2022    INR 1.5 03/14/2022    LABA1C 5.6 03/15/2022       Radiology:  Imaging studies reviewed today. ASSESSMENT:    Principal Problem:    Atrial fibrillation with rapid ventricular response (HCC)  Active Problems:    Sepsis ([address] present on admission    JANA (acute kidney injury) ([facility]    Bacteremia    Severe mitral regurgitation    Suspected endocarditis    Subacute bacterial endocarditis    Ruptured chordae tendineae (HCC)    Mitral valve disease    Paroxysmal atrial fibrillation (HCC)    Acute diastolic (congestive) heart failure ([address]  Resolved Problems:    * No resolved hospital problems.  *       PLAN:    Atrial fibrillation RVR   admit to intermediate care  Monitor on telemetry  Rate control diltiazem drip--> Toprol-XL  Anticoagulation Lovenox  Cardiology Consult appreciated discussed case    Sepsis, bacteremia, suspected endocarditis  Fever 101.1 on admission, then afebrile until T-max 100.6 today  Treated cefepime and vancomycin-> vancomycin--> ampicillin  Echocardiogram with partial flail mitral valve, ruptured chordae and likely vegetation  ANITRA 3/16 confirming severe MR with flail leaflet and ruptured chordae possible small vegetation  Urine culture  Blood cultures 2 of 2+ GPC in chains--alpha streptococcus-strep mitis/oralis  CRP 5.9  Procalcitonin 0.31, repeat 0.23  Infectious disease Consult appreciated-discussed case    Mitral regurgitation-severe  ANITRA flail leaflet, ruptured chordae, sever MR, suspected vegitation   [facility] 3/18 without evidence of significant CAD  CTS Consult appreciated possibly valve repair/replacement Wednesday 3/23  Dental consult appreciated 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 I contacted the [facility] transfer line 3/20 and was notified that they are not set accepting any transfers to the [facility] campus due to their high census. I notified patient of this. I offered transfer to other [facility]. Patient declined saying that no he just wanted to see about the [facility] and is not interested in other options. He will continue on with the current plan of valve surgery here.  +++++++++++++++++++++++++++++++++++++++++++++++++  Luzma Johnson MD   [facility].  +++++++++++++++++++++++++++++++++++++++++++++++++  NOTE: This report was transcribed using voice recognition software. Every effort was made to ensure accuracy; however, inadvertent computerized transcription errors may be present.

## 2022-03-22 NOTE — PLAN OF CARE
Met with patient and discussed that our inpatient Phase I Cardiac Rehabilitation program. Reviewed the benefits of cardiac rehabilitation based on their diagnosis and personal risk factors. Patient demonstrates strong interest in Cardiac Rehabilitation at this time. The patient may call [address] [facility] at [phone] for additional information or questions. All questions answered at this time.

## 2022-03-22 NOTE — PLAN OF CARE
Problem: Infection:  Goal: Will remain free from infection  Description: Will remain free from infection  Outcome: Met This Shift     Problem: Safety:  Goal: Free from accidental physical injury  Description: Free from accidental physical injury  Outcome: Met This Shift  Goal: Free from intentional harm  Description: Free from intentional harm  Outcome: Met This Shift     Problem: Daily Care:  Goal: Daily care needs are met  Description: Daily care needs are met  Outcome: Met This Shift     Problem: Discharge Planning:  Goal: Patients continuum of care needs are met  Description: Patients continuum of care needs are met  Outcome: Met This Shift     Problem: Cardiac:  Goal: Complications related to the disease process, condition or treatment will be avoided or minimized  Description: Complications related to the disease process, condition or treatment will be avoided or minimized  Outcome: Met This Shift  Goal: Hemodynamic stability will improve  Description: Hemodynamic stability will improve  Outcome: Met This Shift     Problem: Falls - Risk of:  Goal: Will remain free from falls  Description: Will remain free from falls  Outcome: Met This Shift  Goal: Absence of physical injury  Description: Absence of physical injury  Outcome: Met This Shift

## 2022-03-22 NOTE — PLAN OF CARE
Problem: Infection:  Goal: Will remain free from infection  Description: Will remain free from infection  3/22/2022 1035 by Nilsa Aiken RN  Outcome: Met This Shift  3/22/2022 0808 by Sakshi Lai RN  Outcome: Met This Shift     Problem: Safety:  Goal: Free from accidental physical injury  Description: Free from accidental physical injury  3/22/2022 1035 by Nilsa Aiken RN  Outcome: Met This Shift  3/22/2022 0808 by Sakshi Lai RN  Outcome: Met This Shift  Goal: Free from intentional harm  Description: Free from intentional harm  3/22/2022 1035 by Nilsa Aiken RN  Outcome: Met This Shift  3/22/2022 0808 by Sakshi Lai RN  Outcome: Met This Shift     Problem: Daily Care:  Goal: Daily care needs are met  Description: Daily care needs are met  3/22/2022 1035 by Nilsa Aiken RN  Outcome: Met This Shift  3/22/2022 0808 by Sakshi Lai RN  Outcome: Met This Shift     Problem: Pain:  Goal: Patient's pain/discomfort is manageable  Description: Patient's pain/discomfort is manageable  Outcome: Met This Shift     Problem: Cardiac:  Goal: Ability to maintain an adequate cardiac output will improve  Description: Ability to maintain an adequate cardiac output will improve  Outcome: Met This Shift  Goal: Complications related to the disease process, condition or treatment will be avoided or minimized  Description: Complications related to the disease process, condition or treatment will be avoided or minimized  3/22/2022 1035 by Nilsa Aiken RN  Outcome: Met This Shift  3/22/2022 0808 by Sakshi Lai RN  Outcome: Met This Shift  Goal: Hemodynamic stability will improve  Description: Hemodynamic stability will improve  3/22/2022 1035 by Nilsa Aiken RN  Outcome: Met This Shift  3/22/2022 0808 by Sakshi Lai RN  Outcome: Met This Shift     Problem: Falls - Risk of:  Goal: Will remain free from falls  Description: Will remain free from falls  3/22/2022 1035 by Nilsa Aiken RN  Outcome: Met This Shift  3/22/2022 0808 by Jonh Loera RN  Outcome: Met This Shift  Goal: Absence of physical injury  Description: Absence of physical injury  3/22/2022 1035 by Gwendolyn Swanson RN  Outcome: Met This Shift  3/22/2022 0808 by Jonh Loera RN  Outcome: Met This Shift     Problem: OXYGENATION/RESPIRATORY FUNCTION  Goal: Patient will maintain patent airway  Outcome: Met This Shift     Problem: HEMODYNAMIC STATUS  Goal: Patient has stable vital signs and fluid balance  Outcome: Met This Shift

## 2022-03-22 NOTE — PROGRESS NOTES
CC:fatigue     Brief HPI:  Patient seen with Dr. Wong Sullivan. Awake, alert. No complaints. Past Medical History:   Diagnosis Date    Acute diastolic (congestive) heart failure [facility])      Past Surgical History:   Procedure Laterality Date    TRANSESOPHAGEAL ECHOCARDIOGRAM  03/16/2022    Dr. Romero Him History     Socioeconomic History    Marital status: Single     Spouse name: Not on file    Number of children: Not on file    Years of education: Not on file    Highest education level: Not on file   Occupational History    Not on file   Tobacco Use    Smoking status: Never Smoker    Smokeless tobacco: Never Used   Substance and Sexual Activity    Alcohol use: Yes     Comment: rarely    Drug use: Never    Sexual activity: Not on file   Other Topics Concern    Not on file   Social History Narrative    Not on file     Social Determinants of Health     Financial Resource Strain:     Difficulty of Paying Living Expenses: Not on file   Food Insecurity:     Worried About Running Out of Food in the Last Year: Not on file    Laureen of Food in the Last Year: Not on file   Transportation Needs:     Lack of Transportation (Medical): Not on file    Lack of Transportation (Non-Medical):  Not on file   Physical Activity:     Days of Exercise per Week: Not on file    Minutes of Exercise per Session: Not on file   Stress:     Feeling of Stress : Not on file   Social Connections:     Frequency of Communication with Friends and Family: Not on file    Frequency of Social Gatherings with Friends and Family: Not on file    Attends Islam Services: Not on file    Active Member of Clubs or Organizations: Not on file    Attends Club or Organization Meetings: Not on file    Marital Status: Not on file   Intimate Partner Violence:     Fear of Current or Ex-Partner: Not on file    Emotionally Abused: Not on file    Physically Abused: Not on file    Sexually Abused: Not on file   Housing Stability:     Unable to Pay for Housing in the Last Year: Not on file    Number of Places Lived in the Last Year: Not on file    Unstable Housing in the Last Year: Not on file     History reviewed. No pertinent family history. Vitals:    03/21/22 1441 03/21/22 2036 03/22/22 0100 03/22/22 0943   BP: 123/82 115/82 105/76 100/78   Pulse: 85 91 81 82   Resp:    18   Temp: 98.6 °F (37 °C)  98.2 °F (36.8 °C) 97.5 °F (36.4 °C)   TempSrc: Oral  Oral Axillary   SpO2: 98%  99% 99%   Weight:       Height:               Intake/Output Summary (Last 24 hours) at 3/22/2022 1000  Last data filed at 3/21/2022 2154  Gross per 24 hour   Intake --   Output 1100 ml   Net -1100 ml         Recent Labs     03/21/22  0500   WBC 9.3   HGB 9.9*   HCT 32.1*         Recent Labs     03/21/22  0500   BUN 13   CREATININE 1.1         ROS:   Negative for CP, palpitations, SOB at rest, dizziness/lightheadedness. Physical Exam   Constitutional: Oriented to person, place, and time. Appears well-developed. No distress. Cardiovascular: Normal rate, regular rhythm and normal heart sounds positive murmur  Pulmonary/Chest: Effort normal. No respiratory distress. Abdominal: Soft. Bowel sounds are normal.   Musculoskeletal: Normal range of motion. Neurological: alert and oriented to person, place, and time. Skin: Skin is warm and dry. Psychiatric: normal mood and affect.      ANITRA Performed By: the attending and the sonographer      Type of Anesthesia: Moderate sedation     Allergies    - No known allergies.      Findings      Left Ventricle   Normal left ventricular size and systolic function.   Ejection fraction is visually estimated at 55%.      Right Ventricle   Right ventricle global systolic function is mildly reduced.      Left Atrium   Dilated left atrium.   No evidence of thrombus within left atrium or appendage.   The interatrial septum appears intact.   Agitated saline injected for shunt evaluation.   No evidence of atrial septal defect or PFO.     Right Atrium   Dilated right atrium.   No evidence of thrombus or mass in the right atrium.      Mitral Valve   Severe holosystolic prolapse of the posterior leaflet with a flail P1/P2   segment due to ruptured chordae tendineae.   Small mobile echodensity suspicious for vegetation on ventricular surface   of posterior leaflet.   Failure of leaflet coaptation.   No definitive papillary muscle rupture.   Torrential mitral regurgitation, eccentric jet directed anteriorly.      Tricuspid Valve   The tricuspid valve appears structurally normal.   Mild tricuspid regurgitation.   No vegetation.      Aortic Valve   Structurally normal aortic valve.   The aortic valve is trileaflet.   No hemodynamically significant aortic stenosis is present.   Mild aortic valve regurgitation.   No vegetation.      Pulmonic Valve   The pulmonic valve was not well visualized.   No evidence of pulmonic valve stenosis.   Physiologic and/or trace pulmonic regurgitation present.   No vegetation.      Pericardial Effusion   No evidence of pericardial effusion.      Aorta   The aorta is within normal limits.   Miscellaneous   Prominent systolic flow reversal 4/4 pulmonary veins.      Conclusions      Summary   Ejection fraction is visually estimated at 55%.   Severe holosystolic prolapse of the posterior leaflet with a flail P1/P2   segment due to ruptured chordae tendineae.   Small mobile echodensity suspicious for vegetation on ventricular surface   of posterior leaflet.   Failure of leaflet coaptation.   No definitive papillary muscle rupture.   Torrential mitral regurgitation, eccentric jet directed anteriorly. [facility] 3/18  Procedure:    1. Left heart cath     Physician: Mary Anne Thomas.  Veronica Gar DO.     Assistant: none     Indication: Mitral regurgitation  AUC: 7  AUC indication: 70     Complication: None.     Sedation: Intravenous Versed.     Anesthesia: Xylocaine, fentanyl      Sedation time: I was present for sedation and ministration at 09 53 and I ended sedation at 1007 for a total face-to-face sedation time of 14 minutes.     Estimated blood loss: Minimal     Specimens: none     Contrast used: 45 cc     Hemodynamics:  Opening Aortic pressure: 58/45  LV systolic pressure: 95  LVEDP: 6  No significant gradient across the aortic valve     Angiographic Results/findings:  Left Main: No angiographically significant stenosis. LAD: No angiographically significant stenosis. D1: Bifurcating vessel. No angiographically significant stenosis. Cx: Dominant vessel. Mid to distal diffuse 10% stenosis. OM1: Bifurcating vessel. No angiographically significant stenosis. OM2: Proximal 10% stenosis. OM 3: No angiographically significant stenosis. OM 4: No angiographically significant stenosis. Ramus: Absent. RCA: non-Dominant. Mild diffuse luminal irregularities. ASSESSMENT:  Patient Active Problem List   Diagnosis    Atrial fibrillation with rapid ventricular response ([address]    Sepsis ([address] present on admission    JANA (acute kidney injury) ([address]    Bacteremia    Severe mitral regurgitation    Suspected endocarditis    Subacute bacterial endocarditis    Ruptured chordae tendineae (HCC)    Mitral valve disease    Paroxysmal atrial fibrillation (HCC)    Acute diastolic (congestive) heart failure ([address]     66yo M with no significant PMH presented to the ED 3/14/2022 with CC of 1 month history of fatigue. He was found to have Streptococcus bacteremia and severe MR with possible flail posterior MV leaflet, possible endocarditis. CTS was consulted for additional recommendations.         PLAN:  OR tomorrow afternoon for MVR and MAZE - second case   Abx per ID   Dental clearance obtained - tooth #30 with abscess removed  Low dose BB ordered for tomorrow morning  Discontinue lovenox tonight for surgery  Continue dilitazem gtt into the OR for HR control         Pre-operative testing review:   --carotids with no significant stenosis  --viet with good flow bilaterally  --ct chest reviewed  --ANITRA findings above   --pfts - FEV1 84% of predicted, DLCO 74% of predicted  --hgA1c 5.6   --dental clearance obtained 3/21        Recent Labs     03/21/22  0500   HGB 9.9*   HCT 32.1*        Recent Labs     03/21/22  0500   BUN 13   CREATININE 1.1     Lab Results   Component Value Date/Time    LABURIN Growth not present 03/14/2022 08:45 PM         [id number] Score for Atrial Fibrillation Stroke Risk   Risk   Factors  Component Value   C CHF  0   H HTN  0   A2 Age >= 75  0   D DM  0   S2 Prior Stroke/TIA  0   V Vascular Disease  0   A Age 74-69  0   Sc Sex  0    [id number]  Score  0       Disclaimer:   Definition and scores for [id number]:      [id number] acronym     Score  Congestive HF      1  Hypertension       1  Age ? 75 years      2  Diabetes mellitus      1  Stroke/TIA/TE      2  Vascular disease (prior MI, PAD, or aortic plaque) 1  Age 72 to 76 years      1  Sex category       female=1, male=0    Maximum score      9    Risk Score calculation is dependent on accuracy of patient problem list and past encounter diagnosis. Note: 25 minutes was spent providing face-to-face patient care, including:  and coordinating care, reviewing the chart, labs, and diagnostics, as well as medical decision making. Greater than 50% of this time was spent instructing and counseling the patient face to face regarding findings and recommendations. Patient seen and examined.  OR tomorrow for mitral valve repair vs replacement, MAZE procedure    [address]  Cardiothoracic Surgery

## 2022-03-23 ENCOUNTER — APPOINTMENT (OUTPATIENT)
Dept: GENERAL RADIOLOGY | Age: 62
DRG: 853 | End: 2022-03-23
Payer: COMMERCIAL

## 2022-03-23 ENCOUNTER — ANESTHESIA (OUTPATIENT)
Dept: OPERATING ROOM | Age: 62
DRG: 853 | End: 2022-03-23
Payer: COMMERCIAL

## 2022-03-23 VITALS
TEMPERATURE: 97.2 F | SYSTOLIC BLOOD PRESSURE: 88 MMHG | OXYGEN SATURATION: 100 % | DIASTOLIC BLOOD PRESSURE: 73 MMHG | RESPIRATION RATE: 11 BRPM

## 2022-03-23 LAB
AADO2: 139.3 MMHG
AADO2: 78.4 MMHG
AADO2: 95.9 MMHG
ACTIVATED CLOTTING TIME: 105 SECONDS (ref 99–130)
ACTIVATED CLOTTING TIME: 110 SECONDS (ref 99–130)
ACTIVATED CLOTTING TIME: 384 SECONDS (ref 99–130)
ACTIVATED CLOTTING TIME: 423 SECONDS (ref 99–130)
ACTIVATED CLOTTING TIME: 426 SECONDS (ref 99–130)
ACTIVATED CLOTTING TIME: 438 SECONDS (ref 99–130)
ACTIVATED CLOTTING TIME: 441 SECONDS (ref 99–130)
ACTIVATED CLOTTING TIME: 460 SECONDS (ref 99–130)
ACTIVATED CLOTTING TIME: >1005 SECONDS (ref 99–130)
ALBUMIN SERPL-MCNC: 2.6 G/DL (ref 3.5–5.2)
ALP BLD-CCNC: 122 U/L (ref 40–129)
ALT SERPL-CCNC: 77 U/L (ref 0–40)
ANION GAP SERPL CALCULATED.3IONS-SCNC: 13 MMOL/L (ref 7–16)
ANION GAP: 10 MMOL/L (ref 7–16)
ANION GAP: 10 MMOL/L (ref 7–16)
ANION GAP: 11 MMOL/L (ref 7–16)
ANION GAP: 9 MMOL/L (ref 7–16)
APTT: 30.2 SEC (ref 24.5–35.1)
AST SERPL-CCNC: 246 U/L (ref 0–39)
B.E.: -1.5 MMOL/L (ref -3–3)
B.E.: -10.2 MMOL/L (ref -3–3)
B.E.: -2 MMOL/L (ref -3–3)
B.E.: -3.6 MMOL/L (ref -3–3)
B.E.: -4.8 MMOL/L (ref -3–3)
B.E.: -8.1 MMOL/L (ref -3–3)
B.E.: -8.2 MMOL/L (ref -3–3)
BILIRUB SERPL-MCNC: 0.5 MG/DL (ref 0–1.2)
BILIRUBIN DIRECT: 0.3 MG/DL (ref 0–0.3)
BILIRUBIN, INDIRECT: 0.2 MG/DL (ref 0–1)
BUN BLDV-MCNC: 16 MG/DL (ref 6–23)
CALCIUM IONIZED: 1.33 MMOL/L (ref 1.15–1.33)
CALCIUM SERPL-MCNC: 8.8 MG/DL (ref 8.6–10.2)
CARDIOPULMONARY BYPASS: NO
CARDIOPULMONARY BYPASS: YES
CHLORIDE BLD-SCNC: 104 MMOL/L (ref 98–107)
CO2: 19 MMOL/L (ref 22–29)
COHB: 0.4 % (ref 0–1.5)
COHB: 0.5 % (ref 0–1.5)
COHB: 0.7 % (ref 0–1.5)
CREAT SERPL-MCNC: 1.2 MG/DL (ref 0.7–1.2)
CRITICAL: ABNORMAL
DATE ANALYZED: ABNORMAL
DATE OF COLLECTION: ABNORMAL
DEVICE: ABNORMAL
FIO2: 40 %
FIO2: 40 %
FIO2: 60 %
GFR AFRICAN AMERICAN: >60
GFR NON-AFRICAN AMERICAN: >60 ML/MIN/1.73
GFR, ESTIMATED: 56 ML/MIN/1.73
GFR, ESTIMATED: >60 ML/MIN/1.73
GLUCOSE BLD-MCNC: 148 MG/DL (ref 74–99)
GLUCOSE BLD-MCNC: 154 MG/DL (ref 74–99)
GLUCOSE BLD-MCNC: 157 MG/DL (ref 74–99)
GLUCOSE BLD-MCNC: 185 MG/DL (ref 74–99)
GLUCOSE BLD-MCNC: 226 MG/DL (ref 74–99)
HCO3: 14.3 MMOL/L (ref 22–26)
HCO3: 17.1 MMOL/L (ref 22–26)
HCO3: 17.3 MMOL/L (ref 22–26)
HCO3: 21.1 MMOL/L (ref 22–26)
HCO3: 21.2 MMOL/L (ref 22–26)
HCO3: 22.3 MMOL/L (ref 22–26)
HCO3: 22.9 MMOL/L (ref 22–26)
HCT VFR BLD CALC: 31.4 % (ref 37–54)
HEMATOCRIT: 21 % (ref 37–54)
HEMATOCRIT: 23 % (ref 37–54)
HEMATOCRIT: 23 % (ref 37–54)
HEMATOCRIT: 28 % (ref 37–54)
HEMOGLOBIN: 7.1 G/DL (ref 12.5–15.5)
HEMOGLOBIN: 7.7 G/DL (ref 12.5–15.5)
HEMOGLOBIN: 7.9 G/DL (ref 12.5–15.5)
HEMOGLOBIN: 9.4 G/DL (ref 12.5–15.5)
HEMOGLOBIN: 9.7 G/DL (ref 12.5–16.5)
HHB: 1.2 % (ref 0–5)
HHB: 1.6 % (ref 0–5)
HHB: 2.3 % (ref 0–5)
INR BLD: 1.6
LAB: ABNORMAL
MAGNESIUM: 3.1 MG/DL (ref 1.6–2.6)
MCH RBC QN AUTO: 27.9 PG (ref 26–35)
MCHC RBC AUTO-ENTMCNC: 30.9 % (ref 32–34.5)
MCV RBC AUTO: 90.2 FL (ref 80–99.9)
METER GLUCOSE: 181 MG/DL (ref 74–99)
METER GLUCOSE: 182 MG/DL (ref 74–99)
METER GLUCOSE: 194 MG/DL (ref 74–99)
METER GLUCOSE: 204 MG/DL (ref 74–99)
METER GLUCOSE: 212 MG/DL (ref 74–99)
METHB: 0.2 % (ref 0–1.5)
METHB: 0.2 % (ref 0–1.5)
METHB: 0.3 % (ref 0–1.5)
MODE: ABNORMAL
MODE: AC
MODE: AC
O2 SATURATION: 100 % (ref 92–98.5)
O2 SATURATION: 100 % (ref 92–98.5)
O2 SATURATION: 97.7 % (ref 92–98.5)
O2 SATURATION: 98.4 % (ref 92–98.5)
O2 SATURATION: 98.8 % (ref 92–98.5)
O2 SATURATION: 99.9 % (ref 92–98.5)
O2 SATURATION: 99.9 % (ref 92–98.5)
O2HB: 96.8 % (ref 94–97)
O2HB: 97.7 % (ref 94–97)
O2HB: 98.1 % (ref 94–97)
OPERATOR ID: 1926
OPERATOR ID: ABNORMAL
PATIENT TEMP: 37 C
PCO2 37: 34.6 MMHG (ref 35–45)
PCO2 37: 35.8 MMHG (ref 35–45)
PCO2 37: 35.8 MMHG (ref 35–45)
PCO2 37: 41.7 MMHG (ref 35–45)
PCO2: 27.2 MMHG (ref 35–45)
PCO2: 34.2 MMHG (ref 35–45)
PCO2: 34.7 MMHG (ref 35–45)
PDW BLD-RTO: 14.9 FL (ref 11.5–15)
PEEP/CPAP: 5 CMH2O
PEEP/CPAP: 5 CMH2O
PFO2: 3.48 MMHG/%
PFO2: 3.93 MMHG/%
PFO2: 4.14 MMHG/%
PH 37: 7.31 (ref 7.35–7.45)
PH 37: 7.38 (ref 7.35–7.45)
PH 37: 7.41 (ref 7.35–7.45)
PH 37: 7.42 (ref 7.35–7.45)
PH BLOOD GAS: 7.32 (ref 7.35–7.45)
PH BLOOD GAS: 7.32 (ref 7.35–7.45)
PH BLOOD GAS: 7.34 (ref 7.35–7.45)
PLATELET # BLD: 263 E9/L (ref 130–450)
PMV BLD AUTO: 10.5 FL (ref 7–12)
PO2 37: 272.6 MMHG (ref 60–80)
PO2 37: 317.3 MMHG (ref 60–80)
PO2 37: 401 MMHG (ref 60–80)
PO2 37: 467.2 MMHG (ref 60–80)
PO2: 139.4 MMHG (ref 75–100)
PO2: 165.5 MMHG (ref 75–100)
PO2: 235.9 MMHG (ref 75–100)
POC BUN: 13 MG/DL (ref 8–23)
POC CHLORIDE: 101 MMOL/L (ref 100–108)
POC CHLORIDE: 102 MMOL/L (ref 100–108)
POC CHLORIDE: 103 MMOL/L (ref 100–108)
POC CHLORIDE: 106 MMOL/L (ref 100–108)
POC CO2: 21.9 MMOL/L (ref 22–29)
POC CO2: 22 MMOL/L (ref 22–29)
POC CO2: 22.9 MMOL/L (ref 22–29)
POC CO2: 23.5 MMOL/L (ref 22–29)
POC CREATININE: 1.2 MG/DL (ref 0.7–1.2)
POC CREATININE: 1.3 MG/DL (ref 0.7–1.2)
POC IONIZED CALCIUM: 1.1 (ref 1.1–1.3)
POC IONIZED CALCIUM: 1.4 (ref 1.1–1.3)
POC LACTIC ACID: 0.8 (ref 0.5–2.2)
POC LACTIC ACID: 0.9 (ref 0.5–2.2)
POC LACTIC ACID: 2.8 (ref 0.5–2.2)
POC LACTIC ACID: 2.8 (ref 0.5–2.2)
POC SODIUM: 134 MMOL/L (ref 132–146)
POC SODIUM: 134 MMOL/L (ref 132–146)
POC SODIUM: 135 MMOL/L (ref 132–146)
POC SODIUM: 139 MMOL/L (ref 132–146)
POC SOURCE: ABNORMAL
POTASSIUM SERPL-SCNC: 4.14 MMOL/L (ref 3.5–5)
POTASSIUM SERPL-SCNC: 4.17 MMOL/L (ref 3.5–5)
POTASSIUM SERPL-SCNC: 4.22 MMOL/L (ref 3.5–5)
POTASSIUM SERPL-SCNC: 4.5 MMOL/L (ref 3.5–5)
POTASSIUM SERPL-SCNC: 4.5 MMOL/L (ref 3.5–5.5)
POTASSIUM SERPL-SCNC: 4.6 MMOL/L (ref 3.5–5)
POTASSIUM SERPL-SCNC: 5.4 MMOL/L (ref 3.5–5.5)
POTASSIUM SERPL-SCNC: 5.5 MMOL/L (ref 3.5–5.5)
POTASSIUM SERPL-SCNC: 5.6 MMOL/L (ref 3.5–5.5)
PROTHROMBIN TIME: 17.3 SEC (ref 9.3–12.4)
RBC # BLD: 3.48 E12/L (ref 3.8–5.8)
RI(T): 0.47
RI(T): 0.59
RI(T): 0.69
RR MECHANICAL: 16 B/MIN
RR MECHANICAL: 16 B/MIN
SODIUM BLD-SCNC: 136 MMOL/L (ref 132–146)
SOURCE, BLOOD GAS: ABNORMAL
THB: 10.3 G/DL (ref 11.5–16.5)
THB: 10.3 G/DL (ref 11.5–16.5)
THB: 9.6 G/DL (ref 11.5–16.5)
TIME ANALYZED: 1850
TIME ANALYZED: 2031
TIME ANALYZED: 2147
TOTAL PROTEIN: 5.7 G/DL (ref 6.4–8.3)
VT MECHANICAL: 500 ML
VT MECHANICAL: 500 ML
WBC # BLD: 39 E9/L (ref 4.5–11.5)

## 2022-03-23 PROCEDURE — 02BG0ZZ EXCISION OF MITRAL VALVE, OPEN APPROACH: ICD-10-PCS | Performed by: STUDENT IN AN ORGANIZED HEALTH CARE EDUCATION/TRAINING PROGRAM

## 2022-03-23 PROCEDURE — 02570ZZ DESTRUCTION OF LEFT ATRIUM, OPEN APPROACH: ICD-10-PCS | Performed by: STUDENT IN AN ORGANIZED HEALTH CARE EDUCATION/TRAINING PROGRAM

## 2022-03-23 PROCEDURE — 83735 ASSAY OF MAGNESIUM: CPT

## 2022-03-23 PROCEDURE — 02L70CK OCCLUSION OF LEFT ATRIAL APPENDAGE WITH EXTRALUMINAL DEVICE, OPEN APPROACH: ICD-10-PCS | Performed by: STUDENT IN AN ORGANIZED HEALTH CARE EDUCATION/TRAINING PROGRAM

## 2022-03-23 PROCEDURE — 6360000002 HC RX W HCPCS: Performed by: NURSE PRACTITIONER

## 2022-03-23 PROCEDURE — 87205 SMEAR GRAM STAIN: CPT

## 2022-03-23 PROCEDURE — C1751 CATH, INF, PER/CENT/MIDLINE: HCPCS | Performed by: STUDENT IN AN ORGANIZED HEALTH CARE EDUCATION/TRAINING PROGRAM

## 2022-03-23 PROCEDURE — 02560ZZ DESTRUCTION OF RIGHT ATRIUM, OPEN APPROACH: ICD-10-PCS | Performed by: STUDENT IN AN ORGANIZED HEALTH CARE EDUCATION/TRAINING PROGRAM

## 2022-03-23 PROCEDURE — 2500000003 HC RX 250 WO HCPCS: Performed by: NURSE ANESTHETIST, CERTIFIED REGISTERED

## 2022-03-23 PROCEDURE — 33259 ABLATE ATRIA W/BYPASS ADD-ON: CPT | Performed by: STUDENT IN AN ORGANIZED HEALTH CARE EDUCATION/TRAINING PROGRAM

## 2022-03-23 PROCEDURE — 3600000008 HC SURGERY OHS BASE: Performed by: STUDENT IN AN ORGANIZED HEALTH CARE EDUCATION/TRAINING PROGRAM

## 2022-03-23 PROCEDURE — 33427 REPAIR OF MITRAL VALVE: CPT | Performed by: THORACIC SURGERY (CARDIOTHORACIC VASCULAR SURGERY)

## 2022-03-23 PROCEDURE — 37799 UNLISTED PX VASCULAR SURGERY: CPT

## 2022-03-23 PROCEDURE — 36620 INSERTION CATHETER ARTERY: CPT | Performed by: ANESTHESIOLOGY

## 2022-03-23 PROCEDURE — 2780000006 HC MISC HEART VALVE: Performed by: STUDENT IN AN ORGANIZED HEALTH CARE EDUCATION/TRAINING PROGRAM

## 2022-03-23 PROCEDURE — 36415 COLL VENOUS BLD VENIPUNCTURE: CPT

## 2022-03-23 PROCEDURE — 6360000002 HC RX W HCPCS: Performed by: NURSE ANESTHETIST, CERTIFIED REGISTERED

## 2022-03-23 PROCEDURE — 2580000003 HC RX 258

## 2022-03-23 PROCEDURE — C1713 ANCHOR/SCREW BN/BN,TIS/BN: HCPCS | Performed by: STUDENT IN AN ORGANIZED HEALTH CARE EDUCATION/TRAINING PROGRAM

## 2022-03-23 PROCEDURE — C9113 INJ PANTOPRAZOLE SODIUM, VIA: HCPCS | Performed by: NURSE PRACTITIONER

## 2022-03-23 PROCEDURE — 33427 REPAIR OF MITRAL VALVE: CPT | Performed by: STUDENT IN AN ORGANIZED HEALTH CARE EDUCATION/TRAINING PROGRAM

## 2022-03-23 PROCEDURE — 2580000003 HC RX 258: Performed by: NURSE PRACTITIONER

## 2022-03-23 PROCEDURE — 3700000000 HC ANESTHESIA ATTENDED CARE: Performed by: STUDENT IN AN ORGANIZED HEALTH CARE EDUCATION/TRAINING PROGRAM

## 2022-03-23 PROCEDURE — 2720000010 HC SURG SUPPLY STERILE: Performed by: STUDENT IN AN ORGANIZED HEALTH CARE EDUCATION/TRAINING PROGRAM

## 2022-03-23 PROCEDURE — 99232 SBSQ HOSP IP/OBS MODERATE 35: CPT | Performed by: INTERNAL MEDICINE

## 2022-03-23 PROCEDURE — 87075 CULTR BACTERIA EXCEPT BLOOD: CPT

## 2022-03-23 PROCEDURE — C1889 IMPLANT/INSERT DEVICE, NOC: HCPCS | Performed by: STUDENT IN AN ORGANIZED HEALTH CARE EDUCATION/TRAINING PROGRAM

## 2022-03-23 PROCEDURE — 6370000000 HC RX 637 (ALT 250 FOR IP): Performed by: NURSE PRACTITIONER

## 2022-03-23 PROCEDURE — 7100000001 HC PACU RECOVERY - ADDTL 15 MIN

## 2022-03-23 PROCEDURE — 6370000000 HC RX 637 (ALT 250 FOR IP)

## 2022-03-23 PROCEDURE — 33120 EXC ICAR TUM RESC W/CARD BYP: CPT | Performed by: THORACIC SURGERY (CARDIOTHORACIC VASCULAR SURGERY)

## 2022-03-23 PROCEDURE — 99233 SBSQ HOSP IP/OBS HIGH 50: CPT | Performed by: NURSE PRACTITIONER

## 2022-03-23 PROCEDURE — 2700000000 HC OXYGEN THERAPY PER DAY

## 2022-03-23 PROCEDURE — 87102 FUNGUS ISOLATION CULTURE: CPT

## 2022-03-23 PROCEDURE — 84132 ASSAY OF SERUM POTASSIUM: CPT

## 2022-03-23 PROCEDURE — 02B70ZZ EXCISION OF LEFT ATRIUM, OPEN APPROACH: ICD-10-PCS | Performed by: STUDENT IN AN ORGANIZED HEALTH CARE EDUCATION/TRAINING PROGRAM

## 2022-03-23 PROCEDURE — 82805 BLOOD GASES W/O2 SATURATION: CPT

## 2022-03-23 PROCEDURE — 82803 BLOOD GASES ANY COMBINATION: CPT

## 2022-03-23 PROCEDURE — 87070 CULTURE OTHR SPECIMN AEROBIC: CPT

## 2022-03-23 PROCEDURE — 80076 HEPATIC FUNCTION PANEL: CPT

## 2022-03-23 PROCEDURE — C1729 CATH, DRAINAGE: HCPCS | Performed by: STUDENT IN AN ORGANIZED HEALTH CARE EDUCATION/TRAINING PROGRAM

## 2022-03-23 PROCEDURE — 82330 ASSAY OF CALCIUM: CPT

## 2022-03-23 PROCEDURE — 74018 RADEX ABDOMEN 1 VIEW: CPT

## 2022-03-23 PROCEDURE — 6370000000 HC RX 637 (ALT 250 FOR IP): Performed by: PHYSICIAN ASSISTANT

## 2022-03-23 PROCEDURE — 85027 COMPLETE CBC AUTOMATED: CPT

## 2022-03-23 PROCEDURE — P9045 ALBUMIN (HUMAN), 5%, 250 ML: HCPCS | Performed by: NURSE PRACTITIONER

## 2022-03-23 PROCEDURE — 2580000003 HC RX 258: Performed by: NURSE ANESTHETIST, CERTIFIED REGISTERED

## 2022-03-23 PROCEDURE — 0PH000Z INSERTION OF RIGID PLATE INTERNAL FIXATION DEVICE INTO STERNUM, OPEN APPROACH: ICD-10-PCS | Performed by: STUDENT IN AN ORGANIZED HEALTH CARE EDUCATION/TRAINING PROGRAM

## 2022-03-23 PROCEDURE — 3600000018 HC SURGERY OHS ADDTL 15MIN: Performed by: STUDENT IN AN ORGANIZED HEALTH CARE EDUCATION/TRAINING PROGRAM

## 2022-03-23 PROCEDURE — A4216 STERILE WATER/SALINE, 10 ML: HCPCS | Performed by: NURSE PRACTITIONER

## 2022-03-23 PROCEDURE — 94002 VENT MGMT INPAT INIT DAY: CPT

## 2022-03-23 PROCEDURE — 85610 PROTHROMBIN TIME: CPT

## 2022-03-23 PROCEDURE — 7100000000 HC PACU RECOVERY - FIRST 15 MIN

## 2022-03-23 PROCEDURE — 85730 THROMBOPLASTIN TIME PARTIAL: CPT

## 2022-03-23 PROCEDURE — 33259 ABLATE ATRIA W/BYPASS ADD-ON: CPT | Performed by: THORACIC SURGERY (CARDIOTHORACIC VASCULAR SURGERY)

## 2022-03-23 PROCEDURE — 2780000010 HC IMPLANT OTHER: Performed by: STUDENT IN AN ORGANIZED HEALTH CARE EDUCATION/TRAINING PROGRAM

## 2022-03-23 PROCEDURE — 2500000003 HC RX 250 WO HCPCS: Performed by: INTERNAL MEDICINE

## 2022-03-23 PROCEDURE — 5A1221Z PERFORMANCE OF CARDIAC OUTPUT, CONTINUOUS: ICD-10-PCS | Performed by: STUDENT IN AN ORGANIZED HEALTH CARE EDUCATION/TRAINING PROGRAM

## 2022-03-23 PROCEDURE — 2580000003 HC RX 258: Performed by: INTERNAL MEDICINE

## 2022-03-23 PROCEDURE — 88305 TISSUE EXAM BY PATHOLOGIST: CPT

## 2022-03-23 PROCEDURE — 2000000000 HC ICU R&B

## 2022-03-23 PROCEDURE — 2709999900 HC NON-CHARGEABLE SUPPLY: Performed by: STUDENT IN AN ORGANIZED HEALTH CARE EDUCATION/TRAINING PROGRAM

## 2022-03-23 PROCEDURE — 3700000001 HC ADD 15 MINUTES (ANESTHESIA): Performed by: STUDENT IN AN ORGANIZED HEALTH CARE EDUCATION/TRAINING PROGRAM

## 2022-03-23 PROCEDURE — 82962 GLUCOSE BLOOD TEST: CPT

## 2022-03-23 PROCEDURE — 71045 X-RAY EXAM CHEST 1 VIEW: CPT

## 2022-03-23 PROCEDURE — 02580ZZ DESTRUCTION OF CONDUCTION MECHANISM, OPEN APPROACH: ICD-10-PCS | Performed by: STUDENT IN AN ORGANIZED HEALTH CARE EDUCATION/TRAINING PROGRAM

## 2022-03-23 PROCEDURE — 94664 DEMO&/EVAL PT USE INHALER: CPT

## 2022-03-23 PROCEDURE — 33120 EXC ICAR TUM RESC W/CARD BYP: CPT | Performed by: STUDENT IN AN ORGANIZED HEALTH CARE EDUCATION/TRAINING PROGRAM

## 2022-03-23 PROCEDURE — 85347 COAGULATION TIME ACTIVATED: CPT

## 2022-03-23 PROCEDURE — 80048 BASIC METABOLIC PNL TOTAL CA: CPT

## 2022-03-23 PROCEDURE — 6360000002 HC RX W HCPCS: Performed by: INTERNAL MEDICINE

## 2022-03-23 DEVICE — LOCKING SCREW,AXS,SELF-DRILLING
Type: IMPLANTABLE DEVICE | Site: STERNUM | Status: FUNCTIONAL
Brand: AXS, SMARTLOCK

## 2022-03-23 DEVICE — DEVICE OCCL CLP L40MM PLUNG GRP FLX SHFT FOR GILLINOV: Type: IMPLANTABLE DEVICE | Site: HEART | Status: FUNCTIONAL

## 2022-03-23 DEVICE — STERNALPLATE, X: Type: IMPLANTABLE DEVICE | Site: STERNUM | Status: FUNCTIONAL

## 2022-03-23 DEVICE — IMPLANTABLE DEVICE: Type: IMPLANTABLE DEVICE | Site: HEART | Status: FUNCTIONAL

## 2022-03-23 DEVICE — STERNALPLATE, BOX: Type: IMPLANTABLE DEVICE | Site: STERNUM | Status: FUNCTIONAL

## 2022-03-23 RX ORDER — MEPERIDINE HYDROCHLORIDE 50 MG/ML
25 INJECTION INTRAMUSCULAR; INTRAVENOUS; SUBCUTANEOUS
Status: ACTIVE | OUTPATIENT
Start: 2022-03-23 | End: 2022-03-23

## 2022-03-23 RX ORDER — ACETAMINOPHEN 325 MG/1
650 TABLET ORAL EVERY 4 HOURS PRN
Status: DISCONTINUED | OUTPATIENT
Start: 2022-03-23 | End: 2022-03-25

## 2022-03-23 RX ORDER — POTASSIUM CHLORIDE 29.8 MG/ML
20 INJECTION INTRAVENOUS PRN
Status: DISCONTINUED | OUTPATIENT
Start: 2022-03-23 | End: 2022-03-25

## 2022-03-23 RX ORDER — SODIUM CHLORIDE 9 MG/ML
INJECTION, SOLUTION INTRAVENOUS CONTINUOUS PRN
Status: DISCONTINUED | OUTPATIENT
Start: 2022-03-23 | End: 2022-03-23 | Stop reason: SDUPTHER

## 2022-03-23 RX ORDER — SENNA AND DOCUSATE SODIUM 50; 8.6 MG/1; MG/1
1 TABLET, FILM COATED ORAL 2 TIMES DAILY
Status: DISCONTINUED | OUTPATIENT
Start: 2022-03-24 | End: 2022-03-25

## 2022-03-23 RX ORDER — VECURONIUM BROMIDE 1 MG/ML
INJECTION, POWDER, LYOPHILIZED, FOR SOLUTION INTRAVENOUS PRN
Status: DISCONTINUED | OUTPATIENT
Start: 2022-03-23 | End: 2022-03-23 | Stop reason: SDUPTHER

## 2022-03-23 RX ORDER — CHLORHEXIDINE GLUCONATE 0.12 MG/ML
15 RINSE ORAL 2 TIMES DAILY
Status: DISCONTINUED | OUTPATIENT
Start: 2022-03-23 | End: 2022-03-24

## 2022-03-23 RX ORDER — ALBUMIN, HUMAN INJ 5% 5 %
25 SOLUTION INTRAVENOUS PRN
Status: DISCONTINUED | OUTPATIENT
Start: 2022-03-23 | End: 2022-03-25

## 2022-03-23 RX ORDER — NOREPINEPHRINE BITARTRATE 1 MG/ML
INJECTION, SOLUTION INTRAVENOUS PRN
Status: DISCONTINUED | OUTPATIENT
Start: 2022-03-23 | End: 2022-03-23 | Stop reason: SDUPTHER

## 2022-03-23 RX ORDER — TAMSULOSIN HYDROCHLORIDE 0.4 MG/1
0.4 CAPSULE ORAL DAILY
Status: DISCONTINUED | OUTPATIENT
Start: 2022-03-24 | End: 2022-03-30 | Stop reason: HOSPADM

## 2022-03-23 RX ORDER — ASPIRIN 81 MG/1
81 TABLET ORAL DAILY
Status: DISCONTINUED | OUTPATIENT
Start: 2022-03-24 | End: 2022-03-25

## 2022-03-23 RX ORDER — HEPARIN SODIUM 10000 [USP'U]/ML
INJECTION, SOLUTION INTRAVENOUS; SUBCUTANEOUS PRN
Status: DISCONTINUED | OUTPATIENT
Start: 2022-03-23 | End: 2022-03-23 | Stop reason: SDUPTHER

## 2022-03-23 RX ORDER — OXYCODONE HYDROCHLORIDE 5 MG/1
5 TABLET ORAL EVERY 4 HOURS PRN
Status: DISCONTINUED | OUTPATIENT
Start: 2022-03-23 | End: 2022-03-25

## 2022-03-23 RX ORDER — IPRATROPIUM BROMIDE AND ALBUTEROL SULFATE 2.5; .5 MG/3ML; MG/3ML
1 SOLUTION RESPIRATORY (INHALATION)
Status: DISCONTINUED | OUTPATIENT
Start: 2022-03-23 | End: 2022-03-30 | Stop reason: HOSPADM

## 2022-03-23 RX ORDER — DEXTROSE MONOHYDRATE 50 MG/ML
100 INJECTION, SOLUTION INTRAVENOUS PRN
Status: DISCONTINUED | OUTPATIENT
Start: 2022-03-23 | End: 2022-03-30 | Stop reason: HOSPADM

## 2022-03-23 RX ORDER — BISACODYL 10 MG
10 SUPPOSITORY, RECTAL RECTAL DAILY PRN
Status: DISCONTINUED | OUTPATIENT
Start: 2022-03-24 | End: 2022-03-25

## 2022-03-23 RX ORDER — NEOSTIGMINE METHYLSULFATE 1 MG/ML
INJECTION, SOLUTION INTRAVENOUS PRN
Status: DISCONTINUED | OUTPATIENT
Start: 2022-03-23 | End: 2022-03-23 | Stop reason: SDUPTHER

## 2022-03-23 RX ORDER — AMINOCAPROIC ACID 250 MG/ML
INJECTION, SOLUTION INTRAVENOUS PRN
Status: DISCONTINUED | OUTPATIENT
Start: 2022-03-23 | End: 2022-03-23 | Stop reason: SDUPTHER

## 2022-03-23 RX ORDER — ASPIRIN 81 MG/1
81 TABLET, CHEWABLE ORAL ONCE
Status: COMPLETED | OUTPATIENT
Start: 2022-03-23 | End: 2022-03-23

## 2022-03-23 RX ORDER — PROTAMINE SULFATE 10 MG/ML
INJECTION, SOLUTION INTRAVENOUS PRN
Status: DISCONTINUED | OUTPATIENT
Start: 2022-03-23 | End: 2022-03-23 | Stop reason: SDUPTHER

## 2022-03-23 RX ORDER — FENTANYL CITRATE 50 UG/ML
25 INJECTION, SOLUTION INTRAMUSCULAR; INTRAVENOUS
Status: DISCONTINUED | OUTPATIENT
Start: 2022-03-23 | End: 2022-03-25

## 2022-03-23 RX ORDER — FENTANYL CITRATE 0.05 MG/ML
INJECTION, SOLUTION INTRAMUSCULAR; INTRAVENOUS PRN
Status: DISCONTINUED | OUTPATIENT
Start: 2022-03-23 | End: 2022-03-23 | Stop reason: SDUPTHER

## 2022-03-23 RX ORDER — PROPOFOL 10 MG/ML
10 INJECTION, EMULSION INTRAVENOUS CONTINUOUS PRN
Status: DISCONTINUED | OUTPATIENT
Start: 2022-03-23 | End: 2022-03-23

## 2022-03-23 RX ORDER — PROPOFOL 10 MG/ML
INJECTION, EMULSION INTRAVENOUS
Status: DISPENSED
Start: 2022-03-23 | End: 2022-03-24

## 2022-03-23 RX ORDER — SODIUM CHLORIDE 9 MG/ML
30 INJECTION, SOLUTION INTRAVENOUS CONTINUOUS
Status: DISCONTINUED | OUTPATIENT
Start: 2022-03-23 | End: 2022-03-25

## 2022-03-23 RX ORDER — EPINEPHRINE 1 MG/ML
INJECTION INTRAMUSCULAR; INTRAVENOUS; SUBCUTANEOUS PRN
Status: DISCONTINUED | OUTPATIENT
Start: 2022-03-23 | End: 2022-03-23 | Stop reason: SDUPTHER

## 2022-03-23 RX ORDER — INSULIN GLARGINE-YFGN 100 [IU]/ML
0.15 INJECTION, SOLUTION SUBCUTANEOUS NIGHTLY
Status: DISCONTINUED | OUTPATIENT
Start: 2022-03-24 | End: 2022-03-28

## 2022-03-23 RX ORDER — CEFAZOLIN SODIUM 1 G/3ML
INJECTION, POWDER, FOR SOLUTION INTRAMUSCULAR; INTRAVENOUS PRN
Status: DISCONTINUED | OUTPATIENT
Start: 2022-03-23 | End: 2022-03-23 | Stop reason: SDUPTHER

## 2022-03-23 RX ORDER — SODIUM CHLORIDE 0.9 % (FLUSH) 0.9 %
10 SYRINGE (ML) INJECTION PRN
Status: DISCONTINUED | OUTPATIENT
Start: 2022-03-23 | End: 2022-03-30 | Stop reason: HOSPADM

## 2022-03-23 RX ORDER — FENTANYL CITRATE 50 UG/ML
50 INJECTION, SOLUTION INTRAMUSCULAR; INTRAVENOUS
Status: DISCONTINUED | OUTPATIENT
Start: 2022-03-23 | End: 2022-03-25

## 2022-03-23 RX ORDER — VASOPRESSIN 20 U/ML
INJECTION PARENTERAL PRN
Status: DISCONTINUED | OUTPATIENT
Start: 2022-03-23 | End: 2022-03-23 | Stop reason: SDUPTHER

## 2022-03-23 RX ORDER — ONDANSETRON 4 MG/1
4 TABLET, ORALLY DISINTEGRATING ORAL EVERY 8 HOURS PRN
Status: DISCONTINUED | OUTPATIENT
Start: 2022-03-23 | End: 2022-03-30 | Stop reason: HOSPADM

## 2022-03-23 RX ORDER — SODIUM CHLORIDE 0.9 % (FLUSH) 0.9 %
10 SYRINGE (ML) INJECTION EVERY 12 HOURS SCHEDULED
Status: DISCONTINUED | OUTPATIENT
Start: 2022-03-23 | End: 2022-03-30 | Stop reason: HOSPADM

## 2022-03-23 RX ORDER — PANTOPRAZOLE SODIUM 40 MG/1
40 TABLET, DELAYED RELEASE ORAL DAILY
Status: DISCONTINUED | OUTPATIENT
Start: 2022-03-24 | End: 2022-03-25

## 2022-03-23 RX ORDER — DEXTROSE MONOHYDRATE 25 G/50ML
12.5 INJECTION, SOLUTION INTRAVENOUS PRN
Status: DISCONTINUED | OUTPATIENT
Start: 2022-03-23 | End: 2022-03-30 | Stop reason: HOSPADM

## 2022-03-23 RX ORDER — NICOTINE POLACRILEX 4 MG
15 LOZENGE BUCCAL PRN
Status: DISCONTINUED | OUTPATIENT
Start: 2022-03-23 | End: 2022-03-30 | Stop reason: HOSPADM

## 2022-03-23 RX ORDER — GLYCOPYRROLATE 1 MG/5 ML
SYRINGE (ML) INTRAVENOUS PRN
Status: DISCONTINUED | OUTPATIENT
Start: 2022-03-23 | End: 2022-03-23 | Stop reason: SDUPTHER

## 2022-03-23 RX ORDER — 0.9 % SODIUM CHLORIDE 0.9 %
250 INTRAVENOUS SOLUTION INTRAVENOUS CONTINUOUS PRN
Status: DISCONTINUED | OUTPATIENT
Start: 2022-03-23 | End: 2022-03-25

## 2022-03-23 RX ORDER — ALBUMIN, HUMAN INJ 5% 5 %
25 SOLUTION INTRAVENOUS ONCE
Status: COMPLETED | OUTPATIENT
Start: 2022-03-23 | End: 2022-03-23

## 2022-03-23 RX ORDER — ACETAMINOPHEN 650 MG/1
650 SUPPOSITORY RECTAL EVERY 4 HOURS PRN
Status: DISCONTINUED | OUTPATIENT
Start: 2022-03-23 | End: 2022-03-25

## 2022-03-23 RX ORDER — MAGNESIUM SULFATE IN WATER 40 MG/ML
2000 INJECTION, SOLUTION INTRAVENOUS PRN
Status: DISCONTINUED | OUTPATIENT
Start: 2022-03-23 | End: 2022-03-25

## 2022-03-23 RX ORDER — MIDAZOLAM HYDROCHLORIDE 1 MG/ML
INJECTION INTRAMUSCULAR; INTRAVENOUS PRN
Status: DISCONTINUED | OUTPATIENT
Start: 2022-03-23 | End: 2022-03-23 | Stop reason: SDUPTHER

## 2022-03-23 RX ORDER — SODIUM CHLORIDE 9 MG/ML
25 INJECTION, SOLUTION INTRAVENOUS PRN
Status: DISCONTINUED | OUTPATIENT
Start: 2022-03-23 | End: 2022-03-25

## 2022-03-23 RX ORDER — LIDOCAINE HYDROCHLORIDE 20 MG/ML
INJECTION, SOLUTION INTRAVENOUS PRN
Status: DISCONTINUED | OUTPATIENT
Start: 2022-03-23 | End: 2022-03-23 | Stop reason: SDUPTHER

## 2022-03-23 RX ORDER — OXYCODONE HYDROCHLORIDE 10 MG/1
10 TABLET ORAL EVERY 4 HOURS PRN
Status: DISCONTINUED | OUTPATIENT
Start: 2022-03-23 | End: 2022-03-25

## 2022-03-23 RX ORDER — ALBUMIN, HUMAN INJ 5% 5 %
SOLUTION INTRAVENOUS
Status: DISPENSED
Start: 2022-03-23 | End: 2022-03-24

## 2022-03-23 RX ORDER — SODIUM CHLORIDE, SODIUM LACTATE, POTASSIUM CHLORIDE, CALCIUM CHLORIDE 600; 310; 30; 20 MG/100ML; MG/100ML; MG/100ML; MG/100ML
INJECTION, SOLUTION INTRAVENOUS CONTINUOUS PRN
Status: DISCONTINUED | OUTPATIENT
Start: 2022-03-23 | End: 2022-03-23 | Stop reason: SDUPTHER

## 2022-03-23 RX ORDER — ONDANSETRON 2 MG/ML
4 INJECTION INTRAMUSCULAR; INTRAVENOUS EVERY 6 HOURS PRN
Status: DISCONTINUED | OUTPATIENT
Start: 2022-03-23 | End: 2022-03-30 | Stop reason: HOSPADM

## 2022-03-23 RX ORDER — PROPOFOL 10 MG/ML
INJECTION, EMULSION INTRAVENOUS PRN
Status: DISCONTINUED | OUTPATIENT
Start: 2022-03-23 | End: 2022-03-23 | Stop reason: SDUPTHER

## 2022-03-23 RX ADMIN — MUPIROCIN: 20 OINTMENT TOPICAL at 08:33

## 2022-03-23 RX ADMIN — ALBUMIN (HUMAN) 12.5 G: 12.5 INJECTION, SOLUTION INTRAVENOUS at 20:19

## 2022-03-23 RX ADMIN — AMINOCAPROIC ACID 5000 MG: 250 INJECTION, SOLUTION INTRAVENOUS at 13:00

## 2022-03-23 RX ADMIN — NOREPINEPHRINE BITARTRATE 8 MCG: 1 INJECTION, SOLUTION, CONCENTRATE INTRAVENOUS at 13:29

## 2022-03-23 RX ADMIN — PROPOFOL 10 MCG/KG/MIN: 10 INJECTION, EMULSION INTRAVENOUS at 18:20

## 2022-03-23 RX ADMIN — Medication 0.07 MCG/KG/MIN: at 13:23

## 2022-03-23 RX ADMIN — IPRATROPIUM BROMIDE AND ALBUTEROL SULFATE 1 AMPULE: .5; 2.5 SOLUTION RESPIRATORY (INHALATION) at 20:56

## 2022-03-23 RX ADMIN — HEPARIN SODIUM 10000 UNITS: 10000 INJECTION INTRAVENOUS; SUBCUTANEOUS at 14:21

## 2022-03-23 RX ADMIN — FENTANYL CITRATE 150 MCG: 50 INJECTION, SOLUTION INTRAMUSCULAR; INTRAVENOUS at 14:07

## 2022-03-23 RX ADMIN — FENTANYL CITRATE 50 MCG: 50 INJECTION, SOLUTION INTRAMUSCULAR; INTRAVENOUS at 18:40

## 2022-03-23 RX ADMIN — MUPIROCIN: 20 OINTMENT TOPICAL at 20:12

## 2022-03-23 RX ADMIN — FENTANYL CITRATE 50 MCG: 50 INJECTION, SOLUTION INTRAMUSCULAR; INTRAVENOUS at 21:16

## 2022-03-23 RX ADMIN — VECURONIUM BROMIDE 5 MG: 10 INJECTION, POWDER, LYOPHILIZED, FOR SOLUTION INTRAVENOUS at 13:29

## 2022-03-23 RX ADMIN — VASOPRESSIN 1 UNITS: 20 INJECTION INTRAVENOUS at 13:01

## 2022-03-23 RX ADMIN — 0.12% CHLORHEXIDINE GLUCONATE 15 ML: 1.2 RINSE ORAL at 05:16

## 2022-03-23 RX ADMIN — DEXTROSE MONOHYDRATE 10 MG/HR: 50 INJECTION, SOLUTION INTRAVENOUS at 05:17

## 2022-03-23 RX ADMIN — EPINEPHRINE 20 MCG: 1 INJECTION INTRAMUSCULAR; INTRAVENOUS; SUBCUTANEOUS at 16:33

## 2022-03-23 RX ADMIN — HEPARIN SODIUM 35000 UNITS: 10000 INJECTION INTRAVENOUS; SUBCUTANEOUS at 14:03

## 2022-03-23 RX ADMIN — AMPICILLIN SODIUM 2000 MG: 2 INJECTION, POWDER, FOR SOLUTION INTRAVENOUS at 01:56

## 2022-03-23 RX ADMIN — VECURONIUM BROMIDE 1 MG: 10 INJECTION, POWDER, LYOPHILIZED, FOR SOLUTION INTRAVENOUS at 16:22

## 2022-03-23 RX ADMIN — EPINEPHRINE 10 MCG: 1 INJECTION INTRAMUSCULAR; INTRAVENOUS; SUBCUTANEOUS at 16:43

## 2022-03-23 RX ADMIN — PROPOFOL 30 MG: 10 INJECTION, EMULSION INTRAVENOUS at 18:17

## 2022-03-23 RX ADMIN — LIDOCAINE HYDROCHLORIDE 100 MG: 20 INJECTION, SOLUTION INTRAVENOUS at 12:51

## 2022-03-23 RX ADMIN — METOPROLOL TARTRATE 25 MG: 25 TABLET, FILM COATED ORAL at 05:16

## 2022-03-23 RX ADMIN — AMINOCAPROIC ACID 1 G/HR: 250 INJECTION, SOLUTION INTRAVENOUS at 13:02

## 2022-03-23 RX ADMIN — SODIUM CHLORIDE, POTASSIUM CHLORIDE, SODIUM LACTATE AND CALCIUM CHLORIDE: 600; 310; 30; 20 INJECTION, SOLUTION INTRAVENOUS at 12:37

## 2022-03-23 RX ADMIN — SODIUM CHLORIDE 4.56 UNITS/HR: 9 INJECTION, SOLUTION INTRAVENOUS at 20:00

## 2022-03-23 RX ADMIN — 0.12% CHLORHEXIDINE GLUCONATE 15 ML: 1.2 RINSE ORAL at 20:12

## 2022-03-23 RX ADMIN — PHENYLEPHRINE HYDROCHLORIDE 100 MCG: 10 INJECTION INTRAVENOUS at 12:51

## 2022-03-23 RX ADMIN — PROPOFOL 120 MG: 10 INJECTION, EMULSION INTRAVENOUS at 12:51

## 2022-03-23 RX ADMIN — EPINEPHRINE 0.06 MCG/KG/MIN: 1 INJECTION PARENTERAL at 16:37

## 2022-03-23 RX ADMIN — FENTANYL CITRATE 250 MCG: 50 INJECTION, SOLUTION INTRAMUSCULAR; INTRAVENOUS at 12:51

## 2022-03-23 RX ADMIN — VASOPRESSIN 2 UNITS: 20 INJECTION INTRAVENOUS at 12:55

## 2022-03-23 RX ADMIN — NOREPINEPHRINE BITARTRATE 8 MCG: 1 INJECTION, SOLUTION, CONCENTRATE INTRAVENOUS at 13:15

## 2022-03-23 RX ADMIN — VASOPRESSIN 2 UNITS: 20 INJECTION INTRAVENOUS at 12:53

## 2022-03-23 RX ADMIN — SODIUM CHLORIDE, PRESERVATIVE FREE 10 ML: 5 INJECTION INTRAVENOUS at 20:11

## 2022-03-23 RX ADMIN — CEFAZOLIN SODIUM 2000 MG: 1 INJECTION, POWDER, FOR SOLUTION INTRAMUSCULAR; INTRAVENOUS at 17:10

## 2022-03-23 RX ADMIN — NOREPINEPHRINE BITARTRATE 8 MCG: 1 INJECTION, SOLUTION, CONCENTRATE INTRAVENOUS at 13:09

## 2022-03-23 RX ADMIN — FENTANYL CITRATE 100 MCG: 50 INJECTION, SOLUTION INTRAMUSCULAR; INTRAVENOUS at 14:09

## 2022-03-23 RX ADMIN — MIDAZOLAM 2 MG: 1 INJECTION INTRAMUSCULAR; INTRAVENOUS at 12:41

## 2022-03-23 RX ADMIN — CEFAZOLIN SODIUM 2000 MG: 1 INJECTION, POWDER, FOR SOLUTION INTRAMUSCULAR; INTRAVENOUS at 13:29

## 2022-03-23 RX ADMIN — AMPICILLIN SODIUM 2000 MG: 2 INJECTION, POWDER, FOR SOLUTION INTRAVENOUS at 10:21

## 2022-03-23 RX ADMIN — Medication 3 MG: at 18:17

## 2022-03-23 RX ADMIN — SODIUM CHLORIDE 30 ML/HR: 9 INJECTION, SOLUTION INTRAVENOUS at 18:20

## 2022-03-23 RX ADMIN — HEPARIN 300 UNITS: 100 SYRINGE at 08:32

## 2022-03-23 RX ADMIN — PHENYLEPHRINE HYDROCHLORIDE 100 MCG: 10 INJECTION INTRAVENOUS at 12:53

## 2022-03-23 RX ADMIN — PROTAMINE SULFATE 300 MG: 10 INJECTION, SOLUTION INTRAVENOUS at 17:02

## 2022-03-23 RX ADMIN — AMPICILLIN SODIUM 2000 MG: 2 INJECTION, POWDER, FOR SOLUTION INTRAVENOUS at 05:21

## 2022-03-23 RX ADMIN — NOREPINEPHRINE BITARTRATE 8 MCG: 1 INJECTION, SOLUTION, CONCENTRATE INTRAVENOUS at 13:07

## 2022-03-23 RX ADMIN — NOREPINEPHRINE BITARTRATE 8 MCG: 1 INJECTION, SOLUTION, CONCENTRATE INTRAVENOUS at 13:03

## 2022-03-23 RX ADMIN — VECURONIUM BROMIDE 10 MG: 10 INJECTION, POWDER, LYOPHILIZED, FOR SOLUTION INTRAVENOUS at 12:51

## 2022-03-23 RX ADMIN — FENTANYL CITRATE 250 MCG: 50 INJECTION, SOLUTION INTRAMUSCULAR; INTRAVENOUS at 13:30

## 2022-03-23 RX ADMIN — NOREPINEPHRINE BITARTRATE 8 MCG: 1 INJECTION, SOLUTION, CONCENTRATE INTRAVENOUS at 13:19

## 2022-03-23 RX ADMIN — Medication 10 ML: at 08:32

## 2022-03-23 RX ADMIN — VASOPRESSIN 2 UNITS: 20 INJECTION INTRAVENOUS at 12:58

## 2022-03-23 RX ADMIN — AMPICILLIN SODIUM 2000 MG: 2 INJECTION, POWDER, FOR SOLUTION INTRAVENOUS at 21:09

## 2022-03-23 RX ADMIN — ALBUMIN (HUMAN) 25 G: 12.5 INJECTION, SOLUTION INTRAVENOUS at 18:30

## 2022-03-23 RX ADMIN — SODIUM CHLORIDE: 9 INJECTION, SOLUTION INTRAVENOUS at 12:37

## 2022-03-23 RX ADMIN — Medication 0.6 MG: at 18:17

## 2022-03-23 RX ADMIN — PHENYLEPHRINE HYDROCHLORIDE 100 MCG: 10 INJECTION INTRAVENOUS at 12:52

## 2022-03-23 RX ADMIN — ASPIRIN 81 MG 81 MG: 81 TABLET ORAL at 20:42

## 2022-03-23 RX ADMIN — SODIUM CHLORIDE 40 MG: 9 INJECTION, SOLUTION INTRAMUSCULAR; INTRAVENOUS; SUBCUTANEOUS at 20:42

## 2022-03-23 RX ADMIN — MIDAZOLAM 2 MG: 1 INJECTION INTRAMUSCULAR; INTRAVENOUS at 12:37

## 2022-03-23 RX ADMIN — HEPARIN SODIUM 10000 UNITS: 10000 INJECTION INTRAVENOUS; SUBCUTANEOUS at 14:13

## 2022-03-23 ASSESSMENT — PULMONARY FUNCTION TESTS
PIF_VALUE: 17
PIF_VALUE: 1
PIF_VALUE: 18
PIF_VALUE: 1
PIF_VALUE: 18
PIF_VALUE: 10
PIF_VALUE: 16
PIF_VALUE: 23
PIF_VALUE: 20
PIF_VALUE: 1
PIF_VALUE: 19
PIF_VALUE: 18
PIF_VALUE: 19
PIF_VALUE: 1
PIF_VALUE: 19
PIF_VALUE: 20
PIF_VALUE: 1
PIF_VALUE: 18
PIF_VALUE: 1
PIF_VALUE: 11
PIF_VALUE: 19
PIF_VALUE: 1
PIF_VALUE: 16
PIF_VALUE: 2
PIF_VALUE: 18
PIF_VALUE: 17
PIF_VALUE: 16
PIF_VALUE: 17
PIF_VALUE: 1
PIF_VALUE: 17
PIF_VALUE: 18
PIF_VALUE: 17
PIF_VALUE: 18
PIF_VALUE: 18
PIF_VALUE: 20
PIF_VALUE: 2
PIF_VALUE: 1
PIF_VALUE: 18
PIF_VALUE: 1
PIF_VALUE: 15
PIF_VALUE: 0
PIF_VALUE: 22
PIF_VALUE: 18
PIF_VALUE: 17
PIF_VALUE: 1
PIF_VALUE: 18
PIF_VALUE: 1
PIF_VALUE: 18
PIF_VALUE: 1
PIF_VALUE: 1
PIF_VALUE: 18
PIF_VALUE: 1
PIF_VALUE: 1
PIF_VALUE: 2
PIF_VALUE: 19
PIF_VALUE: 18
PIF_VALUE: 1
PIF_VALUE: 1
PIF_VALUE: 19
PIF_VALUE: 19
PIF_VALUE: 1
PIF_VALUE: 20
PIF_VALUE: 19
PIF_VALUE: 16
PIF_VALUE: 1
PIF_VALUE: 18
PIF_VALUE: 1
PIF_VALUE: 17
PIF_VALUE: 19
PIF_VALUE: 17
PIF_VALUE: 17
PIF_VALUE: 1
PIF_VALUE: 18
PIF_VALUE: 16
PIF_VALUE: 18
PIF_VALUE: 17
PIF_VALUE: 20
PIF_VALUE: 21
PIF_VALUE: 0
PIF_VALUE: 18
PIF_VALUE: 22
PIF_VALUE: 0
PIF_VALUE: 1
PIF_VALUE: 18
PIF_VALUE: 18
PIF_VALUE: 1
PIF_VALUE: 19
PIF_VALUE: 21
PIF_VALUE: 1
PIF_VALUE: 1
PIF_VALUE: 19
PIF_VALUE: 19
PIF_VALUE: 1
PIF_VALUE: 30
PIF_VALUE: 19
PIF_VALUE: 18
PIF_VALUE: 18
PIF_VALUE: 21
PIF_VALUE: 1
PIF_VALUE: 4
PIF_VALUE: 15
PIF_VALUE: 1
PIF_VALUE: 19
PIF_VALUE: 20
PIF_VALUE: 1
PIF_VALUE: 18
PIF_VALUE: 1
PIF_VALUE: 1
PIF_VALUE: 19
PIF_VALUE: 1
PIF_VALUE: 19
PIF_VALUE: 24
PIF_VALUE: 18
PIF_VALUE: 17
PIF_VALUE: 19
PIF_VALUE: 20
PIF_VALUE: 1
PIF_VALUE: 1
PIF_VALUE: 16
PIF_VALUE: 2
PIF_VALUE: 1
PIF_VALUE: 18
PIF_VALUE: 21
PIF_VALUE: 18
PIF_VALUE: 18
PIF_VALUE: 17
PIF_VALUE: 20
PIF_VALUE: 22
PIF_VALUE: 17
PIF_VALUE: 19
PIF_VALUE: 18
PIF_VALUE: 2
PIF_VALUE: 1
PIF_VALUE: 20
PIF_VALUE: 19
PIF_VALUE: 15
PIF_VALUE: 18
PIF_VALUE: 15
PIF_VALUE: 17
PIF_VALUE: 13
PIF_VALUE: 19
PIF_VALUE: 1
PIF_VALUE: 13
PIF_VALUE: 1
PIF_VALUE: 1
PIF_VALUE: 19
PIF_VALUE: 16
PIF_VALUE: 1
PIF_VALUE: 19
PIF_VALUE: 18
PIF_VALUE: 2
PIF_VALUE: 19
PIF_VALUE: 1
PIF_VALUE: 17
PIF_VALUE: 17
PIF_VALUE: 22
PIF_VALUE: 1
PIF_VALUE: 1
PIF_VALUE: 18
PIF_VALUE: 18
PIF_VALUE: 1
PIF_VALUE: 19
PIF_VALUE: 17
PIF_VALUE: 18
PIF_VALUE: 22
PIF_VALUE: 1
PIF_VALUE: 18
PIF_VALUE: 17
PIF_VALUE: 1
PIF_VALUE: 18
PIF_VALUE: 2
PIF_VALUE: 21
PIF_VALUE: 0
PIF_VALUE: 18
PIF_VALUE: 19
PIF_VALUE: 1
PIF_VALUE: 1
PIF_VALUE: 18
PIF_VALUE: 1
PIF_VALUE: 18
PIF_VALUE: 1
PIF_VALUE: 1
PIF_VALUE: 4
PIF_VALUE: 1
PIF_VALUE: 0
PIF_VALUE: 0
PIF_VALUE: 1
PIF_VALUE: 1
PIF_VALUE: 17
PIF_VALUE: 19
PIF_VALUE: 17
PIF_VALUE: 20
PIF_VALUE: 17
PIF_VALUE: 19
PIF_VALUE: 1
PIF_VALUE: 19
PIF_VALUE: 1
PIF_VALUE: 1
PIF_VALUE: 16
PIF_VALUE: 16
PIF_VALUE: 19
PIF_VALUE: 15
PIF_VALUE: 1
PIF_VALUE: 17
PIF_VALUE: 18
PIF_VALUE: 1
PIF_VALUE: 1
PIF_VALUE: 17
PIF_VALUE: 1
PIF_VALUE: 0
PIF_VALUE: 1
PIF_VALUE: 1
PIF_VALUE: 19
PIF_VALUE: 17
PIF_VALUE: 16
PIF_VALUE: 19
PIF_VALUE: 1
PIF_VALUE: 1
PIF_VALUE: 0
PIF_VALUE: 18
PIF_VALUE: 18
PIF_VALUE: 19
PIF_VALUE: 1
PIF_VALUE: 1
PIF_VALUE: 17
PIF_VALUE: 19
PIF_VALUE: 1
PIF_VALUE: 18
PIF_VALUE: 16
PIF_VALUE: 1
PIF_VALUE: 19
PIF_VALUE: 18
PIF_VALUE: 18
PIF_VALUE: 19
PIF_VALUE: 17
PIF_VALUE: 19
PIF_VALUE: 1
PIF_VALUE: 18
PIF_VALUE: 16
PIF_VALUE: 17
PIF_VALUE: 19
PIF_VALUE: 2
PIF_VALUE: 1
PIF_VALUE: 16
PIF_VALUE: 1
PIF_VALUE: 13
PIF_VALUE: 18
PIF_VALUE: 1
PIF_VALUE: 17
PIF_VALUE: 1
PIF_VALUE: 18
PIF_VALUE: 28
PIF_VALUE: 5
PIF_VALUE: 2
PIF_VALUE: 15
PIF_VALUE: 1
PIF_VALUE: 19
PIF_VALUE: 1
PIF_VALUE: 0
PIF_VALUE: 1
PIF_VALUE: 17
PIF_VALUE: 1
PIF_VALUE: 0
PIF_VALUE: 15
PIF_VALUE: 18
PIF_VALUE: 19
PIF_VALUE: 1
PIF_VALUE: 18
PIF_VALUE: 16
PIF_VALUE: 18
PIF_VALUE: 1
PIF_VALUE: 17
PIF_VALUE: 19
PIF_VALUE: 21
PIF_VALUE: 18
PIF_VALUE: 18
PIF_VALUE: 0
PIF_VALUE: 10
PIF_VALUE: 1
PIF_VALUE: 18
PIF_VALUE: 17
PIF_VALUE: 18
PIF_VALUE: 19
PIF_VALUE: 1
PIF_VALUE: 20
PIF_VALUE: 19
PIF_VALUE: 1
PIF_VALUE: 19
PIF_VALUE: 18
PIF_VALUE: 17
PIF_VALUE: 17
PIF_VALUE: 16
PIF_VALUE: 15
PIF_VALUE: 22
PIF_VALUE: 1
PIF_VALUE: 1
PIF_VALUE: 18
PIF_VALUE: 22
PIF_VALUE: 1
PIF_VALUE: 18
PIF_VALUE: 1
PIF_VALUE: 16
PIF_VALUE: 1
PIF_VALUE: 2
PIF_VALUE: 15
PIF_VALUE: 19
PIF_VALUE: 17
PIF_VALUE: 18
PIF_VALUE: 1
PIF_VALUE: 1
PIF_VALUE: 20
PIF_VALUE: 1
PIF_VALUE: 0
PIF_VALUE: 1
PIF_VALUE: 18
PIF_VALUE: 20
PIF_VALUE: 17
PIF_VALUE: 20
PIF_VALUE: 18
PIF_VALUE: 18
PIF_VALUE: 21
PIF_VALUE: 1
PIF_VALUE: 18
PIF_VALUE: 1
PIF_VALUE: 19
PIF_VALUE: 17
PIF_VALUE: 20

## 2022-03-23 ASSESSMENT — PAIN - FUNCTIONAL ASSESSMENT: PAIN_FUNCTIONAL_ASSESSMENT: PREVENTS OR INTERFERES SOME ACTIVE ACTIVITIES AND ADLS

## 2022-03-23 ASSESSMENT — PAIN SCALES - GENERAL
PAINLEVEL_OUTOF10: 0
PAINLEVEL_OUTOF10: 7
PAINLEVEL_OUTOF10: 5
PAINLEVEL_OUTOF10: 0

## 2022-03-23 ASSESSMENT — PAIN DESCRIPTION - ONSET: ONSET: GRADUAL

## 2022-03-23 ASSESSMENT — PAIN DESCRIPTION - DESCRIPTORS: DESCRIPTORS: PATIENT UNABLE TO DESCRIBE

## 2022-03-23 ASSESSMENT — PAIN DESCRIPTION - PROGRESSION: CLINICAL_PROGRESSION: GRADUALLY WORSENING

## 2022-03-23 ASSESSMENT — PAIN DESCRIPTION - PAIN TYPE: TYPE: SURGICAL PAIN

## 2022-03-23 ASSESSMENT — PAIN DESCRIPTION - FREQUENCY: FREQUENCY: INTERMITTENT

## 2022-03-23 NOTE — ANESTHESIA PROCEDURE NOTES
Arterial Line:    An arterial line was placed using surface landmarks, in the procedure area for the following indication(s): continuous blood pressure monitoring and blood sampling needed. A 20 gauge (size), 12 cm (length), Arrow (type) catheter was placed, Seldinger technique used, into the right brachial artery, secured by tape and Tegaderm. Anesthesia type: Local  Local infiltration: Topical and Injection    Events:  patient tolerated procedure well with no complications.   Anesthesiologist: Tunde Bernardo MD  Resident/CRNA: ROX Loaiza CRNA  Performed: Resident/CRNA   Preanesthetic Checklist  Completed: patient identified, IV checked, site marked, risks and benefits discussed, surgical consent, monitors and equipment checked, pre-op evaluation, timeout performed, anesthesia consent given, oxygen available and patient being monitored

## 2022-03-23 NOTE — ANESTHESIA PRE PROCEDURE
Department of Anesthesiology  Preprocedure Note       Name:  Bethany Vuong   Age:  62 y.o.  :  1960                                          MRN:  [id number]         Date:  3/23/2022      Surgeon: Heath Marie):  Tk Bradshaw DO    Procedure: Procedure(s):  MITRAL VALVE REPAIR VS REPLACEMENT WITH MAZE    Medications prior to admission:   Prior to Admission medications    Medication Sig Start Date End Date Taking?  Authorizing Provider   Ascorbic Acid (VITAMIN C PO) Take by mouth daily   Yes Historical Provider, MD   VITAMIN D PO Take by mouth daily   Yes Historical Provider, MD   ZINC PO Take by mouth daily   Yes Historical Provider, MD       Current medications:    Current Facility-Administered Medications   Medication Dose Route Frequency Provider Last Rate Last Admin    sodium chloride flush 0.9 % injection 5-40 mL  5-40 mL IntraVENous 2 times per day Valaria Gómez, APRN - CNP   10 mL at 22 4908    sodium chloride flush 0.9 % injection 10 mL  10 mL IntraVENous PRN Karmen Timmons, APRN - CNP        0.9 % sodium chloride infusion  25 mL IntraVENous PRN Valaria Gómez, APRN - CNP        ceFAZolin (ANCEF) 2000 mg in sterile water 20 mL IV syringe  2,000 mg IntraVENous See Admin Instructions Valaria Gómez, APRN - CNP        mupirocin (BACTROBAN) 2 % ointment   Nasal BID Valaria Gómez, APRN - CNP   Given at 22 4837    chlorhexidine (HIBICLENS) 4 % liquid   Topical See Admin Instructions Valaria Gómez, APRN - CNP   Given at 22    psyllium (METAMUCIL) 58.12 % packet 1 packet  1 packet Oral Daily Kaycee Calderón MD   1 packet at 22    sodium chloride flush 0.9 % injection 5-40 mL  5-40 mL IntraVENous 2 times per day Checo Archer MD   10 mL at 22 2344    sodium chloride flush 0.9 % injection 5-40 mL  5-40 mL IntraVENous PRN Faustino Archer MD   10 mL at 22 0333    0.9 % sodium chloride infusion  25 mL IntraVENous PRN Checo Archer  mL/hr at 22 0428 25 mL at 03/22/22 0428    heparin flush 100 UNIT/ML injection 300 Units  3 mL IntraVENous 2 times per day Leatha Rinaldi MD   300 Units at 03/23/22 0832    heparin flush 100 UNIT/ML injection 300 Units  3 mL IntraCATHeter PRN Leatha Rinaldi MD        ampicillin 2000 mg ivpb mini bag  2,000 mg IntraVENous 6 times per day Leatha Rinaldi  mL/hr at 03/23/22 1021 2,000 mg at 03/23/22 1021    dilTIAZem 100 mg in dextrose 5 % 100 mL infusion (ADD-Preble)  2.5-15 mg/hr IntraVENous Continuous Lovena Rumble, DO 10 mL/hr at 03/23/22 0517 10 mg/hr at 03/23/22 0517    ondansetron (ZOFRAN-ODT) disintegrating tablet 4 mg  4 mg Oral Q8H PRN Lovena Rumble, DO        Or    ondansetron [facility]) injection 4 mg  4 mg IntraVENous Q6H PRN Lovena Rumble, DO        polyethylene glycol (GLYCOLAX) packet 17 g  17 g Oral Daily PRN Lovena Rumble, DO   17 g at 03/20/22 1952    acetaminophen (TYLENOL) tablet 650 mg  650 mg Oral Q6H PRN Lovena Rumble, DO   650 mg at 03/16/22 2247    Or    acetaminophen (TYLENOL) suppository 650 mg  650 mg Rectal Q6H PRN Lovena Rumble, DO        potassium chloride 10 mEq/100 mL IVPB (Peripheral Line)  10 mEq IntraVENous PRN Lovena Rumble, DO        magnesium sulfate 2000 mg in 50 mL IVPB premix  2,000 mg IntraVENous PRN Lovena Rumble, DO        perflutren lipid microspheres (DEFINITY) injection 1.65 mg  1.5 mL IntraVENous ONCE PRN Lovena Rumble, DO        atorvastatin (LIPITOR) tablet 40 mg  40 mg Oral Nightly Lovena Rumble, DO   40 mg at 03/22/22 2035       Allergies:  No Known Allergies    Problem List:    Patient Active Problem List   Diagnosis Code    Atrial fibrillation with rapid ventricular response (HCC) I48.91    Sepsis ([address] present on admission A41.9    JANA (acute kidney injury) ([address] N17.9    Bacteremia R78.81    Severe mitral regurgitation I34.0    Suspected endocarditis R09.89    Subacute bacterial endocarditis I33.0    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 Lavonne Kellogg       Social History:    Social History     Tobacco Use    Smoking status: Never Smoker    Smokeless tobacco: Not on file   Substance Use Topics    Alcohol use: Yes     Comment: rarely                                Counseling given: Not Answered      Vital Signs (Current):   Vitals:    03/22/22 1530 03/23/22 0000 03/23/22 0523 03/23/22 0815   BP: 103/84 104/76  104/77   Pulse: 81 92  88   Resp: 18 18  18   Temp: 36.4 °C (97.6 °F) 36.6 °C (97.9 °F)  36.7 °C (98.1 °F)   TempSrc: Axillary Oral  Oral   SpO2: 100%   99%   Weight:   179 lb 12.8 oz (81.6 kg)    Height:                                                  BP Readings from Last 3 Encounters:   03/23/22 104/77   03/16/22 86/64       NPO Status:  per pt >8hours                                                                               BMI:   Wt Readings from Last 3 Encounters:   03/23/22 179 lb 12.8 oz (81.6 kg)     Body mass index is 26.55 kg/m².     CBC:   Lab Results   Component Value Date    WBC 10.0 03/22/2022    RBC 3.74 03/22/2022    HGB 10.3 03/22/2022    HCT 33.0 03/22/2022    MCV 88.2 03/22/2022    RDW 14.7 03/22/2022     03/22/2022       CMP:   Lab Results   Component Value Date     03/22/2022    K 4.5 03/22/2022    K 4.2 03/15/2022     03/22/2022    CO2 25 03/22/2022    BUN 18 03/22/2022    CREATININE 1.3 03/22/2022    GFRAA >60 03/22/2022    LABGLOM 56 03/22/2022    GLUCOSE 92 03/22/2022    PROT 6.4 03/22/2022    CALCIUM 8.8 03/22/2022    BILITOT 0.5 03/22/2022    ALKPHOS 168 03/22/2022    AST 57 03/22/2022    ALT 99 03/22/2022       POC Tests: No results for input(s): POCGLU, POCNA, POCK, POCCL, POCBUN, POCHEMO, POCHCT in the last 72 hours. Coags:   Lab Results   Component Value Date    PROTIME 14.4 03/22/2022    INR 1.3 03/22/2022    APTT 37.4 03/22/2022       HCG (If Applicable): No results found for: PREGTESTUR, PREGSERUM, HCG, HCGQUANT     ABGs: No results found for: PHART, PO2ART, KHQ1FMN, AJJ9LYH, BEART, B8EUSMTK     Type & Screen (If Applicable):  No results found for: LABABO, LABRH    Drug/Infectious Status (If Applicable):  No results found for: HIV, HEPCAB    COVID-19 Screening (If Applicable):   Lab Results   Component Value Date    COVID19 Not Detected 03/14/2022    COVID19 Not Detected 03/14/2022           Anesthesia Evaluation  Patient summary reviewed and Nursing notes reviewed no history of anesthetic complications:   Airway: Mallampati: II        Dental:      Comment: Blood noted in the mouth, per pt related to recent tooth removal     Pulmonary:Negative Pulmonary ROS breath sounds clear to auscultation                             Cardiovascular:    (+) valvular problems/murmurs: MR, dysrhythmias: atrial fibrillation, CHF: diastolic,       ECG reviewed  Rhythm: irregular  Rate: abnormal  Echocardiogram reviewed               ROS comment: Severe mitral regurgitation  Suspected endocarditis  Subacute bacterial endocarditis  Ruptured chordae tendineae (HCC)  Mitral valve disease         Neuro/Psych:   Negative Neuro/Psych ROS              GI/Hepatic/Renal:   (+) liver disease ( elevated liver enzymes):, renal disease ( creat 1.3 increase from 1.1):,           Endo/Other:    (+) blood dyscrasia: anemia:., .                 Abdominal:             Vascular: negative vascular ROS. Other Findings:           Anesthesia Plan      general     ASA 3       Induction: intravenous.   arterial line, CVP, central line and ANITRA  MIPS: Postoperative opioids intended and prophylactic pharmacologic antiemetic agents not administered perioperatively for documented reasons. Anesthetic plan and risks discussed with patient. Use of blood products discussed with patient whom consented to blood products. Plan discussed with attending.                   ROX Hyde - CRNA   3/23/2022

## 2022-03-23 NOTE — OP NOTE
Operative Note      Patient: Shaina Zamudio  YOB: 1960  MRN: [id number]    Date of Procedure: 3/23/2022    Pre-Op Diagnosis: severe mitral regurgitation with flail segment, Streptococcus bacteremia, mitral valve endocarditis, paroxysmal atrial fibrillation    Post-Op Diagnosis: Same       Procedure(s):  URGENT STERNOTOMY  URGENT RADICAL MITRAL VALVE REPAIR (P2 TRIANGULAR RESECTION, P2/P3 PERFORATION REPAIR, 34MM FUTURE BAND)  REMOVAL OF INTERNAL CARDIAC LESION   RIGHT AND LEFT ATRIAL CRYOABLATION AND BIPOLAR MAZE PROCEDURE  LEFT ATRIAL APPENDAGE LIGATION WITH 40MM ATRICLIP  RIGID STERNAL FIXATION WITH FRANCE STERNAL PLATE X2 AND STERNAL WIRES    Surgeon(s):  [facility], DO    Assistant:   Marcela Livingston MD (no qualified resident was able to assist, Dr. Romelia Kohler assisted for all critical portions of the case)  First Assistant: ROX Butler CNP (assisted with opening, closure as well as exposure)  Physician Assistant: DAINA Langley (assisted with opening and closure as well as exposure)    Anesthesia: General    Estimated Blood Loss (mL): 8440    Complications: None    Specimens:   ID Type Source Tests Collected by Time Destination   1 : MITRAL VALVE VEGETATION Tissue Tissue CULTURE, ANAEROBIC, CULTURE, FUNGUS, GRAM STAIN, CULTURE, SURGICAL [facility], DO 3/23/2022 1505    A : LEFT ATRIAL APPENDAGE TIP Tissue Tissue SURGICAL PATHOLOGY [facility], DO 3/23/2022 1500    B : P2 TRIANGULAR RESECTION Tissue Tissue SURGICAL PATHOLOGY [facility], DO 3/23/2022 1503        Implants:  Implant Name Type Inv.  Item Serial No.  Lot No. LRB No. Used Action   BAND ANNULPLSTY [id number] 20.83X22.09X34.0X38.9MM ORIFICE - [id number] Annuloplasty rings BAND ANNULPLSTY [id number] 20.83X22.09X34.0X38.9MM ORIFICE [id number] [employer] [place] [employer]  N/A 1 Implanted   DEVICE OCCL CLP L40MM PLUNG GRP FLX SHFT FOR RENZO - [id number]  DEVICE OCCL CLP L40MM PLUNG GRP FLX SHFT FOR Kaylyn Hernandez [employer]WD [id number] N/A 1 Implanted   SCREW LOCKING SD 2.3X13MM 5P - [id number]  SCREW LOCKING SD 2.3X13MM 5P  [employer]WD  N/A 8 Implanted   SCREW LOCKING SD 2.3X15MM 5P - [id number]  SCREW LOCKING SD 2.3X15MM 5P  [employer]WD  N/A 4 Implanted   PLATE FIXATION BOX STERNAL 4 HOLES - [id number]  PLATE FIXATION BOX STERNAL 4 HOLES  [employer]WD  N/A 1 Implanted   PLATE X FIXATION STERNAL 8 HOLES - [id number]  PLATE X FIXATION STERNAL 8 HOLES  [employer]WD  N/A 1 Implanted         Drains:   Chest Tube 1 Anterior Mediastinal 32 [place] (Active)       Closed/Suction Drain Medial LUQ  19 [place] (Active)       Closed/Suction Drain Medial LUQ  19 [place] (Active)       Urethral Catheter Temperature probe 16 fr (Active)       Findings: 64yo M with no significant PMH presented to the ED 3/14/2022 with CC of 1 month history of fatigue. He was found to have Streptococcus bacteremia and severe MR with possible flail posterior MV leaflet, possible endocarditis. CTS was consulted for additional recommendations. Intraoperatively, he was found to have significant vegetations, worst on his A2 scallop of his anterior leaflets as well as along the wall of the left atrium above the anterior leaflet. He had flail P2 scallop with ruptured thickened cord. There was no evidence of endocarditis on his posterior leaflet . Interestingly, he had a perforation between P2/P3 segment that was not associated with endocarditis. Triangular resection of P2 and closure of P2/P3 perforation was performed. A 34mm Future band was placed. Biatrial MAZE using cryoprobe as well as radiofrequency ablation with bipolar probe was performed. Left atrial appendage ligation with 40mm Atriclip. Rigid sternal fixation was performed using combination of sternal wires and [place] sternal plates x2. At the conclusion of the procedure, his EF was 30% with the assistance of moderate inotropic support.  He had trivial mitral regurgitation with a mean gradient of 2mmHg. Detailed Description of Procedure:   After adequate informed consent was obtained, the patient was brought to the operating room in stable condition. He was laid in supine position and induced under general endotracheal anesthesia by anesthesia staff. Adequate monitoring lines, tellez catheter and ANITRA probe were placed. The patient's chest, abdomen, pelvis, and lower extremities were prepped and draped in the usual sterile fashion. Standard sternotomy was performed and hemostasis was obtained. Louis retractor was placed. The pericardium was opened and secured to the chest wall. The aorta was palpated and targets for cannulation and cross-clamp were noted. The patient was systemically heparinized. After ensuring adequate ACT, the patient was instituted on a cardiopulmonary bypass by cannulating the ascending aorta using 18-Maori aortic cannula, the superior vena cava using an angled venous cannula and the right femoral vein using a 22 mm long venous cannula using echo guidance. Aortic root vent was placed. Wasterston's groove was developed. Right superior and inferior pulmonary veins were dissected and radiofrequency ablation was performed using the bipolar probe. The aorta was cross-clamped and the heart was arrested with cold blood antegrade DelNido cardioplegia. Excellent diastolic arrest was noted. Left superior and inferior pulmonary veins were dissected and radiofrequency ablation was performed. The tip of the left atrial appendage was transected and radiofrequency ablation was performed of the left atrial appendage towards the left superior pulmonary vein. A left atriotomy was performed and exposure of the mitral valve was obtained. He had a significant amount of vegetations noted on the A2 scallop of the anterior leaflet and along the left atrium above the anterior leaflet. There was a lesion along the left atrial wall that was removed and specimens were sent for culture.  There was no was noted to be adequate. One chest tube and two drains were placed in the mediastinum. Drains were secured. The ANITRA was reviewed and intracardiac air was removed via the aortic root vent. The patient was then weaned from cardiopulmonary bypass with the assistance of moderate inotropic support. The patient was decannulated in the usual fashion and protamine was given. Mediastinum was again inspected for hemostasis. Hemostasis of the chest wall was achieved using Bovie electrocautery. The sternum was re-approximated using a combination of sternal wires and Christine sternal plates x2. The wound was irrigated copiously. The remainder of the wound was closed in multiple layers of absorbable stitch. Dressings were applied over top. The patient tolerated the procedure well. The patient was transferred to cardiothoracic intensive care unit in stable, but guarded condition. All sponge, instruments, and needle counts were correct at the end of the case. The cardiopulmonary bypass time was 135 minutes and crossclamp time was 93 minutes.     Jesús Bucio DO  Cardiothoracic Surgery      Electronically signed by Jesús Bucio DO on 3/23/2022 at 6:04 PM

## 2022-03-23 NOTE — ANESTHESIA PROCEDURE NOTES
Procedure Performed: ANITRA     Start Time:        End Time:      Preanesthesia Checklist:  Patient identified, IV assessed, risks and benefits discussed, monitors and equipment assessed, procedure being performed at surgeon's request and anesthesia consent obtained. General Procedure Information  Diagnostic Indications for Echo:  assessment of surgical repair, hemodynamic monitoring and assessment of valve function  Physician Requesting Echo: Hung Fung DO  Location performed:  OR  Intubated  Bite block placed  Heart visualized  Probe Insertion:  Easy  Probe Type:  3D and mulitplane  Modalities:  3D, color flow mapping, pulse wave Doppler and continuous wave Doppler    Echocardiographic and Doppler Measurements    Ventricles    Right Ventricle:  Cavity size normal.  Hypertrophy not present. Thrombus not present. Global function normal.    Left Ventricle:  Cavity size dilated. Hypertrophy not present. Thrombus not present. Global Function mildly impaired. Ejection Fraction 45%. Ventricular Regional Function:  1- Basal Anteroseptal:  normal  2- Basal Anterior:  normal  3- Basal Anterolateral:  normal  4- Basal Inferolateral:  normal  5- Basal Inferior:  normal  6- Basal Inferoseptal:  normal  7- Mid Anteroseptal:  normal  8- Mid Anterior:  normal  9- Mid Anterolateral:  normal  10- Mid Inferolateral:  normal  11- Mid Inferior:  normal  12- Mid Inferoseptal:  normal  13- Apical Anterior:  normal  14- Apical Lateral:  normal  15- Apical Inferior:  normal  16- Apical Septal:  normal  17- Delight:  normal    Wall Motion Comments:       Dilated LV with mild global hypokinesis EF 45%    Valves    Aortic Valve: Annulus normal.  Stenosis not present. Regurgitation none. Leaflets normal.  Leaflet motions normal.      Mitral Valve: Annulus normal.  Stenosis not present. Regurgitation severe. Leaflets normal.  Leaflet motions normal and flail.   Specific leaflet segments with abnormal motions are described in the following comments:       P1,2    Tricuspid Valve: Annulus normal.  Stenosis not present. Regurgitation mild. Leaflets normal.  Leaflet motions normal.    Pulmonic Valve: Annulus normal.  Stenosis not present. Regurgitation mild. Other Valve Findings:       Flail P1, P2 segment with ruptured chord. Eccentric severe jet of MR. Flow reversal in pulmonary veins, anterior. No obvious vegetation on valve leaflets but leaflets are thickened. Thickened tissue around posterior commissure. Small area of mobile tissue/vegetation on anterior left atrial wall near AV. No AI. Trace TR without obvious vegetation. Trace PI. Aorta    Ascending Aorta:  Size normal.  Dissection not present. Aortic Arch:  Size normal.  Dissection not present. Descending Aorta:  Size normal.  Dissection not present. Atria    Right Atrium:  Size normal.  Spontaneous echo contrast not present. Thrombus not present. Tumor not present. Device not present. Left Atrium:  Size dilated. Spontaneous echo contrast not present. Thrombus not present. Tumor not present. Device not present. Left atrial appendage normal.      Septa    Atrial Septum:  Intra-atrial septal morphology normal.      Other atrial septal defect findings:       No PFO seen    Ventricular Septum:  Intra-ventricular septum morphology normal.          Other Findings  Pericardium:  normal  Pleural Effusion:  none  Pulmonary Arteries:  normal  Pulmonary Venous Flow:  normal    Anesthesia Information  Performed Personally  Anesthesiologist:  Jannet Hoyt MD      Echocardiogram Comments:       No difficulty with probe insertion or manipulation    Pre:  Globally dilated LV EF 45%. No WMA. Valves described as above with flail P1-2 segment. Mobile tissue mass on anterior aspect of LA near AV. Post:  Mitral valve repair with trace MR. Mean of 2. Global LV dysfunction on epinephrine therapy  EF 30%. Aortic repair intact.   No gradient in outflow tract.

## 2022-03-23 NOTE — PROGRESS NOTES
INPATIENT CARDIOLOGY FOLLOW-UP    Name: Nusrat Marr    Age: 62 y.o. Date of Admission: 3/14/2022  8:06 PM    Date of Service: 3/23/2022    Chief Complaint: Follow-up for mitral valve disease secondary to endocarditis, paroxysmal atrial fibrillation, acute diastolic heart failure    Interim History: The patient remains compensated from a cardiovascular standpoint with surgical intervention anticipated later today. Most recent laboratory assessment demonstrates a slight increase of serum creatinine levels. Review of Systems: The remainder of a complete multisystem review including consitutional, central nervous, respiratory, circulatory, gastrointestinal, genitourinary, endocrinologic, hematologic, musculoskeletal and psychiatric are negative.     Problem List:  Patient Active Problem List   Diagnosis    Atrial fibrillation with rapid ventricular response (HCC)    Sepsis ([facility] present on admission    JANA (acute kidney injury) ([facility] [address] [facility]    Bacteremia    Severe mitral regurgitation    Suspected endocarditis    Subacute bacterial endocarditis    Ruptured chordae tendineae (HCC)    Mitral valve disease    Paroxysmal atrial fibrillation (HCC)    Acute diastolic (congestive) heart failure (HCC)       Allergies:  No Known Allergies    Current Medications:  Current Facility-Administered Medications   Medication Dose Route Frequency Provider Last Rate Last Admin    sodium chloride flush 0.9 % injection 5-40 mL  5-40 mL IntraVENous 2 times per day Mozell Juan Manuel, APRN - CNP   20 mL at 03/22/22 2250    sodium chloride flush 0.9 % injection 10 mL  10 mL IntraVENous PRN Karmen Timmons APRN - CNP        0.9 % sodium chloride infusion  25 mL IntraVENous PRN Saimaell Juan Manuel, APRN - CNP        ceFAZolin (ANCEF) 2000 mg in sterile water 20 mL IV syringe  2,000 mg IntraVENous See Admin Instructions Karmen Timmons APRN - CNP        mupirocin (BACTROBAN) 2 % ointment   Nasal BID Mozell Juan Manuel, APRN - CNP   Given at 03/22/22 2250    chlorhexidine (HIBICLENS) 4 % liquid   Topical See Admin Instructions Deandra Rho, APRN - CNP   Given at 03/22/22 2000    psyllium (METAMUCIL) 58.12 % packet 1 packet  1 packet Oral Daily William Thurston MD   1 packet at 03/21/22 2036    sodium chloride flush 0.9 % injection 5-40 mL  5-40 mL IntraVENous 2 times per day Joey Renae MD   10 mL at 03/22/22 2344    sodium chloride flush 0.9 % injection 5-40 mL  5-40 mL IntraVENous PRN Faustino Archer MD   10 mL at 03/22/22 0333    0.9 % sodium chloride infusion  25 mL IntraVENous PRN Faustinoverna Archer  mL/hr at 03/22/22 0428 25 mL at 03/22/22 0428    heparin flush 100 UNIT/ML injection 300 Units  3 mL IntraVENous 2 times per day Joey Renae MD   300 Units at 03/22/22 2034    heparin flush 100 UNIT/ML injection 300 Units  3 mL IntraCATHeter PRN Joey Renae MD        ampicillin 2000 mg ivpb mini bag  2,000 mg IntraVENous 6 times per day Joey Renae MD   Stopped at 03/23/22 0620    dilTIAZem 100 mg in dextrose 5 % 100 mL infusion (ADD-[place])  2.5-15 mg/hr IntraVENous Continuous Melvinia Luz Marina, DO 10 mL/hr at 03/23/22 0517 10 mg/hr at 03/23/22 0517    ondansetron (ZOFRAN-ODT) disintegrating tablet 4 mg  4 mg Oral Q8H PRN Melvinia Luz Marina, DO        Or    ondansetron [facility]) injection 4 mg  4 mg IntraVENous Q6H PRN Melvinia Luz Marina, DO        polyethylene glycol (GLYCOLAX) packet 17 g  17 g Oral Daily PRN Melvinia Luz Marina, DO   17 g at 03/20/22 1952    acetaminophen (TYLENOL) tablet 650 mg  650 mg Oral Q6H PRN Melvinia Luz Marina, DO   650 mg at 03/16/22 2247    Or    acetaminophen (TYLENOL) suppository 650 mg  650 mg Rectal Q6H PRN Melvinia Luz Marina, DO        potassium chloride 10 mEq/100 mL IVPB (Peripheral Line)  10 mEq IntraVENous PRN Melvinia Luz Marina, DO        magnesium sulfate 2000 mg in 50 mL IVPB premix  2,000 mg IntraVENous PRN Melvinia Luz Marina, DO        perflutren lipid microspheres (DEFINITY) injection 1.65 mg  1.5 mL IntraVENous ONCE PRN Robert Salon, DO        atorvastatin (LIPITOR) tablet 40 mg  40 mg Oral Nightly Robert Salon, DO   40 mg at 03/22/22 2035      sodium chloride      sodium chloride 25 mL (03/22/22 0428)    dilTIAZem 10 mg/hr (03/23/22 0517)       Physical Exam:  /76   Pulse 92   Temp 97.9 °F (36.6 °C) (Oral)   Resp 18   Ht 5' 9\" (1.753 m)   Wt 179 lb 12.8 oz (81.6 kg)   SpO2 100%   BMI 26.55 kg/m²   Weight change: Wt Readings from Last 3 Encounters:   03/23/22 179 lb 12.8 oz (81.6 kg)     The patient is awake, alert and in no discomfort or distress. No gross musculoskeletal deformity is present. No significant skin or nail changes are present. Gross examination of head, eyes, nose and throat are negative. Jugular venous pressure is normal and no carotid bruits are present. Normal respiratory effort is noted with no accessory muscle usage present. Lung fields are clear to ascultation. Cardiac examination is notable for an irregular rhythm with no palpable thrill. No gallop rhythm and an apical holosystolic murmur are identified. A benign abdominal examination is present with no masses or organomegaly. Intact pulses are present throughout all extremities and no peripheral edema is present. No focal neurologic deficits are present. Intake/Output:    Intake/Output Summary (Last 24 hours) at 3/23/2022 0733  Last data filed at 3/23/2022 0525  Gross per 24 hour   Intake 670 ml   Output 1400 ml   Net -730 ml     No intake/output data recorded. Laboratory Tests:  Lab Results   Component Value Date    CREATININE 1.3 (H) 03/22/2022    BUN 18 03/22/2022     03/22/2022    K 4.5 03/22/2022     03/22/2022    CO2 25 03/22/2022     No results for input(s): CKTOTAL, CKMB in the last 72 hours.     Invalid input(s): TROPONONI  No results found for: BNP  Lab Results   Component Value Date    WBC 10.0 03/22/2022    RBC 3.74 03/22/2022    HGB 10.3 03/22/2022    HCT 33.0 03/22/2022 MCV 88.2 03/22/2022    MCH 27.5 03/22/2022    MCHC 31.2 03/22/2022    RDW 14.7 03/22/2022     03/22/2022    MPV 10.7 03/22/2022     Recent Labs     03/22/22  1104   ALKPHOS 168*   ALT 99*   AST 57*   PROT 6.4   BILITOT 0.5   LABALBU 2.9*     Lab Results   Component Value Date    MG 2.4 03/14/2022     Lab Results   Component Value Date    PROTIME 14.4 03/22/2022    INR 1.3 03/22/2022     Lab Results   Component Value Date    TSH 1.880 03/14/2022     No components found for: CHLPL  Lab Results   Component Value Date    TRIG 115 03/15/2022     Lab Results   Component Value Date    HDL 20 03/15/2022     Lab Results   Component Value Date    LDLCALC 90 03/15/2022       Cardiac Tests:  Telemetry findings reviewed: atrial fibrillation with a mean ventricular response of approximately 90 bpm, no new tachy/bradyarrhythmias overnight      ASSESSMENT / PLAN: On a clinical basis, the patient remains compensated from a cardiovascular standpoint with anticipated surgical mitral valve intervention as well as anticipated modified maze procedure later today. He remains hemodynamically compensated with present acceptable rate control of his atrial fibrillation with needs of assessment of his arrhythmia status following surgery and potential at least on a temporary basis postoperative amiodarone of sinus rhythm is achieved for arrhythmia maintenance. Careful postoperative monitoring of his volume status as well as that of renal function and electrolytes will be necessary. We will continue to monitor his progress postoperatively. Note: This report was completed utilizing computer voice recognition software. Every effort has been made to ensure accuracy, however; inadvertent computerized transcription errors may be present. Garrick Escobar.  Jennifer Washington, [facility]

## 2022-03-23 NOTE — ANESTHESIA PROCEDURE NOTES
Central Venous Line:    A central venous line was placed using ultrasound guidance and surface landmarks, in the OR for the following indication(s): central venous access and CVP monitoring. Sterility preparation included the following: hand hygiene performed prior to procedure, maximum sterile barriers used and sterile technique used to drape from head to toe. The patient was placed in Trendelenburg position. The right internal jugular vein was prepped. The site was prepped with Chloraprep. A 8.5 Fr (size), 10 (length), introducer slick was placed. During the procedure, the following specific steps were taken: target vein identified, needle advanced into vein and blood aspirated and guidewire advanced into vein. Intravenous verification was obtained by ultrasound and venous blood return. Post insertion care included: all ports aspirated, all ports flushed easily, guidewire removed intact, Biopatch applied, line sutured in place and dressing applied. During the procedure the patient experienced: patient tolerated procedure well with no complications.       Anesthesia type: general..No  Staffing  Performed: Anesthesiologist   Anesthesiologist: Corrie Sepulveda MD  Preanesthetic Checklist  Completed: patient identified, IV checked, site marked, risks and benefits discussed, surgical consent, monitors and equipment checked, pre-op evaluation, timeout performed, anesthesia consent given, oxygen available and patient being monitored

## 2022-03-23 NOTE — BRIEF OP NOTE
Brief Postoperative Note    Sayda Traore  YOB: 1960  [id number]    Pre-operative Diagnosis: severe mitral regurgitation, streptococcus bacteremia, mitral valve endocarditis, atrial fibrillation    Post-operative Diagnosis: Same    Operation: ANITRA, mitral valve repair (P2 triangular resection, P3/P3 perforation repair, 34mm Future band), MAZE procedure, 40mm Atriclip     Anesthesia: General    Surgeon: Flaquito Cox    Assistants: Lily Davis MD, Mabel LAMA, Roslyn Recinos NP    Estimated Blood Loss: [phone]    Complications: none apparent    Implants: 34mm Future band, 40mm Atriclip, [place] sternal plates x2

## 2022-03-23 NOTE — PROGRESS NOTES
Hospitalist Progress Note      Synopsis: Patient admitted on 114/2022 28-year-old male with no known past medical history except childhood murmur, went to the urgent care today for evaluation of fatigue and was found to have A. fib with RVR and was sent to the main hospital emergency room. Patient states his symptoms of fatigue were on and off since past 1 month. Denies any palpitations. However did report some exertional shortness of breath. Patient consulted, as per him, telemedicine doctor, who prescribed him ivermectin and hydroxychloroquine earlier this month for suspicion of COVID-19 infection. Patient is unvaccinated against Covid. Patient completed 5 days of ivermectin, however, did not finish 14-day course of hydroxychloroquine which he stopped about 3 days ago. Patient denies any recent fever, chills, cough, shortness of breath, chest pain, abdominal pain, nausea, vomiting, diarrhea constipation. No presyncopal or syncopal event. Upon arrival in the emergency room, patient was noted to be febrile with T-max 101.1 °F, WBC count slightly elevated at 12.2, CRP elevated at 5.9, proBNP elevated at 6000, lactate level elevated at 2.6, troponin elevated at 42, 43. Patient was started on Cardizem drip. Patient had CTA chest done which did not reveal acute PE. Blood cultures positive for strep mitis. Echocardiogram reveals severe mitral regurgitation with flail leaflet and ruptured chordae tendon line and concern for vegetation. Patient had left heart catheterization demonstrating no significant coronary artery disease.   Plan for valve replacement 3/23.       Subjective    Feeling good no complaints  No CP or SOB  No fever or chills   No uncontrolled pain  No vomiting or diarrhea   No events reported overnight     Exam:  /77   Pulse 88   Temp 98.1 °F (36.7 °C) (Oral)   Resp 18   Ht 5' 9\" (1.753 m)   Wt 179 lb 12.8 oz (81.6 kg)   SpO2 99%   BMI 26.55 kg/m²   General appearance: No apparent distress, appears stated age and cooperative. HEENT: Pupils equal, round, and reactive to light. Conjunctivae/corneas clear. Neck: Supple. No jugular venous distention. Trachea midline. Respiratory:  Normal respiratory effort. Clear to auscultation, bilaterally without Rales/Wheezes/Rhonchi. Cardiovascular: Regular rate and rhythm with normal S1/S2 +3 of 6 EARNESTINE murmurs, rubs or gallops. Abdomen: Soft, non-tender, non-distended with normal bowel sounds. Musculoskeletal: No clubbing, cyanosis or edema bilaterally. Brisk capillary refill. 2+ lower extremity pulses (dorsalis pedis).    Skin:  No rashes    Neurologic: awake, alert and following commands     Medications:  Reviewed    Infusion Medications    sodium chloride      sodium chloride 25 mL (03/22/22 0428)    dilTIAZem 10 mg/hr (03/23/22 0517)     Scheduled Medications    sodium chloride flush  5-40 mL IntraVENous 2 times per day    ceFAZolin (ANCEF) IVPB  2,000 mg IntraVENous See Admin Instructions    mupirocin   Nasal BID    chlorhexidine   Topical See Admin Instructions    psyllium  1 packet Oral Daily    sodium chloride flush  5-40 mL IntraVENous 2 times per day    heparin flush  3 mL IntraVENous 2 times per day    ampicillin IV  2,000 mg IntraVENous 6 times per day    atorvastatin  40 mg Oral Nightly     PRN Meds: sodium chloride flush, sodium chloride, sodium chloride flush, sodium chloride, heparin flush, ondansetron **OR** ondansetron, polyethylene glycol, acetaminophen **OR** acetaminophen, potassium chloride, magnesium sulfate, perflutren lipid microspheres    I/O    Intake/Output Summary (Last 24 hours) at 3/23/2022 0915  Last data filed at 3/23/2022 0525  Gross per 24 hour   Intake 670 ml   Output 1400 ml   Net -730 ml       Labs:   Recent Labs     03/21/22  0500 03/22/22  1104   WBC 9.3 10.0   HGB 9.9* 10.3*   HCT 32.1* 33.0*    251       Recent Labs     03/21/22  0500 03/22/22  1104    137   K 3.9 4.5   CL 99 104   CO2 28 25   BUN 13 18   CREATININE 1.1 1.3*   CALCIUM 8.3* 8.8       Recent Labs     03/22/22  1104   PROT 6.4   ALKPHOS 168*   ALT 99*   AST 57*   BILITOT 0.5       Recent Labs     03/22/22  1104   INR 1.3       No results for input(s): CKTOTAL, TROPONINI in the last 72 hours. Chronic labs:  Lab Results   Component Value Date    CHOL 133 03/15/2022    TRIG 115 03/15/2022    HDL 20 03/15/2022    LDLCALC 90 03/15/2022    TSH 1.880 03/14/2022    INR 1.3 03/22/2022    LABA1C 5.6 03/15/2022       Radiology:  Imaging studies reviewed today. ASSESSMENT:    Principal Problem:    Atrial fibrillation with rapid ventricular response (HCC)  Active Problems:    Sepsis ([address] present on admission    JANA (acute kidney injury) ([address]    Bacteremia    Severe mitral regurgitation    Suspected endocarditis    Subacute bacterial endocarditis    Ruptured chordae tendineae (HCC)    Mitral valve disease    Paroxysmal atrial fibrillation (HCC)    Acute diastolic (congestive) heart failure ([address]  Resolved Problems:    * No resolved hospital problems.  *       PLAN:    Atrial fibrillation RVR   admit to intermediate care  Monitor on telemetry  Rate control diltiazem drip--> Toprol-XL  Anticoagulation Lovenox  Cardiology Consult appreciated discussed case    Sepsis, bacteremia, suspected endocarditis  Fever 101.1 on admission, then afebrile   Treated cefepime and vancomycin-> vancomycin--> ampicillin  Echocardiogram with partial flail mitral valve, ruptured chordae and likely vegetation  ANITRA 3/16 confirming severe MR with flail leaflet and ruptured chordae possible small vegetation  Urine culture  Blood cultures 2 of 2+ GPC in chains--alpha streptococcus-strep mitis/oralis  CRP 5.9  Procalcitonin 0.31, repeat 0.23  Infectious disease Consult appreciated-discussed case    Mitral regurgitation-severe  ANITRA flail leaflet, ruptured chordae, sever MR, suspected vegitation   [facility] 3/18 without evidence of significant CAD  CTS Consult appreciated possibly valve repair/replacement Wednesday 3/23  Dental consult appreciated 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  +++++++++++++++++++++++++++++++++++++++++++++++++  NOTE: This report was transcribed using voice recognition software. Every effort was made to ensure accuracy; however, inadvertent computerized transcription errors may be present.

## 2022-03-23 NOTE — PROGRESS NOTES
CVICU Admission Note    Name: Alisa Mesa  MRN: [id number]    CC: Postoperative Critical Care Management     Indication for Surgery/Procedure: severe mitral regurgitation, streptococcus bacteremia, mitral valve endocarditis, atrial fibrillation  LVEF:  40% preop, 30% post CPB    RVF:  Dilated, normal     Important/Relevant PMH/PSH: HFpEF, elevated liver enzymes,     Procedure/Surgeries: 3/23/2022 ANITRA, mitral valve repair (P2 triangular resection, P3/P3 perforation repair, 34mm Future band), MAZE procedure, 40mm Atriclip     Pacing wires: Ventricular       Physical Exam:    /77   Pulse 88   Temp 98.1 °F (36.7 °C) (Oral)   Resp 18   Ht 5' 9\" (1.753 m)   Wt 179 lb 12.8 oz (81.6 kg)   SpO2 99%   BMI 26.55 kg/m²     Recent Labs     03/22/22  1104 03/23/22  1509 03/23/22  1741   WBC 10.0  --   --    RBC 3.74*  --   --    HGB 10.3*  --   --    HCT 33.0*   < > 28.0*   MCV 88.2  --   --    MCH 27.5  --   --    MCHC 31.2*  --   --    RDW 14.7  --   --      --   --    MPV 10.7  --   --     < > = values in this interval not displayed. Recent Labs     03/22/22  1104 03/23/22  1509 03/23/22  1741     --   --    K 4.5   < > 4.5     --   --    CO2 25  --   --    BUN 18  --   --    CREATININE 1.3*   < > 1.2   GLUCOSE 92  --   --    CALCIUM 8.8  --   --     < > = values in this interval not displayed. Post operative CXR:      Atelectasis, No pneumothorax noted, Mildly increased vascular markings bilateral, No significant pleural effusion. ETT/lines/drains appear to be in proper position. Final Radiology report pending. General Appearance: Pt arrived to ICU intubated, sedated. On epinephrine gtt for hemodynamic support. Eyes: PERRL  Pulmonary: Diminished bibasilar.   No wheezes, no accessory muscle use noted   Ventilator: Mode: AC/VC, 60% FiO2, 5 PEEP, 500 Vt   Cardiovascular: RRR, no heaves or thrills palpated   Telemetry: SR 80s   Abdomen: Soft, OG to LIWS   Extremities: BLE +DP/PT signals, no edema   Neurologic/Psych: Sedation  Skin: Warm and dry   Incision: MSI TASHA intact, Right groin C/D/I       Assessment/Plan: Day of Surgery     1. Severe MR, MV Endocarditis S/p ANITRA, mitral valve repair (P2 triangular resection, P3/P3 perforation repair, 34mm Future band), MAZE procedure, 40mm Atriclip   - ASA, Lipitor  - streptococcus bacteremia; MV Endocarditis-- Antibiotics per ID   - Monitor chest tube output and hemodynamics closely    2. Acute Pulmonary Insufficiency Following Surgery    - Expected 2/2 surgery  - Intubated on ventilator  - Wean vent settings and extubate patient once awake, following commands, HEATH, and no signs of bleeding  - ABGs per protocol and PRN     3. Acute Post Operative Pain   - PRN fentanyl for pain management until able to take PO     4. Cardiac Insufficiency  - EF 30% post CPB, requiring epinephrine gtt for hemodynamic support  - IVF resuscitation PRN, was on levophed gtt intraop, weaned off prior to arrival in ICU, restart if needed to maintain MAP >65     5. Renal Insufficiency  - Sr 1.3 preop, baseline 1.0-1.3  - Monitor labs, UOP, avoid hypotension     6. Transaminitis  - elevated AST/ALT preop, will trend x2 post op     7.  Stress hyperglycemia  - no h/o of DM  - RHI gtt for BG >180 per protocol       Electronically signed by ROX Naidu - CNP on 3/23/2022 at 6:02 PM

## 2022-03-24 ENCOUNTER — APPOINTMENT (OUTPATIENT)
Dept: GENERAL RADIOLOGY | Age: 62
DRG: 853 | End: 2022-03-24
Payer: COMMERCIAL

## 2022-03-24 LAB
ALBUMIN SERPL-MCNC: 3.1 G/DL (ref 3.5–5.2)
ALP BLD-CCNC: 95 U/L (ref 40–129)
ALT SERPL-CCNC: 59 U/L (ref 0–40)
ANION GAP SERPL CALCULATED.3IONS-SCNC: 9 MMOL/L (ref 7–16)
AST SERPL-CCNC: 153 U/L (ref 0–39)
B.E.: -7.7 MMOL/L (ref -3–3)
BILIRUB SERPL-MCNC: 0.4 MG/DL (ref 0–1.2)
BUN BLDV-MCNC: 19 MG/DL (ref 6–23)
CALCIUM IONIZED: 1.28 MMOL/L (ref 1.15–1.33)
CALCIUM SERPL-MCNC: 8.3 MG/DL (ref 8.6–10.2)
CHLORIDE BLD-SCNC: 108 MMOL/L (ref 98–107)
CO2: 22 MMOL/L (ref 22–29)
COHB: 0.1 % (ref 0–1.5)
CREAT SERPL-MCNC: 1.1 MG/DL (ref 0.7–1.2)
CRITICAL: ABNORMAL
DATE ANALYZED: ABNORMAL
DATE OF COLLECTION: ABNORMAL
GFR AFRICAN AMERICAN: >60
GFR NON-AFRICAN AMERICAN: >60 ML/MIN/1.73
GLUCOSE BLD-MCNC: 105 MG/DL (ref 74–99)
GRAM STAIN ORDERABLE: NORMAL
HCO3: 16.7 MMOL/L (ref 22–26)
HCT VFR BLD CALC: 22.4 % (ref 37–54)
HCT VFR BLD CALC: 24.2 % (ref 37–54)
HCT VFR BLD CALC: 24.2 % (ref 37–54)
HEMOGLOBIN: 7.1 G/DL (ref 12.5–16.5)
HEMOGLOBIN: 7.6 G/DL (ref 12.5–16.5)
HEMOGLOBIN: 7.7 G/DL (ref 12.5–16.5)
HHB: 4 % (ref 0–5)
LAB: ABNORMAL
Lab: ABNORMAL
MAGNESIUM: 2.5 MG/DL (ref 1.6–2.6)
MCH RBC QN AUTO: 27.7 PG (ref 26–35)
MCHC RBC AUTO-ENTMCNC: 31.4 % (ref 32–34.5)
MCV RBC AUTO: 88.3 FL (ref 80–99.9)
METER GLUCOSE: 103 MG/DL (ref 74–99)
METER GLUCOSE: 107 MG/DL (ref 74–99)
METER GLUCOSE: 111 MG/DL (ref 74–99)
METER GLUCOSE: 113 MG/DL (ref 74–99)
METER GLUCOSE: 140 MG/DL (ref 74–99)
METER GLUCOSE: 145 MG/DL (ref 74–99)
METER GLUCOSE: 150 MG/DL (ref 74–99)
METER GLUCOSE: 164 MG/DL (ref 74–99)
METER GLUCOSE: 191 MG/DL (ref 74–99)
METER GLUCOSE: 91 MG/DL (ref 74–99)
METHB: 0.5 % (ref 0–1.5)
MODE: ABNORMAL
O2 SATURATION: 96 % (ref 92–98.5)
O2HB: 95.4 % (ref 94–97)
OPERATOR ID: 1721
PATIENT TEMP: 37 C
PCO2: 29.8 MMHG (ref 35–45)
PDW BLD-RTO: 15.4 FL (ref 11.5–15)
PH BLOOD GAS: 7.37 (ref 7.35–7.45)
PLATELET # BLD: 185 E9/L (ref 130–450)
PMV BLD AUTO: 11.1 FL (ref 7–12)
PO2: 97.6 MMHG (ref 75–100)
POTASSIUM SERPL-SCNC: 3.98 MMOL/L (ref 3.5–5)
POTASSIUM SERPL-SCNC: 4.2 MMOL/L (ref 3.5–5)
POTASSIUM SERPL-SCNC: 4.6 MMOL/L (ref 3.5–5)
RBC # BLD: 2.74 E12/L (ref 3.8–5.8)
SODIUM BLD-SCNC: 139 MMOL/L (ref 132–146)
SOURCE, BLOOD GAS: ABNORMAL
THB: 9.5 G/DL (ref 11.5–16.5)
TIME ANALYZED: 18
TOTAL PROTEIN: 5.6 G/DL (ref 6.4–8.3)
WBC # BLD: 16.8 E9/L (ref 4.5–11.5)

## 2022-03-24 PROCEDURE — 6370000000 HC RX 637 (ALT 250 FOR IP): Performed by: NURSE PRACTITIONER

## 2022-03-24 PROCEDURE — 6360000002 HC RX W HCPCS: Performed by: NURSE PRACTITIONER

## 2022-03-24 PROCEDURE — S5553 INSULIN LONG ACTING 5 U: HCPCS | Performed by: NURSE PRACTITIONER

## 2022-03-24 PROCEDURE — 82330 ASSAY OF CALCIUM: CPT

## 2022-03-24 PROCEDURE — 85014 HEMATOCRIT: CPT

## 2022-03-24 PROCEDURE — 2580000003 HC RX 258: Performed by: NURSE PRACTITIONER

## 2022-03-24 PROCEDURE — 99233 SBSQ HOSP IP/OBS HIGH 50: CPT | Performed by: INTERNAL MEDICINE

## 2022-03-24 PROCEDURE — P9047 ALBUMIN (HUMAN), 25%, 50ML: HCPCS | Performed by: STUDENT IN AN ORGANIZED HEALTH CARE EDUCATION/TRAINING PROGRAM

## 2022-03-24 PROCEDURE — 85018 HEMOGLOBIN: CPT

## 2022-03-24 PROCEDURE — 82962 GLUCOSE BLOOD TEST: CPT

## 2022-03-24 PROCEDURE — 94640 AIRWAY INHALATION TREATMENT: CPT

## 2022-03-24 PROCEDURE — 6360000002 HC RX W HCPCS: Performed by: STUDENT IN AN ORGANIZED HEALTH CARE EDUCATION/TRAINING PROGRAM

## 2022-03-24 PROCEDURE — 83735 ASSAY OF MAGNESIUM: CPT

## 2022-03-24 PROCEDURE — 2700000000 HC OXYGEN THERAPY PER DAY

## 2022-03-24 PROCEDURE — 82805 BLOOD GASES W/O2 SATURATION: CPT

## 2022-03-24 PROCEDURE — 99233 SBSQ HOSP IP/OBS HIGH 50: CPT | Performed by: NURSE PRACTITIONER

## 2022-03-24 PROCEDURE — 85027 COMPLETE CBC AUTOMATED: CPT

## 2022-03-24 PROCEDURE — 97162 PT EVAL MOD COMPLEX 30 MIN: CPT

## 2022-03-24 PROCEDURE — 84132 ASSAY OF SERUM POTASSIUM: CPT

## 2022-03-24 PROCEDURE — 97166 OT EVAL MOD COMPLEX 45 MIN: CPT

## 2022-03-24 PROCEDURE — 80053 COMPREHEN METABOLIC PANEL: CPT

## 2022-03-24 PROCEDURE — 97530 THERAPEUTIC ACTIVITIES: CPT

## 2022-03-24 PROCEDURE — 2000000000 HC ICU R&B

## 2022-03-24 PROCEDURE — 36415 COLL VENOUS BLD VENIPUNCTURE: CPT

## 2022-03-24 PROCEDURE — 36430 TRANSFUSION BLD/BLD COMPNT: CPT

## 2022-03-24 PROCEDURE — P9045 ALBUMIN (HUMAN), 5%, 250 ML: HCPCS | Performed by: NURSE PRACTITIONER

## 2022-03-24 PROCEDURE — 37799 UNLISTED PX VASCULAR SURGERY: CPT

## 2022-03-24 PROCEDURE — 71045 X-RAY EXAM CHEST 1 VIEW: CPT

## 2022-03-24 RX ORDER — FUROSEMIDE 10 MG/ML
20 INJECTION INTRAMUSCULAR; INTRAVENOUS ONCE
Status: COMPLETED | OUTPATIENT
Start: 2022-03-24 | End: 2022-03-24

## 2022-03-24 RX ORDER — SODIUM CHLORIDE 9 MG/ML
INJECTION, SOLUTION INTRAVENOUS PRN
Status: DISCONTINUED | OUTPATIENT
Start: 2022-03-24 | End: 2022-03-25

## 2022-03-24 RX ORDER — ALBUMIN (HUMAN) 12.5 G/50ML
25 SOLUTION INTRAVENOUS ONCE
Status: COMPLETED | OUTPATIENT
Start: 2022-03-24 | End: 2022-03-24

## 2022-03-24 RX ADMIN — INSULIN LISPRO 2 UNITS: 100 INJECTION, SOLUTION INTRAVENOUS; SUBCUTANEOUS at 20:21

## 2022-03-24 RX ADMIN — SENNOSIDES AND DOCUSATE SODIUM 1 TABLET: 50; 8.6 TABLET ORAL at 08:53

## 2022-03-24 RX ADMIN — IPRATROPIUM BROMIDE AND ALBUTEROL SULFATE 1 AMPULE: .5; 2.5 SOLUTION RESPIRATORY (INHALATION) at 15:15

## 2022-03-24 RX ADMIN — SODIUM CHLORIDE, PRESERVATIVE FREE 10 ML: 5 INJECTION INTRAVENOUS at 19:57

## 2022-03-24 RX ADMIN — SENNOSIDES AND DOCUSATE SODIUM 1 TABLET: 50; 8.6 TABLET ORAL at 19:57

## 2022-03-24 RX ADMIN — OXYCODONE HYDROCHLORIDE 10 MG: 10 TABLET ORAL at 02:25

## 2022-03-24 RX ADMIN — ATORVASTATIN CALCIUM 40 MG: 40 TABLET, FILM COATED ORAL at 19:57

## 2022-03-24 RX ADMIN — IPRATROPIUM BROMIDE AND ALBUTEROL SULFATE 1 AMPULE: .5; 2.5 SOLUTION RESPIRATORY (INHALATION) at 19:41

## 2022-03-24 RX ADMIN — OXYCODONE HYDROCHLORIDE 5 MG: 5 TABLET ORAL at 22:38

## 2022-03-24 RX ADMIN — ALBUMIN (HUMAN) 25 G: 0.25 INJECTION, SOLUTION INTRAVENOUS at 21:37

## 2022-03-24 RX ADMIN — ASPIRIN 81 MG: 81 TABLET, COATED ORAL at 08:52

## 2022-03-24 RX ADMIN — OXYCODONE HYDROCHLORIDE 10 MG: 10 TABLET ORAL at 18:43

## 2022-03-24 RX ADMIN — INSULIN LISPRO 3 UNITS: 100 INJECTION, SOLUTION INTRAVENOUS; SUBCUTANEOUS at 12:12

## 2022-03-24 RX ADMIN — IPRATROPIUM BROMIDE AND ALBUTEROL SULFATE 1 AMPULE: .5; 2.5 SOLUTION RESPIRATORY (INHALATION) at 11:24

## 2022-03-24 RX ADMIN — ACETAMINOPHEN 650 MG: 325 TABLET ORAL at 22:37

## 2022-03-24 RX ADMIN — FENTANYL CITRATE 25 MCG: 50 INJECTION, SOLUTION INTRAMUSCULAR; INTRAVENOUS at 17:27

## 2022-03-24 RX ADMIN — AMPICILLIN SODIUM 2000 MG: 2 INJECTION, POWDER, FOR SOLUTION INTRAVENOUS at 22:38

## 2022-03-24 RX ADMIN — SODIUM CHLORIDE, PRESERVATIVE FREE 10 ML: 5 INJECTION INTRAVENOUS at 08:53

## 2022-03-24 RX ADMIN — AMPICILLIN SODIUM 2000 MG: 2 INJECTION, POWDER, FOR SOLUTION INTRAVENOUS at 10:56

## 2022-03-24 RX ADMIN — ALBUMIN (HUMAN) 12.5 G: 12.5 INJECTION, SOLUTION INTRAVENOUS at 01:29

## 2022-03-24 RX ADMIN — INSULIN GLARGINE-YFGN 12 UNITS: 100 INJECTION, SOLUTION SUBCUTANEOUS at 20:21

## 2022-03-24 RX ADMIN — FENTANYL CITRATE 25 MCG: 50 INJECTION, SOLUTION INTRAMUSCULAR; INTRAVENOUS at 00:19

## 2022-03-24 RX ADMIN — AMPICILLIN SODIUM 2000 MG: 2 INJECTION, POWDER, FOR SOLUTION INTRAVENOUS at 06:25

## 2022-03-24 RX ADMIN — Medication 400 MG: at 08:52

## 2022-03-24 RX ADMIN — INSULIN LISPRO 3 UNITS: 100 INJECTION, SOLUTION INTRAVENOUS; SUBCUTANEOUS at 17:40

## 2022-03-24 RX ADMIN — OXYCODONE HYDROCHLORIDE 10 MG: 10 TABLET ORAL at 14:42

## 2022-03-24 RX ADMIN — TAMSULOSIN HYDROCHLORIDE 0.4 MG: 0.4 CAPSULE ORAL at 08:52

## 2022-03-24 RX ADMIN — MUPIROCIN: 20 OINTMENT TOPICAL at 08:52

## 2022-03-24 RX ADMIN — AMPICILLIN SODIUM 2000 MG: 2 INJECTION, POWDER, FOR SOLUTION INTRAVENOUS at 03:15

## 2022-03-24 RX ADMIN — PANTOPRAZOLE SODIUM 40 MG: 40 TABLET, DELAYED RELEASE ORAL at 08:53

## 2022-03-24 RX ADMIN — ACETAMINOPHEN 650 MG: 325 TABLET ORAL at 17:27

## 2022-03-24 RX ADMIN — OXYCODONE HYDROCHLORIDE 5 MG: 5 TABLET ORAL at 06:39

## 2022-03-24 RX ADMIN — FUROSEMIDE 20 MG: 10 INJECTION, SOLUTION INTRAMUSCULAR; INTRAVENOUS at 08:53

## 2022-03-24 RX ADMIN — ACETAMINOPHEN 650 MG: 325 TABLET ORAL at 06:39

## 2022-03-24 RX ADMIN — AMPICILLIN SODIUM 2000 MG: 2 INJECTION, POWDER, FOR SOLUTION INTRAVENOUS at 14:32

## 2022-03-24 RX ADMIN — MUPIROCIN: 20 OINTMENT TOPICAL at 19:58

## 2022-03-24 RX ADMIN — EPINEPHRINE 3 MCG/MIN: 1 INJECTION INTRAMUSCULAR; INTRAVENOUS; SUBCUTANEOUS at 04:43

## 2022-03-24 RX ADMIN — IPRATROPIUM BROMIDE AND ALBUTEROL SULFATE 1 AMPULE: .5; 2.5 SOLUTION RESPIRATORY (INHALATION) at 07:49

## 2022-03-24 RX ADMIN — AMPICILLIN SODIUM 2000 MG: 2 INJECTION, POWDER, FOR SOLUTION INTRAVENOUS at 18:44

## 2022-03-24 RX ADMIN — FENTANYL CITRATE 25 MCG: 50 INJECTION, SOLUTION INTRAMUSCULAR; INTRAVENOUS at 01:28

## 2022-03-24 ASSESSMENT — PAIN DESCRIPTION - ONSET
ONSET: GRADUAL
ONSET: ON-GOING
ONSET: ON-GOING
ONSET: GRADUAL
ONSET: ON-GOING
ONSET: GRADUAL
ONSET: GRADUAL

## 2022-03-24 ASSESSMENT — PAIN DESCRIPTION - LOCATION
LOCATION: CHEST

## 2022-03-24 ASSESSMENT — PAIN DESCRIPTION - DESCRIPTORS
DESCRIPTORS: ACHING;CONSTANT;DISCOMFORT
DESCRIPTORS: ACHING;CONSTANT
DESCRIPTORS: ACHING;CONSTANT;DISCOMFORT
DESCRIPTORS: ACHING;CONSTANT;DISCOMFORT

## 2022-03-24 ASSESSMENT — PAIN DESCRIPTION - PAIN TYPE
TYPE: SURGICAL PAIN

## 2022-03-24 ASSESSMENT — PAIN DESCRIPTION - FREQUENCY
FREQUENCY: INTERMITTENT
FREQUENCY: CONTINUOUS
FREQUENCY: INTERMITTENT
FREQUENCY: INTERMITTENT

## 2022-03-24 ASSESSMENT — PAIN SCALES - GENERAL
PAINLEVEL_OUTOF10: 6
PAINLEVEL_OUTOF10: 4
PAINLEVEL_OUTOF10: 6
PAINLEVEL_OUTOF10: 8
PAINLEVEL_OUTOF10: 2
PAINLEVEL_OUTOF10: 0
PAINLEVEL_OUTOF10: 0
PAINLEVEL_OUTOF10: 3
PAINLEVEL_OUTOF10: 4
PAINLEVEL_OUTOF10: 2
PAINLEVEL_OUTOF10: 2
PAINLEVEL_OUTOF10: 9
PAINLEVEL_OUTOF10: 5
PAINLEVEL_OUTOF10: 0
PAINLEVEL_OUTOF10: 0
PAINLEVEL_OUTOF10: 5
PAINLEVEL_OUTOF10: 0
PAINLEVEL_OUTOF10: 0
PAINLEVEL_OUTOF10: 9
PAINLEVEL_OUTOF10: 0
PAINLEVEL_OUTOF10: 3
PAINLEVEL_OUTOF10: 5

## 2022-03-24 ASSESSMENT — PAIN DESCRIPTION - PROGRESSION
CLINICAL_PROGRESSION: GRADUALLY IMPROVING
CLINICAL_PROGRESSION: GRADUALLY WORSENING

## 2022-03-24 ASSESSMENT — PAIN - FUNCTIONAL ASSESSMENT
PAIN_FUNCTIONAL_ASSESSMENT: PREVENTS OR INTERFERES SOME ACTIVE ACTIVITIES AND ADLS
PAIN_FUNCTIONAL_ASSESSMENT: PREVENTS OR INTERFERES WITH MANY ACTIVE NOT PASSIVE ACTIVITIES

## 2022-03-24 ASSESSMENT — PAIN DESCRIPTION - ORIENTATION
ORIENTATION: MID

## 2022-03-24 NOTE — PROGRESS NOTES
Department of Internal Medicine  Infectious Diseases  Progress  Note      C/C : Streptococcus bacteremia , fever     Denied fever , chills   Reports chest pain   Afebrile     Current Facility-Administered Medications   Medication Dose Route Frequency Provider Last Rate Last Admin    0.9 % sodium chloride infusion   IntraVENous PRN ROX Terrell - CNP        0.9 % sodium chloride infusion  30 mL/hr IntraVENous Continuous Hulon Shana, APRN - CNP 30 mL/hr at 03/23/22 1820 30 mL/hr at 03/23/22 1820    sodium chloride flush 0.9 % injection 10 mL  10 mL IntraVENous 2 times per day Hulon Binning, APRN - CNP   10 mL at 03/24/22 0853    sodium chloride flush 0.9 % injection 10 mL  10 mL IntraVENous PRN Hulon Binning, APRN - CNP        0.9 % sodium chloride infusion  25 mL IntraVENous PRN Hulon Binning, APRN - CNP        ondansetron (ZOFRAN-ODT) disintegrating tablet 4 mg  4 mg Oral Q8H PRN Hulon Binning, APRN - CNP        Or    ondansetron (ZOFRAN) injection 4 mg  4 mg IntraVENous Q6H PRN Hulon Binning, APRN - CNP        aspirin EC tablet 81 mg  81 mg Oral Daily Hulon Binning, APRN - CNP   81 mg at 03/24/22 4476    acetaminophen (TYLENOL) tablet 650 mg  650 mg Oral Q4H PRN Hulon Binning, APRN - CNP   650 mg at 03/24/22 1240    oxyCODONE (ROXICODONE) immediate release tablet 5 mg  5 mg Oral Q4H PRN Hulon Binning, APRN - CNP   5 mg at 03/24/22 8122    Or    oxyCODONE HCl (OXY-IR) immediate release tablet 10 mg  10 mg Oral Q4H PRN Hulon Binning, APRN - CNP   10 mg at 03/24/22 0225    fentaNYL (SUBLIMAZE) injection 25 mcg  25 mcg IntraVENous Q1H PRN Hulon Binning, APRN - CNP   25 mcg at 03/24/22 0128    Or    fentaNYL (SUBLIMAZE) injection 50 mcg  50 mcg IntraVENous Q1H PRN Hulon Binning, APRN - CNP   50 mcg at 03/23/22 2116    magnesium oxide (MAG-OX) tablet 400 mg  400 mg Oral Daily Hulon Binning, APRN - CNP   400 mg at 03/24/22 2639    mupirocin (BACTROBAN) 2 % ointment   Nasal BID Dixie Jarquin, APRN - CNP   Given at 03/24/22 7660    sennosides-docusate sodium (SENOKOT-S) 8.6-50 MG tablet 1 tablet  1 tablet Oral BID Dixie Jarquin, APRN - CNP   1 tablet at 03/24/22 4359    magnesium hydroxide (MILK OF MAGNESIA) 400 MG/5ML suspension 30 mL  30 mL Oral Daily PRN Dixie Jarquin, APRN - CNP        bisacodyl (DULCOLAX) suppository 10 mg  10 mg Rectal Daily PRN Dixie Jarquin, APRN - CNP        pantoprazole (PROTONIX) tablet 40 mg  40 mg Oral Daily Dixie Jarquin, APRN - CNP   40 mg at 03/24/22 0853    potassium chloride 20 mEq/50 mL IVPB (Central Line)  20 mEq IntraVENous PRN Dixie Jarquin, APRN - CNP        magnesium sulfate 2000 mg in 50 mL IVPB premix  2,000 mg IntraVENous PRN Dixie Jarquin, APRN - CNP        calcium chloride 1,000 mg in sodium chloride 0.9 % 100 mL IVPB  1,000 mg IntraVENous PRN Dixie Jarquin, APRN - CNP        Or    calcium chloride 2,000 mg in sodium chloride 0.9 % 100 mL IVPB  2,000 mg IntraVENous PRN Dixie Jarquin, APRN - CNP        ipratropium-albuterol (DUONEB) nebulizer solution 1 ampule  1 ampule Inhalation Q4H WA Dixie Jarquin, APRN - CNP   1 ampule at 03/24/22 0749    albumin human 5 % IV solution 25 g  25 g IntraVENous PRN Dixie Jarquin, APRN - CNP   Stopped at 03/24/22 0146    0.9 % sodium chloride bolus  250 mL IntraVENous Continuous PRN Dixie Jarquin, APRN - CNP        norepinephrine (LEVOPHED) 16 mg in dextrose 5% 250 mL infusion (non-weight-based)  1-100 mcg/min IntraVENous Continuous PRN Dixie Jarquin, APRN - CNP        insulin glargine-yfgn (SEMGLEE-YFGN) injection vial 12 Units  0.15 Units/kg SubCUTAneous Nightly Dixie Jarquin, APRN - CNP        glucose (GLUTOSE) 40 % oral gel 15 g  15 g Oral PRN Dixie Jarquin, APRN - CNP        dextrose 50 % IV solution  12.5 g IntraVENous PRN ROX Mcgill CNP        glucagon (rDNA) injection 1 mg  1 mg IntraMUSCular PRN ROX Mcgill CNP        dextrose 5 % solution  100 mL/hr IntraVENous PRN Jose A Sep, APRN - CNP        acetaminophen (TYLENOL) suppository 650 mg  650 mg Rectal Q4H PRN Jose A Sep, APRN - CNP        insulin lispro (HUMALOG) injection vial 0-3 Units  0-3 Units SubCUTAneous Q4H Jose A Sep, APRN - CNP        tamsulosin (FLOMAX) capsule 0.4 mg  0.4 mg Oral Daily Jose A Sep, APRN - CNP   0.4 mg at 03/24/22 4953    EPINEPHrine (EPINEPHrine HCL) 5 mg in dextrose 5 % 250 mL infusion  1-20 mcg/min IntraVENous Continuous Elly Rick, APRN - CNP   Stopped at 03/24/22 0700    ampicillin 2000 mg ivpb mini bag  2,000 mg IntraVENous 6 times per day Jose A Sep, APRN -  mL/hr at 03/24/22 1056 2,000 mg at 03/24/22 1056    atorvastatin (LIPITOR) tablet 40 mg  40 mg Oral Nightly Jose A Sep, APRN - CNP   40 mg at 03/22/22 2035         REVIEW OF SYSTEMS:    CONSTITUTIONAL:  Denies fever, chill or rigors. HEENT: denies blurring of vision or double vision, denies hearing problem  RESPIRATORY: Chest pain   CARDIOVASCULAR:  Denies palpitation  GASTROINTESTINAL:  Denies abdomen pain, diarrhea or constipation. GENITOURINARY:  Denies burning urination or frequency of urination  INTEGUMENT: denies wound , rash  HEMATOLOGIC/LYMPHATIC:  Denies lymph node swelling, gum bleeding or easy bruising. MUSCULOSKELETAL:  Denies leg pain , joint pain , joint swelling  NEUROLOGICAL:  Fatigue , weakness     PHYSICAL EXAM:      Vitals:     /60   Pulse 86   Temp 99.3 °F (37.4 °C) (Bladder)   Resp 17   Ht 5' 9\" (1.753 m)   Wt 194 lb 6.4 oz (88.2 kg)   SpO2 100%   BMI 28.71 kg/m²       General Appearance:    Awake, alert , no acute distress. Head:    Normocephalic, atraumatic   Eyes:    No pallor, no icterus,   Ears:    No obvious deformity or drainage.    Nose:   No nasal drainage   Throat:   Mucosa moist, multiple dental caries    Neck:   Supple, no lymphadenopathy   Lungs:     Clear to auscultation bilaterally, no wheeze    Heart:     Irregular, sternum stable Abdomen:     Soft, non-tender, bowel sounds present    Extremities:   No edema, no cyanosis    Pulses:   Dorsalis pedis palpable    Skin:   no rashes       Lab Results   Component Value Date    WBC 16.8 03/24/2022    RBC 2.74 03/24/2022    HGB 7.1 03/24/2022    HCT 22.4 03/24/2022    HCT 28.0 03/23/2022     03/24/2022    MCV 88.3 03/24/2022    MCH 27.7 03/24/2022    MCHC 31.4 03/24/2022    RDW 15.4 03/24/2022    LYMPHOPCT 20.0 03/21/2022    MONOPCT 5.8 03/21/2022    BASOPCT 0.5 03/21/2022    MONOSABS 0.54 03/21/2022    LYMPHSABS 1.86 03/21/2022    EOSABS 0.32 03/21/2022    BASOSABS 0.05 03/21/2022       CMP     Lab Results   Component Value Date     03/24/2022    K 4.6 03/24/2022    K 4.2 03/15/2022     03/24/2022    CO2 22 03/24/2022    BUN 19 03/24/2022    CREATININE 1.1 03/24/2022    GFRAA >60 03/24/2022    LABGLOM >60 03/24/2022    GLUCOSE 105 03/24/2022    PROT 5.6 03/24/2022    LABALBU 3.1 03/24/2022    CALCIUM 8.3 03/24/2022    BILITOT 0.4 03/24/2022    ALKPHOS 95 03/24/2022     03/24/2022    ALT 59 03/24/2022         Hepatic Function Panel:    Lab Results   Component Value Date    ALKPHOS 95 03/24/2022    ALT 59 03/24/2022     03/24/2022    PROT 5.6 03/24/2022    BILITOT 0.4 03/24/2022    BILIDIR 0.3 03/23/2022    IBILI 0.2 03/23/2022    LABALBU 3.1 03/24/2022       PT/INR:    Lab Results   Component Value Date    PROTIME 17.3 03/23/2022    INR 1.6 03/23/2022       TSH:    Lab Results   Component Value Date    TSH 1.880 03/14/2022       U/A:    Lab Results   Component Value Date    COLORU Yellow 03/14/2022    PHUR 5.0 03/14/2022    WBCUA 0-1 03/14/2022    RBCUA 0-1 03/14/2022    BACTERIA MODERATE 03/14/2022    CLARITYU Clear 03/14/2022    SPECGRAV 1.025 03/14/2022    LEUKOCYTESUR Negative 03/14/2022    UROBILINOGEN 4.0 03/14/2022    BILIRUBINUR Negative 03/14/2022    BLOODU Negative 03/14/2022    GLUCOSEU Negative 03/14/2022    AMORPHOUS FEW 03/14/2022       ABG:  No results found for: SJF0SCA, BEART, N6UXNFGY, PHART, THGBART, WGV6JUV, PO2ART, NLJ6AGA    MICROBIOLOGY:    Blood culture -     Streptococcus mitis / Streptococcus oralis (1)    Antibiotic Interpretation Microscan  Method Status    ampicillin Sensitive <=^0.25 mcg/mL BACTERIAL SUSCEPTIBILITY PANEL BY LANEY     benzylpenicillin Sensitive <=^0.06 mcg/mL BACTERIAL SUSCEPTIBILITY PANEL BY LANEY     cefotaxime Sensitive <=^0.12 mcg/mL BACTERIAL SUSCEPTIBILITY PANEL BY LANEY     cefTRIAXone Sensitive <=^0.12 mcg/mL BACTERIAL SUSCEPTIBILITY PANEL BY LANEY     clindamycin Sensitive <=^0.25 mcg/mL BACTERIAL SUSCEPTIBILITY PANEL BY LANEY     levofloxacin Sensitive ^0.5 mcg/mL BACTERIAL SUSCEPTIBILITY PANEL BY LANEY     linezolid Sensitive <=^2 mcg/mL BACTERIAL SUSCEPTIBILITY PANEL BY LANEY     vancomycin Sensitive ^0.5 mcg/mL BACTERIAL SUSCEPTIBILITY PANEL BY LANEY             Radiology :    CTA chest       Impression:        No evidence of pulmonary embolism or acute pulmonary abnormality. Echo -       Ejection fraction is visually estimated at 55%. Severe holosystolic prolapse of the posterior leaflet with a flail P1/P2   segment due to ruptured chordae tendineae. Small mobile echodensity suspicious for vegetation on ventricular surface   of posterior leaflet. Failure of leaflet coaptation. No definitive papillary muscle rupture. Torrential mitral regurgitation, eccentric jet directed anteriorly. IMPRESSION:     1. Streptococcus bacteremia ( 3/14 and 3/15) ,mitral valve endocarditis ,follow up blood cx neg on ( 3/17) s/p mitral valve repair ( 3/23)    RECOMMENDATIONS:      1. Ampicillin 2 grams IV q 4 hrs  ( PCN LANEY <0.06 )    2.  Await tissue cx

## 2022-03-24 NOTE — PROGRESS NOTES
CVICU Progress Note    Name: Roseanna Noriega  MRN: [id number]    CC: Postoperative Critical Care Management      Indication for Surgery/Procedure: severe mitral regurgitation, streptococcus bacteremia, mitral valve endocarditis, atrial fibrillation  LVEF:  40% preop, 30% post CPB    RVF:  Dilated, normal      Important/Relevant PMH/PSH: HFpEF, elevated liver enzymes,      Procedure/Surgeries: 3/23/2022 ANITRA, mitral valve repair (P2 triangular resection, P3/P3 perforation repair, 34mm Future band), MAZE procedure, 40mm Atriclip      Pacing wires: Ventricular     Intake/Output Summary (Last 24 hours) at 3/24/2022 0757  Last data filed at 3/24/2022 0700  Gross per 24 hour   Intake 5088. 11 ml   Output 3190 ml   Net 1898.11 ml       Recent Labs     03/22/22  1104 03/23/22  1509 03/23/22  1741 03/23/22  1825 03/24/22  0445   WBC 10.0  --   --  39.0* 16.8*   HGB 10.3*  --   --  9.7* 7.6*   HCT 33.0*   < > 28.0* 31.4* 24.2*     --   --  263 185    < > = values in this interval not displayed. Lab Results   Component Value Date     03/24/2022    K 4.6 03/24/2022    K 4.2 03/15/2022     03/24/2022    CO2 22 03/24/2022    BUN 19 03/24/2022    CREATININE 1.1 03/24/2022    GLUCOSE 105 03/24/2022    CALCIUM 8.3 03/24/2022         Physical Exam:    /60   Pulse 81   Temp 99.3 °F (37.4 °C) (Bladder)   Resp 27   Ht 5' 9\" (1.753 m)   Wt 194 lb 6.4 oz (88.2 kg)   SpO2 99%   BMI 28.71 kg/m²       CXR Findings: 3/24/2022      FINDINGS:   There has been endotracheal and nasogastric extubation since the prior study. The other support lines are stable.  Postoperative changes are again noted. No new abnormal findings are present. --  CXR personally viewed and interpreted by ICU Nurse Practitioner, agree with above findings, +gastric bubble, scattered pulmonary edema      General: Awake, alert. Denies SOB, palpitations, denies nausea    Eyes: PERRL, anicteric   Pulmonary: Diminished bibasilar.  No wheezes, no accessory muscle use noted   Cardiovascular:  RRR, no heaves or thrills on palpation  Tele: SR  Abdomen: Soft, nontender, + BS   Extremities: BLE with +DP/PT signals   Neurologic/Psych: A&Ox3, HEATH to command   Skin: Warm and dry  Incisions: MSI with poonam dressing intact, right groin with stitch. Assessment/Plan: POD #1  1. Severe MR, MV Endocarditis S/p ANITRA, mitral valve repair (P2 triangular resection, P3/P3 perforation repair, 34mm Future band)  - ASA, Lipitor  - streptococcus bacteremia; MV Endocarditis-- Antibiotics per ID Ampicillin 2grams q4 hours    - MS chest tube x1 removed w/o difficulty, remains drains placed to bulb sxn--pt tolerated well   -PT/OT    2. Atrial fibrillation s/p MAZE procedure, 40mm Atriclip   -SR     3. Acute Pulmonary Insufficiency Following Surgery    - Expected 2/2 surgery  - CXR with pulmonary edema, atelectasis--Sats 100% on 2L NC, dose lasix, encourage IS, Ezpap per RT, OOBTC.      4. Acute blood loss anemia   -Hemoglobin 9.7-->7.6. No active s/s of bleeding. if becomes hypotensive will transfuse 1 prbc   -recheck H/H at 11am     5. Cardiac Insufficiency  - EF 30% post CPB, requiring epinephrine gtt for hemodynamic support  -Off Epi ~0645, SBP ~100. Hold off BB.   -plan for repeat echo prior to discharge      6. Renal Insufficiency  - Sr 1.3 preop, baseline 1.0-1.3  - Scr stable at 1.0. dose lasix. Monitor UOP and labs     7. Transaminitis  -elevated preop   - AST/ALT 59/153 down from yesterday 77/246.      8. Stress hyperglycemia  - no h/o of DM  - SSI     9. Acute Post Operative Pain   - PRN oxycodone     VTE Prophylaxis: Pharmacologic/Mechanical:  Yes, SCDs   Line infection prevention: Can CVC or arterial line be removed: No  Continued need for urinary catheter: Yes - clinical indication: Patient post major surgery requiring fluid balance and input and output measurement.     Dispo: CVICU, possible transfer out of ICU today     Electronically signed by Ricarda Smith APRN - CNP on 3/24/2022 at 7:57 AM

## 2022-03-24 NOTE — PROGRESS NOTES
Order to extubate reviewed by this RT. Patient ETT and oral cavity suctioned for no secretions.  balloon deflated and pt extubated to 3 lpm nasal cannula without complication.  Patient resting with stable vitals as follows:    HR = 95 bpm  F  = 16  BP = 108/50  SpO2  - 100%

## 2022-03-24 NOTE — PROGRESS NOTES
Physical Therapy  Physical Therapy Initial Assessment     Name: Castillo Anthony  : 1960  MRN: [id number]      Date of Service: 3/24/2022    Evaluating PT:  Eva Calderon, PT, DPT [id number]    Room #:  2239/3882-B  Diagnosis:  Atrial fibrillation with rapid ventricular response ([facility] [address] [I48.91]  Febrile illness [R50.9]  PMHx/PSHx:  CHF  Procedure/Surgery:  3/23 ANITRA, MV repair  Precautions:  Falls, Sternal, O2  Equipment Needs:  None    SUBJECTIVE:    Pt will be discharging to his sister's 2 story house with 2 steps to enter and no rail. 6+6 steps and 1 rail to bedroom. Pt ambulated without device and was independent PTA. OBJECTIVE:   Initial Evaluation  Date: 3/24/22 Treatment Short Term/ Long Term   Goals   AM-PAC 6 Clicks      Was pt agreeable to Eval/treatment?  Yes     Does pt have pain? 3-4/10 surgical pain     Bed Mobility  Rolling: NT  Supine to sit: ModA x 2 with HOB elevated  Sit to supine: NT  Scooting: ModA  Hazel   Transfers Sit to stand: Hazel  Stand to sit: ModA  Stand pivot: Hazel no device  Independent   Ambulation   A few steps to chair with Hazel no device  >400 feet Independently   Stair negotiation: ascended and descended NT  >10 steps with 1 rail Mod Independent   ROM BUE:  Defer to OT note  BLE:  WFL     Strength BUE:  Defer to OT note  BLE:  4+/5 grossly  Increase by 1/3 MMT grade   Balance Sitting EOB:  SBA  Dynamic Standing:  Hazel no device  Sitting EOB:  Independent  Dynamic Standing:  Independent     Pt is A & O x 4  CAM-ICU: NT  RASS: 0  Sensation:  No reported paresthesias  Edema:  None    Vitals:  Heart Rate at rest 84 bpm Heart Rate post session 84 bpm   SpO2 at rest 100% SpO2 post session 92%   Blood Pressure at rest 113/67 mmHg Blood Pressure post session 98/56 mmHg       Functional Status Score-Intensive Care Unit (FSS-ICU)   Rolling -/   Supine to sit transfer 2/7   Unsupported sitting  5/   Sit to stand transfers /   Ambulation    Total       Therapeutic Exercises:  NA    Patient education  Pt educated on safety    Patient response to education:   Pt verbalized understanding Pt demonstrated skill Pt requires further education in this area   x x x     ASSESSMENT:    Conditions Requiring Skilled Therapeutic Intervention:    []Decreased strength     []Decreased ROM  [x]Decreased functional mobility  [x]Decreased balance   [x]Decreased endurance   [x]Decreased posture  []Decreased sensation  []Decreased coordination   []Decreased vision  []Decreased safety awareness   [x]Increased pain       Comments:  NP reported pt was medically stable. Pt was in bed upon arrival, agreeable to initial evaluation. Pt was educated on sternal precautions and PLB prior to activity. Increased assistance provided for bed mobility due to deconditioning and precautions. Hypotension noted with upright activity but improved with increased time - pt was asymptomatic. Pt completed shuffled steps to chair. Further activity deferred due to labile BP. Pt was left in chair with all needs met and call light in reach. All lines remained intact. Pt's sister and friend present for session. Treatment:  Patient practiced and was instructed in the following treatment:     Bed mobility training - pt given verbal and tactile cues to facilitate proper sequencing and safety during supine>sit as well as provided with physical assistance.  Sitting EOB for >5 minutes for upright tolerance, postural awareness and BLE ROM   Transfer training - pt was given verbal and tactile cues to facilitate proper hand placement, technique and safety during sit to stand and stand to sit as well as provided with physical assistance.  Gait training- pt was given verbal and tactile cues to facilitate safety and balance during ambulation as well as provided with physical assistance. Pt's/ family goals   1. Return home    Prognosis is good for reaching above PT goals.     Patient and or family understand(s) diagnosis, prognosis, and plan of care. yes    PHYSICAL THERAPY PLAN OF CARE:    PT POC is established based on physician order and patient diagnosis     Referring provider/PT Order:  ROX Hill - CNP/03/24/22 0600 PT eval and treat  Diagnosis:  Atrial fibrillation with rapid ventricular response (HCC) [I48.91]  Febrile illness [R50.9]  Specific instructions for next treatment:  Progress activity    Current Treatment Recommendations:     [x] Strengthening to improve independence with functional mobility   [] ROM to improve independence with functional mobility   [x] Balance Training to improve static/dynamic balance and to reduce fall risk  [x] Endurance Training to improve activity tolerance during functional mobility   [x] Transfer Training to improve safety and independence with all functional transfers   [x] Gait Training to improve gait mechanics, endurance and asses need for appropriate assistive device  [x] Stair Training in preparation for safe discharge home and/or into the community   [] Positioning to prevent skin breakdown and contractures  [x] Safety and Education Training   [x] Patient/Caregiver Education   [] HEP  [] Other     PT long term treatment goals are located in above grid    Frequency of treatments: 2-5x/week x 1-2 weeks. Time in  0945  Time out  1010    Total Treatment Time  10 minutes     Evaluation Time includes thorough review of current medical information, gathering information on past medical history/social history and prior level of function, completion of standardized testing/informal observation of tasks, assessment of data and education on plan of care and goals.     CPT codes:  [] Low Complexity PT evaluation 71649  [x] Moderate Complexity PT evaluation 34627  [] High Complexity PT evaluation 90975  [] PT Re-evaluation 11579  [] Gait training 97338 - minutes  [] Manual therapy 61652 - minutes  [x] Therapeutic activities 95324 10 minutes  [] Therapeutic exercises 73263 - minutes  [] Neuromuscular reeducation 14616 - minutes     Dipika Miller, PT, DPT  [id number]

## 2022-03-24 NOTE — PROGRESS NOTES
NP [facility] aware of patient's BP being low and sustaining low. NBP cuff checked against Arterial line pressure, they correlated. 1128: Epi gtt restarted at 2mcg/min per [facility] NP. Patient then subsequently helped back to bed due to concern for orthostatic hypotension. [facility] NP also aware of repeat hemoglobin coming back at 7.1, 1 unit of PRBC ordered to be given.

## 2022-03-24 NOTE — PLAN OF CARE
Problem: Skin Integrity:  Goal: Absence of new skin breakdown  Description: Absence of new skin breakdown  Outcome: Met This Shift     Problem: Cardiac Output - Decreased:  Goal: Cardiac output within specified parameters  Description: Cardiac output within specified parameters  3/24/2022 0735 by Macho Nova RN  Outcome: Ongoing     Problem: Infection - Surgical Site:  Goal: Will show no infection signs and symptoms  Description: Will show no infection signs and symptoms  3/24/2022 0735 by Macho Nova RN  Outcome: Ongoing     Problem: Pain - Acute:  Goal: Pain level will decrease  Description: Pain level will decrease  3/24/2022 0735 by Macho Nova RN  Outcome: Ongoing     Problem: Pain:  Goal: Pain level will decrease  Description: Pain level will decrease  3/24/2022 0735 by Macho Nova RN  Outcome: Ongoing     Problem: Skin Integrity:  Goal: Will show no infection signs and symptoms  Description: Will show no infection signs and symptoms  Outcome: Ongoing

## 2022-03-24 NOTE — CARE COORDINATION
Care Coordination  Spoke with Patients sister Sebas Pritchard by phone to discuss transition of care planning. Went over patients therapy evals, he will need skilled care. Discussed skilled list and she chose [facility] at [facility] referral made to [place]. I will await if accepted. Her Second choice is [address]. Patient admitted for streptococcus bacteremia and is on iv Ampicillin 2 gm iv q4 hrs.

## 2022-03-24 NOTE — PROGRESS NOTES
[address]     Date:3/24/2022                                                               Patient Name: Keiko Marshall  MRN: [id number]  : 1960  Room: [address] [place] [address]    Evaluating OT: Suman Santana OTR/L [id number]    Referring Provider: ROX Gardiner CNP   Specific Provider Orders/Date: OT eval and treat (3/23/22)      Diagnosis: Severe mitral regurgitation with flail segment, Streptococcus bacteremia, mitral valve endocarditis, paroxysmal atrial fibrillation        Surgery/Procedures: 3/23 ANITRA, mitral valve repair,MAZE procedure      Pertinent Medical History: Acute diastolic CHF     *Precautions:  Fall Risk, sternal, O2, external pacemaker    Assessment of current deficits   [x]Functional mobility  [x]ADLs [x]Strength  []Cognition  [x]Functional transfers  [x]IADLs [x]Safety Awareness  [x]Endurance  []Fine Motor Coordination  [x]Balance       []Vision/perception  []Sensation    []Gross Motor Coordination [x]ROM  []Delirium                  [] Motor Control     []Communication     OT PLAN OF CARE   OT POC based on physician orders, patient diagnosis and results of clinical assessment.        Frequency/Duration: 1-3 days/wk for 1-2 weeks PRN     Specific OT Treatment to include:   ADL retraining/adapted techniques and AE recommendations to increase functional independence within precautions                    Energy conservation techniques to improve tolerance for selfcare routine   Functional transfer/mobility training/DME recommendations for increased independence, safety and fall prevention         Patient/family education to increase safety and functional independence within precautions             Environmental modifications for safe mobility and completion of ADLs                             Therapeutic activity to improve functional performance during ADLs of precautions during tasks     LB dressing/bathing Max A                           Crys  using AE as needed for safe reach/ energy conservation       Toileting NT                       Crys     Bed Mobility  Supine to sit:   ModA+2    Sit to supine:   NT                       Crys  in prep of ADL tasks & transfers   Functional Transfers Sit to stand: Min A  from higher bed surface;  NT from lower chair surface due to decreased tolerance     Stand to sit: Min A                       Crys  sit<>stand/functional bathroom transfers using AD/DME as needed for balance and safety   Functional Mobility Min A  no device                        Crys   functional/bathroom mobility using AD as needed & demonstrating G safety     Balance Sitting:     Static:  SBA    Dynamic:Min A  Standing: Min A  Crys dynamic sitting balance; Crys dynamic standing balance  during ADL tasks & transfers   Endurance/Activity Tolerance   F tolerance with light activity. *Min hypotensive with mobility. Nurse present. G   tolerance with moderate activity/self care routine   Visual/  Perceptual               WFL                          Vitals:   HR at rest: 84 bpm HR at end of session: 89 bpm   Spo2 at rest:100% Spo2 at end of session 92%   BP at rest:113/67 mmHg BP at end of session 98/52 mmHg       Treatment: OT intervention provided this date includes:  Functional mobility: Instruction on sternal precautions to facilitate safe bed mobility & functional transfers. Pt required 2 person assist for safe mobility due to complexity of medical condition, medical lines and deconditioning. HOB elevated to assist.     ADL retraining: Instruction on adapted dressing techniques to maintain sternal precautions during ADLs. Pt demo G ability to use figure 4 technique within precautions. Energy Conservation training: Education on postural awareness/positioning and breathing techniques to improve overall tolerance and participation in self care routine. Pt demonstrated fair tolerance. Review of recommended DME for fall prevention, bathroom safety & energy conservation. Pt/Family Education: Instruction with handouts on energy conservation and sternal precautions during functional activities for safe return home. Pt/sister demonstrates fair understanding. Line management and environmental modifications made prior to and end of session to ensure patient safety and to increase efficiency of session. Skilled monitoring of HR, O2 saturation, blood pressure and patient's response to activity performed throughout session. Comments: OK from RN to see patient. Upon arrival, patient supine in bed, agreeable to session. Family present. At end of session, patient left seated in chair to increase activity tolerance. Call light within reach, all lines and tubes intact. Pt instructed on use of call light for assistance and fall prevention. Patient presents with decreased activity tolerance, dynamic balance, functional mobility and hypotension limiting completion of ADLs and safety. Pt can benefit from continued skilled OT to increase safety, functional independence and quality of life. Rehab Potential: Good for established goals    Patient / Family Goal: return to [facility]    Patient and/or family were instructed/educated on diagnosis, prognosis/goals and plan of care. Pt demonstrated G understanding. [] Malnutrition indicators have been identified and nursing has been notified to ensure a dietitian consult is ordered. Evaluation Complexity: Moderate    · History: Expanded chart review of consults, imaging, and psychosocial history related to current functional performance. · Exam: 5+ performance deficits identified limiting functional independence and safe return home   · Assistance/Modification: Min/mod assistance or modifications required to perform tasks. May have comorbidities that affect occupational performance.     Time OK:5918 Time Out: 1015       Total Treatment Time: 10          Min Units   OT Eval Low 43785     OT Eval Medium 28072 X    OT Eval High 38723     OT Re-Eval [id number]     Therapeutic Ex [id number]     Therapeutic Activities 82337 10 1   ADL/Self Care 24229     Orthotic Management 08945     Neuro Re-Ed 13825     Non-Billable Time       Evaluation time includes thorough review of current medical information, gathering information on past medical history/social history and prior level of function, completion of standardized testing/informal observation of tasks, assessment of data and development of POC/Goals.      Jeniffer Louis, OTR/L [id number]

## 2022-03-24 NOTE — PROGRESS NOTES
mL  10 mL IntraVENous PRN Real Gwinnett, APRN - CNP        0.9 % sodium chloride infusion  25 mL IntraVENous PRN Real Gwinnett, APRN - CNP        ondansetron (ZOFRAN-ODT) disintegrating tablet 4 mg  4 mg Oral Q8H PRN Real Leann, APRN - CNP        Or    ondansetron [facility]) injection 4 mg  4 mg IntraVENous Q6H PRN Real Leann, APRN - CNP        aspirin EC tablet 81 mg  81 mg Oral Daily Real Gwinnett, APRN - CNP        acetaminophen (TYLENOL) tablet 650 mg  650 mg Oral Q4H PRN Real Gwinnett, APRN - CNP        oxyCODONE (ROXICODONE) immediate release tablet 5 mg  5 mg Oral Q4H PRN Real Gwinnett, APRN - CNP        Or    oxyCODONE HCl (OXY-IR) immediate release tablet 10 mg  10 mg Oral Q4H PRN Real Gwinnett, APRN - CNP   10 mg at 03/24/22 0225    fentaNYL (SUBLIMAZE) injection 25 mcg  25 mcg IntraVENous Q1H PRN Real Gwinnett, APRN - CNP   25 mcg at 03/24/22 0128    Or    fentaNYL (SUBLIMAZE) injection 50 mcg  50 mcg IntraVENous Q1H PRN Real Gwinnett, APRN - CNP   50 mcg at 03/23/22 2116    chlorhexidine (PERIDEX) 0.12 % solution 15 mL  15 mL Mouth/Throat BID Real Leann, APRN - CNP   15 mL at 03/23/22 2012    magnesium oxide (MAG-OX) tablet 400 mg  400 mg Oral Daily Real Gwinnett, APRN - CNP        mupirocin (BACTROBAN) 2 % ointment   Nasal BID Real Leann, APRN - CNP   Given at 03/23/22 2012    sennosides-docusate sodium (SENOKOT-S) 8.6-50 MG tablet 1 tablet  1 tablet Oral BID Real Gwinnett, APRN - CNP        magnesium hydroxide (MILK OF MAGNESIA) 400 MG/5ML suspension 30 mL  30 mL Oral Daily PRN Real Gwinnett, APRN - CNP        bisacodyl (DULCOLAX) suppository 10 mg  10 mg Rectal Daily PRN Real Gwinnett, APRN - CNP        pantoprazole (PROTONIX) tablet 40 mg  40 mg Oral Daily Real Gwinnett, APRN - CNP        potassium chloride 20 mEq/50 mL IVPB (Central Line)  20 mEq IntraVENous PRN Real Gwinnett, APRN - CNP        magnesium sulfate 2000 mg in 50 mL IVPB premix  2,000 mg IntraVENous PRN Gail Cordia, APRN - CNP        calcium chloride 1,000 mg in sodium chloride 0.9 % 100 mL IVPB  1,000 mg IntraVENous PRN Gail Cordia, APRN - CNP        Or    calcium chloride 2,000 mg in sodium chloride 0.9 % 100 mL IVPB  2,000 mg IntraVENous PRN Gail Cordia, APRN - CNP        ipratropium-albuterol (DUONEB) nebulizer solution 1 ampule  1 ampule Inhalation Q4H WA Gail Cordia, APRN - CNP   1 ampule at 03/23/22 2056    albumin human 5 % IV solution 25 g  25 g IntraVENous PRN Gail Cordia, APRN - CNP   Stopped at 03/24/22 0146    0.9 % sodium chloride bolus  250 mL IntraVENous Continuous PRN Gail Cordia, APRN - CNP        norepinephrine (LEVOPHED) 16 mg in dextrose 5% 250 mL infusion (non-weight-based)  1-100 mcg/min IntraVENous Continuous PRN Gail Cordia, APRN - CNP        nitroPRUSSide (NIPRIDE) 50 mg in dextrose 5 % 250 mL infusion  0.1-3 mcg/kg/min IntraVENous Continuous PRN Gail Cordia, APRN - CNP        insulin regular (HUMULIN R;NOVOLIN R) 100 Units in sodium chloride 0.9 % 100 mL infusion  1 Units/hr IntraVENous Continuous Gail Cordia, APRN - CNP 3.4 mL/hr at 03/24/22 0500 3.44 Units/hr at 03/24/22 0500    insulin glargine-yfgn (SEMGLEE-YFGN) injection vial 12 Units  0.15 Units/kg SubCUTAneous Nightly Gail Cordia, APRN - CNP        glucose (GLUTOSE) 40 % oral gel 15 g  15 g Oral PRN Gail Cordia, APRN - CNP        dextrose 50 % IV solution  12.5 g IntraVENous PRN Gail Cordia, APRN - CNP        glucagon (rDNA) injection 1 mg  1 mg IntraMUSCular PRN Gail Cordia, APRN - CNP        dextrose 5 % solution  100 mL/hr IntraVENous PRN Gail Cordia, APRN - CNP        acetaminophen (TYLENOL) suppository 650 mg  650 mg Rectal Q4H PRN Gail Cordia, APRN - CNP        insulin lispro (HUMALOG) injection vial 0-3 Units  0-3 Units SubCUTAneous Q4H Gail Rajia, APRN - CNP        tamsulosin (FLOMAX) capsule 0.4 mg  0.4 mg Oral Daily GailROX Benson - CNP        EPINEPHrine (EPINEPHrine HCL) 5 mg in dextrose 5 % 250 mL infusion  1-20 mcg/min IntraVENous Continuous Harpreet Resendez APRN - CNP 3 mL/hr at 03/24/22 0600 1 mcg/min at 03/24/22 0600    ampicillin 2000 mg ivpb mini bag  2,000 mg IntraVENous 6 times per day Allen Seashore, APRN -  mL/hr at 03/24/22 0625 2,000 mg at 03/24/22 5315    atorvastatin (LIPITOR) tablet 40 mg  40 mg Oral Nightly Allen Seashore, APRN - CNP   40 mg at 03/22/22 2035      sodium chloride 30 mL/hr (03/23/22 1820)    sodium chloride      sodium chloride      norepinephrine      nitroprusside (NIPRIDE) 50 mg in D5W infusion      insulin 3.44 Units/hr (03/24/22 0500)    dextrose      EPINEPHrine infusion (NON-WEIGHT-BASED) 1 mcg/min (03/24/22 0600)       Physical Exam:  /60   Pulse 82   Temp 99.3 °F (37.4 °C) (Bladder)   Resp 22   Ht 5' 9\" (1.753 m)   Wt 194 lb 6.4 oz (88.2 kg)   SpO2 97%   BMI 28.71 kg/m²   Weight change: 14 lb 9.6 oz (6.623 kg)  Wt Readings from Last 3 Encounters:   03/24/22 194 lb 6.4 oz (88.2 kg)     The patient is awake, alert and in moderate postoperative discomfort but no distress. No gross musculoskeletal deformity is present. No significant skin or nail changes are present. Gross examination of head, eyes, nose and throat are negative. Jugular venous pressure is normal and no carotid bruits are present. Normal respiratory effort is noted with no accessory muscle usage present. Lung fields are clear to ascultation. Cardiac examination is notable for a regular rate and rhythm with no palpable thrill. No gallop rhythm or cardiac murmur are identified. A benign abdominal examination is present with no masses or organomegaly. Intact pulses are present throughout all extremities and no peripheral edema is present. No focal neurologic deficits are present.     Intake/Output:    Intake/Output Summary (Last 24 hours) at 3/24/2022 0626  Last data filed at 3/24/2022 0500  Gross per 24 hour Intake 4419.71 ml   Output 3075 ml   Net 1344.71 ml     I/O this shift:  In: 1269.7 [P.O.:180; I.V.:319.7; NG/GT:60; IV Piggyback:710]  Out: 970 [Urine:435; Chest Tube:535]    Laboratory Tests:  Lab Results   Component Value Date    CREATININE 1.1 03/24/2022    BUN 19 03/24/2022     03/24/2022    K 4.6 03/24/2022     (H) 03/24/2022    CO2 22 03/24/2022     No results for input(s): CKTOTAL, CKMB in the last 72 hours.     Invalid input(s): TROPONONI  No results found for: BNP  Lab Results   Component Value Date    WBC 16.8 03/24/2022    RBC 2.74 03/24/2022    HGB 7.6 03/24/2022    HCT 24.2 03/24/2022    HCT 28.0 03/23/2022    MCV 88.3 03/24/2022    MCH 27.7 03/24/2022    MCHC 31.4 03/24/2022    RDW 15.4 03/24/2022     03/24/2022    MPV 11.1 03/24/2022     Recent Labs     03/22/22  1104 03/23/22  1825 03/24/22  0445   ALKPHOS 168* 122 95   ALT 99* 77* 59*   AST 57* 246* 153*   PROT 6.4 5.7* 5.6*   BILITOT 0.5 0.5 0.4   BILIDIR  --  0.3  --    LABALBU 2.9* 2.6* 3.1*     Lab Results   Component Value Date    MG 2.5 03/24/2022     Lab Results   Component Value Date    PROTIME 17.3 03/23/2022    INR 1.6 03/23/2022     Lab Results   Component Value Date    TSH 1.880 03/14/2022     No components found for: CHLPL  Lab Results   Component Value Date    TRIG 115 03/15/2022     Lab Results   Component Value Date    HDL 20 03/15/2022     Lab Results   Component Value Date    LDLCALC 90 03/15/2022       Cardiac Tests:  Telemetry findings reviewed: sinus rhythm with occasional ventricular ectopy and transient paroxysmal atrial fibrillation, no new tachy/bradyarrhythmias overnight  Chest X-ray: pending, postoperative x-ray reviewed at the time of evaluation demonstrated evidence of borderline cardiomegaly with mild atelectasis and interstitial changes  Last Echocardiogram: Transesophageal echocardiography in conjunction with surgery demonstrated evidence of a mildly dilated left ventricular chamber preoperatively with mild left ventricular systolic dysfunction estimated ejection fraction 45% with postoperative evidence of appropriate valvular repair with trivial mitral regurgitation and a mean transmitral gradient of 2 mmHg with severe left ventricular systolic dysfunction and estimated ejection fraction of 30%      ASSESSMENT / PLAN: On a clinical basis, the patient present remains compensated from a cardiovascular standpoint with persistence requirements of low-dose epinephrine in the face of postoperative ventricular dysfunction with present maintenance of sinus rhythm. Continued arrhythmia monitoring will be necessary in the face of his preoperative atrial rhythm with additional management deferred to the cardiothoracic surgical service in regards to short-term postoperative amiodarone. Continue careful monitoring of his volume status will be necessary in addition to that of renal function and electrolytes. A postoperative anemia is present with present needs of appropriate hemoglobin monitoring. Ongoing antibiotic management will be deferred to the infectious disease service. On a long-term basis, needs of antibiotic prophylaxis time of all dental intervention will be reinforced during his hospital recovery. Note: This report was completed utilizing computer voice recognition software. Every effort has been made to ensure accuracy, however; inadvertent computerized transcription errors may be present. Nelida Owusu.  [facility], [address] [facility]

## 2022-03-24 NOTE — PLAN OF CARE
ANTICOAGULATION MANAGEMENT     Patient Name:  Cricket Klein  Date:  2021    ASSESSMENT /SUBJECTIVE:    Today's INR result of 2.5 is therapeutic. Goal INR of 2.0-3.0      Warfarin dose taken: Warfarin taken as instructed    Diet: No new diet changes affecting INR    Medication changes/ interactions: No new medications/supplements affecting INR    Previous INR: Therapeutic     S/S of bleeding or thromboembolism: No    New injury or illness: No    Upcoming surgery, procedure or cardioversion: No    Additional findings: None      PLAN:    Telephone call with TRISTON Bunn regarding INR result and instructed:     Warfarin Dosing Instructions: Continue your current warfarin dose 1.25 mg every Mon, Wed, Fri; 2.5mg all other days    Instructed patient to follow up no later than: 3 weeks  Orders given to  Homecare nurse/facility to recheck    Education provided: Target INR goal and significance of current INR result      Oni verbalizes understanding and agrees to warfarin dosing plan.    Instructed to call the Anticoagulation Clinic for any changes, questions or concerns. (#[phone])        Ap Johnston RN      OBJECTIVE:  Recent labs: (last 7 days)     21   INR 2.5*         No question data found.  Anticoagulation Summary  As of 2021    INR goal:  2.0-3.0   TTR:  84.3 % (1 y)   INR used for dosin.5 (2021)   Warfarin maintenance plan:  1.25 mg (2.5 mg x 0.5) every Mon, Wed, Fri; 2.5 mg (2.5 mg x 1) all other days   Full warfarin instructions:  1.25 mg every Mon, Wed, Fri; 2.5 mg all other days   Weekly warfarin total:  13.75 mg   Plan last modified:  Shruthi Hutchison RN (2020)   Next INR check:  2021   Priority:  Maintenance   Target end date:  Indefinite    Indications    Other and unspecified coagulation defects [D68.9]  Long-term (current) use of anticoagulants [Z79.01] [Z79.01]  Pulmonary embolism without acute cor pulmonale  unspecified chronicity  unspecified pulmonary embolism  Problem: Cardiac Output - Decreased:  Goal: Cardiac output within specified parameters  Description: Cardiac output within specified parameters  Outcome: Met This Shift     Problem: Infection - Surgical Site:  Goal: Will show no infection signs and symptoms  Description: Will show no infection signs and symptoms  Outcome: Met This Shift     Problem: Pain - Acute:  Goal: Pain level will decrease  Description: Pain level will decrease  Outcome: Met This Shift     Problem: Venous Thromboembolism:  Goal: Will show no signs or symptoms of venous thromboembolism  Description: Will show no signs or symptoms of venous thromboembolism  Outcome: Met This Shift  Goal: Absence of signs or symptoms of impaired coagulation  Description: Absence of signs or symptoms of impaired coagulation  Outcome: Met This Shift     Problem: Pain:  Goal: Pain level will decrease  Description: Pain level will decrease  Outcome: Met This Shift  Goal: Control of acute pain  Description: Control of acute pain  Outcome: Met This Shift  Goal: Control of chronic pain  Description: Control of chronic pain  Outcome: Met This Shift type (H) [I26.99]             Anticoagulation Episode Summary     INR check location:      Preferred lab:      Send INR reminders to:  CATA ROMAN    Comments:  * Speak with Pat (wife) with dosing. Had PE in 2010 following hip surgery. Has heterozygous prothrombin gene mutation. Second PE 7-26-16. Needs lifelong warfarin. was on warfarin [id number]. Do not leave info. on VM        Anticoagulation Care Providers     Provider Role Specialty Phone number    Saud Cruz MD Referring Family Medicine [phone]

## 2022-03-25 ENCOUNTER — APPOINTMENT (OUTPATIENT)
Dept: GENERAL RADIOLOGY | Age: 62
DRG: 853 | End: 2022-03-25
Payer: COMMERCIAL

## 2022-03-25 LAB
ANION GAP SERPL CALCULATED.3IONS-SCNC: 10 MMOL/L (ref 7–16)
BUN BLDV-MCNC: 23 MG/DL (ref 6–23)
CALCIUM IONIZED: 1.23 MMOL/L (ref 1.15–1.33)
CALCIUM SERPL-MCNC: 8.4 MG/DL (ref 8.6–10.2)
CHLORIDE BLD-SCNC: 102 MMOL/L (ref 98–107)
CO2: 21 MMOL/L (ref 22–29)
CREAT SERPL-MCNC: 1 MG/DL (ref 0.7–1.2)
GFR AFRICAN AMERICAN: >60
GFR NON-AFRICAN AMERICAN: >60 ML/MIN/1.73
GLUCOSE BLD-MCNC: 115 MG/DL (ref 74–99)
HCT VFR BLD CALC: 23.9 % (ref 37–54)
HEMOGLOBIN: 7.6 G/DL (ref 12.5–16.5)
MAGNESIUM: 2.5 MG/DL (ref 1.6–2.6)
MCH RBC QN AUTO: 28.3 PG (ref 26–35)
MCHC RBC AUTO-ENTMCNC: 31.8 % (ref 32–34.5)
MCV RBC AUTO: 88.8 FL (ref 80–99.9)
METER GLUCOSE: 101 MG/DL (ref 74–99)
METER GLUCOSE: 114 MG/DL (ref 74–99)
METER GLUCOSE: 152 MG/DL (ref 74–99)
METER GLUCOSE: 83 MG/DL (ref 74–99)
PDW BLD-RTO: 15.3 FL (ref 11.5–15)
PLATELET # BLD: 141 E9/L (ref 130–450)
PMV BLD AUTO: 10.9 FL (ref 7–12)
POTASSIUM SERPL-SCNC: 4.4 MMOL/L (ref 3.5–5)
RBC # BLD: 2.69 E12/L (ref 3.8–5.8)
SODIUM BLD-SCNC: 133 MMOL/L (ref 132–146)
WBC # BLD: 13.7 E9/L (ref 4.5–11.5)

## 2022-03-25 PROCEDURE — 85027 COMPLETE CBC AUTOMATED: CPT

## 2022-03-25 PROCEDURE — 2580000003 HC RX 258: Performed by: NURSE PRACTITIONER

## 2022-03-25 PROCEDURE — 97530 THERAPEUTIC ACTIVITIES: CPT

## 2022-03-25 PROCEDURE — 94640 AIRWAY INHALATION TREATMENT: CPT

## 2022-03-25 PROCEDURE — 2700000000 HC OXYGEN THERAPY PER DAY

## 2022-03-25 PROCEDURE — 82330 ASSAY OF CALCIUM: CPT

## 2022-03-25 PROCEDURE — 6370000000 HC RX 637 (ALT 250 FOR IP): Performed by: NURSE PRACTITIONER

## 2022-03-25 PROCEDURE — 82962 GLUCOSE BLOOD TEST: CPT

## 2022-03-25 PROCEDURE — 37799 UNLISTED PX VASCULAR SURGERY: CPT

## 2022-03-25 PROCEDURE — 6360000002 HC RX W HCPCS: Performed by: NURSE PRACTITIONER

## 2022-03-25 PROCEDURE — 2140000000 HC CCU INTERMEDIATE R&B

## 2022-03-25 PROCEDURE — 97535 SELF CARE MNGMENT TRAINING: CPT

## 2022-03-25 PROCEDURE — 99233 SBSQ HOSP IP/OBS HIGH 50: CPT | Performed by: INTERNAL MEDICINE

## 2022-03-25 PROCEDURE — 6370000000 HC RX 637 (ALT 250 FOR IP): Performed by: PHYSICIAN ASSISTANT

## 2022-03-25 PROCEDURE — 36415 COLL VENOUS BLD VENIPUNCTURE: CPT

## 2022-03-25 PROCEDURE — 80048 BASIC METABOLIC PNL TOTAL CA: CPT

## 2022-03-25 PROCEDURE — 83735 ASSAY OF MAGNESIUM: CPT

## 2022-03-25 PROCEDURE — 99232 SBSQ HOSP IP/OBS MODERATE 35: CPT | Performed by: NURSE PRACTITIONER

## 2022-03-25 PROCEDURE — 71045 X-RAY EXAM CHEST 1 VIEW: CPT

## 2022-03-25 RX ORDER — PANTOPRAZOLE SODIUM 40 MG/1
40 TABLET, DELAYED RELEASE ORAL DAILY
Status: DISCONTINUED | OUTPATIENT
Start: 2022-03-26 | End: 2022-03-30 | Stop reason: HOSPADM

## 2022-03-25 RX ORDER — ASCORBIC ACID 500 MG
500 TABLET ORAL 2 TIMES DAILY
Status: DISCONTINUED | OUTPATIENT
Start: 2022-03-25 | End: 2022-03-30 | Stop reason: HOSPADM

## 2022-03-25 RX ORDER — POTASSIUM CHLORIDE 20 MEQ/1
20 TABLET, EXTENDED RELEASE ORAL PRN
Status: DISCONTINUED | OUTPATIENT
Start: 2022-03-25 | End: 2022-03-30 | Stop reason: HOSPADM

## 2022-03-25 RX ORDER — FOLIC ACID 1 MG/1
1 TABLET ORAL DAILY
Status: DISCONTINUED | OUTPATIENT
Start: 2022-03-25 | End: 2022-03-30 | Stop reason: HOSPADM

## 2022-03-25 RX ORDER — AMIODARONE HYDROCHLORIDE 200 MG/1
200 TABLET ORAL 3 TIMES DAILY
Status: DISCONTINUED | OUTPATIENT
Start: 2022-03-25 | End: 2022-03-30 | Stop reason: HOSPADM

## 2022-03-25 RX ORDER — OXYCODONE HYDROCHLORIDE 10 MG/1
10 TABLET ORAL EVERY 4 HOURS PRN
Status: DISCONTINUED | OUTPATIENT
Start: 2022-03-25 | End: 2022-03-30 | Stop reason: HOSPADM

## 2022-03-25 RX ORDER — DIPHENHYDRAMINE HCL 25 MG
25 TABLET ORAL EVERY 8 HOURS PRN
Status: DISCONTINUED | OUTPATIENT
Start: 2022-03-25 | End: 2022-03-30 | Stop reason: HOSPADM

## 2022-03-25 RX ORDER — AMIODARONE HYDROCHLORIDE 200 MG/1
400 TABLET ORAL PRN
Status: DISCONTINUED | OUTPATIENT
Start: 2022-03-25 | End: 2022-03-30 | Stop reason: HOSPADM

## 2022-03-25 RX ORDER — FUROSEMIDE 10 MG/ML
20 INJECTION INTRAMUSCULAR; INTRAVENOUS DAILY
Status: DISCONTINUED | OUTPATIENT
Start: 2022-03-25 | End: 2022-03-28

## 2022-03-25 RX ORDER — MORPHINE SULFATE 2 MG/ML
2 INJECTION, SOLUTION INTRAMUSCULAR; INTRAVENOUS EVERY 4 HOURS PRN
Status: DISCONTINUED | OUTPATIENT
Start: 2022-03-25 | End: 2022-03-30 | Stop reason: HOSPADM

## 2022-03-25 RX ORDER — FERROUS SULFATE 325(65) MG
325 TABLET ORAL 2 TIMES DAILY WITH MEALS
Status: DISCONTINUED | OUTPATIENT
Start: 2022-03-25 | End: 2022-03-30 | Stop reason: HOSPADM

## 2022-03-25 RX ORDER — BISACODYL 10 MG
10 SUPPOSITORY, RECTAL RECTAL DAILY PRN
Status: DISCONTINUED | OUTPATIENT
Start: 2022-03-25 | End: 2022-03-30 | Stop reason: HOSPADM

## 2022-03-25 RX ORDER — HEPARIN SODIUM (PORCINE) LOCK FLUSH IV SOLN 100 UNIT/ML 100 UNIT/ML
300 SOLUTION INTRAVENOUS PRN
Status: DISCONTINUED | OUTPATIENT
Start: 2022-03-25 | End: 2022-03-30 | Stop reason: HOSPADM

## 2022-03-25 RX ORDER — MAGNESIUM SULFATE IN WATER 40 MG/ML
2000 INJECTION, SOLUTION INTRAVENOUS PRN
Status: DISCONTINUED | OUTPATIENT
Start: 2022-03-25 | End: 2022-03-30 | Stop reason: HOSPADM

## 2022-03-25 RX ORDER — OXYCODONE HYDROCHLORIDE 5 MG/1
5 TABLET ORAL EVERY 4 HOURS PRN
Status: DISCONTINUED | OUTPATIENT
Start: 2022-03-25 | End: 2022-03-30 | Stop reason: HOSPADM

## 2022-03-25 RX ADMIN — OXYCODONE 5 MG: 5 TABLET ORAL at 13:13

## 2022-03-25 RX ADMIN — Medication 300 UNITS: at 09:37

## 2022-03-25 RX ADMIN — IPRATROPIUM BROMIDE AND ALBUTEROL SULFATE 1 AMPULE: .5; 2.5 SOLUTION RESPIRATORY (INHALATION) at 16:18

## 2022-03-25 RX ADMIN — OXYCODONE HYDROCHLORIDE AND ACETAMINOPHEN 500 MG: 500 TABLET ORAL at 21:20

## 2022-03-25 RX ADMIN — DIPHENHYDRAMINE HCL 25 MG: 25 TABLET ORAL at 21:20

## 2022-03-25 RX ADMIN — OXYCODONE HYDROCHLORIDE 5 MG: 5 TABLET ORAL at 04:19

## 2022-03-25 RX ADMIN — AMPICILLIN SODIUM 2000 MG: 2 INJECTION, POWDER, FOR SOLUTION INTRAVENOUS at 07:00

## 2022-03-25 RX ADMIN — FOLIC ACID 1 MG: 1 TABLET ORAL at 12:40

## 2022-03-25 RX ADMIN — AMPICILLIN SODIUM 2000 MG: 2 INJECTION, POWDER, FOR SOLUTION INTRAVENOUS at 18:04

## 2022-03-25 RX ADMIN — AMIODARONE HYDROCHLORIDE 200 MG: 200 TABLET ORAL at 21:20

## 2022-03-25 RX ADMIN — IPRATROPIUM BROMIDE AND ALBUTEROL SULFATE 1 AMPULE: .5; 2.5 SOLUTION RESPIRATORY (INHALATION) at 11:09

## 2022-03-25 RX ADMIN — ACETAMINOPHEN 650 MG: 325 TABLET ORAL at 08:26

## 2022-03-25 RX ADMIN — AMPICILLIN SODIUM 2000 MG: 2 INJECTION, POWDER, FOR SOLUTION INTRAVENOUS at 22:15

## 2022-03-25 RX ADMIN — MUPIROCIN: 20 OINTMENT TOPICAL at 08:27

## 2022-03-25 RX ADMIN — PANTOPRAZOLE SODIUM 40 MG: 40 TABLET, DELAYED RELEASE ORAL at 08:26

## 2022-03-25 RX ADMIN — IPRATROPIUM BROMIDE AND ALBUTEROL SULFATE 1 AMPULE: .5; 2.5 SOLUTION RESPIRATORY (INHALATION) at 21:35

## 2022-03-25 RX ADMIN — AMPICILLIN SODIUM 2000 MG: 2 INJECTION, POWDER, FOR SOLUTION INTRAVENOUS at 14:09

## 2022-03-25 RX ADMIN — MUPIROCIN: 20 OINTMENT TOPICAL at 21:15

## 2022-03-25 RX ADMIN — TAMSULOSIN HYDROCHLORIDE 0.4 MG: 0.4 CAPSULE ORAL at 08:27

## 2022-03-25 RX ADMIN — OXYCODONE 5 MG: 5 TABLET ORAL at 21:31

## 2022-03-25 RX ADMIN — SENNOSIDES AND DOCUSATE SODIUM 1 TABLET: 50; 8.6 TABLET ORAL at 08:26

## 2022-03-25 RX ADMIN — FUROSEMIDE 20 MG: 10 INJECTION, SOLUTION INTRAMUSCULAR; INTRAVENOUS at 09:37

## 2022-03-25 RX ADMIN — AMPICILLIN SODIUM 2000 MG: 2 INJECTION, POWDER, FOR SOLUTION INTRAVENOUS at 10:55

## 2022-03-25 RX ADMIN — ATORVASTATIN CALCIUM 40 MG: 40 TABLET, FILM COATED ORAL at 21:20

## 2022-03-25 RX ADMIN — ASPIRIN 81 MG: 81 TABLET, COATED ORAL at 08:27

## 2022-03-25 RX ADMIN — AMIODARONE HYDROCHLORIDE 200 MG: 200 TABLET ORAL at 09:37

## 2022-03-25 RX ADMIN — IPRATROPIUM BROMIDE AND ALBUTEROL SULFATE 1 AMPULE: .5; 2.5 SOLUTION RESPIRATORY (INHALATION) at 08:41

## 2022-03-25 RX ADMIN — OXYCODONE HYDROCHLORIDE AND ACETAMINOPHEN 500 MG: 500 TABLET ORAL at 12:40

## 2022-03-25 RX ADMIN — Medication 400 MG: at 08:27

## 2022-03-25 RX ADMIN — AMIODARONE HYDROCHLORIDE 200 MG: 200 TABLET ORAL at 14:09

## 2022-03-25 RX ADMIN — SODIUM CHLORIDE, PRESERVATIVE FREE 10 ML: 5 INJECTION INTRAVENOUS at 21:15

## 2022-03-25 RX ADMIN — Medication 300 UNITS: at 22:15

## 2022-03-25 RX ADMIN — ACETAMINOPHEN 650 MG: 325 TABLET ORAL at 04:18

## 2022-03-25 RX ADMIN — AMPICILLIN SODIUM 2000 MG: 2 INJECTION, POWDER, FOR SOLUTION INTRAVENOUS at 03:50

## 2022-03-25 RX ADMIN — OXYCODONE HYDROCHLORIDE 10 MG: 10 TABLET ORAL at 08:27

## 2022-03-25 RX ADMIN — INSULIN LISPRO 1 UNITS: 100 INJECTION, SOLUTION INTRAVENOUS; SUBCUTANEOUS at 16:41

## 2022-03-25 RX ADMIN — FERROUS SULFATE TAB 325 MG (65 MG ELEMENTAL FE) 325 MG: 325 (65 FE) TAB at 16:40

## 2022-03-25 RX ADMIN — SODIUM CHLORIDE, PRESERVATIVE FREE 10 ML: 5 INJECTION INTRAVENOUS at 09:37

## 2022-03-25 ASSESSMENT — PAIN DESCRIPTION - PAIN TYPE
TYPE: SURGICAL PAIN
TYPE: ACUTE PAIN;SURGICAL PAIN
TYPE: ACUTE PAIN;SURGICAL PAIN
TYPE: SURGICAL PAIN
TYPE: ACUTE PAIN;SURGICAL PAIN
TYPE: SURGICAL PAIN

## 2022-03-25 ASSESSMENT — PAIN DESCRIPTION - DESCRIPTORS
DESCRIPTORS: THROBBING;DISCOMFORT;ACHING
DESCRIPTORS: ACHING;CONSTANT;SORE
DESCRIPTORS: ACHING;CONSTANT;DISCOMFORT
DESCRIPTORS: ACHING;SORE;CONSTANT
DESCRIPTORS: ACHING;CONSTANT;DISCOMFORT

## 2022-03-25 ASSESSMENT — PAIN DESCRIPTION - LOCATION
LOCATION: CHEST

## 2022-03-25 ASSESSMENT — PAIN SCALES - GENERAL
PAINLEVEL_OUTOF10: 3
PAINLEVEL_OUTOF10: 0
PAINLEVEL_OUTOF10: 0
PAINLEVEL_OUTOF10: 5
PAINLEVEL_OUTOF10: 0
PAINLEVEL_OUTOF10: 5
PAINLEVEL_OUTOF10: 2
PAINLEVEL_OUTOF10: 5
PAINLEVEL_OUTOF10: 0
PAINLEVEL_OUTOF10: 3
PAINLEVEL_OUTOF10: 4
PAINLEVEL_OUTOF10: 0
PAINLEVEL_OUTOF10: 2
PAINLEVEL_OUTOF10: 9
PAINLEVEL_OUTOF10: 0
PAINLEVEL_OUTOF10: 5
PAINLEVEL_OUTOF10: 0
PAINLEVEL_OUTOF10: 0
PAINLEVEL_OUTOF10: 2

## 2022-03-25 ASSESSMENT — PAIN DESCRIPTION - PROGRESSION
CLINICAL_PROGRESSION: GRADUALLY IMPROVING
CLINICAL_PROGRESSION: GRADUALLY WORSENING
CLINICAL_PROGRESSION: GRADUALLY IMPROVING

## 2022-03-25 ASSESSMENT — PAIN DESCRIPTION - ORIENTATION
ORIENTATION: MID

## 2022-03-25 ASSESSMENT — PAIN - FUNCTIONAL ASSESSMENT
PAIN_FUNCTIONAL_ASSESSMENT: PREVENTS OR INTERFERES SOME ACTIVE ACTIVITIES AND ADLS
PAIN_FUNCTIONAL_ASSESSMENT: ACTIVITIES ARE NOT PREVENTED
PAIN_FUNCTIONAL_ASSESSMENT: PREVENTS OR INTERFERES SOME ACTIVE ACTIVITIES AND ADLS
PAIN_FUNCTIONAL_ASSESSMENT: PREVENTS OR INTERFERES WITH MANY ACTIVE NOT PASSIVE ACTIVITIES
PAIN_FUNCTIONAL_ASSESSMENT: ACTIVITIES ARE NOT PREVENTED

## 2022-03-25 ASSESSMENT — PAIN DESCRIPTION - FREQUENCY
FREQUENCY: INTERMITTENT

## 2022-03-25 ASSESSMENT — PAIN DESCRIPTION - ONSET
ONSET: ON-GOING
ONSET: ON-GOING
ONSET: GRADUAL
ONSET: ON-GOING
ONSET: GRADUAL

## 2022-03-25 NOTE — PROGRESS NOTES
Hospitalist Progress Note      Synopsis: Patient admitted on 114/2022 32-year-old male with no known past medical history except childhood murmur, went to the urgent care today for evaluation of fatigue and was found to have A. fib with RVR and was sent to the main hospital emergency room. Patient states his symptoms of fatigue were on and off since past 1 month. Denies any palpitations. However did report some exertional shortness of breath. Patient consulted, as per him, telemedicine doctor, who prescribed him ivermectin and hydroxychloroquine earlier this month for suspicion of COVID-19 infection. Patient is unvaccinated against Covid. Patient completed 5 days of ivermectin, however, did not finish 14-day course of hydroxychloroquine which he stopped about 3 days ago. Patient denies any recent fever, chills, cough, shortness of breath, chest pain, abdominal pain, nausea, vomiting, diarrhea constipation. No presyncopal or syncopal event. Upon arrival in the emergency room, patient was noted to be febrile with T-max 101.1 °F, WBC count slightly elevated at 12.2, CRP elevated at 5.9, proBNP elevated at 6000, lactate level elevated at 2.6, troponin elevated at 42, 43. Patient was started on Cardizem drip. Patient had CTA chest done which did not reveal acute PE. Blood cultures positive for strep mitis. Echocardiogram reveals severe mitral regurgitation with flail leaflet and ruptured chordae tendon line and concern for vegetation. Patient had left heart catheterization demonstrating no significant coronary artery disease.   Plan for valve replacement 3/23.       Subjective    Feeling good no complaints  No CP or SOB  No fever or chills   No uncontrolled pain  No vomiting or diarrhea   No events reported overnight   Still requiring Levophed  Exam:  /64   Pulse 92   Temp 98.4 °F (36.9 °C) (Axillary)   Resp 15   Ht 5' 9\" (1.753 m)   Wt 194 lb 6.4 oz (88.2 kg)   SpO2 100%   BMI 28.71 kg/m² General appearance: No apparent distress, appears stated age and cooperative. HEENT: Pupils equal, round, and reactive to light. Conjunctivae/corneas clear. Neck: Supple. No jugular venous distention. Trachea midline. Respiratory:  Normal respiratory effort. Clear to auscultation, bilaterally without Rales/Wheezes/Rhonchi. Cardiovascular: Regular rate and rhythm with normal S1/S2 +3 of 6 EARNESTINE murmurs, rubs or gallops. Abdomen: Soft, non-tender, non-distended with normal bowel sounds. Musculoskeletal: No clubbing, cyanosis or edema bilaterally. Brisk capillary refill. 2+ lower extremity pulses (dorsalis pedis).    Skin: Incision CDI appropriately tender no rashes    Neurologic: awake, alert and following commands     Medications:  Reviewed    Infusion Medications    sodium chloride      sodium chloride 30 mL/hr (03/23/22 1820)    sodium chloride      sodium chloride      norepinephrine      dextrose      EPINEPHrine infusion (NON-WEIGHT-BASED) 1 mcg/min (03/24/22 2000)     Scheduled Medications    insulin lispro  0-18 Units SubCUTAneous TID WC    insulin lispro  0-9 Units SubCUTAneous Nightly    sodium chloride flush  10 mL IntraVENous 2 times per day    aspirin  81 mg Oral Daily    magnesium oxide  400 mg Oral Daily    mupirocin   Nasal BID    sennosides-docusate sodium  1 tablet Oral BID    pantoprazole  40 mg Oral Daily    ipratropium-albuterol  1 ampule Inhalation Q4H WA    insulin glargine  0.15 Units/kg SubCUTAneous Nightly    tamsulosin  0.4 mg Oral Daily    ampicillin IV  2,000 mg IntraVENous 6 times per day    atorvastatin  40 mg Oral Nightly     PRN Meds: sodium chloride, sodium chloride flush, sodium chloride, ondansetron **OR** ondansetron, acetaminophen, oxyCODONE **OR** oxyCODONE, fentanNYL **OR** fentanNYL, magnesium hydroxide, bisacodyl, potassium chloride, magnesium sulfate, calcium chloride IVPB **OR** calcium chloride IVPB, albumin human, sodium chloride, norepinephrine, glucose, dextrose, glucagon (rDNA), dextrose, acetaminophen    I/O    Intake/Output Summary (Last 24 hours) at 3/24/2022 2102  Last data filed at 3/24/2022 2000  Gross per 24 hour   Intake 4203.94 ml   Output 1625 ml   Net 2578.94 ml       Labs:   Recent Labs     03/22/22  1104 03/23/22  1509 03/23/22  1825 03/23/22  1825 03/24/22  0445 03/24/22  1040 03/24/22  1445   WBC 10.0  --  39.0*  --  16.8*  --   --    HGB 10.3*  --  9.7*   < > 7.6* 7.1* 7.7*   HCT 33.0*   < > 31.4*   < > 24.2* 22.4* 24.2*     --  263  --  185  --   --     < > = values in this interval not displayed. Recent Labs     03/22/22  1104 03/23/22  1509 03/23/22  1741 03/23/22  1741 03/23/22 1825 03/23/22  1850 03/24/22  0018 03/24/22  0130 03/24/22  0445     --   --   --  136  --   --   --  139   K 4.5   < > 4.5   < > 4.5  4.6   < > 3.98 4.2 4.6     --   --   --  104  --   --   --  108*   CO2 25  --   --   --  19*  --   --   --  22   BUN 18  --   --   --  16  --   --   --  19   CREATININE 1.3*   < > 1.2  --  1.2  --   --   --  1.1   CALCIUM 8.8  --   --   --  8.8  --   --   --  8.3*    < > = values in this interval not displayed. Recent Labs     03/22/22  1104 03/23/22  1825 03/24/22  0445   PROT 6.4 5.7* 5.6*   ALKPHOS 168* 122 95   ALT 99* 77* 59*   AST 57* 246* 153*   BILITOT 0.5 0.5 0.4       Recent Labs     03/22/22  1104 03/23/22  1825   INR 1.3 1.6       No results for input(s): [employer] in the last 72 hours. Chronic labs:  Lab Results   Component Value Date    CHOL 133 03/15/2022    TRIG 115 03/15/2022    HDL 20 03/15/2022    LDLCALC 90 03/15/2022    TSH 1.880 03/14/2022    INR 1.6 03/23/2022    LABA1C 5.6 03/15/2022       Radiology:  Imaging studies reviewed today.     ASSESSMENT:    Principal Problem:    Atrial fibrillation with rapid ventricular response (HCC)  Active Problems:    Sepsis ([address] present on admission    JANA (acute kidney injury) ([address]    Bacteremia    Severe mitral regurgitation    Suspected endocarditis    Subacute bacterial endocarditis    Ruptured chordae tendineae (HCC)    Mitral valve disease    Paroxysmal atrial fibrillation (HCC)    Acute diastolic (congestive) heart failure (HCC)    Cardiac insufficiency (HCC)    Acute pulmonary insufficiency    Acute blood loss anemia  Resolved Problems:    * No resolved hospital problems. *       PLAN:    Atrial fibrillation RVR   admit to intermediate care  Monitor on telemetry  Rate control diltiazem drip--> Toprol-XL  Anticoagulation Lovenox  Cardiology Consult appreciated discussed case    Sepsis, bacteremia, suspected endocarditis  Fever 101.1 on admission, then afebrile   Treated cefepime and vancomycin-> vancomycin--> ampicillin  Echocardiogram with partial flail mitral valve, ruptured chordae and likely vegetation  ANITRA 3/16 confirming severe MR with flail leaflet and ruptured chordae possible small vegetation  Urine culture  Blood cultures 2 of 2+ GPC in chains--alpha streptococcus-strep mitis/oralis  CRP 5.9  Procalcitonin 0.31, repeat 0.23  Infectious disease Consult appreciated-discussed case    Mitral regurgitation-severe  ANITRA flail leaflet, ruptured chordae, sever MR, suspected vegitation   [facility] 3/18 without evidence of significant CAD  CTS Consult appreciated possibly valve repair/replacement Wednesday 3/23  Dental consult appreciated 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 #14 and #30--cleared for valve surgery  JANA-improved  IVF completed  Avoid nephrotoxins  Urine labs  Monitor renal function, electrolytes, urine output    Medications for other co morbidities cont as appropriate w dosage adjustments as necessary       Diet: ADULT DIET; Regular; Low Fat/Low Chol/High Fiber/EPHRAIM  ADULT ORAL NUTRITION SUPPLEMENT; Breakfast, Lunch, Dinner;  Other Oral Supplement; ensure with meals  Code Status: Full Code  PT/OT Eval Status: Ordered  DVT Prophylaxis:   Lovenox  Recommended disposition at discharge:   Pending valve repair/ further medical stabilization    +++++++++++++++++++++++++++++++++++++++++++++++++  Jyoti Ramey MD   [facility].  +++++++++++++++++++++++++++++++++++++++++++++++++  NOTE: This report was transcribed using voice recognition software. Every effort was made to ensure accuracy; however, inadvertent computerized transcription errors may be present.

## 2022-03-25 NOTE — PROGRESS NOTES
CVICU Progress Note    Name: Isidro Duque  MRN: [id number]    CC: Postoperative Critical Care Management     Indication for Surgery/Procedure: severe mitral regurgitation, streptococcus bacteremia, mitral valve endocarditis, atrial fibrillation  LVEF:  40% preop, 30% post CPB    RVF:  Dilated, normal      Important/Relevant PMH/PSH: HFpEF, elevated liver enzymes,      Procedure/Surgeries: 3/23/2022 ANITRA, mitral valve repair (P2 triangular resection, P3/P3 perforation repair, 34mm Future band), MAZE procedure, 40mm Atriclip      Pacing wires: Ventricular         Intake/Output Summary (Last 24 hours) at 3/25/2022 0835  Last data filed at 3/25/2022 0800  Gross per 24 hour   Intake 3513.28 ml   Output 1115 ml   Net 2398.28 ml       Recent Labs     03/23/22  1825 03/23/22  1825 03/24/22  0445 03/24/22  0445 03/24/22  1040 03/24/22  1445 03/25/22  0415   WBC 39.0*  --  16.8*  --   --   --  13.7*   HGB 9.7*   < > 7.6*   < > 7.1* 7.7* 7.6*   HCT 31.4*   < > 24.2*   < > 22.4* 24.2* 23.9*     --  185  --   --   --  141    < > = values in this interval not displayed. Lab Results   Component Value Date     03/25/2022    K 4.4 03/25/2022    K 4.2 03/15/2022     03/25/2022    CO2 21 03/25/2022    BUN 23 03/25/2022    CREATININE 1.0 03/25/2022    GLUCOSE 115 03/25/2022    CALCIUM 8.4 03/25/2022         Physical Exam:    /64   Pulse 87   Temp 98 °F (36.7 °C) (Oral)   Resp 21   Ht 5' 9\" (1.753 m)   Wt 186 lb 12.8 oz (84.7 kg)   SpO2 96%   BMI 27.59 kg/m²       CXR Findings: 3/25/2022      ---CXR personally viewed and interpreted by ICU Nurse Practitioner, final readings per radiology pending     General: Awake, alert. Denies SOB, palpitations   Eyes: PERRL, anicteric   Pulmonary: Diminished bibasilar.  No wheezes, no accessory muscle use noted on 2L NC  Cardiovascular:  RRR, no heaves or thrills on palpation  Tele: SR  Abdomen: Soft, nontender, + BS, -BM   Extremities: BLE with +DP/PT signals Neurologic/Psych: A&Ox3, HEATH to command   Skin: Warm and dry  Incisions: MSI with poonam dressing intact, right groin stitch intact       Assessment/Plan: POD #2  1. Severe MR, MV Endocarditis S/p ANITRA, mitral valve repair (P2 triangular resection, P3/P3 perforation repair, 34mm Future band)  - ASA, Lipitor  - streptococcus bacteremia; MV Endocarditis-- Antibiotics per ID Ampicillin 2grams q4 hours    -remaining ms drainsx2 to bulb sxn (75cc, 100cc/ 24 hrs)  -PT/OT  - Bowel regimen      2. Atrial fibrillation s/p MAZE procedure, 40mm Atriclip   -SR, prophylactic amio started  -Hold off BB for now, SBP~100     3. Acute Pulmonary Insufficiency Following Surgery    - Expected 2/2 surgery  - CXR improving, remains on 2L NC  - Daily lasix  - Encourage IS, Ezpap per RT, OOBTC.      4. Acute blood loss anemia   - 1prbc 3/24   - hemoglobin 7.6, stable     5. Cardiac Insufficiency  - EF 30% post CPB, requiring epinephrine gtt for hemodynamic support  -Off Epi. SBP ~100. Hold off BB.   -plan for repeat echo prior to discharge      6. Renal Insufficiency  - Sr 1.3 preop, baseline 1.0-1.3  - Scr stable at 1.0. daily lasix. Monitor UOP and labs     7. Transaminitis  -elevated preop   - AST/ALT 59/153 downtrending, consider checking prior to discharge      8. Stress hyperglycemia  - no h/o of DM  - SSI      9.  Acute Post Operative Pain   - PRN oxycodone         VTE Prophylaxis: Pharmacologic/Mechanical: Yes, SCDs   Line infection prevention: Can CVC or arterial line be removed: Yes  Continued need for urinary catheter: No, remove    Dispo: transfer to [employer]     Electronically signed by ROX Lin CNP on 3/25/2022 at 8:35 AM

## 2022-03-25 NOTE — PROGRESS NOTES
INPATIENT CARDIOLOGY FOLLOW-UP    Name: Yves Miles    Age: 62 y.o. Date of Admission: 3/14/2022  8:06 PM    Date of Service: 3/25/2022    Chief Complaint: Follow-up for mitral valve disease secondary to endocarditis, paroxysmal atrial fibrillation, nonischemic cardiomyopathy, resolving acute diastolic heart failure, anemia    Interim History: The patient relates significant improvement of his postoperative discomfort with continued hemodynamic stabilization discontinuation of epinephrine. He remains in sinus rhythm and is hemodynamically stable with present management. Significant improvement of radiographic findings are present with improvement of his volume status and a mild residual positive fluid balance. His postoperative anemia remained stable. Review of Systems: The remainder of a complete multisystem review including consitutional, central nervous, respiratory, circulatory, gastrointestinal, genitourinary, endocrinologic, hematologic, musculoskeletal and psychiatric are negative.     Problem List:  Patient Active Problem List   Diagnosis    Atrial fibrillation with rapid ventricular response (HCC)    Sepsis ([facility] present on admission    JANA (acute kidney injury) ([facility]    Bacteremia    Severe mitral regurgitation    Suspected endocarditis    Subacute bacterial endocarditis    Ruptured chordae tendineae (HCC)    Mitral valve disease    Paroxysmal atrial fibrillation (HCC)    Acute diastolic (congestive) heart failure (HCC)    Cardiac insufficiency (HCC)    Acute pulmonary insufficiency    Acute blood loss anemia       Allergies:  No Known Allergies    Current Medications:  Current Facility-Administered Medications   Medication Dose Route Frequency Provider Last Rate Last Admin    0.9 % sodium chloride infusion   IntraVENous PRN ROX Hayward CNP        insulin lispro (HUMALOG) injection vial 0-18 Units  0-18 Units SubCUTAneous TID WC ROX Hayward CNP 3 Units at 03/24/22 1740    insulin lispro (HUMALOG) injection vial 0-9 Units  0-9 Units SubCUTAneous Nightly Elizabeth Merritt, APRN - CNP   2 Units at 03/24/22 2021    0.9 % sodium chloride infusion  30 mL/hr IntraVENous Continuous [facility], APRN - CNP 30 mL/hr at 03/23/22 1820 30 mL/hr at 03/23/22 1820    sodium chloride flush 0.9 % injection 10 mL  10 mL IntraVENous 2 times per day [facility], APRN - CNP   10 mL at 03/24/22 1957    sodium chloride flush 0.9 % injection 10 mL  10 mL IntraVENous PRN [facility], APRN - CNP        0.9 % sodium chloride infusion  25 mL IntraVENous PRN [facility], APRN - CNP        ondansetron (ZOFRAN-ODT) disintegrating tablet 4 mg  4 mg Oral Q8H PRN [facility], APRN - CNP        Or    ondansetron (ZOFRAN) injection 4 mg  4 mg IntraVENous Q6H PRN [facility], APRN - CNP        aspirin EC tablet 81 mg  81 mg Oral Daily [facility], APRN - CNP   81 mg at 03/24/22 8995    acetaminophen (TYLENOL) tablet 650 mg  650 mg Oral Q4H PRN [facility], APRN - CNP   650 mg at 03/25/22 0418    oxyCODONE (ROXICODONE) immediate release tablet 5 mg  5 mg Oral Q4H PRN [facility], APRN - CNP   5 mg at 03/25/22 2398    Or    oxyCODONE HCl (OXY-IR) immediate release tablet 10 mg  10 mg Oral Q4H PRN [facility], APRN - CNP   10 mg at 03/24/22 1843    fentaNYL (SUBLIMAZE) injection 25 mcg  25 mcg IntraVENous Q1H PRN [facility], APRN - CNP   25 mcg at 03/24/22 1727    Or    fentaNYL (SUBLIMAZE) injection 50 mcg  50 mcg IntraVENous Q1H PRN [facility], APRN - CNP   50 mcg at 03/23/22 2116    magnesium oxide (MAG-OX) tablet 400 mg  400 mg Oral Daily [facility], APRN - CNP   400 mg at 03/24/22 8401    mupirocin (BACTROBAN) 2 % ointment   Nasal BID [facility], APRN - CNP   Given at 03/24/22 1958    sennosides-docusate sodium (SENOKOT-S) 8.6-50 MG tablet 1 tablet  1 tablet Oral BID Nyasia Marshall, APRN - CNP   1 tablet at 03/24/22 1957    magnesium hydroxide (MILK OF MAGNESIA) 400 MG/5ML suspension 30 mL  30 mL Oral Daily PRN Arlon Sebastian, APRN - CNP        bisacodyl (DULCOLAX) suppository 10 mg  10 mg Rectal Daily PRN Arlon Sebastian, APRN - CNP        pantoprazole (PROTONIX) tablet 40 mg  40 mg Oral Daily Arlon Sebastian, APRN - CNP   40 mg at 03/24/22 0853    potassium chloride 20 mEq/50 mL IVPB (Central Line)  20 mEq IntraVENous PRN Arlon Sebastian, APRN - CNP        magnesium sulfate 2000 mg in 50 mL IVPB premix  2,000 mg IntraVENous PRN Arlon Sebastian, APRN - CNP        calcium chloride 1,000 mg in sodium chloride 0.9 % 100 mL IVPB  1,000 mg IntraVENous PRN Arlon Sebastian, APRN - CNP        Or    calcium chloride 2,000 mg in sodium chloride 0.9 % 100 mL IVPB  2,000 mg IntraVENous PRN Arlon Sebastian, APRN - CNP        ipratropium-albuterol (DUONEB) nebulizer solution 1 ampule  1 ampule Inhalation Q4H WA Arlon Sebastian, APRN - CNP   1 ampule at 03/24/22 1941    albumin human 5 % IV solution 25 g  25 g IntraVENous PRN Arlon Sebastian, APRN - CNP   Stopped at 03/24/22 0146    0.9 % sodium chloride bolus  250 mL IntraVENous Continuous PRN Arlon Sebastian, APRN - CNP        norepinephrine (LEVOPHED) 16 mg in dextrose 5% 250 mL infusion (non-weight-based)  1-100 mcg/min IntraVENous Continuous PRN Arloandrew Sebastian, APRN - CNP        insulin glargine-yfgn (SEMGLEE-YFGN) injection vial 12 Units  0.15 Units/kg SubCUTAneous Nightly Arlon Sebastian, APRN - CNP   12 Units at 03/24/22 2021    glucose (GLUTOSE) 40 % oral gel 15 g  15 g Oral PRN Arlon Sebastian, APRN - CNP        dextrose 50 % IV solution  12.5 g IntraVENous PRN Arlon Sebastian, APRN - CNP        glucagon (rDNA) injection 1 mg  1 mg IntraMUSCular PRN Arlon Sebastian, APRN - CNP        dextrose 5 % solution  100 mL/hr IntraVENous PRN Arlon Sebastian, APRN - CNP        acetaminophen (TYLENOL) suppository 650 mg  650 mg Rectal Q4H PRN Arlon Sebastian, APRN - CNP        tamsulosin [facility]) capsule 0.4 mg  0.4 mg Oral Daily [place], APRN - CNP   0.4 mg at 03/24/22 1628    EPINEPHrine (EPINEPHrine HCL) 5 mg in dextrose 5 % 250 mL infusion  1-20 mcg/min IntraVENous Continuous Harpreet Mal, APRN - CNP   Stopped at 03/25/22 0400    ampicillin 2000 mg ivpb mini bag  2,000 mg IntraVENous 6 times per day [place], APRN - CNP   Stopped at 03/25/22 0420    atorvastatin (LIPITOR) tablet 40 mg  40 mg Oral Nightly [place], APRN - CNP   40 mg at 03/24/22 1957      sodium chloride      sodium chloride 30 mL/hr (03/23/22 1820)    sodium chloride      sodium chloride      norepinephrine      dextrose      EPINEPHrine infusion (NON-WEIGHT-BASED) Stopped (03/25/22 0400)       Physical Exam:  /64   Pulse 89   Temp 98 °F (36.7 °C) (Oral)   Resp 20   Ht 5' 9\" (1.753 m)   Wt 186 lb 12.8 oz (84.7 kg)   SpO2 98%   BMI 27.59 kg/m²   Weight change: -7 lb 9.6 oz (-3.447 kg)  Wt Readings from Last 3 Encounters:   03/25/22 186 lb 12.8 oz (84.7 kg)     The patient is awake, alert and in no discomfort or distress. No gross musculoskeletal deformity is present. No significant skin or nail changes are present. Gross examination of head, eyes, nose and throat are negative. Jugular venous pressure is normal and no carotid bruits are present. Normal respiratory effort is noted with no accessory muscle usage present. Lung fields are clear to ascultation with limited inspiratory effort and mildly diminished breath sounds in both lung bases. Cardiac examination is notable for a regular rate and rhythm with no palpable thrill. No gallop rhythm or cardiac murmur are identified. A benign abdominal examination is present with no masses or organomegaly. Intact pulses are present throughout all extremities and no peripheral edema is present. No focal neurologic deficits are present.     Intake/Output:    Intake/Output Summary (Last 24 hours) at 3/25/2022 0655  Last data filed at 3/25/2022 0600  Gross per 24 hour   Intake 3639.88 ml   Output 1135 ml   Net 2504.88 ml     I/O this shift:  In: 1187.5 [P.O.:360; I.V.:527.5; IV Piggyback:300]  Out: 350 [Urine:265; Chest Tube:85]    Laboratory Tests:  Lab Results   Component Value Date    CREATININE 1.0 03/25/2022    BUN 23 03/25/2022     03/25/2022    K 4.4 03/25/2022     03/25/2022    CO2 21 (L) 03/25/2022     No results for input(s): CKTOTAL, CKMB in the last 72 hours.     Invalid input(s): TROPONONI  No results found for: BNP  Lab Results   Component Value Date    WBC 13.7 03/25/2022    RBC 2.69 03/25/2022    HGB 7.6 03/25/2022    HCT 23.9 03/25/2022    HCT 28.0 03/23/2022    MCV 88.8 03/25/2022    MCH 28.3 03/25/2022    MCHC 31.8 03/25/2022    RDW 15.3 03/25/2022     03/25/2022    MPV 10.9 03/25/2022     Recent Labs     03/22/22  1104 03/23/22  1825 03/24/22  0445   ALKPHOS 168* 122 95   ALT 99* 77* 59*   AST 57* 246* 153*   PROT 6.4 5.7* 5.6*   BILITOT 0.5 0.5 0.4   BILIDIR  --  0.3  --    LABALBU 2.9* 2.6* 3.1*     Lab Results   Component Value Date    MG 2.5 03/25/2022     Lab Results   Component Value Date    PROTIME 17.3 03/23/2022    INR 1.6 03/23/2022     Lab Results   Component Value Date    TSH 1.880 03/14/2022     No components found for: CHLPL  Lab Results   Component Value Date    TRIG 115 03/15/2022     Lab Results   Component Value Date    HDL 20 03/15/2022     Lab Results   Component Value Date    LDLCALC 90 03/15/2022       Cardiac Tests:  Telemetry findings reviewed: sinus rhythm, no new tachy/bradyarrhythmias overnight  Chest X-ray: A repeat chest x-ray reviewed at time of evaluation limited by rotation demonstrates persistent cardiomegaly with significant improvement of a right-sided infiltrate/pleural effusion and mild residual atelectasis      ASSESSMENT / PLAN: On a clinical basis, the patient appears significantly improved over his initial postoperative assessment with significant reduction of postoperative discomfort and discontinuation of vasoactive support. He is maintained sinus rhythm with no additional identified transient episodes of atrial arrhythmias. A limited positive fluid balance is noted with needs of ongoing gradual fluid mobilization and monitoring of his postoperative anemia. Additional management of his systolic dysfunction will be necessary with recommendation of the initiation of small doses of metoprolol succinate which will additionally provide arrhythmia stabilization with later addition of afterload reduction as tolerated by blood pressure as well as that of renal function and electrolytes. Additional prophylactic amiodarone to reduce risk of postoperative atrial arrhythmia will be deferred to the cardiothoracic surgical service as well is that of ongoing antibiotic therapy in the face of his endocarditis deferred to infectious disease. Ongoing aggressive risk factor modification of blood pressure, diabetes and serum lipids remain essential to reducing risk of future atherosclerotic development. He appears compensated from a cardiovascular standpoint for transfer to telemetry once appropriate from a surgical standpoint. On long-term basis, continued antibiotic prophylaxis will remain essential at time of all dental interventions to reduce risk of bacterial endocarditis. Note: This report was completed utilizing computer voice recognition software. Every effort has been made to ensure accuracy, however; inadvertent computerized transcription errors may be present. Juan A Wiggins.  Hiram Gongora, [facility]

## 2022-03-25 NOTE — PROGRESS NOTES
MS silvia X1 removed without difficulty, Pt tolerated well.  Remaining MS silvia remains to bulb sxn

## 2022-03-25 NOTE — PROGRESS NOTES
Physical Therapy  Physical Therapy Treatment Note    Name: Alisa Mesa  : 1960  MRN: [id number]      Date of Service: 3/25/2022    Evaluating PT:  Marilia Stout, PT, DPT [id number]    Room #:  0281/8268-Z  Diagnosis:  Atrial fibrillation with rapid ventricular response ([facility] [address] [I48.91]  Febrile illness [R50.9]  PMHx/PSHx:  CHF  Procedure/Surgery:  3/23 ANITRA, MV repair  Precautions:  Falls, Sternal, O2  Equipment Needs:  None    SUBJECTIVE:    Pt will be discharging to his sister's 2 story house with 2 steps to enter and no rail. 6+6 steps and 1 rail to bedroom. Pt ambulated without device and was independent PTA. OBJECTIVE:   Initial Evaluation  Date: 3/24/22 Treatment  3/25/22 Short Term/ Long Term   Goals   AM-PAC 6 Clicks 86/63 94/38    Was pt agreeable to Eval/treatment?  Yes Yes    Does pt have pain? 3-4/10 surgical pain Mild surgical discomfort but no rating given    Bed Mobility  Rolling: NT  Supine to sit: ModA x 2 with HOB elevated  Sit to supine: NT  Scooting: ModA Rolling: NT  Supine to sit: MaxA with HOB elevated  Sit to supine: NT  Scooting: ModA Hazel   Transfers Sit to stand: Hazel  Stand to sit: ModA  Stand pivot: Hazel no device Sit to stand: Hazel  Stand to sit: Hazel  Stand pivot: Hazel no device Independent   Ambulation   A few steps to chair with Hazel no device 150 feet with Hazel no device >400 feet Independently   Stair negotiation: ascended and descended NT NT >10 steps with 1 rail Mod Independent   ROM BUE:  Defer to OT note  BLE:  WFL     Strength BUE:  Defer to OT note  BLE:  4+/5 grossly  Increase by 1/3 MMT grade   Balance Sitting EOB:  SBA  Dynamic Standing:  Hazel no device Sitting EOB:  SBA  Dynamic Standing:  Hazel no device Sitting EOB:  Independent  Dynamic Standing:  Independent     Pt is A & O x 4  CAM-ICU: NT  RASS: 0  Sensation:  No reported paresthesias  Edema:  None    Vitals:  Heart Rate at rest 90 bpm Heart Rate post session 95 bpm   SpO2 at rest 100% SpO2 post session 100%   Blood Pressure at rest 120/70 mmHg Blood Pressure post session 120/68 mmHg       Functional Status Score-Intensive Care Unit (FSS-ICU)   Rolling -/7   Supine to sit transfer 2/7   Unsupported sitting  5/7   Sit to stand transfers 4/7   Ambulation 4/7   Total  15/35     Therapeutic Exercises:  NA    Patient education  Pt educated on safety    Patient response to education:   Pt verbalized understanding Pt demonstrated skill Pt requires further education in this area   x x x     ASSESSMENT:    Conditions Requiring Skilled Therapeutic Intervention:    []Decreased strength     []Decreased ROM  [x]Decreased functional mobility  [x]Decreased balance   [x]Decreased endurance   [x]Decreased posture  []Decreased sensation  []Decreased coordination   []Decreased vision  []Decreased safety awareness   [x]Increased pain       Comments:  NP reported pt was medically stable. Pt was in bed upon arrival, agreeable to treatment session. Pt was educated on sternal precautions and PLB prior to activity. Improved mobility and tolerance to activity noted this session compared to initial evaluation. Pt initially transferred to chair in attempt to use bedpan but unsuccessful. Pt ambulated into hallway with flexed posture and slight unsteadiness but no LOBs noted. Fatigue reported at end of session. Pt was left in chair with all needs met and call light in reach. Treatment:  Patient practiced and was instructed in the following treatment:     Bed mobility training - pt given verbal and tactile cues to facilitate proper sequencing and safety during supine>sit as well as provided with physical assistance.  Sitting EOB for >5 minutes for upright tolerance, postural awareness and BLE ROM   Transfer training - pt was given verbal and tactile cues to facilitate proper hand placement, technique and safety during sit to stand and stand to sit as well as provided with physical assistance.    Gait training- pt was given verbal and tactile cues to facilitate safety and balance during ambulation as well as provided with physical assistance. PLAN:    Patient is making progress towards established goals. Will continue with current POC.       Time in  0840  Time out  0905    Total Treatment Time  25 minutes     CPT codes:  [] Gait training 10798 - minutes  [] Manual therapy 46304 - minutes  [x] Therapeutic activities 68045 25 minutes  [] Therapeutic exercises 76172 - minutes  [] Neuromuscular reeducation 04848 - minutes    Eva Calderon PT, DPT  [id number]

## 2022-03-25 NOTE — PROGRESS NOTES
Nutrition Education      Counseled patient on heart healthy/cardiac diet. Provided educational handouts and sample meal plans. Patient states that he eats healthy at home. Will start Ensure high protein supplement TID to help meet increased nutritional needs from surgical wound healing. · Written educational materials provided. · Contact name and number provided. · Refer to Patient Education activity for more details.     Electronically signed by Edmundo Goodpasture, RD, LD on 3/25/22 at 1:27 PM EDT    Contact: [phone]

## 2022-03-25 NOTE — PROGRESS NOTES
Hospitalist Progress Note      Synopsis: Patient admitted on 114/2022 64-year-old male with no known past medical history except childhood murmur, went to the urgent care today for evaluation of fatigue and was found to have A. fib with RVR and was sent to the main hospital emergency room. Patient states his symptoms of fatigue were on and off since past 1 month. Denies any palpitations. However did report some exertional shortness of breath. Patient consulted, as per him, telemedicine doctor, who prescribed him ivermectin and hydroxychloroquine earlier this month for suspicion of COVID-19 infection. Patient is unvaccinated against Covid. Patient completed 5 days of ivermectin, however, did not finish 14-day course of hydroxychloroquine which he stopped about 3 days ago. Patient denies any recent fever, chills, cough, shortness of breath, chest pain, abdominal pain, nausea, vomiting, diarrhea constipation. No presyncopal or syncopal event. Upon arrival in the emergency room, patient was noted to be febrile with T-max 101.1 °F, WBC count slightly elevated at 12.2, CRP elevated at 5.9, proBNP elevated at 6000, lactate level elevated at 2.6, troponin elevated at 42, 43. Patient was started on Cardizem drip. Patient had CTA chest done which did not reveal acute PE. Blood cultures positive for strep mitis. Echocardiogram reveals severe mitral regurgitation with flail leaflet and ruptured chordae tendon line and concern for vegetation. Patient had left heart catheterization demonstrating no significant coronary artery disease.   Plan for valve replacement 3/23.       Subjective    Feeling good no complaints  No CP or SOB  No fever or chills   No uncontrolled pain  No vomiting or diarrhea   No events reported overnight   Still requiring Levophed  Exam:  /80   Pulse 90   Temp 98.4 °F (36.9 °C) (Oral)   Resp 17   Ht 5' 9\" (1.753 m)   Wt 186 lb 12.8 oz (84.7 kg)   SpO2 95%   BMI 27.59 kg/m² General appearance: No apparent distress, appears stated age and cooperative. HEENT: Pupils equal, round, and reactive to light. Conjunctivae/corneas clear. Neck: Supple. No jugular venous distention. Trachea midline. Respiratory:  Normal respiratory effort. Clear to auscultation, bilaterally without Rales/Wheezes/Rhonchi. Cardiovascular: Regular rate and rhythm with normal S1/S2 +3 of 6 EARNESTINE murmurs, rubs or gallops. Abdomen: Soft, non-tender, non-distended with normal bowel sounds. Musculoskeletal: No clubbing, cyanosis or edema bilaterally. Brisk capillary refill. 2+ lower extremity pulses (dorsalis pedis).    Skin: Incision CDI appropriately tender no rashes    Neurologic: awake, alert and following commands     Medications:  Reviewed    Infusion Medications    sodium chloride      sodium chloride 30 mL/hr (03/23/22 1820)    sodium chloride      sodium chloride      norepinephrine      dextrose      EPINEPHrine infusion (NON-WEIGHT-BASED) Stopped (03/25/22 0400)     Scheduled Medications    amiodarone  200 mg Oral TID    furosemide  20 mg IntraVENous Daily    insulin lispro  0-18 Units SubCUTAneous TID WC    insulin lispro  0-9 Units SubCUTAneous Nightly    sodium chloride flush  10 mL IntraVENous 2 times per day    aspirin  81 mg Oral Daily    magnesium oxide  400 mg Oral Daily    mupirocin   Nasal BID    sennosides-docusate sodium  1 tablet Oral BID    pantoprazole  40 mg Oral Daily    ipratropium-albuterol  1 ampule Inhalation Q4H WA    insulin glargine  0.15 Units/kg SubCUTAneous Nightly    tamsulosin  0.4 mg Oral Daily    ampicillin IV  2,000 mg IntraVENous 6 times per day    atorvastatin  40 mg Oral Nightly     PRN Meds: heparin flush, sodium chloride, sodium chloride flush, sodium chloride, ondansetron **OR** ondansetron, acetaminophen, oxyCODONE **OR** oxyCODONE, fentanNYL **OR** fentanNYL, magnesium hydroxide, bisacodyl, potassium chloride, magnesium sulfate, calcium chloride IVPB **OR** calcium chloride IVPB, albumin human, sodium chloride, norepinephrine, glucose, dextrose, glucagon (rDNA), dextrose, acetaminophen    I/O    Intake/Output Summary (Last 24 hours) at 3/25/2022 1119  Last data filed at 3/25/2022 1100  Gross per 24 hour   Intake 3253.28 ml   Output 1365 ml   Net 1888.28 ml       Labs:   Recent Labs     03/23/22  1825 03/23/22  1825 03/24/22  0445 03/24/22  0445 03/24/22  1040 03/24/22  1445 03/25/22  0415   WBC 39.0*  --  16.8*  --   --   --  13.7*   HGB 9.7*   < > 7.6*   < > 7.1* 7.7* 7.6*   HCT 31.4*   < > 24.2*   < > 22.4* 24.2* 23.9*     --  185  --   --   --  141    < > = values in this interval not displayed. Recent Labs     03/23/22 1825 03/23/22  1850 03/24/22  0130 03/24/22  0445 03/25/22  0415     --   --  139 133   K 4.5  4.6   < > 4.2 4.6 4.4     --   --  108* 102   CO2 19*  --   --  22 21*   BUN 16  --   --  19 23   CREATININE 1.2  --   --  1.1 1.0   CALCIUM 8.8  --   --  8.3* 8.4*    < > = values in this interval not displayed. Recent Labs     03/23/22 1825 03/24/22 0445   PROT 5.7* 5.6*   ALKPHOS 122 95   ALT 77* 59*   * 153*   BILITOT 0.5 0.4       Recent Labs     03/23/22 1825   INR 1.6       No results for input(s): CKTOTAL, TROPONINI in the last 72 hours. Chronic labs:  Lab Results   Component Value Date    CHOL 133 03/15/2022    TRIG 115 03/15/2022    HDL 20 03/15/2022    LDLCALC 90 03/15/2022    TSH 1.880 03/14/2022    INR 1.6 03/23/2022    LABA1C 5.6 03/15/2022       Radiology:  Imaging studies reviewed today.     ASSESSMENT:    Principal Problem:    Atrial fibrillation with rapid ventricular response (HCC)  Active Problems:    Sepsis ([address] present on admission    JANA (acute kidney injury) ([address]    Bacteremia    Severe mitral regurgitation    Suspected endocarditis    Subacute bacterial endocarditis    Ruptured chordae tendineae (HCC)    Mitral valve disease    Paroxysmal atrial fibrillation (HCC)    Acute diastolic (congestive) heart failure (HCC)    Cardiac insufficiency (HCC)    Acute pulmonary insufficiency    Acute blood loss anemia  Resolved Problems:    * No resolved hospital problems. *       PLAN:    Atrial fibrillation RVR   admit to intermediate care  Monitor on telemetry  Rate control diltiazem drip--> Toprol-XL  Anticoagulation Lovenox  Cardiology Consult appreciated discussed case    Sepsis, bacteremia, suspected endocarditis  Fever 101.1 on admission, then afebrile   Treated cefepime and vancomycin> vancomycin--> ampicillin  Echocardiogram with partial flail mitral valve, ruptured chordae and likely vegetation  ANITRA 3/16 confirming severe MR with flail leaflet and ruptured chordae possible small vegetation  Urine culture  Blood cultures 2 of 2+ GPC in chains--alpha streptococcusstrep mitis/oralis  CRP 5.9  Procalcitonin 0.31, repeat 0.23  Infectious disease Consult appreciateddiscussed case    Mitral regurgitationsevere  ANITRA flail leaflet, ruptured chordae, sever MR, suspected vegitation   [facility] 3/18 without evidence of significant CAD  CTS Consult appreciated possibly valve repair/replacement Wednesday 3/23  Dental consult appreciated 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 #14 and #30--cleared for valve surgery  JANAimproved  IVF completed  Avoid nephrotoxins  Urine labs  Monitor renal function, electrolytes, urine output    Medications for other co morbidities cont as appropriate w dosage adjustments as necessary       Diet: ADULT DIET; Regular; Low Fat/Low Chol/High Fiber/EPHRAIM  ADULT ORAL NUTRITION SUPPLEMENT; Breakfast, Lunch, Dinner;  Other Oral Supplement; ensure with meals  Code Status: Full Code  PT/OT Eval Status: Ordered  DVT Prophylaxis:   Lovenox  Recommended disposition at discharge:   Pending valve repair/ further medical stabilization    +++++++++++++++++++++++++++++++++++++++++++++++++  Luzma Johnson MD   [facility].  +++++++++++++++++++++++++++++++++++++++++++++++++  NOTE: This report was transcribed using voice recognition software. Every effort was made to ensure accuracy; however, inadvertent computerized transcription errors may be present.

## 2022-03-25 NOTE — PROGRESS NOTES
Department of Internal Medicine  Infectious Diseases  Progress  Note      C/C : Streptococcus bacteremia , mitral valve endocarditis     Denied fever , chills   Reports chest pain   Afebrile     Current Facility-Administered Medications   Medication Dose Route Frequency Provider Last Rate Last Admin    heparin flush 100 UNIT/ML injection 300 Units  300 Units IntraCATHeter PRN Seda Hickey APRN - CNP   300 Units at 03/25/22 7541    amiodarone (CORDARONE) tablet 200 mg  200 mg Oral TID Seda Hickey APRN - CNP   200 mg at 03/25/22 3071    furosemide (LASIX) injection 20 mg  20 mg IntraVENous Daily Seda Hickey, APRN - CNP   20 mg at 03/25/22 3195    0.9 % sodium chloride infusion   IntraVENous PRN Seda Hickey APRN - CNP        insulin lispro (HUMALOG) injection vial 0-18 Units  0-18 Units SubCUTAneous TID WC Seda Hickey APRN - CNP   3 Units at 03/24/22 1740    insulin lispro (HUMALOG) injection vial 0-9 Units  0-9 Units SubCUTAneous Nightly Seda Hickey APRN - CNP   2 Units at 03/24/22 2021    0.9 % sodium chloride infusion  30 mL/hr IntraVENous Continuous Praveen Jiang APRN - CNP 30 mL/hr at 03/23/22 1820 30 mL/hr at 03/23/22 1820    sodium chloride flush 0.9 % injection 10 mL  10 mL IntraVENous 2 times per day Praveen Jiang, APRN - CNP   10 mL at 03/25/22 5992    sodium chloride flush 0.9 % injection 10 mL  10 mL IntraVENous PRN Praveen Jiang APRN - CNP        0.9 % sodium chloride infusion  25 mL IntraVENous PRN Praveen Jiang APRN - CNP        ondansetron (ZOFRAN-ODT) disintegrating tablet 4 mg  4 mg Oral Q8H PRN Praveen Jiang APRN - CNP        Or    ondansetron (ZOFRAN) injection 4 mg  4 mg IntraVENous Q6H PRN Praveen Jiang, APRN - CNP        aspirin EC tablet 81 mg  81 mg Oral Daily Praveen Jiang, APRN - CNP   81 mg at 03/25/22 0827    acetaminophen (TYLENOL) tablet 650 mg  650 mg Oral Q4H PRN Praveen Jiang, APRN - CNP   650 mg at 03/25/22 0826    oxyCODONE (ROXICODONE) immediate release tablet 5 mg  5 mg Oral Q4H PRN Kenzie John, APRN - CNP   5 mg at 03/25/22 0419    Or    oxyCODONE HCl (OXY-IR) immediate release tablet 10 mg  10 mg Oral Q4H PRN Kenzie John, APRN - CNP   10 mg at 03/25/22 0827    fentaNYL (SUBLIMAZE) injection 25 mcg  25 mcg IntraVENous Q1H PRN Kenzie John, APRN - CNP   25 mcg at 03/24/22 1727    Or    fentaNYL (SUBLIMAZE) injection 50 mcg  50 mcg IntraVENous Q1H PRN Kenzie John, APRN - CNP   50 mcg at 03/23/22 2116    magnesium oxide (MAG-OX) tablet 400 mg  400 mg Oral Daily Kenzie John, APRN - CNP   400 mg at 03/25/22 0827    mupirocin (BACTROBAN) 2 % ointment   Nasal BID Kenzie John, APRN - CNP   Given at 03/25/22 0827    sennosides-docusate sodium (SENOKOT-S) 8.6-50 MG tablet 1 tablet  1 tablet Oral BID Kenzie John, APRN - CNP   1 tablet at 03/25/22 1111    magnesium hydroxide (MILK OF MAGNESIA) 400 MG/5ML suspension 30 mL  30 mL Oral Daily PRN Kenzie John, APRN - CNP        bisacodyl (DULCOLAX) suppository 10 mg  10 mg Rectal Daily PRN Kenzie John, APRN - CNP        pantoprazole (PROTONIX) tablet 40 mg  40 mg Oral Daily Kenzie John, APRN - CNP   40 mg at 03/25/22 2872    potassium chloride 20 mEq/50 mL IVPB (Central Line)  20 mEq IntraVENous PRN Kenzie John, APRN - CNP        magnesium sulfate 2000 mg in 50 mL IVPB premix  2,000 mg IntraVENous PRN Kenzie John, APRN - CNP        calcium chloride 1,000 mg in sodium chloride 0.9 % 100 mL IVPB  1,000 mg IntraVENous PRN Kenzie John, APRN - CNP        Or    calcium chloride 2,000 mg in sodium chloride 0.9 % 100 mL IVPB  2,000 mg IntraVENous PRN Kenzie John, APRN - CNP        ipratropium-albuterol (DUONEB) nebulizer solution 1 ampule  1 ampule Inhalation Q4H WA ROX Teresa CNP   1 ampule at 03/25/22 0841    albumin human 5 % IV solution 25 g  25 g IntraVENous PRN ROX Teresa CNP   Stopped at 03/24/22 0146    0.9 % sodium chloride bolus  250 mL IntraVENous Continuous PRN Lorena Reges, APRN - CNP        norepinephrine (LEVOPHED) 16 mg in dextrose 5% 250 mL infusion (non-weight-based)  1-100 mcg/min IntraVENous Continuous PRN Lorena Reges, APRN - CNP        insulin glargine-yfgn (SEMGLEE-YFGN) injection vial 12 Units  0.15 Units/kg SubCUTAneous Nightly Lorena Reges, APRN - CNP   12 Units at 03/24/22 2021    glucose (GLUTOSE) 40 % oral gel 15 g  15 g Oral PRN Lorena Reges, APRN - CNP        dextrose 50 % IV solution  12.5 g IntraVENous PRN Lorena Reges, APRN - CNP        glucagon (rDNA) injection 1 mg  1 mg IntraMUSCular PRN Lorena Reges, APRN - CNP        dextrose 5 % solution  100 mL/hr IntraVENous PRN Lorena Reges, APRN - CNP        acetaminophen (TYLENOL) suppository 650 mg  650 mg Rectal Q4H PRN Lorena Reges, APRN - CNP        tamsulosin (FLOMAX) capsule 0.4 mg  0.4 mg Oral Daily Lorena Reges, APRN - CNP   0.4 mg at 03/25/22 0827    EPINEPHrine (EPINEPHrine HCL) 5 mg in dextrose 5 % 250 mL infusion  1-20 mcg/min IntraVENous Continuous Elly Rick, APRN - CNP   Stopped at 03/25/22 0400    ampicillin 2000 mg ivpb mini bag  2,000 mg IntraVENous 6 times per day Lorena Reges, APRN - CNP   Stopped at 03/25/22 0495    atorvastatin (LIPITOR) tablet 40 mg  40 mg Oral Nightly Lorena Reges, APRN - CNP   40 mg at 03/24/22 1957         REVIEW OF SYSTEMS:    CONSTITUTIONAL:  Denies fever, chill or rigors. HEENT: denies blurring of vision or double vision, denies hearing problem  RESPIRATORY: Chest pain   CARDIOVASCULAR:  Denies palpitation  GASTROINTESTINAL:  Denies abdomen pain, diarrhea or constipation. GENITOURINARY:  Denies burning urination or frequency of urination  INTEGUMENT: denies wound , rash  HEMATOLOGIC/LYMPHATIC:  Denies lymph node swelling, gum bleeding or easy bruising.   MUSCULOSKELETAL:  Denies leg pain , joint pain , joint swelling  NEUROLOGICAL:  Fatigue , weakness     PHYSICAL EXAM: Vitals:     /81   Pulse 94   Temp 98.4 °F (36.9 °C) (Oral)   Resp 22   Ht 5' 9\" (1.753 m)   Wt 186 lb 12.8 oz (84.7 kg)   SpO2 97%   BMI 27.59 kg/m²       General Appearance:    Awake, alert , no acute distress. Head:    Normocephalic, atraumatic   Eyes:    No pallor, no icterus,   Ears:    No obvious deformity or drainage.    Nose:   No nasal drainage   Throat:   Mucosa moist, multiple dental caries    Neck:   Supple, no lymphadenopathy   Lungs:     Clear to auscultation bilaterally   Heart:     Irregular, sternum stable, chest tube    Abdomen:     Soft, non-tender, bowel sounds present    Extremities:   No edema, no cyanosis    Pulses:   Dorsalis pedis palpable    Skin:   no rashes       Lab Results   Component Value Date    WBC 13.7 03/25/2022    RBC 2.69 03/25/2022    HGB 7.6 03/25/2022    HCT 23.9 03/25/2022    HCT 28.0 03/23/2022     03/25/2022    MCV 88.8 03/25/2022    MCH 28.3 03/25/2022    MCHC 31.8 03/25/2022    RDW 15.3 03/25/2022    LYMPHOPCT 20.0 03/21/2022    MONOPCT 5.8 03/21/2022    BASOPCT 0.5 03/21/2022    MONOSABS 0.54 03/21/2022    LYMPHSABS 1.86 03/21/2022    EOSABS 0.32 03/21/2022    BASOSABS 0.05 03/21/2022       CMP     Lab Results   Component Value Date     03/25/2022    K 4.4 03/25/2022    K 4.2 03/15/2022     03/25/2022    CO2 21 03/25/2022    BUN 23 03/25/2022    CREATININE 1.0 03/25/2022    GFRAA >60 03/25/2022    LABGLOM >60 03/25/2022    GLUCOSE 115 03/25/2022    PROT 5.6 03/24/2022    LABALBU 3.1 03/24/2022    CALCIUM 8.4 03/25/2022    BILITOT 0.4 03/24/2022    ALKPHOS 95 03/24/2022     03/24/2022    ALT 59 03/24/2022         Hepatic Function Panel:    Lab Results   Component Value Date    ALKPHOS 95 03/24/2022    ALT 59 03/24/2022     03/24/2022    PROT 5.6 03/24/2022    BILITOT 0.4 03/24/2022    BILIDIR 0.3 03/23/2022    IBILI 0.2 03/23/2022    LABALBU 3.1 03/24/2022       PT/INR:    Lab Results   Component Value Date    PROTIME 17.3 03/23/2022    INR 1.6 03/23/2022       TSH:    Lab Results   Component Value Date    TSH 1.880 03/14/2022       U/A:    Lab Results   Component Value Date    COLORU Yellow 03/14/2022    PHUR 5.0 03/14/2022    WBCUA 0-1 03/14/2022    RBCUA 0-1 03/14/2022    BACTERIA MODERATE 03/14/2022    CLARITYU Clear 03/14/2022    SPECGRAV 1.025 03/14/2022    LEUKOCYTESUR Negative 03/14/2022    UROBILINOGEN 4.0 03/14/2022    BILIRUBINUR Negative 03/14/2022    BLOODU Negative 03/14/2022    GLUCOSEU Negative 03/14/2022    AMORPHOUS FEW 03/14/2022       ABG:  No results found for: FBK4IKJ, BEART, N1LANYVS, PHART, THGBART, OXH4HMD, PO2ART, PJA2ZMD    MICROBIOLOGY:    Blood culture -     Streptococcus mitis / Streptococcus oralis (1)    Antibiotic Interpretation Microscan  Method Status    ampicillin Sensitive <=^0.25 mcg/mL BACTERIAL SUSCEPTIBILITY PANEL BY LANEY     benzylpenicillin Sensitive <=^0.06 mcg/mL BACTERIAL SUSCEPTIBILITY PANEL BY LANEY     cefotaxime Sensitive <=^0.12 mcg/mL BACTERIAL SUSCEPTIBILITY PANEL BY LANEY     cefTRIAXone Sensitive <=^0.12 mcg/mL BACTERIAL SUSCEPTIBILITY PANEL BY LANEY     clindamycin Sensitive <=^0.25 mcg/mL BACTERIAL SUSCEPTIBILITY PANEL BY LANEY     levofloxacin Sensitive ^0.5 mcg/mL BACTERIAL SUSCEPTIBILITY PANEL BY LANEY     linezolid Sensitive <=^2 mcg/mL BACTERIAL SUSCEPTIBILITY PANEL BY LANEY     vancomycin Sensitive ^0.5 mcg/mL BACTERIAL SUSCEPTIBILITY PANEL BY LANEY             Radiology :    CTA chest       Impression:        No evidence of pulmonary embolism or acute pulmonary abnormality. Echo -       Ejection fraction is visually estimated at 55%. Severe holosystolic prolapse of the posterior leaflet with a flail P1/P2   segment due to ruptured chordae tendineae. Small mobile echodensity suspicious for vegetation on ventricular surface   of posterior leaflet. Failure of leaflet coaptation. No definitive papillary muscle rupture.    Torrential mitral regurgitation, eccentric jet directed anteriorly. IMPRESSION:     1. Streptococcus bacteremia ( 3/14 and 3/15) ,mitral valve endocarditis ,follow up blood cx neg on ( 3/17) s/p mitral valve repair ( 3/23)    RECOMMENDATIONS:      1. Ampicillin 2 grams IV q 4 hrs  ( PCN LANEY <0.06 ) ~ 4 weeks    2.  Await tissue cx - neg to date

## 2022-03-25 NOTE — PROGRESS NOTES
Patient transported to [facility] via wheelchair. All belongings taken with patient. Patient's sister notified of patient being transferred.

## 2022-03-25 NOTE — PLAN OF CARE
Problem: Pain - Acute:  Goal: Pain level will decrease  Description: Pain level will decrease  3/25/2022 1318 by Ever Ramos RN  Outcome: Met This Shift     Problem: Venous Thromboembolism:  Goal: Will show no signs or symptoms of venous thromboembolism  Description: Will show no signs or symptoms of venous thromboembolism  3/25/2022 1318 by Ever Ramos RN  Outcome: Met This Shift     Problem: Venous Thromboembolism:  Goal: Absence of signs or symptoms of impaired coagulation  Description: Absence of signs or symptoms of impaired coagulation  3/25/2022 1318 by Ever Ramos RN  Outcome: Met This Shift     Problem: Pain:  Goal: Pain level will decrease  Description: Pain level will decrease  3/25/2022 1318 by Ever Ramos RN  Outcome: Met This Shift     Problem: Pain:  Goal: Control of chronic pain  Description: Control of chronic pain  3/25/2022 1318 by Ever Ramos RN  Outcome: Met This Shift

## 2022-03-25 NOTE — PLAN OF CARE
Problem: Infection - Surgical Site:  Goal: Will show no infection signs and symptoms  Description: Will show no infection signs and symptoms  3/25/2022 1557 by Frederick Heard RN  Outcome: Met This Shift     Problem: Pain - Acute:  Goal: Pain level will decrease  Description: Pain level will decrease  3/25/2022 1557 by Frederick Heard RN  Outcome: Met This Shift     Problem: Venous Thromboembolism:  Goal: Will show no signs or symptoms of venous thromboembolism  Description: Will show no signs or symptoms of venous thromboembolism  3/25/2022 1557 by Frederick Heard RN  Outcome: Met This Shift     Problem: Venous Thromboembolism:  Goal: Absence of signs or symptoms of impaired coagulation  Description: Absence of signs or symptoms of impaired coagulation  3/25/2022 1318 by Frederick Heard RN  Outcome: Met This Shift

## 2022-03-25 NOTE — PLAN OF CARE
Problem: Cardiac Output - Decreased:  Goal: Cardiac output within specified parameters  Description: Cardiac output within specified parameters  Outcome: Met This Shift     Problem: Infection - Surgical Site:  Goal: Will show no infection signs and symptoms  Description: Will show no infection signs and symptoms  Outcome: Met This Shift     Problem: Pain - Acute:  Goal: Pain level will decrease  Description: Pain level will decrease  Outcome: Met This Shift     Problem: Venous Thromboembolism:  Goal: Will show no signs or symptoms of venous thromboembolism  Description: Will show no signs or symptoms of venous thromboembolism  Outcome: Met This Shift  Goal: Absence of signs or symptoms of impaired coagulation  Description: Absence of signs or symptoms of impaired coagulation  Outcome: Met This Shift     Problem: Pain:  Goal: Pain level will decrease  Description: Pain level will decrease  Outcome: Met This Shift  Goal: Control of acute pain  Description: Control of acute pain  Outcome: Met This Shift  Goal: Control of chronic pain  Description: Control of chronic pain  Outcome: Met This Shift     Problem: Skin Integrity:  Goal: Will show no infection signs and symptoms  Description: Will show no infection signs and symptoms  Outcome: Met This Shift  Goal: Absence of new skin breakdown  Description: Absence of new skin breakdown  Outcome: Met This Shift     Problem: ABCDS Injury Assessment  Goal: Absence of physical injury  Outcome: Met This Shift     Problem: Musculor/Skeletal Functional Status  Goal: Highest potential functional level  Outcome: Met This Shift  Goal: Absence of falls  Outcome: Met This Shift

## 2022-03-25 NOTE — PLAN OF CARE
Problem: ABCDS Injury Assessment  Goal: Absence of physical injury  3/25/2022 0856 by Grant Sheehan RN  Outcome: Met This Shift     Problem: Musculor/Skeletal Functional Status  Goal: Highest potential functional level  3/25/2022 0856 by Grant Sheehan RN  Outcome: Met This Shift     Problem: Musculor/Skeletal Functional Status  Goal: Absence of falls  3/25/2022 0856 by Grant Sheehan RN  Outcome: Met This Shift     Problem: Cardiac Output - Decreased:  Goal: Cardiac output within specified parameters  Description: Cardiac output within specified parameters  3/25/2022 0856 by Grant Sheehan RN  Outcome: Ongoing     Problem: Infection - Surgical Site:  Goal: Will show no infection signs and symptoms  Description: Will show no infection signs and symptoms  3/25/2022 0856 by Grant Sheehan RN  Outcome: Ongoing     Problem: Pain - Acute:  Goal: Pain level will decrease  Description: Pain level will decrease  3/25/2022 0856 by Grant Sheehan RN  Outcome: Ongoing     Problem: Venous Thromboembolism:  Goal: Will show no signs or symptoms of venous thromboembolism  Description: Will show no signs or symptoms of venous thromboembolism  3/25/2022 0856 by Grant Sheehan RN  Outcome: Ongoing     Problem: Venous Thromboembolism:  Goal: Absence of signs or symptoms of impaired coagulation  Description: Absence of signs or symptoms of impaired coagulation  3/25/2022 0856 by Grant Sheehan RN  Outcome: Ongoing     Problem: Pain:  Goal: Pain level will decrease  Description: Pain level will decrease  3/25/2022 0856 by Grant Sheehan RN  Outcome: Ongoing     Problem: Pain:  Goal: Control of acute pain  Description: Control of acute pain  3/25/2022 0856 by Grant Sheehan RN  Outcome: Ongoing     Problem: Pain:  Goal: Control of chronic pain  Description: Control of chronic pain  3/25/2022 0856 by Grant Sheehan RN  Outcome: Ongoing     Problem: Skin Integrity:  Goal: Will show no infection signs and symptoms  Description: Will show no infection signs and symptoms  3/25/2022 0856 by Lc Swain RN  Outcome: Ongoing     Problem: Skin Integrity:  Goal: Absence of new skin breakdown  Description: Absence of new skin breakdown  3/25/2022 0856 by Lc Swain RN  Outcome: Ongoing

## 2022-03-25 NOTE — PROGRESS NOTES
[address], [address]                                                               Patient Name: Delia Torres  MRN: [id number]  : 1960  Room: [address] [place] [address]    Evaluating OT: Patti Angulo OTR/L [id number]    Referring Provider: ROX Méndez CNP   Specific Provider Orders/Date: OT eval and treat (3/23/22)      Diagnosis: Severe mitral regurgitation with flail segment, Streptococcus bacteremia, mitral valve endocarditis, paroxysmal atrial fibrillation        Surgery/Procedures: 3/23 ANITRA, mitral valve repair,MAZE procedure      Pertinent Medical History: Acute diastolic CHF     *Precautions:  Fall Risk, sternal, O2, external pacemaker    Assessment of current deficits   [x]Functional mobility  [x]ADLs [x]Strength  []Cognition  [x]Functional transfers  [x]IADLs [x]Safety Awareness  [x]Endurance  []Fine Motor Coordination  [x]Balance       []Vision/perception  []Sensation    []Gross Motor Coordination [x]ROM  []Delirium                  [] Motor Control     []Communication     OT PLAN OF CARE   OT POC based on physician orders, patient diagnosis and results of clinical assessment.        Frequency/Duration: 1-3 days/wk for 1-2 weeks PRN     Specific OT Treatment to include:   ADL retraining/adapted techniques and AE recommendations to increase functional independence within precautions                    Energy conservation techniques to improve tolerance for selfcare routine   Functional transfer/mobility training/DME recommendations for increased independence, safety and fall prevention         Patient/family education to increase safety and functional independence within precautions             Environmental modifications for safe mobility and completion of ADLs                             Therapeutic activity to improve functional performance during ADLs Therapeutic exercise to improve tolerance and functional strength for ADLs   Balance retraining exercises/tasks for facilitation of postural control with dynamic challenges during ADLs . Recommended Adaptive Equipment:  LB dressing AE pending progress, shower seat for energy conservation     Home Living: Pt will stay with sister upon discharge in a 2 floor plan with 2 step(s) to enter through garage and no rail(s); bed/bath on 2nd floor (6+6 steps with rail)  Bathroom setup: tub/shower; standard height commode  Equipment owned: no DME    Prior Level of Function: IND with ADLs;  IND with IADLs. No device for ambulation. Driving: yes  Occupation: retired     Pain Level: pt c/o 4/10 chest pain  this session    Cognition: A&O: 4/4    Follows 2 step commands appropriately. Memory: Good; pt unable to recall sternal precautions, pt re-educated and re-issued educational materials. Full recall at end of session.    Comprehension Good   Problem solving: Fair+/Good   Judgement/safety: Fair+/Good               Communication skills: WFL           Vision: WFL               Glasses:corrective lenses                                                   Hearing: WFL     RASS: 0  CAM-ICU: (NT) Delirium     UE Assessment:  Hand Dominance: Right [x]  Left []     ROM Strength STM goal: PRN   RUE  Grossly WFL within precautions Not formally tested; grossly WFL              WNL for ADLS     LUE Grossly WFL within precautions Not formally tested; grossly WFL              WNL for ADLS       Sensation: No c/o numbness or tingling in extremities   Tone: WNL   Edema: [facility]     Functional Assessment: AM-PAC Daily Activity Raw Score: 14/24   Initial Eval Status  Date: 3/24/22 Treatment Status  Date: 3/25/22 STG=LTG  Time Frame: 5-7days   Feeding S; set up S/U                      IND  while seated up in chair to increase activity tolerance        Grooming Max A Min A  To stand at sink Crys   while standing sink level demonstrating G tolerance; G balance. UB dressing/bathing Max A Max A                      Crys   demonstrating G knowledge of precautions during tasks     LB dressing/bathing Max A     Mod A  With use of LB AE for socks/pants. Assist and min verbal cues for technique with reacher. Pt unable to recall sternal precautions, full recall upon 2nd attempt following re-education. Crys  using AE as needed for safe reach/ energy conservation       Toileting NT Mod A overall  Min A for STS from bed pan on chair, pt unable to void. Hygiene simulated Max A                      Crys     Bed Mobility  Supine to sit:   ModA+2    Sit to supine:   NT Sup>sit: Max A  Sit>sup: NT                        Crys  in prep of ADL tasks & transfers   Functional Transfers Sit to stand: Min A  from higher bed surface;  NT from lower chair surface due to decreased tolerance     Stand to sit: Min A Sit to stand: Min A  From EOB, and chair. Pt requiring verbal cues for maintenance for sternal precautions. SPT: Min A   With no AD                      Crys  sit<>stand/functional bathroom transfers using AD/DME as needed for balance and safety   Functional Mobility Min A  no device Min A  With no device for household distances. Crys   functional/bathroom mobility using AD as needed & demonstrating G safety     Balance Sitting:     Static:  SBA    Dynamic:Min A  Standing: Min A Sitting:     Static:  SBA    Dynamic:Min A  Standing: Min A Crys dynamic sitting balance; Crys dynamic standing balance  during ADL tasks & transfers   Endurance/Activity Tolerance   F tolerance with light activity. *Min hypotensive with mobility. Nurse present. Fair tolerance with moderate activity  Pt with mild SOB with increased activity, symptoms resolving with rest break <1 min to recover. SpO2 >90% throughout.  G   tolerance with moderate activity/self care routine Visual/  Perceptual               WFL         WFL                 Vitals:   HR at rest: 90 bpm HR at end of session: 95 bpm   Spo2 at rest:100% Spo2 at end of session 96%   BP at rest:125/72 mmHg BP at end of session 117/61 mmHg       Treatment: OT intervention provided this date includes:  Functional mobility: Instruction on sternal precautions to facilitate safe bed mobility & functional transfers. Pt required 1 person assist with fair recall of sternal precautions, verbal cues throughout. ADL retraining: Instruction on adapted dressing techniques to maintain sternal precautions during ADLs. Pt demo limited ability with figure 4 technique, verbal cues for sternal precautions. Energy Conservation training: Education on postural awareness/positioning and breathing techniques to improve overall tolerance and participation in self care routine. Pt educated on bracing and positioning to alleviate pain and discomfort with positional changes with good return demo. Pt/Family Education: Instruction with handouts on energy conservation and sternal precautions during functional activities for safe return home. Pt re-issued hand-out of sternal precautions to maximize understanding. Line management and environmental modifications made prior to and end of session to ensure patient safety and to increase efficiency of session. Skilled monitoring of HR, O2 saturation, blood pressure and patient's response to activity performed throughout session. Comments: OK from RN to see patient. Upon arrival, patient supine in bed, agreeable to session. At end of session, patient left seated in chair to increase activity tolerance. Call light within reach, all lines and tubes intact. Pt instructed on use of call light for assistance and fall prevention. Patient presents with decreased activity tolerance, dynamic balance, functional mobility and hypotension limiting completion of ADLs and safety.   Pt can benefit from continued skilled OT to increase safety, functional independence and quality of life. Rehab Potential: Good for established goals    Patient / Family Goal: return to PLOF    Patient and/or family were instructed/educated on diagnosis, prognosis/goals and plan of care. Pt demonstrated G understanding. [] Malnutrition indicators have been identified and nursing has been notified to ensure a dietitian consult is ordered. Evaluation Complexity: Moderate    · History: Expanded chart review of consults, imaging, and psychosocial history related to current functional performance. · Exam: 5+ performance deficits identified limiting functional independence and safe return home   · Assistance/Modification: Min/mod assistance or modifications required to perform tasks. May have comorbidities that affect occupational performance. Time In: 8:48 AM              Time Out: 9:17 AM     Total Treatment Time: 29 minutes          Min Units   OT Eval Low 11324     OT Eval Medium 66741     OT Eval High 84166     OT Re-Eval [id number]     Therapeutic Ex 06864     Therapeutic Activities 88035     ADL/Self Care 05128 29 2   Orthotic Management 82741     Neuro Re-Ed 93361     Non-Billable Time       Evaluation time includes thorough review of current medical information, gathering information on past medical history/social history and prior level of function, completion of standardized testing/informal observation of tasks, assessment of data and development of POC/Goals.      [address] [place] [address], OTR/L, [id number]

## 2022-03-26 LAB
ANION GAP SERPL CALCULATED.3IONS-SCNC: 12 MMOL/L (ref 7–16)
BLOOD BANK DISPENSE STATUS: NORMAL
BLOOD BANK DISPENSE STATUS: NORMAL
BLOOD BANK PRODUCT CODE: NORMAL
BLOOD BANK PRODUCT CODE: NORMAL
BPU ID: NORMAL
BPU ID: NORMAL
BUN BLDV-MCNC: 19 MG/DL (ref 6–23)
CALCIUM SERPL-MCNC: 8.5 MG/DL (ref 8.6–10.2)
CHLORIDE BLD-SCNC: 99 MMOL/L (ref 98–107)
CO2: 23 MMOL/L (ref 22–29)
CREAT SERPL-MCNC: 0.8 MG/DL (ref 0.7–1.2)
CULTURE SURGICAL: NORMAL
DESCRIPTION BLOOD BANK: NORMAL
DESCRIPTION BLOOD BANK: NORMAL
GFR AFRICAN AMERICAN: >60
GFR NON-AFRICAN AMERICAN: >60 ML/MIN/1.73
GLUCOSE BLD-MCNC: 87 MG/DL (ref 74–99)
HCT VFR BLD CALC: 24.4 % (ref 37–54)
HEMOGLOBIN: 7.7 G/DL (ref 12.5–16.5)
MAGNESIUM: 2.3 MG/DL (ref 1.6–2.6)
MCH RBC QN AUTO: 28.7 PG (ref 26–35)
MCHC RBC AUTO-ENTMCNC: 31.6 % (ref 32–34.5)
MCV RBC AUTO: 91 FL (ref 80–99.9)
METER GLUCOSE: 119 MG/DL (ref 74–99)
METER GLUCOSE: 131 MG/DL (ref 74–99)
METER GLUCOSE: 91 MG/DL (ref 74–99)
METER GLUCOSE: 99 MG/DL (ref 74–99)
PDW BLD-RTO: 15.7 FL (ref 11.5–15)
PLATELET # BLD: 157 E9/L (ref 130–450)
PMV BLD AUTO: 11.1 FL (ref 7–12)
POTASSIUM SERPL-SCNC: 4.1 MMOL/L (ref 3.5–5)
RBC # BLD: 2.68 E12/L (ref 3.8–5.8)
SODIUM BLD-SCNC: 134 MMOL/L (ref 132–146)
WBC # BLD: 11.8 E9/L (ref 4.5–11.5)

## 2022-03-26 PROCEDURE — 82962 GLUCOSE BLOOD TEST: CPT

## 2022-03-26 PROCEDURE — 83735 ASSAY OF MAGNESIUM: CPT

## 2022-03-26 PROCEDURE — 93798 PHYS/QHP OP CAR RHAB W/ECG: CPT

## 2022-03-26 PROCEDURE — 36415 COLL VENOUS BLD VENIPUNCTURE: CPT

## 2022-03-26 PROCEDURE — 80048 BASIC METABOLIC PNL TOTAL CA: CPT

## 2022-03-26 PROCEDURE — 6370000000 HC RX 637 (ALT 250 FOR IP): Performed by: PHYSICIAN ASSISTANT

## 2022-03-26 PROCEDURE — 2140000000 HC CCU INTERMEDIATE R&B

## 2022-03-26 PROCEDURE — 6370000000 HC RX 637 (ALT 250 FOR IP): Performed by: NURSE PRACTITIONER

## 2022-03-26 PROCEDURE — S5553 INSULIN LONG ACTING 5 U: HCPCS | Performed by: NURSE PRACTITIONER

## 2022-03-26 PROCEDURE — 94640 AIRWAY INHALATION TREATMENT: CPT

## 2022-03-26 PROCEDURE — 6360000002 HC RX W HCPCS: Performed by: NURSE PRACTITIONER

## 2022-03-26 PROCEDURE — 36592 COLLECT BLOOD FROM PICC: CPT

## 2022-03-26 PROCEDURE — 2580000003 HC RX 258: Performed by: NURSE PRACTITIONER

## 2022-03-26 PROCEDURE — 99233 SBSQ HOSP IP/OBS HIGH 50: CPT | Performed by: INTERNAL MEDICINE

## 2022-03-26 PROCEDURE — 6370000000 HC RX 637 (ALT 250 FOR IP): Performed by: INTERNAL MEDICINE

## 2022-03-26 PROCEDURE — 85027 COMPLETE CBC AUTOMATED: CPT

## 2022-03-26 RX ORDER — ROSUVASTATIN CALCIUM 20 MG/1
20 TABLET, COATED ORAL NIGHTLY
Status: DISCONTINUED | OUTPATIENT
Start: 2022-03-26 | End: 2022-03-30 | Stop reason: HOSPADM

## 2022-03-26 RX ORDER — ASPIRIN 81 MG/1
81 TABLET, CHEWABLE ORAL DAILY
Status: DISCONTINUED | OUTPATIENT
Start: 2022-03-26 | End: 2022-03-30 | Stop reason: HOSPADM

## 2022-03-26 RX ADMIN — INSULIN GLARGINE-YFGN 12 UNITS: 100 INJECTION, SOLUTION SUBCUTANEOUS at 20:48

## 2022-03-26 RX ADMIN — AMPICILLIN SODIUM 2000 MG: 2 INJECTION, POWDER, FOR SOLUTION INTRAVENOUS at 14:12

## 2022-03-26 RX ADMIN — MUPIROCIN: 20 OINTMENT TOPICAL at 10:32

## 2022-03-26 RX ADMIN — OXYCODONE HYDROCHLORIDE AND ACETAMINOPHEN 500 MG: 500 TABLET ORAL at 20:49

## 2022-03-26 RX ADMIN — TAMSULOSIN HYDROCHLORIDE 0.4 MG: 0.4 CAPSULE ORAL at 08:34

## 2022-03-26 RX ADMIN — PANTOPRAZOLE SODIUM 40 MG: 40 TABLET, DELAYED RELEASE ORAL at 08:34

## 2022-03-26 RX ADMIN — ROSUVASTATIN 20 MG: 20 TABLET, FILM COATED ORAL at 20:49

## 2022-03-26 RX ADMIN — FERROUS SULFATE TAB 325 MG (65 MG ELEMENTAL FE) 325 MG: 325 (65 FE) TAB at 08:34

## 2022-03-26 RX ADMIN — MUPIROCIN: 20 OINTMENT TOPICAL at 20:49

## 2022-03-26 RX ADMIN — OXYCODONE 5 MG: 5 TABLET ORAL at 15:04

## 2022-03-26 RX ADMIN — AMIODARONE HYDROCHLORIDE 200 MG: 200 TABLET ORAL at 20:49

## 2022-03-26 RX ADMIN — ASPIRIN 81 MG 81 MG: 81 TABLET ORAL at 10:35

## 2022-03-26 RX ADMIN — SODIUM CHLORIDE, PRESERVATIVE FREE 10 ML: 5 INJECTION INTRAVENOUS at 20:51

## 2022-03-26 RX ADMIN — AMPICILLIN SODIUM 2000 MG: 2 INJECTION, POWDER, FOR SOLUTION INTRAVENOUS at 02:48

## 2022-03-26 RX ADMIN — OXYCODONE HYDROCHLORIDE 10 MG: 10 TABLET ORAL at 01:34

## 2022-03-26 RX ADMIN — IPRATROPIUM BROMIDE AND ALBUTEROL SULFATE 1 AMPULE: .5; 2.5 SOLUTION RESPIRATORY (INHALATION) at 16:46

## 2022-03-26 RX ADMIN — Medication 400 MG: at 08:34

## 2022-03-26 RX ADMIN — OXYCODONE 5 MG: 5 TABLET ORAL at 20:54

## 2022-03-26 RX ADMIN — FOLIC ACID 1 MG: 1 TABLET ORAL at 08:34

## 2022-03-26 RX ADMIN — FUROSEMIDE 20 MG: 10 INJECTION, SOLUTION INTRAMUSCULAR; INTRAVENOUS at 08:34

## 2022-03-26 RX ADMIN — AMIODARONE HYDROCHLORIDE 200 MG: 200 TABLET ORAL at 08:34

## 2022-03-26 RX ADMIN — IPRATROPIUM BROMIDE AND ALBUTEROL SULFATE 1 AMPULE: .5; 2.5 SOLUTION RESPIRATORY (INHALATION) at 09:06

## 2022-03-26 RX ADMIN — IPRATROPIUM BROMIDE AND ALBUTEROL SULFATE 1 AMPULE: .5; 2.5 SOLUTION RESPIRATORY (INHALATION) at 12:57

## 2022-03-26 RX ADMIN — OXYCODONE HYDROCHLORIDE 10 MG: 10 TABLET ORAL at 06:54

## 2022-03-26 RX ADMIN — OXYCODONE HYDROCHLORIDE AND ACETAMINOPHEN 500 MG: 500 TABLET ORAL at 08:34

## 2022-03-26 RX ADMIN — AMPICILLIN SODIUM 2000 MG: 2 INJECTION, POWDER, FOR SOLUTION INTRAVENOUS at 18:05

## 2022-03-26 RX ADMIN — SODIUM CHLORIDE, PRESERVATIVE FREE 10 ML: 5 INJECTION INTRAVENOUS at 06:55

## 2022-03-26 RX ADMIN — AMPICILLIN SODIUM 2000 MG: 2 INJECTION, POWDER, FOR SOLUTION INTRAVENOUS at 10:31

## 2022-03-26 RX ADMIN — FERROUS SULFATE TAB 325 MG (65 MG ELEMENTAL FE) 325 MG: 325 (65 FE) TAB at 17:22

## 2022-03-26 RX ADMIN — SODIUM CHLORIDE, PRESERVATIVE FREE 10 ML: 5 INJECTION INTRAVENOUS at 08:34

## 2022-03-26 RX ADMIN — AMPICILLIN SODIUM 2000 MG: 2 INJECTION, POWDER, FOR SOLUTION INTRAVENOUS at 06:54

## 2022-03-26 RX ADMIN — IPRATROPIUM BROMIDE AND ALBUTEROL SULFATE 1 AMPULE: .5; 2.5 SOLUTION RESPIRATORY (INHALATION) at 21:19

## 2022-03-26 RX ADMIN — AMIODARONE HYDROCHLORIDE 200 MG: 200 TABLET ORAL at 14:12

## 2022-03-26 ASSESSMENT — PAIN DESCRIPTION - DESCRIPTORS
DESCRIPTORS: THROBBING;TENDER;SORE
DESCRIPTORS: TENDER;SORE;STABBING

## 2022-03-26 ASSESSMENT — PAIN DESCRIPTION - ONSET
ONSET: ON-GOING
ONSET: ON-GOING

## 2022-03-26 ASSESSMENT — PAIN SCALES - GENERAL
PAINLEVEL_OUTOF10: 8
PAINLEVEL_OUTOF10: 0
PAINLEVEL_OUTOF10: 7
PAINLEVEL_OUTOF10: 4
PAINLEVEL_OUTOF10: 5

## 2022-03-26 ASSESSMENT — PAIN - FUNCTIONAL ASSESSMENT
PAIN_FUNCTIONAL_ASSESSMENT: PREVENTS OR INTERFERES SOME ACTIVE ACTIVITIES AND ADLS
PAIN_FUNCTIONAL_ASSESSMENT: PREVENTS OR INTERFERES SOME ACTIVE ACTIVITIES AND ADLS

## 2022-03-26 ASSESSMENT — PAIN DESCRIPTION - LOCATION
LOCATION: CHEST;BACK
LOCATION: CHEST;BACK

## 2022-03-26 ASSESSMENT — PAIN DESCRIPTION - FREQUENCY
FREQUENCY: CONTINUOUS
FREQUENCY: INTERMITTENT

## 2022-03-26 ASSESSMENT — PAIN DESCRIPTION - PAIN TYPE
TYPE: ACUTE PAIN;SURGICAL PAIN
TYPE: ACUTE PAIN;SURGICAL PAIN

## 2022-03-26 ASSESSMENT — PAIN DESCRIPTION - ORIENTATION
ORIENTATION: MID
ORIENTATION: MID

## 2022-03-26 ASSESSMENT — PAIN DESCRIPTION - PROGRESSION
CLINICAL_PROGRESSION: GRADUALLY IMPROVING
CLINICAL_PROGRESSION: GRADUALLY IMPROVING

## 2022-03-26 NOTE — PROGRESS NOTES
Hospitalist Progress Note      Synopsis: Patient admitted on 114/2022 81-year-old male with no known past medical history except childhood murmur, went to the urgent care today for evaluation of fatigue and was found to have A. fib with RVR and was sent to the main hospital emergency room. Patient states his symptoms of fatigue were on and off since past 1 month. Denies any palpitations. However did report some exertional shortness of breath. Patient consulted, as per him, telemedicine doctor, who prescribed him ivermectin and hydroxychloroquine earlier this month for suspicion of COVID-19 infection. Patient is unvaccinated against Covid. Patient completed 5 days of ivermectin, however, did not finish 14-day course of hydroxychloroquine which he stopped about 3 days ago. Patient denies any recent fever, chills, cough, shortness of breath, chest pain, abdominal pain, nausea, vomiting, diarrhea constipation. No presyncopal or syncopal event. Upon arrival in the emergency room, patient was noted to be febrile with T-max 101.1 °F, WBC count slightly elevated at 12.2, CRP elevated at 5.9, proBNP elevated at 6000, lactate level elevated at 2.6, troponin elevated at 42, 43. Patient was started on Cardizem drip. Patient had CTA chest done which did not reveal acute PE. Blood cultures positive for strep mitis. Echocardiogram reveals severe mitral regurgitation with flail leaflet and ruptured chordae tendon line and concern for vegetation. Patient had left heart catheterization demonstrating no significant coronary artery disease. Plan for valve replacement 3/23.       Subjective    Patient seen and examined. Chart reviewed, discussed with nursing staff and case management. No overnight events reported.    WBC down to 11.8, hemoglobin stable at 7 .7  Exam:  /79   Pulse 88   Temp 98.2 °F (36.8 °C) (Oral)   Resp 16   Ht 5' 9\" (1.753 m)   Wt 188 lb 12.8 oz (85.6 kg)   SpO2 98%   BMI 27.88 kg/m² General appearance: No apparent distress, appears stated age and cooperative. HEENT: Pupils equal, round, and reactive to light. Conjunctivae/corneas clear. Neck: Supple. No jugular venous distention. Trachea midline. Respiratory:  Normal respiratory effort. Clear to auscultation, bilaterally without Rales/Wheezes/Rhonchi. Cardiovascular: Regular rate and rhythm with normal S1/S2 +3 of 6 EARNESTINE murmurs, rubs or gallops. Abdomen: Soft, non-tender, non-distended with normal bowel sounds. Musculoskeletal: No clubbing, cyanosis or edema bilaterally. Brisk capillary refill. 2+ lower extremity pulses (dorsalis pedis).    Skin: Incision CDI appropriately tender no rashes    Neurologic: awake, alert and following commands     Medications:  Reviewed    Infusion Medications    dextrose       Scheduled Medications    rosuvastatin  20 mg Oral Nightly    aspirin  81 mg Oral Daily    amiodarone  200 mg Oral TID    furosemide  20 mg IntraVENous Daily    ferrous sulfate  325 mg Oral BID WC    vitamin C  500 mg Oral BID    folic acid  1 mg Oral Daily    pantoprazole  40 mg Oral Daily    insulin lispro  0-6 Units SubCUTAneous TID     insulin lispro  0-3 Units SubCUTAneous Nightly    sodium chloride flush  10 mL IntraVENous 2 times per day    magnesium oxide  400 mg Oral Daily    mupirocin   Nasal BID    ipratropium-albuterol  1 ampule Inhalation Q4H WA    insulin glargine  0.15 Units/kg SubCUTAneous Nightly    tamsulosin  0.4 mg Oral Daily    ampicillin IV  2,000 mg IntraVENous 6 times per day     PRN Meds: perflutren lipid microspheres, heparin flush, potassium chloride, diphenhydrAMINE, bisacodyl, amiodarone, amiodarone bolus, magnesium sulfate, morphine, sodium chloride, trimethobenzamide, oxyCODONE **OR** oxyCODONE, sodium chloride flush, ondansetron **OR** ondansetron, glucose, dextrose, glucagon (rDNA), dextrose    I/O    Intake/Output Summary (Last 24 hours) at 3/26/2022 9467  Last data filed at admission, then afebrile   Treated cefepime and vancomycin-> vancomycin--> ampicillin  Echocardiogram with partial flail mitral valve, ruptured chordae and likely vegetation  ANITRA 3/16 confirming severe MR with flail leaflet and ruptured chordae possible small vegetation  Urine culture  Blood cultures 2 of 2+ GPC in chains--alpha streptococcus-strep mitis/oralis  CRP 5.9  Procalcitonin 0.31, repeat 0.23  Infectious disease Consult appreciated-discussed case    Mitral regurgitation-severe  ANITRA flail leaflet, ruptured chordae, sever MR, suspected vegitation   [facility] 3/18 without evidence of significant CAD  CTS Consult appreciated possibly valve repair/replacement Wednesday 3/23  Dental consult appreciated 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 #14 and #30--cleared for valve surgery  JANA-improved  IVF completed  Avoid nephrotoxins  Urine labs  Monitor renal function, electrolytes, urine output    Medications for other co morbidities cont as appropriate w dosage adjustments as necessary       Diet: ADULT ORAL NUTRITION SUPPLEMENT; Breakfast, Lunch, Dinner; Low Calorie/High Protein Oral Supplement  ADULT DIET; Regular; Low Fat/Low Chol/High Fiber/EPHRAIM  Code Status: Full Code  PT/OT Eval Status: Ordered  DVT Prophylaxis:   Lovenox  Recommended disposition at discharge:   Pending valve repair/ further medical stabilization    +++++++++++++++++++++++++++++++++++++++++++++++++  larissa rose MD  [facility].  +++++++++++++++++++++++++++++++++++++++++++++++++  NOTE: This report was transcribed using voice recognition software. Every effort was made to ensure accuracy; however, inadvertent computerized transcription errors may be present.

## 2022-03-26 NOTE — PROGRESS NOTES
INPATIENT CARDIOLOGY FOLLOW-UP    Name: Jyoti Flower    Age: 62 y.o. Date of Admission: 3/14/2022  8:06 PM    Date of Service: 3/26/2022    Chief Complaint: Follow-up for mitral valve disease secondary to endocarditis, paroxysmal atrial fibrillation, nonischemic cardiomyopathy, resolving acute diastolic heart failure, anemia    Interim History: Patient remains compensated from a cardiovascular standpoint with no perioperative cardiovascular complications and a transient episode of low-grade fever. He remains hemodynamically stable with maintenance of sinus rhythm and stable renal function and electrolytes. His postoperative anemia remains unchanged. Review of Systems: The remainder of a complete multisystem review including consitutional, central nervous, respiratory, circulatory, gastrointestinal, genitourinary, endocrinologic, hematologic, musculoskeletal and psychiatric are negative.     Problem List:  Patient Active Problem List   Diagnosis    Atrial fibrillation with rapid ventricular response ([facility] [address]    Sepsis ([address] present on admission    JANA (acute kidney injury) ([facility] [address]    Bacteremia    Severe mitral regurgitation    Suspected endocarditis    Subacute bacterial endocarditis    Ruptured chordae tendineae (HCC)    Mitral valve disease    Paroxysmal atrial fibrillation (HCC)    Acute diastolic (congestive) heart failure (HCC)    Cardiac insufficiency (HCC)    Acute pulmonary insufficiency    Acute blood loss anemia       Allergies:  No Known Allergies    Current Medications:  Current Facility-Administered Medications   Medication Dose Route Frequency Provider Last Rate Last Admin    heparin flush 100 UNIT/ML injection 300 Units  300 Units IntraCATHeter PRN ROX Trotter CNP   300 Units at 03/25/22 2215    amiodarone (CORDARONE) tablet 200 mg  200 mg Oral TID ROX Trotter CNP   200 mg at 03/25/22 2120    furosemide (LASIX) injection 20 mg  20 mg IntraVENous Daily Danielle Judith, APRN - CNP   20 mg at 03/25/22 8966    ferrous sulfate (IRON 325) tablet 325 mg  325 mg Oral BID  Danielle Wong APRN - CNP   325 mg at 03/25/22 1640    ascorbic acid (VITAMIN C) tablet 500 mg  500 mg Oral BID Danielle Arts, APRN - CNP   500 mg at 71/38/38 6994    folic acid (FOLVITE) tablet 1 mg  1 mg Oral Daily Danielle ROX Wong - CNP   1 mg at 03/25/22 1240    potassium chloride (KLOR-CON M) extended release tablet 20 mEq  20 mEq Oral PRN Danielle Judith, APRN - CNP        diphenhydrAMINE (BENADRYL) tablet 25 mg  25 mg Oral Q8H PRN Danielle Wong, APRN - CNP   25 mg at 03/25/22 2120    pantoprazole (PROTONIX) tablet 40 mg  40 mg Oral Daily Danielle Judith, APRN - CNP        insulin lispro (HUMALOG) injection vial 0-6 Units  0-6 Units SubCUTAneous TID  ROX Flores - CNP   1 Units at 03/25/22 1641    insulin lispro (HUMALOG) injection vial 0-3 Units  0-3 Units SubCUTAneous Nightly Daniellemian Wong APRN - CNP        bisacodyl (DULCOLAX) suppository 10 mg  10 mg Rectal Daily PRN Danielle Wong APRN - CNP        amiodarone (CORDARONE) tablet 400 mg  400 mg Oral PRN Danielle Wong, APRN - CNP        amiodarone (CORDARONE) 150 mg in dextrose 5 % 100 mL bolus  150 mg IntraVENous PRN ROX Flores - CNP        magnesium sulfate 2000 mg in 50 mL IVPB premix  2,000 mg IntraVENous PRN Danielle Wong APRN - CNP        morphine (PF) injection 2 mg  2 mg IntraVENous Q4H PRN Danielle Wong APRN - CNP        sodium chloride (OCEAN, BABY AYR) 0.65 % nasal spray 1 spray  1 spray Each Nostril PRN Danielle Wong APRN - CNP        trimethobenzamide Margert Majestic) injection 200 mg  200 mg IntraMUSCular Q6H PRN Danielle Wong, APRN - CNP        oxyCODONE (ROXICODONE) immediate release tablet 5 mg  5 mg Oral Q4H PRN Betty Garcia PA-C   5 mg at 03/25/22 2131    Or    oxyCODONE HCl (OXY-IR) immediate release tablet 10 mg  10 mg Oral Q4H PRN Betty Garcia PA-C 10 mg at 03/26/22 0654    sodium chloride flush 0.9 % injection 10 mL  10 mL IntraVENous 2 times per day Ami Reamer, APRN - CNP   10 mL at 03/25/22 2115    sodium chloride flush 0.9 % injection 10 mL  10 mL IntraVENous PRN Ami Reamer, APRN - CNP   10 mL at 03/26/22 0655    ondansetron (ZOFRAN-ODT) disintegrating tablet 4 mg  4 mg Oral Q8H PRN Ami Reamer, APRN - CNP        Or    ondansetron [facility]) injection 4 mg  4 mg IntraVENous Q6H PRN Ami Reamer, APRN - CNP        magnesium oxide (MAG-OX) tablet 400 mg  400 mg Oral Daily Ami Reamer, APRN - CNP   400 mg at 03/25/22 0827    mupirocin (BACTROBAN) 2 % ointment   Nasal BID Ami Reamer, APRN - CNP   Given at 03/25/22 2115    ipratropium-albuterol (DUONEB) nebulizer solution 1 ampule  1 ampule Inhalation Q4H WA Ami Reamer, APRN - CNP   1 ampule at 03/25/22 2135    insulin glargine-yfgn (SEMGLEE-YFGN) injection vial 12 Units  0.15 Units/kg SubCUTAneous Nightly Ami Reamer, APRN - CNP   12 Units at 03/24/22 2021    glucose (GLUTOSE) 40 % oral gel 15 g  15 g Oral PRN Ami Reamer, APRN - CNP        dextrose 50 % IV solution  12.5 g IntraVENous PRN Ami Reamer, APRN - CNP        glucagon (rDNA) injection 1 mg  1 mg IntraMUSCular PRN Ami Reamer, APRN - CNP        dextrose 5 % solution  100 mL/hr IntraVENous PRN Ami Reamer, APRN - CNP        tamsulosin [facility]) capsule 0.4 mg  0.4 mg Oral Daily Ami Reamer, APRN - CNP   0.4 mg at 03/25/22 0827    ampicillin 2000 mg ivpb mini bag  2,000 mg IntraVENous 6 times per day Ami Reamer, APRN -  mL/hr at 03/26/22 0654 2,000 mg at 03/26/22 0654    atorvastatin (LIPITOR) tablet 40 mg  40 mg Oral Nightly Ami Reamer, APRN - CNP   40 mg at 03/25/22 2120      dextrose         Physical Exam:  /88   Pulse 94   Temp 97.1 °F (36.2 °C) (Temporal)   Resp 16   Ht 5' 9\" (1.753 m)   Wt 188 lb 12.8 oz (85.6 kg)   SpO2 93%   BMI 27.88 kg/m² 03/26/2022     Lab Results   Component Value Date    PROTIME 17.3 03/23/2022    INR 1.6 03/23/2022     Lab Results   Component Value Date    TSH 1.880 03/14/2022     No components found for: CHLPL  Lab Results   Component Value Date    TRIG 115 03/15/2022     Lab Results   Component Value Date    HDL 20 03/15/2022     Lab Results   Component Value Date    LDLCALC 90 03/15/2022       Cardiac Tests:  Telemetry findings reviewed: Sinus rhythm/sinus tachycardia, no new tachy/bradyarrhythmias overnight      ASSESSMENT / PLAN: On a clinical basis, the patient remains compensated from a cardiovascular standpoint with decreasing postoperative discomfort and present maintenance of sinus rhythm with the addition of amiodarone by the cardiothoracic surgical service. His blood pressure remains stable, albeit borderline relative hypotensive with limitations regarding further optimal medical therapy inclusive of beta-blockers additionally stabilizing his atrial arrhythmias. Additional management will be deferred to the cardiothoracic surgical service in addition to that of the infectious disease service and the management of his bacterial endocarditis. Based on the potential interaction between amiodarone and atorvastatin, the lateral be converted to rosuvastatin to assist in aggressive risk factor modification related to coronary atherosclerosis as well as needs of ongoing aggressive risk factor modification of blood pressure and diabetes to reduce these risks. His ongoing needs of antibiotic prophylaxis at time of all dental interventions has been reinforced. Note: This report was completed utilizing computer voice recognition software. Every effort has been made to ensure accuracy, however; inadvertent computerized transcription errors may be present. Jimmie Meza.  Lucero Mendes, [facility]

## 2022-03-26 NOTE — PROGRESS NOTES
POD#3 Awake, alert. No complaints. Denies CP, palpitations, SOB at rest, dizziness/lightheadedness. Vitals:    03/26/22 0315 03/26/22 0345 03/26/22 0749 03/26/22 0830   BP: 106/72  102/88    Pulse: 94  94    Resp: 16  16    Temp: 97.4 °F (36.3 °C)  97.1 °F (36.2 °C)    TempSrc: Temporal  Temporal    SpO2: 95%  93% 93%   Weight:  188 lb 12.8 oz (85.6 kg)     Height:         RA      Intake/Output Summary (Last 24 hours) at 3/26/2022 1008  Last data filed at 3/26/2022 0645  Gross per 24 hour   Intake 900 ml   Output 1640 ml   Net -740 ml         UO: 575mL/8hr   CT 50cc/24 hours       Recent Labs     03/24/22  0445 03/24/22  1040 03/24/22  1445 03/25/22  0415 03/26/22  0600   WBC 16.8*  --   --  13.7* 11.8*   HGB 7.6*   < > 7.7* 7.6* 7.7*   HCT 24.2*   < > 24.2* 23.9* 24.4*     --   --  141 157    < > = values in this interval not displayed. Recent Labs     03/24/22  0445 03/25/22  0415 03/26/22  0600   BUN 19 23 19   CREATININE 1.1 1.0 0.8         Telemetry: SR      PE  Cardiac: RRR  Lungs: decreased bases  Chest incision with intact TASHA DSD. Sternum stable. Prior chest tube site incisions C/D/I, no erythema with intact sutures. Chest tubes x 1 and Epicardial pacing wires present and secure. Abd: Soft, nontender, +BS  Ext: Left groin suture intact, groin soft            A/P: POD#3    1. severe mitral regurgitation, streptococcus bacteremia, mitral valve endocarditis, atrial fibrillation  --Stable s/p ANITRA, mitral valve repair (P2 triangular resection, P3/P3 perforation repair, 34mm Future band), MAZE procedure, 40mm Atriclip   --Post op ANITRA reveals 30% EF  --will order TTE prior to discharge to establish baseline and need for life vest on DC   --Scr stable- Scr 0.8   --ASA/statin   --reinforce sternal precautions  --continue epicardial pacing wires  -- Cont remaining CT today, with plans to remove tomm      2.  Expected acute blood loss anemia secondary to open heart surgery  --stable- hgb 7.7 ( 7.6 yest)- no active bleeding, cont to monitor   - 1PRBC 3/24       3. Atrial fibrillation s/p MAZE procedure, 40mm Atriclip   -SR, prophylactic amio started- currently on amio 200mg tid   -Hold off BB for now, SBP~100        4. Expected acute pulmonary insufficiency in the setting following surgery  --continue duonebs with ezpap  --encourage C&DB, SMI  --currently on RA  -- Lasix daily     5. streptococcus bacteremia; MV Endocarditis  - Antibiotics per ID Ampicillin 2grams q4 hours      6. Cardiac Insufficiency  - EF 30% post CPB, requiring epinephrine gtt for hemodynamic support  -Off Epi. SBP ~100. Hold off BB.   - GDMT as hemodynamics allow       7. Constipation--expected delayed return of bowel function  --secondary to anesthesia, narcotics, decreased oral intake, and decreased physical activity   --no BM since prior to surgery  --Continue daily MOM/oral bisacodyl until +BM and senna-s as scheduled. --Will give daily suppository until +BM starting POD 3 if no result by then   --Encouraged continued increase in oral intake and activity.          8. Expected deconditioning in the setting following surgery  --Increase activity as tolerated  --PT/OT  --currently ambulating 400ft            DVT prophylaxis:  --continue bilateral knee high GODWIN hose  --continue PCDs  --continue progressive ambulation  --continue knee high GODWIN hose/pcds/progressive ambulation      Dispo: Home vs rehab pending plan for IV abx         This patient's case and care plan was discussed with the attending surgeon

## 2022-03-26 NOTE — PROGRESS NOTES
Department of Internal Medicine  Infectious Diseases  Progress  Note      C/C : Streptococcus bacteremia , mitral valve endocarditis     Denied fever , chills   Reports feeling well   Afebrile     Current Facility-Administered Medications   Medication Dose Route Frequency Provider Last Rate Last Admin    rosuvastatin (CRESTOR) tablet 20 mg  20 mg Oral Nightly Estrellita Bauer MD        perflutren lipid microspheres (DEFINITY) injection 1.65 mg  1.5 mL IntraVENous ONCE PRN Jose L Anthony PA-C        aspirin chewable tablet 81 mg  81 mg Oral Daily CINDY GeorgesC   81 mg at 03/26/22 1035    heparin flush 100 UNIT/ML injection 300 Units  300 Units IntraCATHeter PRN ROX Jasmine CNP   300 Units at 03/25/22 2215    amiodarone (CORDARONE) tablet 200 mg  200 mg Oral TID ROX Jasmine CNP   200 mg at 03/26/22 2306    furosemide (LASIX) injection 20 mg  20 mg IntraVENous Daily ROX Jasmine CNP   20 mg at 03/26/22 0834    ferrous sulfate (IRON 325) tablet 325 mg  325 mg Oral BID  ROX Jasmine CNP   325 mg at 03/26/22 6324    ascorbic acid (VITAMIN C) tablet 500 mg  500 mg Oral BID ROX Jasmine CNP   500 mg at 25/72/25 7535    folic acid (FOLVITE) tablet 1 mg  1 mg Oral Daily ROX Jasmine CNP   1 mg at 03/26/22 5020    potassium chloride (KLOR-CON M) extended release tablet 20 mEq  20 mEq Oral PRN ROX Jasmine CNP        diphenhydrAMINE (BENADRYL) tablet 25 mg  25 mg Oral Q8H PRN ROX Jasmine CNP   25 mg at 03/25/22 2120    pantoprazole (PROTONIX) tablet 40 mg  40 mg Oral Daily ROX Jasmine CNP   40 mg at 03/26/22 0834    insulin lispro (HUMALOG) injection vial 0-6 Units  0-6 Units SubCUTAneous TID  ROX Jasmine CNP   1 Units at 03/25/22 1641    insulin lispro (HUMALOG) injection vial 0-3 Units  0-3 Units SubCUTAneous Nightly ROX Jasmine - CNP        bisacodyl (DULCOLAX) suppository 10 mg  10 mg Rectal Daily PRN Lossie Penning, APRN - CNP        amiodarone (CORDARONE) tablet 400 mg  400 mg Oral PRN Lossie Penning, APRN - CNP        amiodarone (CORDARONE) 150 mg in dextrose 5 % 100 mL bolus  150 mg IntraVENous PRN Lossie Penning, APRN - CNP        magnesium sulfate 2000 mg in 50 mL IVPB premix  2,000 mg IntraVENous PRN Lossie Penning, APRN - CNP        morphine (PF) injection 2 mg  2 mg IntraVENous Q4H PRN Lossie Penning, APRN - CNP        sodium chloride (OCEAN, BABY AYR) 0.65 % nasal spray 1 spray  1 spray Each Nostril PRN Lossie Penning, APRN - CNP        trimethobenzamide Jorge Peon) injection 200 mg  200 mg IntraMUSCular Q6H PRN Lossie Penning, APRN - CNP        oxyCODONE (ROXICODONE) immediate release tablet 5 mg  5 mg Oral Q4H PRN Tony Dominguez PA-C   5 mg at 03/25/22 2131    Or    oxyCODONE HCl (OXY-IR) immediate release tablet 10 mg  10 mg Oral Q4H PRN Tony Dominguez PA-C   10 mg at 03/26/22 0654    sodium chloride flush 0.9 % injection 10 mL  10 mL IntraVENous 2 times per day Lossie Penning, APRN - CNP   10 mL at 03/26/22 0834    sodium chloride flush 0.9 % injection 10 mL  10 mL IntraVENous PRN Lossie Penning, APRN - CNP   10 mL at 03/26/22 0655    ondansetron (ZOFRAN-ODT) disintegrating tablet 4 mg  4 mg Oral Q8H PRN Lossie Penning, APRN - CNP        Or    ondansetron [facility]) injection 4 mg  4 mg IntraVENous Q6H PRN Lossie Penning, APRN - CNP        magnesium oxide (MAG-OX) tablet 400 mg  400 mg Oral Daily Lossie Penning, APRN - CNP   400 mg at 03/26/22 5344    mupirocin (BACTROBAN) 2 % ointment   Nasal BID Lossie Penning, APRN - CNP   Given at 03/26/22 1032    ipratropium-albuterol (DUONEB) nebulizer solution 1 ampule  1 ampule Inhalation Q4H WA Lossie Penning, APRN - CNP   1 ampule at 03/26/22 0906    insulin glargine-yfgn (SEMGLEE-YFGN) injection vial 12 Units  0.15 Units/kg SubCUTAneous Nightly ROX Henry - CNP   12 Units at 03/24/22 2021    glucose (GLUTOSE) 40 % oral gel 15 g  15 g Oral PRN ROX Bergman CNP        dextrose 50 % IV solution  12.5 g IntraVENous PRN ROX Bergman CNP        glucagon (rDNA) injection 1 mg  1 mg IntraMUSCular PRN ROX Bergman CNP        dextrose 5 % solution  100 mL/hr IntraVENous PRN ROX Bergman CNP        tamsulosin [facility]) capsule 0.4 mg  0.4 mg Oral Daily ROX Bergman CNP   0.4 mg at 03/26/22 3372    ampicillin 2000 mg ivpb mini bag  2,000 mg IntraVENous 6 times per day ROX Bergman CNP   Stopped at 03/26/22 1113         REVIEW OF SYSTEMS:    CONSTITUTIONAL:  Denies fever, chill or rigors. HEENT: denies blurring of vision or double vision, denies hearing problem  RESPIRATORY: Chest pain   CARDIOVASCULAR:  Denies palpitation  GASTROINTESTINAL:  Denies abdomen pain, diarrhea or constipation. GENITOURINARY:  Denies burning urination or frequency of urination  INTEGUMENT: denies wound , rash  HEMATOLOGIC/LYMPHATIC:  Denies lymph node swelling, gum bleeding or easy bruising. MUSCULOSKELETAL:  Denies leg pain , joint pain , joint swelling  NEUROLOGICAL:  Fatigue , weakness     PHYSICAL EXAM:      Vitals:     /79   Pulse 88   Temp 98.2 °F (36.8 °C) (Oral)   Resp 16   Ht 5' 9\" (1.753 m)   Wt 188 lb 12.8 oz (85.6 kg)   SpO2 98%   BMI 27.88 kg/m²       General Appearance:    Awake, alert , no acute distress. Head:    Normocephalic, atraumatic   Eyes:    No pallor, no icterus,   Ears:    No obvious deformity or drainage.    Nose:   No nasal drainage   Throat:   Mucosa moist, multiple dental caries    Neck:   Supple, no lymphadenopathy   Lungs:     Clear to auscultation bilaterally   Heart:     Irregular, sternum stable, chest tube    Abdomen:     Soft, non-tender, bowel sounds present    Extremities:   No edema, no cyanosis    Pulses:   Dorsalis pedis palpable    Skin:   no rashes       Lab Results   Component Value Date    WBC 11.8 03/26/2022    RBC 2.68 03/26/2022    HGB 7.7 03/26/2022    HCT 24.4 03/26/2022    HCT 28.0 03/23/2022     03/26/2022    MCV 91.0 03/26/2022    MCH 28.7 03/26/2022    MCHC 31.6 03/26/2022    RDW 15.7 03/26/2022    LYMPHOPCT 20.0 03/21/2022    MONOPCT 5.8 03/21/2022    BASOPCT 0.5 03/21/2022    MONOSABS 0.54 03/21/2022    LYMPHSABS 1.86 03/21/2022    EOSABS 0.32 03/21/2022    BASOSABS 0.05 03/21/2022       CMP     Lab Results   Component Value Date     03/26/2022    K 4.1 03/26/2022    K 4.2 03/15/2022    CL 99 03/26/2022    CO2 23 03/26/2022    BUN 19 03/26/2022    CREATININE 0.8 03/26/2022    GFRAA >60 03/26/2022    LABGLOM >60 03/26/2022    GLUCOSE 87 03/26/2022    PROT 5.6 03/24/2022    LABALBU 3.1 03/24/2022    CALCIUM 8.5 03/26/2022    BILITOT 0.4 03/24/2022    ALKPHOS 95 03/24/2022     03/24/2022    ALT 59 03/24/2022         Hepatic Function Panel:    Lab Results   Component Value Date    ALKPHOS 95 03/24/2022    ALT 59 03/24/2022     03/24/2022    PROT 5.6 03/24/2022    BILITOT 0.4 03/24/2022    BILIDIR 0.3 03/23/2022    IBILI 0.2 03/23/2022    LABALBU 3.1 03/24/2022       PT/INR:    Lab Results   Component Value Date    PROTIME 17.3 03/23/2022    INR 1.6 03/23/2022       TSH:    Lab Results   Component Value Date    TSH 1.880 03/14/2022       U/A:    Lab Results   Component Value Date    COLORU Yellow 03/14/2022    PHUR 5.0 03/14/2022    WBCUA 0-1 03/14/2022    RBCUA 0-1 03/14/2022    BACTERIA MODERATE 03/14/2022    CLARITYU Clear 03/14/2022    SPECGRAV 1.025 03/14/2022    LEUKOCYTESUR Negative 03/14/2022    UROBILINOGEN 4.0 03/14/2022    BILIRUBINUR Negative 03/14/2022    BLOODU Negative 03/14/2022    GLUCOSEU Negative 03/14/2022    AMORPHOUS FEW 03/14/2022       ABG:  No results found for: GAV3YWK, BEART, P3OTCEXC, PHART, THGBART, AHU2CUI, PO2ART, XZG6LGE    MICROBIOLOGY:    Blood culture -     Streptococcus mitis / Streptococcus oralis (1)    Antibiotic Interpretation Microscan Method Status    ampicillin Sensitive <=^0.25 mcg/mL BACTERIAL SUSCEPTIBILITY PANEL BY LANEY     benzylpenicillin Sensitive <=^0.06 mcg/mL BACTERIAL SUSCEPTIBILITY PANEL BY LANEY     cefotaxime Sensitive <=^0.12 mcg/mL BACTERIAL SUSCEPTIBILITY PANEL BY LANEY     cefTRIAXone Sensitive <=^0.12 mcg/mL BACTERIAL SUSCEPTIBILITY PANEL BY LANEY     clindamycin Sensitive <=^0.25 mcg/mL BACTERIAL SUSCEPTIBILITY PANEL BY LANEY     levofloxacin Sensitive ^0.5 mcg/mL BACTERIAL SUSCEPTIBILITY PANEL BY LANEY     linezolid Sensitive <=^2 mcg/mL BACTERIAL SUSCEPTIBILITY PANEL BY LANEY     vancomycin Sensitive ^0.5 mcg/mL BACTERIAL SUSCEPTIBILITY PANEL BY LANEY       Culture, Surgical [[id number]] Collected: 03/23/22 1505   Order Status: Completed Specimen: Tissue Updated: 03/26/22 0740    Culture Surgical Growth not present           Radiology :    CTA chest       Impression:        No evidence of pulmonary embolism or acute pulmonary abnormality. Echo -       Ejection fraction is visually estimated at 55%. Severe holosystolic prolapse of the posterior leaflet with a flail P1/P2   segment due to ruptured chordae tendineae. Small mobile echodensity suspicious for vegetation on ventricular surface   of posterior leaflet. Failure of leaflet coaptation. No definitive papillary muscle rupture. Torrential mitral regurgitation, eccentric jet directed anteriorly. IMPRESSION:     1. Streptococcus bacteremia ( 3/14 and 3/15) ,mitral valve endocarditis ,follow up blood cx neg on ( 3/17) s/p mitral valve repair ( 3/23). No growth on tissue culture. RECOMMENDATIONS:      1.  Ampicillin 2 grams IV q 4 hrs  ( PCN LANEY <0.06 ) ~ 4 weeks

## 2022-03-27 ENCOUNTER — APPOINTMENT (OUTPATIENT)
Dept: GENERAL RADIOLOGY | Age: 62
DRG: 853 | End: 2022-03-27
Payer: COMMERCIAL

## 2022-03-27 LAB
ANION GAP SERPL CALCULATED.3IONS-SCNC: 10 MMOL/L (ref 7–16)
BUN BLDV-MCNC: 17 MG/DL (ref 6–23)
CALCIUM SERPL-MCNC: 8.7 MG/DL (ref 8.6–10.2)
CHLORIDE BLD-SCNC: 99 MMOL/L (ref 98–107)
CO2: 25 MMOL/L (ref 22–29)
CREAT SERPL-MCNC: 0.8 MG/DL (ref 0.7–1.2)
GFR AFRICAN AMERICAN: >60
GFR NON-AFRICAN AMERICAN: >60 ML/MIN/1.73
GLUCOSE BLD-MCNC: 88 MG/DL (ref 74–99)
HCT VFR BLD CALC: 25.4 % (ref 37–54)
HEMOGLOBIN: 7.8 G/DL (ref 12.5–16.5)
MAGNESIUM: 2.4 MG/DL (ref 1.6–2.6)
MCH RBC QN AUTO: 28.5 PG (ref 26–35)
MCHC RBC AUTO-ENTMCNC: 30.7 % (ref 32–34.5)
MCV RBC AUTO: 92.7 FL (ref 80–99.9)
METER GLUCOSE: 101 MG/DL (ref 74–99)
METER GLUCOSE: 112 MG/DL (ref 74–99)
METER GLUCOSE: 85 MG/DL (ref 74–99)
METER GLUCOSE: 94 MG/DL (ref 74–99)
PDW BLD-RTO: 15.9 FL (ref 11.5–15)
PLATELET # BLD: 171 E9/L (ref 130–450)
PMV BLD AUTO: 11.1 FL (ref 7–12)
POTASSIUM SERPL-SCNC: 4.2 MMOL/L (ref 3.5–5)
RBC # BLD: 2.74 E12/L (ref 3.8–5.8)
SODIUM BLD-SCNC: 134 MMOL/L (ref 132–146)
WBC # BLD: 9.1 E9/L (ref 4.5–11.5)

## 2022-03-27 PROCEDURE — 6360000002 HC RX W HCPCS: Performed by: NURSE PRACTITIONER

## 2022-03-27 PROCEDURE — 6370000000 HC RX 637 (ALT 250 FOR IP): Performed by: NURSE PRACTITIONER

## 2022-03-27 PROCEDURE — 36415 COLL VENOUS BLD VENIPUNCTURE: CPT

## 2022-03-27 PROCEDURE — 93798 PHYS/QHP OP CAR RHAB W/ECG: CPT

## 2022-03-27 PROCEDURE — 2580000003 HC RX 258: Performed by: NURSE PRACTITIONER

## 2022-03-27 PROCEDURE — 83735 ASSAY OF MAGNESIUM: CPT

## 2022-03-27 PROCEDURE — 6370000000 HC RX 637 (ALT 250 FOR IP): Performed by: PHYSICIAN ASSISTANT

## 2022-03-27 PROCEDURE — 2140000000 HC CCU INTERMEDIATE R&B

## 2022-03-27 PROCEDURE — 80048 BASIC METABOLIC PNL TOTAL CA: CPT

## 2022-03-27 PROCEDURE — 85027 COMPLETE CBC AUTOMATED: CPT

## 2022-03-27 PROCEDURE — 71045 X-RAY EXAM CHEST 1 VIEW: CPT

## 2022-03-27 PROCEDURE — 82962 GLUCOSE BLOOD TEST: CPT

## 2022-03-27 PROCEDURE — 6370000000 HC RX 637 (ALT 250 FOR IP): Performed by: INTERNAL MEDICINE

## 2022-03-27 PROCEDURE — S5553 INSULIN LONG ACTING 5 U: HCPCS | Performed by: NURSE PRACTITIONER

## 2022-03-27 PROCEDURE — 94640 AIRWAY INHALATION TREATMENT: CPT

## 2022-03-27 RX ORDER — CARVEDILOL 3.12 MG/1
3.12 TABLET ORAL 2 TIMES DAILY WITH MEALS
Status: DISCONTINUED | OUTPATIENT
Start: 2022-03-27 | End: 2022-03-30 | Stop reason: HOSPADM

## 2022-03-27 RX ADMIN — CARVEDILOL 3.12 MG: 3.12 TABLET, FILM COATED ORAL at 09:09

## 2022-03-27 RX ADMIN — ASPIRIN 81 MG 81 MG: 81 TABLET ORAL at 09:05

## 2022-03-27 RX ADMIN — ROSUVASTATIN 20 MG: 20 TABLET, FILM COATED ORAL at 21:18

## 2022-03-27 RX ADMIN — OXYCODONE HYDROCHLORIDE AND ACETAMINOPHEN 500 MG: 500 TABLET ORAL at 09:06

## 2022-03-27 RX ADMIN — IPRATROPIUM BROMIDE AND ALBUTEROL SULFATE 1 AMPULE: .5; 2.5 SOLUTION RESPIRATORY (INHALATION) at 12:35

## 2022-03-27 RX ADMIN — IPRATROPIUM BROMIDE AND ALBUTEROL SULFATE 1 AMPULE: .5; 2.5 SOLUTION RESPIRATORY (INHALATION) at 09:31

## 2022-03-27 RX ADMIN — AMIODARONE HYDROCHLORIDE 200 MG: 200 TABLET ORAL at 14:10

## 2022-03-27 RX ADMIN — MUPIROCIN: 20 OINTMENT TOPICAL at 21:18

## 2022-03-27 RX ADMIN — AMIODARONE HYDROCHLORIDE 200 MG: 200 TABLET ORAL at 09:05

## 2022-03-27 RX ADMIN — CARVEDILOL 3.12 MG: 3.12 TABLET, FILM COATED ORAL at 16:34

## 2022-03-27 RX ADMIN — AMPICILLIN SODIUM 2000 MG: 2 INJECTION, POWDER, FOR SOLUTION INTRAVENOUS at 00:50

## 2022-03-27 RX ADMIN — SODIUM CHLORIDE, PRESERVATIVE FREE 10 ML: 5 INJECTION INTRAVENOUS at 00:50

## 2022-03-27 RX ADMIN — AMIODARONE HYDROCHLORIDE 200 MG: 200 TABLET ORAL at 21:18

## 2022-03-27 RX ADMIN — OXYCODONE 5 MG: 5 TABLET ORAL at 21:21

## 2022-03-27 RX ADMIN — SODIUM CHLORIDE, PRESERVATIVE FREE 10 ML: 5 INJECTION INTRAVENOUS at 04:00

## 2022-03-27 RX ADMIN — MUPIROCIN: 20 OINTMENT TOPICAL at 09:06

## 2022-03-27 RX ADMIN — INSULIN GLARGINE-YFGN 12 UNITS: 100 INJECTION, SOLUTION SUBCUTANEOUS at 21:17

## 2022-03-27 RX ADMIN — IPRATROPIUM BROMIDE AND ALBUTEROL SULFATE 1 AMPULE: .5; 2.5 SOLUTION RESPIRATORY (INHALATION) at 21:10

## 2022-03-27 RX ADMIN — OXYCODONE HYDROCHLORIDE AND ACETAMINOPHEN 500 MG: 500 TABLET ORAL at 21:18

## 2022-03-27 RX ADMIN — SODIUM CHLORIDE, PRESERVATIVE FREE 10 ML: 5 INJECTION INTRAVENOUS at 09:06

## 2022-03-27 RX ADMIN — SODIUM CHLORIDE, PRESERVATIVE FREE 10 ML: 5 INJECTION INTRAVENOUS at 21:18

## 2022-03-27 RX ADMIN — OXYCODONE 5 MG: 5 TABLET ORAL at 11:05

## 2022-03-27 RX ADMIN — AMPICILLIN SODIUM 2000 MG: 2 INJECTION, POWDER, FOR SOLUTION INTRAVENOUS at 14:10

## 2022-03-27 RX ADMIN — IPRATROPIUM BROMIDE AND ALBUTEROL SULFATE 1 AMPULE: .5; 2.5 SOLUTION RESPIRATORY (INHALATION) at 16:13

## 2022-03-27 RX ADMIN — AMPICILLIN SODIUM 2000 MG: 2 INJECTION, POWDER, FOR SOLUTION INTRAVENOUS at 03:59

## 2022-03-27 RX ADMIN — PANTOPRAZOLE SODIUM 40 MG: 40 TABLET, DELAYED RELEASE ORAL at 09:05

## 2022-03-27 RX ADMIN — AMPICILLIN SODIUM 2000 MG: 2 INJECTION, POWDER, FOR SOLUTION INTRAVENOUS at 23:07

## 2022-03-27 RX ADMIN — Medication 400 MG: at 09:05

## 2022-03-27 RX ADMIN — FERROUS SULFATE TAB 325 MG (65 MG ELEMENTAL FE) 325 MG: 325 (65 FE) TAB at 16:34

## 2022-03-27 RX ADMIN — FERROUS SULFATE TAB 325 MG (65 MG ELEMENTAL FE) 325 MG: 325 (65 FE) TAB at 09:06

## 2022-03-27 RX ADMIN — TAMSULOSIN HYDROCHLORIDE 0.4 MG: 0.4 CAPSULE ORAL at 09:09

## 2022-03-27 RX ADMIN — AMPICILLIN SODIUM 2000 MG: 2 INJECTION, POWDER, FOR SOLUTION INTRAVENOUS at 07:23

## 2022-03-27 RX ADMIN — FUROSEMIDE 20 MG: 10 INJECTION, SOLUTION INTRAMUSCULAR; INTRAVENOUS at 09:06

## 2022-03-27 RX ADMIN — AMPICILLIN SODIUM 2000 MG: 2 INJECTION, POWDER, FOR SOLUTION INTRAVENOUS at 10:43

## 2022-03-27 RX ADMIN — AMPICILLIN SODIUM 2000 MG: 2 INJECTION, POWDER, FOR SOLUTION INTRAVENOUS at 18:27

## 2022-03-27 RX ADMIN — FOLIC ACID 1 MG: 1 TABLET ORAL at 09:06

## 2022-03-27 ASSESSMENT — PAIN DESCRIPTION - ORIENTATION: ORIENTATION: MID

## 2022-03-27 ASSESSMENT — PAIN SCALES - GENERAL
PAINLEVEL_OUTOF10: 5
PAINLEVEL_OUTOF10: 4
PAINLEVEL_OUTOF10: 0

## 2022-03-27 ASSESSMENT — PAIN - FUNCTIONAL ASSESSMENT: PAIN_FUNCTIONAL_ASSESSMENT: PREVENTS OR INTERFERES SOME ACTIVE ACTIVITIES AND ADLS

## 2022-03-27 ASSESSMENT — PAIN DESCRIPTION - DESCRIPTORS: DESCRIPTORS: ACHING;DISCOMFORT

## 2022-03-27 ASSESSMENT — PAIN DESCRIPTION - ONSET: ONSET: ON-GOING

## 2022-03-27 ASSESSMENT — PAIN DESCRIPTION - FREQUENCY: FREQUENCY: CONTINUOUS

## 2022-03-27 ASSESSMENT — PAIN DESCRIPTION - PROGRESSION: CLINICAL_PROGRESSION: GRADUALLY WORSENING

## 2022-03-27 ASSESSMENT — PAIN DESCRIPTION - LOCATION: LOCATION: CHEST

## 2022-03-27 ASSESSMENT — PAIN DESCRIPTION - PAIN TYPE: TYPE: SURGICAL PAIN

## 2022-03-27 NOTE — PROGRESS NOTES
POD# 4  Awake, alert. No complaints. Denies CP, palpitations, SOB at rest, dizziness/lightheadedness. Vitals:    03/26/22 1646 03/26/22 2004 03/27/22 0051 03/27/22 0413   BP:  119/72 121/80 116/79   Pulse:  91 87 87   Resp: 18 18 18 18   Temp:  97.1 °F (36.2 °C) 96.8 °F (36 °C) 98.6 °F (37 °C)   TempSrc:  Temporal Temporal Oral   SpO2:  95% 97% 98%   Weight:       Height:         RA      Intake/Output Summary (Last 24 hours) at 3/27/2022 0728  Last data filed at 3/27/2022 0650  Gross per 24 hour   Intake 760 ml   Output 2125 ml   Net -1365 ml           Recent Labs     03/25/22  0415 03/26/22  0600 03/27/22  0619   WBC 13.7* 11.8* 9.1   HGB 7.6* 7.7* 7.8*   HCT 23.9* 24.4* 25.4*    157 171      Recent Labs     03/25/22 0415 03/26/22  0600   BUN 23 19   CREATININE 1.0 0.8         Telemetry: SR      PE  Cardiac: RRR  Lungs: decreased bases  Chest incision with intact TASHA DSD. Sternum stable. Prior chest tube site incisions C/D/I, no erythema with intact sutures. Chest tubes x 1 and Epicardial pacing wires present and secure. Abd: Soft, nontender, +BS  Ext: Left groin suture intact, groin soft        A/P: POD# 4     1. severe mitral regurgitation, streptococcus bacteremia, mitral valve endocarditis, atrial fibrillation  --Stable s/p ANITRA, mitral valve repair (P2 triangular resection, P3/P3 perforation repair, 34mm Future band), MAZE procedure, 40mm Atriclip   --Post op ANITRA reveals 30% EF  --will order TTE prior to discharge to establish baseline and need for life vest on DC - ordered on 3/26  --Scr stable  --ASA/statin   --reinforce sternal precautions  --continue epicardial pacing wires  -- CT without significant drainage or airleak, will remove. Chest tube(s) removed without difficulty.  Patient tolerated well  -- Left groin suture removed without difficulty         2. Expected acute blood loss anemia secondary to open heart surgery  --stable- hgb 7.8 ( 7.7 yest)- no active bleeding, cont to monitor   - 1PRBC 3/24         3. Atrial fibrillation s/p MAZE procedure, 40mm Atriclip   -SR, prophylactic amio started- currently on amio 200mg tid   - Plan to start low dose BB today        4. Expected acute pulmonary insufficiency in the setting following surgery  --continue duonebs with ezpap  --encourage C&DB, SMI  --currently on RA  -- Lasix daily      5. streptococcus bacteremia; MV Endocarditis  - Antibiotics per ID Ampicillin 2grams q4 hours       6. Cardiac Insufficiency  - EF 30% post CPB, requiring epinephrine gtt for hemodynamic support  -Off Epi. SBP ~100. SBP improved to 110s today - plan to start low dose coreg and monitor   - GDMT as hemodynamics allow         7. Constipation--expected delayed return of bowel function  --secondary to anesthesia, narcotics, decreased oral intake, and decreased physical activity   --no BM since prior to surgery  --Continue daily MOM/oral bisacodyl until +BM and senna-s as scheduled.    --Will give daily suppository until +BM starting POD 3 if no result by then   --Encouraged continued increase in oral intake and activity.          8. Expected deconditioning in the setting following surgery  --Increase activity as tolerated  --PT/OT  --currently ambulating 400ft            DVT prophylaxis:  --continue bilateral knee high GODWIN hose  --continue PCDs  --continue progressive ambulation  --continue knee high GODWIN hose/pcds/progressive ambulation        Dispo: Home vs rehab pending plan for IV abx          This patient's case and care plan was discussed with the attending surgeon

## 2022-03-27 NOTE — PROGRESS NOTES
Department of Internal Medicine  Infectious Diseases  Progress  Note      C/C : Streptococcus bacteremia , mitral valve endocarditis     Denied fever , chills   Reports feeling well   Afebrile   Chest tubes removed    Current Facility-Administered Medications   Medication Dose Route Frequency Provider Last Rate Last Admin    carvedilol (COREG) tablet 3.125 mg  3.125 mg Oral BID  Jensen Cross PA-C   3.125 mg at 03/27/22 9066    rosuvastatin (CRESTOR) tablet 20 mg  20 mg Oral Nightly Pebbles Florian MD   20 mg at 03/26/22 2049    perflutren lipid microspheres (DEFINITY) injection 1.65 mg  1.5 mL IntraVENous ONCE PRN Jensen Cross PA-C        aspirin chewable tablet 81 mg  81 mg Oral Daily [facility] SOPHIA Cross   81 mg at 03/27/22 0905    heparin flush 100 UNIT/ML injection 300 Units  300 Units IntraCATHeter PRN Ricarda Sarah, APRN - CNP   300 Units at 03/25/22 2215    amiodarone (CORDARONE) tablet 200 mg  200 mg Oral TID Ricarda Sarah, APRN - CNP   200 mg at 03/27/22 0579    furosemide (LASIX) injection 20 mg  20 mg IntraVENous Daily Ricarda Sarah, APRN - CNP   20 mg at 03/27/22 3786    ferrous sulfate (IRON 325) tablet 325 mg  325 mg Oral BID  Ricarda Sarah, APRN - CNP   325 mg at 03/27/22 1433    ascorbic acid (VITAMIN C) tablet 500 mg  500 mg Oral BID Ricarda Sarah, APRN - CNP   500 mg at 89/85/13 2474    folic acid (FOLVITE) tablet 1 mg  1 mg Oral Daily Ricarda Sarah, APRN - CNP   1 mg at 03/27/22 8712    potassium chloride (KLOR-CON M) extended release tablet 20 mEq  20 mEq Oral PRN Ricarda Sarah, APRN - CNP        diphenhydrAMINE (BENADRYL) tablet 25 mg  25 mg Oral Q8H PRN Ricarda Sarah, APRN - CNP   25 mg at 03/25/22 2120    pantoprazole (PROTONIX) tablet 40 mg  40 mg Oral Daily Ricarda Sarah, APRN - CNP   40 mg at 03/27/22 0905    insulin lispro (HUMALOG) injection vial 0-6 Units  0-6 Units SubCUTAneous TID  Ricarda Smith, APRN - CNP   1 Units at 03/25/22 9291  insulin lispro (HUMALOG) injection vial 0-3 Units  0-3 Units SubCUTAneous Nightly Sade Drivers, APRN - CNP        bisacodyl (DULCOLAX) suppository 10 mg  10 mg Rectal Daily PRN Sade Drivers, APRN - CNP        amiodarone (CORDARONE) tablet 400 mg  400 mg Oral PRN Sade Drivers, APRN - CNP        amiodarone (CORDARONE) 150 mg in dextrose 5 % 100 mL bolus  150 mg IntraVENous PRN Sade Drivers, APRN - CNP        magnesium sulfate 2000 mg in 50 mL IVPB premix  2,000 mg IntraVENous PRN Sade Drivers, APRN - CNP        morphine (PF) injection 2 mg  2 mg IntraVENous Q4H PRN Sade Drivers, APRN - CNP        sodium chloride (OCEAN, BABY AYR) 0.65 % nasal spray 1 spray  1 spray Each Nostril PRN Sade Drivers, APRN - CNP        trimethobenzamide Genisarah Esparza) injection 200 mg  200 mg IntraMUSCular Q6H PRN Sade Drivers, APRN - CNP        oxyCODONE (ROXICODONE) immediate release tablet 5 mg  5 mg Oral Q4H PRN Drena Silence, PA-C   5 mg at 03/26/22 2054    Or    oxyCODONE HCl (OXY-IR) immediate release tablet 10 mg  10 mg Oral Q4H PRN Drena Silence, PA-C   10 mg at 03/26/22 0654    sodium chloride flush 0.9 % injection 10 mL  10 mL IntraVENous 2 times per day Sade Drivers, APRN - CNP   10 mL at 03/27/22 0906    sodium chloride flush 0.9 % injection 10 mL  10 mL IntraVENous PRN Sade Drivers, APRN - CNP   10 mL at 03/27/22 0400    ondansetron (ZOFRAN-ODT) disintegrating tablet 4 mg  4 mg Oral Q8H PRN Sade Drivers, APRN - CNP        Or    ondansetron [facility]) injection 4 mg  4 mg IntraVENous Q6H PRN Sade Drivers, APRN - CNP        magnesium oxide (MAG-OX) tablet 400 mg  400 mg Oral Daily Sade Drivers, APRN - CNP   400 mg at 03/27/22 1784    mupirocin (BACTROBAN) 2 % ointment   Nasal BID Sade Drivers, APRN - CNP   Given at 03/27/22 0906    ipratropium-albuterol (DUONEB) nebulizer solution 1 ampule  1 ampule Inhalation Q4H ROX Mcgraw - CNP   1 ampule at 03/27/22 3657  insulin glargine-yfgn (SEMGLEE-YFGN) injection vial 12 Units  0.15 Units/kg SubCUTAneous Nightly ROX Nice CNP   12 Units at 03/26/22 2048    glucose (GLUTOSE) 40 % oral gel 15 g  15 g Oral PRN ROX Nice CNP        dextrose 50 % IV solution  12.5 g IntraVENous PRN ROX Nice CNP        glucagon (rDNA) injection 1 mg  1 mg IntraMUSCular PRN ROX Nice CNP        dextrose 5 % solution  100 mL/hr IntraVENous PRN ROX Nice CNP        tamsulosin [facility]) capsule 0.4 mg  0.4 mg Oral Daily ROX Nice CNP   0.4 mg at 03/27/22 3817    ampicillin 2000 mg ivpb mini bag  2,000 mg IntraVENous 6 times per day ROX Nice CNP   Stopped at 03/27/22 0901         REVIEW OF SYSTEMS:    CONSTITUTIONAL:  Denies fever, chill or rigors. HEENT: denies blurring of vision or double vision, denies hearing problem  RESPIRATORY: Chest pain   CARDIOVASCULAR:  Denies palpitation  GASTROINTESTINAL:  Denies abdomen pain, diarrhea or constipation. GENITOURINARY:  Denies burning urination or frequency of urination  INTEGUMENT: denies wound , rash  HEMATOLOGIC/LYMPHATIC:  Denies lymph node swelling, gum bleeding or easy bruising. MUSCULOSKELETAL:  Denies leg pain , joint pain , joint swelling  NEUROLOGICAL:  Fatigue , weakness     PHYSICAL EXAM:      Vitals:     /80   Pulse 92   Temp 97.1 °F (36.2 °C) (Temporal)   Resp 18   Ht 5' 9\" (1.753 m)   Wt 187 lb (84.8 kg)   SpO2 92% Comment: 92 starting, 97 walking, 95 recovery  BMI 27.62 kg/m²       General Appearance:    Awake, alert , no acute distress. Head:    Normocephalic, atraumatic   Eyes:    No pallor, no icterus,   Ears:    No obvious deformity or drainage.    Nose:   No nasal drainage   Throat:   Mucosa moist, multiple dental caries    Neck:   Supple, no lymphadenopathy   Lungs:     Clear to auscultation bilaterally   Heart:     Irregular, sternum stable, chest tube    Abdomen: Soft, non-tender, bowel sounds present    Extremities:   No edema, no cyanosis    Pulses:   Dorsalis pedis palpable    Skin:   no rashes       Lab Results   Component Value Date    WBC 9.1 03/27/2022    RBC 2.74 03/27/2022    HGB 7.8 03/27/2022    HCT 25.4 03/27/2022    HCT 28.0 03/23/2022     03/27/2022    MCV 92.7 03/27/2022    MCH 28.5 03/27/2022    MCHC 30.7 03/27/2022    RDW 15.9 03/27/2022    LYMPHOPCT 20.0 03/21/2022    MONOPCT 5.8 03/21/2022    BASOPCT 0.5 03/21/2022    MONOSABS 0.54 03/21/2022    LYMPHSABS 1.86 03/21/2022    EOSABS 0.32 03/21/2022    BASOSABS 0.05 03/21/2022       CMP     Lab Results   Component Value Date     03/27/2022    K 4.2 03/27/2022    K 4.2 03/15/2022    CL 99 03/27/2022    CO2 25 03/27/2022    BUN 17 03/27/2022    CREATININE 0.8 03/27/2022    GFRAA >60 03/27/2022    LABGLOM >60 03/27/2022    GLUCOSE 88 03/27/2022    PROT 5.6 03/24/2022    LABALBU 3.1 03/24/2022    CALCIUM 8.7 03/27/2022    BILITOT 0.4 03/24/2022    ALKPHOS 95 03/24/2022     03/24/2022    ALT 59 03/24/2022         Hepatic Function Panel:    Lab Results   Component Value Date    ALKPHOS 95 03/24/2022    ALT 59 03/24/2022     03/24/2022    PROT 5.6 03/24/2022    BILITOT 0.4 03/24/2022    BILIDIR 0.3 03/23/2022    IBILI 0.2 03/23/2022    LABALBU 3.1 03/24/2022       PT/INR:    Lab Results   Component Value Date    PROTIME 17.3 03/23/2022    INR 1.6 03/23/2022       TSH:    Lab Results   Component Value Date    TSH 1.880 03/14/2022       U/A:    Lab Results   Component Value Date    COLORU Yellow 03/14/2022    PHUR 5.0 03/14/2022    WBCUA 0-1 03/14/2022    RBCUA 0-1 03/14/2022    BACTERIA MODERATE 03/14/2022    CLARITYU Clear 03/14/2022    SPECGRAV 1.025 03/14/2022    LEUKOCYTESUR Negative 03/14/2022    UROBILINOGEN 4.0 03/14/2022    BILIRUBINUR Negative 03/14/2022    BLOODU Negative 03/14/2022    GLUCOSEU Negative 03/14/2022    AMORPHOUS FEW 03/14/2022       ABG:  No results found for: VPG9HUL, BEART, B6JMHMQK, PHART, THGBART, QAY0OYR, PO2ART, MFX5TJF    MICROBIOLOGY:    Blood culture -     Streptococcus mitis / Streptococcus oralis (1)    Antibiotic Interpretation Microscan  Method Status    ampicillin Sensitive <=^0.25 mcg/mL BACTERIAL SUSCEPTIBILITY PANEL BY LANEY     benzylpenicillin Sensitive <=^0.06 mcg/mL BACTERIAL SUSCEPTIBILITY PANEL BY LANEY     cefotaxime Sensitive <=^0.12 mcg/mL BACTERIAL SUSCEPTIBILITY PANEL BY LANEY     cefTRIAXone Sensitive <=^0.12 mcg/mL BACTERIAL SUSCEPTIBILITY PANEL BY LANEY     clindamycin Sensitive <=^0.25 mcg/mL BACTERIAL SUSCEPTIBILITY PANEL BY LANEY     levofloxacin Sensitive ^0.5 mcg/mL BACTERIAL SUSCEPTIBILITY PANEL BY LANEY     linezolid Sensitive <=^2 mcg/mL BACTERIAL SUSCEPTIBILITY PANEL BY LANEY     vancomycin Sensitive ^0.5 mcg/mL BACTERIAL SUSCEPTIBILITY PANEL BY LANEY       Culture, Surgical [[id number]] Collected: 03/23/22 1505   Order Status: Completed Specimen: Tissue Updated: 03/26/22 0740    Culture Surgical Growth not present           Radiology :    CTA chest       Impression:        No evidence of pulmonary embolism or acute pulmonary abnormality. Echo -       Ejection fraction is visually estimated at 55%. Severe holosystolic prolapse of the posterior leaflet with a flail P1/P2   segment due to ruptured chordae tendineae. Small mobile echodensity suspicious for vegetation on ventricular surface   of posterior leaflet. Failure of leaflet coaptation. No definitive papillary muscle rupture. Torrential mitral regurgitation, eccentric jet directed anteriorly. IMPRESSION:     1. Streptococcus bacteremia ( 3/14 and 3/15) ,mitral valve endocarditis ,follow up blood cx neg on ( 3/17) s/p mitral valve repair ( 3/23). No growth on tissue culture. RECOMMENDATIONS:      1.  Ampicillin 2 grams IV q 4 hrs  ( PCN LANEY <0.06 ) ~ 4 weeks

## 2022-03-27 NOTE — PROGRESS NOTES
Hospitalist Progress Note      Synopsis: Patient admitted on 114/2022 50-year-old male with no known past medical history except childhood murmur, went to the urgent care today for evaluation of fatigue and was found to have A. fib with RVR and was sent to the main hospital emergency room. Patient states his symptoms of fatigue were on and off since past 1 month. Denies any palpitations. However did report some exertional shortness of breath. Patient consulted, as per him, telemedicine doctor, who prescribed him ivermectin and hydroxychloroquine earlier this month for suspicion of COVID-19 infection. Patient is unvaccinated against Covid. Patient completed 5 days of ivermectin, however, did not finish 14-day course of hydroxychloroquine which he stopped about 3 days ago. Patient denies any recent fever, chills, cough, shortness of breath, chest pain, abdominal pain, nausea, vomiting, diarrhea constipation. No presyncopal or syncopal event. Upon arrival in the emergency room, patient was noted to be febrile with T-max 101.1 °F, WBC count slightly elevated at 12.2, CRP elevated at 5.9, proBNP elevated at 6000, lactate level elevated at 2.6, troponin elevated at 42, 43. Patient was started on Cardizem drip. Patient had CTA chest done which did not reveal acute PE. Blood cultures positive for strep mitis. Echocardiogram reveals severe mitral regurgitation with flail leaflet and ruptured chordae tendon line and concern for vegetation. Patient had left heart catheterization demonstrating no significant coronary artery disease. Plan for valve replacement 3/23.       Subjective      Sitting up in bed trying to eat. He is feeling okay. Exam:  BP 96/67   Pulse 86   Temp 98.2 °F (36.8 °C) (Temporal)   Resp 18   Ht 5' 9\" (1.753 m)   Wt 187 lb (84.8 kg)   SpO2 98%   BMI 27.62 kg/m²   General appearance: No apparent distress, appears stated age and cooperative.   HEENT: Pupils equal, round, and reactive to light. Conjunctivae/corneas clear. Neck: Supple. No jugular venous distention. Trachea midline. Respiratory:  Normal respiratory effort. Clear to auscultation, bilaterally without Rales/Wheezes/Rhonchi. Cardiovascular: Regular rate and rhythm with normal S1/S2 +3 of 6 EARNESTINE murmurs, rubs or gallops. Abdomen: Soft, non-tender, non-distended with normal bowel sounds. Upper abdomen with significant dressing  Musculoskeletal: No clubbing, cyanosis or edema bilaterally. Brisk capillary refill. 2+ lower extremity pulses (dorsalis pedis).    Skin: Incision CDI appropriately tender no rashes    Neurologic: awake, alert and following commands     Medications:  Reviewed    Infusion Medications    dextrose       Scheduled Medications    carvedilol  3.125 mg Oral BID WC    rosuvastatin  20 mg Oral Nightly    aspirin  81 mg Oral Daily    amiodarone  200 mg Oral TID    furosemide  20 mg IntraVENous Daily    ferrous sulfate  325 mg Oral BID WC    vitamin C  500 mg Oral BID    folic acid  1 mg Oral Daily    pantoprazole  40 mg Oral Daily    insulin lispro  0-6 Units SubCUTAneous TID     insulin lispro  0-3 Units SubCUTAneous Nightly    sodium chloride flush  10 mL IntraVENous 2 times per day    magnesium oxide  400 mg Oral Daily    mupirocin   Nasal BID    ipratropium-albuterol  1 ampule Inhalation Q4H WA    insulin glargine  0.15 Units/kg SubCUTAneous Nightly    tamsulosin  0.4 mg Oral Daily    ampicillin IV  2,000 mg IntraVENous 6 times per day     PRN Meds: perflutren lipid microspheres, heparin flush, potassium chloride, diphenhydrAMINE, bisacodyl, amiodarone, amiodarone bolus, magnesium sulfate, morphine, sodium chloride, trimethobenzamide, oxyCODONE **OR** oxyCODONE, sodium chloride flush, ondansetron **OR** ondansetron, glucose, dextrose, glucagon (rDNA), dextrose    I/O    Intake/Output Summary (Last 24 hours) at 3/27/2022 1221  Last data filed at 3/27/2022 1022  Gross per 24 hour   Intake 1333.67 ml   Output 2285 ml   Net -951.33 ml       Labs:   Recent Labs     03/25/22  0415 03/26/22  0600 03/27/22  0619   WBC 13.7* 11.8* 9.1   HGB 7.6* 7.7* 7.8*   HCT 23.9* 24.4* 25.4*    157 171       Recent Labs     03/25/22  0415 03/26/22  0600 03/27/22  0619    134 134   K 4.4 4.1 4.2    99 99   CO2 21* 23 25   BUN 23 19 17   CREATININE 1.0 0.8 0.8   CALCIUM 8.4* 8.5* 8.7       No results for input(s): PROT, ALB, ALKPHOS, ALT, AST, BILITOT, AMYLASE, LIPASE in the last 72 hours. No results for input(s): INR in the last 72 hours. No results for input(s): Ethelene Soulier in the last 72 hours. Chronic labs:  Lab Results   Component Value Date    CHOL 133 03/15/2022    TRIG 115 03/15/2022    HDL 20 03/15/2022    LDLCALC 90 03/15/2022    TSH 1.880 03/14/2022    INR 1.6 03/23/2022    LABA1C 5.6 03/15/2022       Radiology:  XR CHEST PORTABLE    Result Date: 3/25/2022  1. Status post removal of the endotracheal tube and NG tube. 2. Minimal left lung base atelectasis. 3. Cardiomegaly. XR CHEST PORTABLE    Result Date: 3/24/2022  Endotracheal and nasogastric extubation. No new abnormal findings. XR CHEST PORTABLE    Result Date: 3/23/2022  1. NG tube is present with distal tip just below level of the gastroesophageal junction. Side hole is above gastroesophageal junction. This tube could be advanced 5-6 cm. 2. Endotracheal tube is 5.2 cm above the gregory. 3. Status post mediastinal surgery with interstitial prominence in perihilar and infrahilar locations. 4. No pneumothorax. XR CHEST PORTABLE    Result Date: 3/19/2022  1. New right upper extremity PICC line. No obvious complication. 2.  New confluent opacity in the right upper lung. Persistent interstitial opacities bilaterally. Prominent cardiac silhouette. XR CHEST PORTABLE    Result Date: 3/17/2022  No acute process. Cardiomegaly. XR CHEST PORTABLE    Result Date: 3/14/2022  No acute airspace opacity.      CTA PULMONARY W CONTRAST    Result Date: 3/14/2022  No evidence of pulmonary embolism or acute pulmonary abnormality. RECOMMENDATIONS: Unavailable     XR ABDOMEN FOR NG/OG/NE TUBE PLACEMENT    Result Date: 3/23/2022  1. NG tube is present with distal tip just below level of the gastroesophageal junction. Side hole is above gastroesophageal junction. This tube could be advanced 5-6 cm. 2. Endotracheal tube is 5.2 cm above the gregory. 3. Status post mediastinal surgery with interstitial prominence in perihilar and infrahilar locations. 4. No pneumothorax. US CAROTID ARTERY BILATERAL    Result Date: 3/16/2022  Atherosclerotic disease. No hemodynamically significant stenosis is identified Estimated stenosis by NASCET criteria in the proximal right carotid artery is between 0% and 49%. Estimated stenosis by NASCET criteria in the proximal left carotid artery is between 0% and 49%. ASSESSMENT:    Principal Problem:    Atrial fibrillation with rapid ventricular response (HCC)  Active Problems:    Sepsis ([address] present on admission    JANA (acute kidney injury) ([address]    Bacteremia    Severe mitral regurgitation    Suspected endocarditis    Subacute bacterial endocarditis    Ruptured chordae tendineae (HCC)    Mitral valve disease    Paroxysmal atrial fibrillation (HCC)    Acute diastolic (congestive) heart failure (HCC)    Cardiac insufficiency (HCC)    Acute pulmonary insufficiency    Acute blood loss anemia  Resolved Problems:    * No resolved hospital problems. *       PLAN:    1. Patient's heart rate is controlled  2. Endocarditis is under treatment. Infectious disease is in charge  3. Patient's previous echogram had revealed a partial flail mitral valve and a ruptured chordae and likely vegetation and a subsequent transesophageal echo on 316 had confirmed severe mitral regurgitation with failed leaflet and ruptured chordae and possible vegetation.   He is now status post mitral valve repair and perforation repair and Maze procedure and atrial clip. 4.  Maintaining a respiratory protocol with duo nebs and EZ Pap  5. Postop issues including nausea and lack of appetite and constipation are now starting to get under control  6. Maintaining a low EF of 30%. He is stable now. He will need a LifeVest at discharge  7. Maintaining steady diuresis  8. Blood pressure is on the softer side      Diet: ADULT ORAL NUTRITION SUPPLEMENT; Breakfast, Lunch, Dinner; Low Calorie/High Protein Oral Supplement  ADULT DIET; Regular; Low Fat/Low Chol/High Fiber/EPHRAIM  Code Status: Full Code  PT/OT Eval Status: Ordered  DVT Prophylaxis:   Lovenox  Recommended disposition at discharge: To be determined  +++++++++++++++++++++++++++++++++++++++++++++++++  larissa rose MD  [facility].  +++++++++++++++++++++++++++++++++++++++++++++++++  NOTE: This report was transcribed using voice recognition software. Every effort was made to ensure accuracy; however, inadvertent computerized transcription errors may be present.

## 2022-03-28 ENCOUNTER — APPOINTMENT (OUTPATIENT)
Dept: GENERAL RADIOLOGY | Age: 62
DRG: 853 | End: 2022-03-28
Payer: COMMERCIAL

## 2022-03-28 LAB
ANION GAP SERPL CALCULATED.3IONS-SCNC: 8 MMOL/L (ref 7–16)
BUN BLDV-MCNC: 20 MG/DL (ref 6–23)
CALCIUM SERPL-MCNC: 8.5 MG/DL (ref 8.6–10.2)
CHLORIDE BLD-SCNC: 99 MMOL/L (ref 98–107)
CO2: 28 MMOL/L (ref 22–29)
CREAT SERPL-MCNC: 0.9 MG/DL (ref 0.7–1.2)
GFR AFRICAN AMERICAN: >60
GFR NON-AFRICAN AMERICAN: >60 ML/MIN/1.73
GLUCOSE BLD-MCNC: 83 MG/DL (ref 74–99)
HCT VFR BLD CALC: 25.4 % (ref 37–54)
HEMOGLOBIN: 7.9 G/DL (ref 12.5–16.5)
LV EF: 28 %
LVEF MODALITY: NORMAL
MAGNESIUM: 2.4 MG/DL (ref 1.6–2.6)
MCH RBC QN AUTO: 28.3 PG (ref 26–35)
MCHC RBC AUTO-ENTMCNC: 31.1 % (ref 32–34.5)
MCV RBC AUTO: 91 FL (ref 80–99.9)
METER GLUCOSE: 109 MG/DL (ref 74–99)
METER GLUCOSE: 109 MG/DL (ref 74–99)
PDW BLD-RTO: 15.9 FL (ref 11.5–15)
PLATELET # BLD: 213 E9/L (ref 130–450)
PMV BLD AUTO: 10.8 FL (ref 7–12)
POTASSIUM SERPL-SCNC: 4 MMOL/L (ref 3.5–5)
RBC # BLD: 2.79 E12/L (ref 3.8–5.8)
SODIUM BLD-SCNC: 135 MMOL/L (ref 132–146)
WBC # BLD: 8.7 E9/L (ref 4.5–11.5)

## 2022-03-28 PROCEDURE — 6370000000 HC RX 637 (ALT 250 FOR IP): Performed by: PHYSICIAN ASSISTANT

## 2022-03-28 PROCEDURE — 6370000000 HC RX 637 (ALT 250 FOR IP): Performed by: NURSE PRACTITIONER

## 2022-03-28 PROCEDURE — 2580000003 HC RX 258: Performed by: NURSE PRACTITIONER

## 2022-03-28 PROCEDURE — 6360000002 HC RX W HCPCS: Performed by: NURSE PRACTITIONER

## 2022-03-28 PROCEDURE — 93306 TTE W/DOPPLER COMPLETE: CPT

## 2022-03-28 PROCEDURE — 2140000000 HC CCU INTERMEDIATE R&B

## 2022-03-28 PROCEDURE — 85027 COMPLETE CBC AUTOMATED: CPT

## 2022-03-28 PROCEDURE — 36415 COLL VENOUS BLD VENIPUNCTURE: CPT

## 2022-03-28 PROCEDURE — 99233 SBSQ HOSP IP/OBS HIGH 50: CPT | Performed by: INTERNAL MEDICINE

## 2022-03-28 PROCEDURE — 71045 X-RAY EXAM CHEST 1 VIEW: CPT

## 2022-03-28 PROCEDURE — 6370000000 HC RX 637 (ALT 250 FOR IP): Performed by: INTERNAL MEDICINE

## 2022-03-28 PROCEDURE — 02HV33Z INSERTION OF INFUSION DEVICE INTO SUPERIOR VENA CAVA, PERCUTANEOUS APPROACH: ICD-10-PCS | Performed by: INTERNAL MEDICINE

## 2022-03-28 PROCEDURE — 82962 GLUCOSE BLOOD TEST: CPT

## 2022-03-28 PROCEDURE — 80048 BASIC METABOLIC PNL TOTAL CA: CPT

## 2022-03-28 PROCEDURE — 94640 AIRWAY INHALATION TREATMENT: CPT

## 2022-03-28 PROCEDURE — 83735 ASSAY OF MAGNESIUM: CPT

## 2022-03-28 PROCEDURE — 6360000004 HC RX CONTRAST MEDICATION: Performed by: PHYSICIAN ASSISTANT

## 2022-03-28 PROCEDURE — 93798 PHYS/QHP OP CAR RHAB W/ECG: CPT

## 2022-03-28 RX ORDER — DOCUSATE SODIUM 100 MG/1
100 CAPSULE, LIQUID FILLED ORAL 2 TIMES DAILY PRN
Qty: 60 CAPSULE | Refills: 0 | Status: SHIPPED | OUTPATIENT
Start: 2022-03-28 | End: 2022-04-27

## 2022-03-28 RX ORDER — ASPIRIN 81 MG/1
81 TABLET, CHEWABLE ORAL DAILY
Qty: 30 TABLET | Refills: 3 | Status: SHIPPED | OUTPATIENT
Start: 2022-03-29 | End: 2022-05-18 | Stop reason: ALTCHOICE

## 2022-03-28 RX ORDER — BISACODYL 10 MG
10 SUPPOSITORY, RECTAL RECTAL DAILY
Status: DISCONTINUED | OUTPATIENT
Start: 2022-03-28 | End: 2022-03-30 | Stop reason: HOSPADM

## 2022-03-28 RX ORDER — FERROUS SULFATE 325(65) MG
325 TABLET ORAL 2 TIMES DAILY WITH MEALS
Qty: 60 TABLET | Refills: 0 | Status: SHIPPED | OUTPATIENT
Start: 2022-03-28 | End: 2022-05-09 | Stop reason: ALTCHOICE

## 2022-03-28 RX ORDER — HYDROCODONE BITARTRATE AND ACETAMINOPHEN 5; 325 MG/1; MG/1
1 TABLET ORAL EVERY 4 HOURS PRN
Qty: 42 TABLET | Refills: 0 | Status: SHIPPED | OUTPATIENT
Start: 2022-03-28 | End: 2022-04-04

## 2022-03-28 RX ORDER — PANTOPRAZOLE SODIUM 40 MG/1
40 TABLET, DELAYED RELEASE ORAL DAILY
Qty: 14 TABLET | Refills: 0 | Status: SHIPPED | OUTPATIENT
Start: 2022-03-29 | End: 2022-05-09 | Stop reason: ALTCHOICE

## 2022-03-28 RX ORDER — POTASSIUM CHLORIDE 750 MG/1
10 TABLET, EXTENDED RELEASE ORAL DAILY
Qty: 90 TABLET | Refills: 1 | Status: SHIPPED | OUTPATIENT
Start: 2022-03-28

## 2022-03-28 RX ORDER — ROSUVASTATIN CALCIUM 20 MG/1
20 TABLET, COATED ORAL NIGHTLY
Qty: 30 TABLET | Refills: 3 | Status: SHIPPED | OUTPATIENT
Start: 2022-03-28 | End: 2022-08-03 | Stop reason: SDUPTHER

## 2022-03-28 RX ORDER — FUROSEMIDE 20 MG/1
20 TABLET ORAL DAILY
Qty: 60 TABLET | Refills: 1 | Status: SHIPPED | OUTPATIENT
Start: 2022-03-28 | End: 2022-08-02 | Stop reason: SDUPTHER

## 2022-03-28 RX ORDER — FUROSEMIDE 20 MG/1
20 TABLET ORAL DAILY
Status: DISCONTINUED | OUTPATIENT
Start: 2022-03-28 | End: 2022-03-30 | Stop reason: HOSPADM

## 2022-03-28 RX ORDER — AMIODARONE HYDROCHLORIDE 200 MG/1
TABLET ORAL
Qty: 24 TABLET | Refills: 0 | Status: SHIPPED | OUTPATIENT
Start: 2022-03-28 | End: 2022-05-09 | Stop reason: ALTCHOICE

## 2022-03-28 RX ORDER — FOLIC ACID 1 MG/1
1 TABLET ORAL DAILY
Qty: 30 TABLET | Refills: 0 | Status: SHIPPED | OUTPATIENT
Start: 2022-03-29 | End: 2022-05-09 | Stop reason: ALTCHOICE

## 2022-03-28 RX ORDER — POLYETHYLENE GLYCOL 3350 17 G/17G
17 POWDER, FOR SOLUTION ORAL DAILY
Status: DISCONTINUED | OUTPATIENT
Start: 2022-03-28 | End: 2022-03-30 | Stop reason: HOSPADM

## 2022-03-28 RX ORDER — CARVEDILOL 3.12 MG/1
3.12 TABLET ORAL 2 TIMES DAILY WITH MEALS
Qty: 60 TABLET | Refills: 2 | Status: SHIPPED | OUTPATIENT
Start: 2022-03-28 | End: 2022-05-18 | Stop reason: DRUGHIGH

## 2022-03-28 RX ADMIN — PANTOPRAZOLE SODIUM 40 MG: 40 TABLET, DELAYED RELEASE ORAL at 08:15

## 2022-03-28 RX ADMIN — CARVEDILOL 3.12 MG: 3.12 TABLET, FILM COATED ORAL at 16:55

## 2022-03-28 RX ADMIN — OXYCODONE 5 MG: 5 TABLET ORAL at 16:57

## 2022-03-28 RX ADMIN — ASPIRIN 81 MG 81 MG: 81 TABLET ORAL at 08:15

## 2022-03-28 RX ADMIN — AMIODARONE HYDROCHLORIDE 200 MG: 200 TABLET ORAL at 21:51

## 2022-03-28 RX ADMIN — AMIODARONE HYDROCHLORIDE 200 MG: 200 TABLET ORAL at 13:44

## 2022-03-28 RX ADMIN — FUROSEMIDE 20 MG: 10 INJECTION, SOLUTION INTRAMUSCULAR; INTRAVENOUS at 08:16

## 2022-03-28 RX ADMIN — Medication 400 MG: at 08:15

## 2022-03-28 RX ADMIN — PERFLUTREN 1.65 MG: 6.52 INJECTION, SUSPENSION INTRAVENOUS at 09:51

## 2022-03-28 RX ADMIN — OXYCODONE HYDROCHLORIDE AND ACETAMINOPHEN 500 MG: 500 TABLET ORAL at 08:16

## 2022-03-28 RX ADMIN — AMPICILLIN SODIUM 2000 MG: 2 INJECTION, POWDER, FOR SOLUTION INTRAVENOUS at 13:46

## 2022-03-28 RX ADMIN — OXYCODONE HYDROCHLORIDE AND ACETAMINOPHEN 500 MG: 500 TABLET ORAL at 21:50

## 2022-03-28 RX ADMIN — SODIUM CHLORIDE, PRESERVATIVE FREE 10 ML: 5 INJECTION INTRAVENOUS at 21:50

## 2022-03-28 RX ADMIN — IPRATROPIUM BROMIDE AND ALBUTEROL SULFATE 1 AMPULE: .5; 2.5 SOLUTION RESPIRATORY (INHALATION) at 10:42

## 2022-03-28 RX ADMIN — AMPICILLIN SODIUM 2000 MG: 2 INJECTION, POWDER, FOR SOLUTION INTRAVENOUS at 21:52

## 2022-03-28 RX ADMIN — AMIODARONE HYDROCHLORIDE 200 MG: 200 TABLET ORAL at 08:16

## 2022-03-28 RX ADMIN — MUPIROCIN: 20 OINTMENT TOPICAL at 08:16

## 2022-03-28 RX ADMIN — AMPICILLIN SODIUM 2000 MG: 2 INJECTION, POWDER, FOR SOLUTION INTRAVENOUS at 06:44

## 2022-03-28 RX ADMIN — AMPICILLIN SODIUM 2000 MG: 2 INJECTION, POWDER, FOR SOLUTION INTRAVENOUS at 20:04

## 2022-03-28 RX ADMIN — AMPICILLIN SODIUM 2000 MG: 2 INJECTION, POWDER, FOR SOLUTION INTRAVENOUS at 03:19

## 2022-03-28 RX ADMIN — TAMSULOSIN HYDROCHLORIDE 0.4 MG: 0.4 CAPSULE ORAL at 08:16

## 2022-03-28 RX ADMIN — OXYCODONE 5 MG: 5 TABLET ORAL at 21:50

## 2022-03-28 RX ADMIN — IPRATROPIUM BROMIDE AND ALBUTEROL SULFATE 1 AMPULE: .5; 2.5 SOLUTION RESPIRATORY (INHALATION) at 15:54

## 2022-03-28 RX ADMIN — POTASSIUM CHLORIDE 20 MEQ: 20 TABLET, EXTENDED RELEASE ORAL at 09:51

## 2022-03-28 RX ADMIN — OXYCODONE 5 MG: 5 TABLET ORAL at 08:15

## 2022-03-28 RX ADMIN — SODIUM CHLORIDE, PRESERVATIVE FREE 10 ML: 5 INJECTION INTRAVENOUS at 08:16

## 2022-03-28 RX ADMIN — FERROUS SULFATE TAB 325 MG (65 MG ELEMENTAL FE) 325 MG: 325 (65 FE) TAB at 16:55

## 2022-03-28 RX ADMIN — POLYETHYLENE GLYCOL 3350 17 G: 17 POWDER, FOR SOLUTION ORAL at 09:51

## 2022-03-28 RX ADMIN — FOLIC ACID 1 MG: 1 TABLET ORAL at 08:16

## 2022-03-28 RX ADMIN — CARVEDILOL 3.12 MG: 3.12 TABLET, FILM COATED ORAL at 08:16

## 2022-03-28 RX ADMIN — AMPICILLIN SODIUM 2000 MG: 2 INJECTION, POWDER, FOR SOLUTION INTRAVENOUS at 09:50

## 2022-03-28 RX ADMIN — ROSUVASTATIN 20 MG: 20 TABLET, FILM COATED ORAL at 21:50

## 2022-03-28 RX ADMIN — FERROUS SULFATE TAB 325 MG (65 MG ELEMENTAL FE) 325 MG: 325 (65 FE) TAB at 08:15

## 2022-03-28 ASSESSMENT — PAIN DESCRIPTION - ONSET
ONSET: ON-GOING

## 2022-03-28 ASSESSMENT — PAIN DESCRIPTION - PAIN TYPE
TYPE: SURGICAL PAIN

## 2022-03-28 ASSESSMENT — PAIN DESCRIPTION - DESCRIPTORS
DESCRIPTORS: ACHING;DISCOMFORT;SORE
DESCRIPTORS: ACHING;SORE

## 2022-03-28 ASSESSMENT — PAIN DESCRIPTION - ORIENTATION
ORIENTATION: MID

## 2022-03-28 ASSESSMENT — PAIN DESCRIPTION - PROGRESSION
CLINICAL_PROGRESSION: GRADUALLY IMPROVING

## 2022-03-28 ASSESSMENT — PAIN SCALES - GENERAL
PAINLEVEL_OUTOF10: 0
PAINLEVEL_OUTOF10: 0
PAINLEVEL_OUTOF10: 3
PAINLEVEL_OUTOF10: 0
PAINLEVEL_OUTOF10: 2
PAINLEVEL_OUTOF10: 6
PAINLEVEL_OUTOF10: 0
PAINLEVEL_OUTOF10: 6
PAINLEVEL_OUTOF10: 6

## 2022-03-28 ASSESSMENT — PAIN DESCRIPTION - LOCATION
LOCATION: STERNUM;BACK
LOCATION: STERNUM

## 2022-03-28 ASSESSMENT — PAIN DESCRIPTION - FREQUENCY
FREQUENCY: CONTINUOUS

## 2022-03-28 ASSESSMENT — PAIN - FUNCTIONAL ASSESSMENT
PAIN_FUNCTIONAL_ASSESSMENT: ACTIVITIES ARE NOT PREVENTED
PAIN_FUNCTIONAL_ASSESSMENT: ACTIVITIES ARE NOT PREVENTED
PAIN_FUNCTIONAL_ASSESSMENT: PREVENTS OR INTERFERES SOME ACTIVE ACTIVITIES AND ADLS

## 2022-03-28 NOTE — PLAN OF CARE
Patient's chart updated to reflect:      . - HF care plan, HF education points and HF discharge instructions.  -Orders: 2 gram sodium diet, daily weights, I/O.  -PCP and/or Cardiologist appointment to be scheduled within 7 days of hospital discharge.   Baljinder Durham RN RN, BSN  Heart Failure Navigator

## 2022-03-28 NOTE — PATIENT CARE CONFERENCE
[facility] Quality Flow/Interdisciplinary Rounds Progress Note        Quality Flow Rounds held on March 28, 2022    Disciplines Attending:  Bedside Nurse, ,  and Nursing Unit Leadership    Parker Mckeon was admitted on 3/14/2022  8:06 PM    Anticipated Discharge Date:  Expected Discharge Date: 03/31/22    Disposition:    Alfred Score:  Alfred Scale Score: 20    Readmission Risk              Risk of Unplanned Readmission:  15           Discussed patient goal for the day, patient clinical progression, and barriers to discharge.   The following Goal(s) of the Day/Commitment(s) have been identified:  Diagnostics - Report Results and Labs - Report Results      Mayelin Pearson RN  March 28, 2022

## 2022-03-28 NOTE — HOME CARE
RECEIVED REFERRAL FOR [facility] HOWEVER PATIENT HAS [facility] INSURANCE WHICH IS PART OF [employer] AND [facility] IS NOTE IN NETWORK LEFT VM FOR ASTRID MEDRANO TO LET HER KNOW REFERRAL WILL BE ADITYA Benavides, LPN   [facility] AT [facility]

## 2022-03-28 NOTE — DISCHARGE SUMMARY
Physician Discharge Summary     Patient ID:  Sanjeev Bonilla  [id number]  62 y.o.  1960    Admit date: 3/14/2022    Discharge date: 3/30/2022    Admitting Physician: Nanci Hernandez DO     Discharge Physician: Nanci Hernandez DO    Discharge Diagnoses:   severe mitral regurgitation, streptococcus bacteremia, mitral valve endocarditis, atrial fibrillation    Operation: ANITRA, mitral valve repair (P2 triangular resection, P3/P3 perforation repair, 34mm Future band), MAZE procedure, 40mm Atriclip     Discharged Condition: stable    Indication for Admission:   severe mitral regurgitation, streptococcus bacteremia, mitral valve endocarditis, atrial fibrillation    Hospital Course: Course as above, and on 3/23/2022 the patient underwent ANITRA, urgent mitral valve repair (P2 triangular resection, P3/P3 perforation repair, 34mm Future band), MAZE procedure, 40mm Atriclip. Postoperatively, he was transferred to the CVICU in guarded stable condition and extubated once indications met. Once appropriate, he was transferred to the monitored stepdown unit and beta blockade was initiated. He was on oral amiodarone as he experienced atrial fibrillation pre-operatively. He was started on GDMT as his BP allowed, which included coreg and lasix. With improvement in his BP, low dose cozaar and aldactone were added per cardiology. The mediastinal/pleural chest tubes, and epicardial pacing wires were discontinued in the usual fashion. He underwent a TTE on 3/28/2022, which revealed and EF ~25-30 percent. He did require a WCD on discharge. Supplemental oxygen was weaned off, all ambulatory requirements were met, and he was deemed ready for discharge. Due to insurance conflict, he was unable to receive his medication; therefore, had to remain in the hospital an additional night for SW to resolve as he required his cardiac medications.  He was discharged to home on POD 7 (3/30/2022) in stable condition with a post-operative appointment scheduled. Consults: cardiology and ID    Discharge Exam:  Vitals   Vitals:     03/29/22 0600 03/29/22 0813 03/29/22 0840 03/29/22 1025   BP:   129/83   110/71   Pulse:   86   89   Resp:   18   18   Temp:   97.3 °F (36.3 °C)   97.3 °F (36.3 °C)   TempSrc:   Temporal   Temporal   SpO2:   95% 98%     Weight: 189 lb (85.7 kg)         Height:                 O2: RA        Intake/Output Summary (Last 24 hours) at 3/29/2022 1144  Last data filed at 3/29/2022 1002      Gross per 24 hour   Intake 300 ml   Output 1800 ml   Net -1500 ml            +BM on 3/28     UO: 900mL/8hr               Recent Labs     03/27/22  0619 03/28/22  0704 03/29/22  0625   WBC 9.1 8.7 7.6   HGB 7.8* 7.9* 7.7*   HCT 25.4* 25.4* 25.2*    213 259            Recent Labs     03/27/22  0619 03/28/22  0704 03/29/22  0625   BUN 17 20 18   CREATININE 0.8 0.9 0.9         Telemetry: SR        PE  Cardiac: RRR  Lungs: decreased bases  Chest incision C/D/I, approximated, no erythema. Sternum stable. Prior chest tube site incisions C/D/I, no erythema with intact sutures. Abd: Soft, nontender, +BS  Ext: Left groin cannulation site soft and intact       Disposition: home    Patient Instructions:      Medication List      START taking these medications    amiodarone 200 MG tablet  Commonly known as: CORDARONE  Take 200 mg orally twice daily for five days, then take 200 mg orally daily for fourteen days, then discontinue.      aspirin 81 MG chewable tablet  Take 1 tablet by mouth daily  Start taking on: March 29, 2022     carvedilol 3.125 MG tablet  Commonly known as: COREG  Take 1 tablet by mouth 2 times daily (with meals)     docusate sodium 100 MG capsule  Commonly known as: COLACE  Take 1 capsule by mouth 2 times daily as needed for Constipation     ferrous sulfate 325 (65 Fe) MG tablet  Commonly known as: IRON 325  Take 1 tablet by mouth 2 times daily (with meals)     folic acid 1 MG tablet  Commonly known as: FOLVITE  Take 1 tablet by mouth daily  Start taking on: March 29, 2022     furosemide 20 MG tablet  Commonly known as: Lasix  Take 1 tablet by mouth daily     HYDROcodone-acetaminophen 5-325 MG per tablet  Commonly known as: Norco  Take 1 tablet by mouth every 4 hours as needed for Pain for up to 7 days. Intended supply: 7 days.  Take lowest dose possible to manage pain     magnesium oxide 400 (241.3 Mg) MG Tabs tablet  Commonly known as: MAG-OX  Take 1 tablet by mouth daily for 14 days  Start taking on: March 29, 2022     pantoprazole 40 MG tablet  Commonly known as: PROTONIX  Take 1 tablet by mouth daily for 14 days  Start taking on: March 29, 2022     potassium chloride 10 MEQ extended release tablet  Commonly known as: KLOR-CON M  Take 1 tablet by mouth daily     rosuvastatin 20 MG tablet  Commonly known as: CRESTOR  Take 1 tablet by mouth nightly        CONTINUE taking these medications    VITAMIN C PO     VITAMIN D PO     ZINC PO           Where to Get Your Medications      You can get these medications from any pharmacy    Bring a paper prescription for each of these medications  · amiodarone 200 MG tablet  · aspirin 81 MG chewable tablet  · carvedilol 3.125 MG tablet  · docusate sodium 100 MG capsule  · ferrous sulfate 325 (65 Fe) MG tablet  · folic acid 1 MG tablet  · furosemide 20 MG tablet  · HYDROcodone-acetaminophen 5-325 MG per tablet  · magnesium oxide 400 (241.3 Mg) MG Tabs tablet  · pantoprazole 40 MG tablet  · potassium chloride 10 MEQ extended release tablet  · rosuvastatin 20 MG tablet       Activity: sternal precautions  Diet: cardiac diet  Wound Care: as directed    Follow-up with   Future Appointments   Date Time Provider Meet Tierney   4/26/2022  1:00 PM Ester Damon DO CARDIO SURG [facility]       Smoking cessation education provided prior to discharge    Discussed with patient the benefits of participation in cardiac rehab after discharge once approved safe by the surgeon and that a referral will be made at the follow up appointment. Pt verbalized comprehension.     Signed:  Electronically signed by ROX Barker CNP on 3/30/2022 at 6:59 AM

## 2022-03-28 NOTE — PROGRESS NOTES
POD#5 Awake, alert. No complaints. Denies CP, palpitations, SOB at rest, dizziness/lightheadedness. Vitals:    03/27/22 2310 03/28/22 0327 03/28/22 0617 03/28/22 0757   BP: 117/80 116/78  (!) 103/59   Pulse: 82 77  81   Resp: 16 16  18   Temp: 97 °F (36.1 °C) 97 °F (36.1 °C)  97.5 °F (36.4 °C)   TempSrc: Temporal Temporal     SpO2: 99% 100%  95%   Weight:   187 lb 3.2 oz (84.9 kg)    Height:         O2: RA      Intake/Output Summary (Last 24 hours) at 3/28/2022 0931  Last data filed at 3/28/2022 0617  Gross per 24 hour   Intake 1400 ml   Output 1700 ml   Net -300 ml         +BM pre-op - increased bowel regimen ordered today    UO: 200mL/8hr       Recent Labs     03/26/22  0600 03/27/22  0619 03/28/22  0704   WBC 11.8* 9.1 8.7   HGB 7.7* 7.8* 7.9*   HCT 24.4* 25.4* 25.4*    171 213      Recent Labs     03/26/22  0600 03/27/22  0619 03/28/22  0704   BUN 19 17 20   CREATININE 0.8 0.8 0.9         Telemetry: SR      PE  Cardiac: RRR  Lungs: decreased bases  Chest incision with intact TASHA DSD. Sternum stable. Prior chest tube site incisions C/D/I, no erythema with intact sutures. Epicardial pacing wires present and secure.    Abd: Soft, nontender, +BS  Ext: Left groin cannulation site soft and intact           A/P: POD#5    1. severe mitral regurgitation, streptococcus bacteremia, mitral valve endocarditis, atrial fibrillation  --Stable s/p ANITRA, mitral valve repair (P2 triangular resection, P3/P3 perforation repair, 34mm Future band), MAZE procedure, 40mm Atriclip   --Post op ANITRA reveals 30% EF  --will order TTE prior to discharge to establish baseline and need for life vest on DC - ordered on 3/26 - in process currently   --Scr stable 0.9  --ASA/statin   --reinforce sternal precautions  --continue epicardial pacing wires - will remove today  --CTs removed  --Left groin suture removed without difficulty         2. Expected acute blood loss anemia secondary to open heart surgery  --stable- hgb 7.9 no active bleeding, cont to monitor   --[facility] 3/24         3. Atrial fibrillation s/p MAZE procedure, 40mm Atriclip   - SR, prophylactic amio started- currently on amio 200mg tid   - Continue low dose BB today        4. Expected acute pulmonary insufficiency in the setting following surgery  --continue duonebs with ezpap  --encourage C&DB, SMI  --currently on RA  -- Lasix daily        5. streptococcus bacteremia; MV Endocarditis  - Antibiotics per ID Ampicillin 2grams q4 hours         6. Cardiac Insufficiency  - EF 30% post CPB, requiring epinephrine gtt for hemodynamic support  - Off Epi. SBP ~100. SBP improved to 110s today - low dose coreg started 3/27 and monitor - /59 this morning   - GDMT as hemodynamics allow - start as outpatient when BP improves         7. Constipation--expected delayed return of bowel function  --secondary to anesthesia, narcotics, decreased oral intake, and decreased physical activity   --no BM since prior to surgery - increased bowel reg today  --Continue daily MOM/oral bisacodyl until +BM and senna-s as scheduled. --Will give daily suppository until +BM starting POD 3 if no result by then   --Encouraged continued increase in oral intake and activity.          8. Expected deconditioning in the setting following surgery  --Increase activity as tolerated  --PT/OT  --currently ambulating 400ft            DVT prophylaxis:  --continue bilateral knee high GODWIN hose  --continue PCDs  --continue progressive ambulation  --continue knee high GODWIN hose/pcds/progressive ambulation        Dispo: Home tomorrow after echo is read and BM    Addendum: epicardial pacing wires cut without difficulty. TASHA dressing removed.  Sternal incision C/D/I without signs of infection     This patient's case and care plan was discussed with the attending surgeon

## 2022-03-28 NOTE — PROGRESS NOTES
Department of Internal Medicine  Infectious Diseases  Progress  Note      C/C : Streptococcus bacteremia , mitral valve endocarditis     Denied fever , chills   Reports feeling well   Afebrile   Chest tubes removed    Current Facility-Administered Medications   Medication Dose Route Frequency Provider Last Rate Last Admin    polyethylene glycol (GLYCOLAX) packet 17 g  17 g Oral Daily DAINA Harmon   17 g at 03/28/22 2805    bisacodyl (DULCOLAX) suppository 10 mg  10 mg Rectal Daily DAINA Harmon        furosemide (LASIX) tablet 20 mg  20 mg Oral Daily DAINA Harmon        carvedilol (COREG) tablet 3.125 mg  3.125 mg Oral BID  Alejandro Buenrostro PA-C   3.125 mg at 03/28/22 3193    rosuvastatin (CRESTOR) tablet 20 mg  20 mg Oral Nightly Shantel Chester MD   20 mg at 03/27/22 2118    aspirin chewable tablet 81 mg  81 mg Oral Daily Alejandro Buenrostro PA-C   81 mg at 03/28/22 0815    heparin flush 100 UNIT/ML injection 300 Units  300 Units IntraCATHeter PRN Ami Reamer, APRN - CNP   300 Units at 03/25/22 2215    amiodarone (CORDARONE) tablet 200 mg  200 mg Oral TID Ami Reamer, APRN - CNP   200 mg at 03/28/22 1466    ferrous sulfate (IRON 325) tablet 325 mg  325 mg Oral BID  Ami Reamer, APRN - CNP   325 mg at 03/28/22 0815    ascorbic acid (VITAMIN C) tablet 500 mg  500 mg Oral BID Ami Reamer, APRN - CNP   500 mg at 17/32/23 4079    folic acid (FOLVITE) tablet 1 mg  1 mg Oral Daily Ami Reamer, APRN - CNP   1 mg at 03/28/22 5070    potassium chloride (KLOR-CON M) extended release tablet 20 mEq  20 mEq Oral PRN Ami Reamer, APRN - CNP   20 mEq at 03/28/22 4055    diphenhydrAMINE (BENADRYL) tablet 25 mg  25 mg Oral Q8H PRN Ami Reamer, APRN - CNP   25 mg at 03/25/22 2120    pantoprazole (PROTONIX) tablet 40 mg  40 mg Oral Daily Ami Reamer, APRN - CNP   40 mg at 03/28/22 0815    insulin lispro (HUMALOG) injection vial 0-6 Units  0-6 Units SubCUTAneous TID  Seda Hickey, ROX - CNP   1 Units at 03/25/22 1641    insulin lispro (HUMALOG) injection vial 0-3 Units  0-3 Units SubCUTAneous Nightly Seda Hickey, ROX Juarez CNP        bisacodyl (DULCOLAX) suppository 10 mg  10 mg Rectal Daily PRN Seda Hickey, ROX Juarez CNP        amiodarone (CORDARONE) tablet 400 mg  400 mg Oral PRN Seda Hickey, ROX - DIMITRIS        amiodarone (CORDARONE) 150 mg in dextrose 5 % 100 mL bolus  150 mg IntraVENous PRN Seda Hickey, ROX - CNP        magnesium sulfate 2000 mg in 50 mL IVPB premix  2,000 mg IntraVENous PRN Seda Hickey, ROX Juarez CNP        morphine (PF) injection 2 mg  2 mg IntraVENous Q4H PRN Seda Hickey, ROX Juarez CNP        sodium chloride (OCEAN, BABY AYR) 0.65 % nasal spray 1 spray  1 spray Each Nostril PRN Seda Hickey, APRN - CNP        trimethobenzamide Orbie Victoria) injection 200 mg  200 mg IntraMUSCular Q6H PRN Seda Hickey, ROX Juarez CNP        oxyCODONE (ROXICODONE) immediate release tablet 5 mg  5 mg Oral Q4H PRN Woo Johnson PA-C   5 mg at 03/28/22 0894    Or    oxyCODONE HCl (OXY-IR) immediate release tablet 10 mg  10 mg Oral Q4H PRN Woo Johnson PA-C   10 mg at 03/26/22 0654    sodium chloride flush 0.9 % injection 10 mL  10 mL IntraVENous 2 times per day ROX Trotter - CNP   10 mL at 03/28/22 0816    sodium chloride flush 0.9 % injection 10 mL  10 mL IntraVENous PRN Seda Hickey, ROX - CNP   10 mL at 03/27/22 0400    ondansetron (ZOFRAN-ODT) disintegrating tablet 4 mg  4 mg Oral Q8H PRN ROX Trotter CNP        Or    ondansetron [facility]) injection 4 mg  4 mg IntraVENous Q6H PRN Seda Hickey, ROX Juarez CNP        magnesium oxide (MAG-OX) tablet 400 mg  400 mg Oral Daily Seda Hickey, ROX - CNP   400 mg at 03/28/22 0815    ipratropium-albuterol (DUONEB) nebulizer solution 1 ampule  1 ampule Inhalation Q4H ROX Kiran - CNP   1 ampule at 03/28/22 1042    insulin glargine-yfgn (SEMGLEE-YFGN) injection vial 12 Units  0.15 Units/kg SubCUTAneous Nightly Carlosclintonparvin ROX Tristan - CNP   12 Units at 03/27/22 2117    glucose (GLUTOSE) 40 % oral gel 15 g  15 g Oral PRN Sherryle Remak, APRN - CNP        dextrose 50 % IV solution  12.5 g IntraVENous PRN Carlile Azalea, APRN - CNP        glucagon (rDNA) injection 1 mg  1 mg IntraMUSCular PRN Carlile Azalea, APRN - CNP        dextrose 5 % solution  100 mL/hr IntraVENous PRN Carlile Remak, APRN - CNP        tamsulosin [facility]) capsule 0.4 mg  0.4 mg Oral Daily Sherryparvin MARIJA TristanN - CNP   0.4 mg at 03/28/22 6943    ampicillin 2000 mg ivpb mini bag  2,000 mg IntraVENous 6 times per day Sherryle Remak, APRN - CNP   Stopped at 03/28/22 1032         REVIEW OF SYSTEMS:    CONSTITUTIONAL:  Denies fever, chill or rigors. HEENT: denies blurring of vision or double vision, denies hearing problem  RESPIRATORY: Chest pain   CARDIOVASCULAR:  Denies palpitation  GASTROINTESTINAL:  Denies abdomen pain, diarrhea or constipation. GENITOURINARY:  Denies burning urination or frequency of urination  INTEGUMENT: denies wound , rash  HEMATOLOGIC/LYMPHATIC:  Denies lymph node swelling, gum bleeding or easy bruising. MUSCULOSKELETAL:  Denies leg pain , joint pain , joint swelling  NEUROLOGICAL:  Fatigue , weakness     PHYSICAL EXAM:      Vitals:     BP 94/60   Pulse 80   Temp 97.7 °F (36.5 °C)   Resp 18   Ht 5' 9\" (1.753 m)   Wt 187 lb 3.2 oz (84.9 kg)   SpO2 94% Comment: walking  BMI 27.64 kg/m²       General Appearance:    Awake, alert , no acute distress. Head:    Normocephalic, atraumatic   Eyes:    No pallor, no icterus,   Ears:    No obvious deformity or drainage.    Nose:   No nasal drainage   Throat:   Mucosa moist, multiple dental caries    Neck:   Supple, no lymphadenopathy   Lungs:     Clear to auscultation bilaterally   Heart:     Irregular, sternum stable, chest tube    Abdomen:     Soft, non-tender, bowel sounds present    Extremities:   No edema, no cyanosis    Pulses:   Dorsalis pedis palpable    Skin:   no rashes       Lab Results   Component Value Date    WBC 8.7 03/28/2022    RBC 2.79 03/28/2022    HGB 7.9 03/28/2022    HCT 25.4 03/28/2022    HCT 28.0 03/23/2022     03/28/2022    MCV 91.0 03/28/2022    MCH 28.3 03/28/2022    MCHC 31.1 03/28/2022    RDW 15.9 03/28/2022    LYMPHOPCT 20.0 03/21/2022    MONOPCT 5.8 03/21/2022    BASOPCT 0.5 03/21/2022    MONOSABS 0.54 03/21/2022    LYMPHSABS 1.86 03/21/2022    EOSABS 0.32 03/21/2022    BASOSABS 0.05 03/21/2022       CMP     Lab Results   Component Value Date     03/28/2022    K 4.0 03/28/2022    K 4.2 03/15/2022    CL 99 03/28/2022    CO2 28 03/28/2022    BUN 20 03/28/2022    CREATININE 0.9 03/28/2022    GFRAA >60 03/28/2022    LABGLOM >60 03/28/2022    GLUCOSE 83 03/28/2022    PROT 5.6 03/24/2022    LABALBU 3.1 03/24/2022    CALCIUM 8.5 03/28/2022    BILITOT 0.4 03/24/2022    ALKPHOS 95 03/24/2022     03/24/2022    ALT 59 03/24/2022         Hepatic Function Panel:    Lab Results   Component Value Date    ALKPHOS 95 03/24/2022    ALT 59 03/24/2022     03/24/2022    PROT 5.6 03/24/2022    BILITOT 0.4 03/24/2022    BILIDIR 0.3 03/23/2022    IBILI 0.2 03/23/2022    LABALBU 3.1 03/24/2022       PT/INR:    Lab Results   Component Value Date    PROTIME 17.3 03/23/2022    INR 1.6 03/23/2022       TSH:    Lab Results   Component Value Date    TSH 1.880 03/14/2022       U/A:    Lab Results   Component Value Date    COLORU Yellow 03/14/2022    PHUR 5.0 03/14/2022    WBCUA 0-1 03/14/2022    RBCUA 0-1 03/14/2022    BACTERIA MODERATE 03/14/2022    CLARITYU Clear 03/14/2022    SPECGRAV 1.025 03/14/2022    LEUKOCYTESUR Negative 03/14/2022    UROBILINOGEN 4.0 03/14/2022    BILIRUBINUR Negative 03/14/2022    BLOODU Negative 03/14/2022    GLUCOSEU Negative 03/14/2022    AMORPHOUS FEW 03/14/2022       ABG:  No results found for: CLH9MPF, BEART, I4ZOFOMD, PHART, THGBART, KIV5EXD, PO2ART, GZM8FVZ    MICROBIOLOGY:    Blood culture -     Streptococcus mitis / Streptococcus oralis (1)    Antibiotic Interpretation Microscan  Method Status    ampicillin Sensitive <=^0.25 mcg/mL BACTERIAL SUSCEPTIBILITY PANEL BY LANEY     benzylpenicillin Sensitive <=^0.06 mcg/mL BACTERIAL SUSCEPTIBILITY PANEL BY LANEY     cefotaxime Sensitive <=^0.12 mcg/mL BACTERIAL SUSCEPTIBILITY PANEL BY LANEY     cefTRIAXone Sensitive <=^0.12 mcg/mL BACTERIAL SUSCEPTIBILITY PANEL BY LANEY     clindamycin Sensitive <=^0.25 mcg/mL BACTERIAL SUSCEPTIBILITY PANEL BY LANEY     levofloxacin Sensitive ^0.5 mcg/mL BACTERIAL SUSCEPTIBILITY PANEL BY LANEY     linezolid Sensitive <=^2 mcg/mL BACTERIAL SUSCEPTIBILITY PANEL BY LANEY     vancomycin Sensitive ^0.5 mcg/mL BACTERIAL SUSCEPTIBILITY PANEL BY LANEY       Culture, Surgical [[id number]] Collected: 03/23/22 1505   Order Status: Completed Specimen: Tissue Updated: 03/26/22 0740    Culture Surgical Growth not present           Radiology :    CTA chest       Impression:        No evidence of pulmonary embolism or acute pulmonary abnormality. Echo -       Ejection fraction is visually estimated at 55%. Severe holosystolic prolapse of the posterior leaflet with a flail P1/P2   segment due to ruptured chordae tendineae. Small mobile echodensity suspicious for vegetation on ventricular surface   of posterior leaflet. Failure of leaflet coaptation. No definitive papillary muscle rupture. Torrential mitral regurgitation, eccentric jet directed anteriorly. IMPRESSION:     1. Streptococcus bacteremia ( 3/14 and 3/15) ,mitral valve endocarditis ,follow up blood cx neg on ( 3/17) s/p mitral valve repair ( 3/23). Tissue culture negative   RECOMMENDATIONS:      1.  Ampicillin 2 grams IV q 4 hrs  ( PCN LANEY <0.06 ) till 4/20/22

## 2022-03-28 NOTE — PROGRESS NOTES
[facility] Cardiology Inpatient Progress Note    Patient is a 62 y.o. male of No primary care provider on file. seen in hospital follow up. 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 No CP.      Patient Active Problem List   Diagnosis    Atrial fibrillation with rapid ventricular response ([facility] [address]    Sepsis ([facility] [address] present on admission    JANA (acute kidney injury) ([facility] [address]    Bacteremia    Severe mitral regurgitation    Suspected endocarditis    Subacute bacterial endocarditis    Ruptured chordae tendineae (HCC)    Mitral valve disease    Paroxysmal atrial fibrillation (HCC)    Acute diastolic (congestive) heart failure (HCC)    Cardiac insufficiency (HCC)    Acute pulmonary insufficiency    Acute blood loss anemia       No Known Allergies    Current Facility-Administered Medications   Medication Dose Route Frequency Provider Last Rate Last Admin    polyethylene glycol (GLYCOLAX) packet 17 g  17 g Oral Daily DAINA Sullivan        bisacodyl (DULCOLAX) suppository 10 mg  10 mg Rectal Daily DAINA Sullivan        carvedilol (COREG) tablet 3.125 mg  3.125 mg Oral BID WC Jensen Cross PA-C   3.125 mg at 03/28/22 0816    rosuvastatin (CRESTOR) tablet 20 mg  20 mg Oral Nightly Pebbles Florian MD   20 mg at 03/27/22 2118    perflutren lipid microspheres (DEFINITY) injection 1.65 mg  1.5 mL IntraVENous ONCE PRN Jensen Cross PA-C        aspirin chewable tablet 81 mg  81 mg Oral Daily Jensen Cross PA-C   81 mg at 03/28/22 0815    heparin flush 100 UNIT/ML injection 300 Units  300 Units IntraCATHeter PRN ROX Blake CNP   300 Units at 03/25/22 2215    amiodarone (CORDARONE) tablet 200 mg  200 mg Oral TID ROX Blake CNP   200 mg at 03/28/22 0816    furosemide (LASIX) injection 20 mg  20 mg IntraVENous Daily ROX Blake CNP   20 mg at 03/28/22 0816    ferrous sulfate (IRON 325) tablet 325 mg  325 mg Oral BID  ROX Mckeon CNP   325 mg at 03/28/22 1027    ascorbic acid (VITAMIN C) tablet 500 mg  500 mg Oral BID ROX Mckeon - CNP   500 mg at 86/30/28 1345    folic acid (FOLVITE) tablet 1 mg  1 mg Oral Daily ROX Mckeon - DIMITRIS   1 mg at 03/28/22 9096    potassium chloride (KLOR-CON M) extended release tablet 20 mEq  20 mEq Oral PRN Antonia Duran APRN - CNP        diphenhydrAMINE (BENADRYL) tablet 25 mg  25 mg Oral Q8H PRN ROX Mckeon - CNP   25 mg at 03/25/22 2120    pantoprazole (PROTONIX) tablet 40 mg  40 mg Oral Daily ROX Mckeon - DIMITRIS   40 mg at 03/28/22 0815    insulin lispro (HUMALOG) injection vial 0-6 Units  0-6 Units SubCUTAneous TID  ROX Mckeon CNP   1 Units at 03/25/22 1641    insulin lispro (HUMALOG) injection vial 0-3 Units  0-3 Units SubCUTAneous Nightly ROX Mckeon - CNP        bisacodyl (DULCOLAX) suppository 10 mg  10 mg Rectal Daily PRN ROX Mckeon - CNP        amiodarone (CORDARONE) tablet 400 mg  400 mg Oral PRN ROX Mckeon - CNP        amiodarone (CORDARONE) 150 mg in dextrose 5 % 100 mL bolus  150 mg IntraVENous PRN ROX Mckeon - CNP        magnesium sulfate 2000 mg in 50 mL IVPB premix  2,000 mg IntraVENous PRN ROX Mckeon - CNP        morphine (PF) injection 2 mg  2 mg IntraVENous Q4H PRN ROX Mckeon - CNP        sodium chloride (OCEAN, BABY AYR) 0.65 % nasal spray 1 spray  1 spray Each Nostril PRN ROX Mckeon - CNP        trimethobenzamide Monroe Dia) injection 200 mg  200 mg IntraMUSCular Q6H PRN ROX Mckeon - CNP        oxyCODONE (ROXICODONE) immediate release tablet 5 mg  5 mg Oral Q4H PRN Remonia Quentin, PA-C   5 mg at 03/28/22 4041    Or    oxyCODONE HCl (OXY-IR) immediate release tablet 10 mg  10 mg Oral Q4H PRN James Saavedra PA-C   10 mg at 03/26/22 0654    sodium chloride flush 0.9 % injection 10 mL  10 mL IntraVENous 2 times per day Gleda Flick, APRN - CNP   10 mL at 03/28/22 0816    sodium chloride flush 0.9 % injection 10 mL  10 mL IntraVENous PRN Gleda Flick, APRN - CNP   10 mL at 03/27/22 0400    ondansetron (ZOFRAN-ODT) disintegrating tablet 4 mg  4 mg Oral Q8H PRN Gleda Flick, APRN - CNP        Or    ondansetron [facility]) injection 4 mg  4 mg IntraVENous Q6H PRN Gleda Flick, APRN - CNP        magnesium oxide (MAG-OX) tablet 400 mg  400 mg Oral Daily Gleda Flick, APRN - CNP   400 mg at 03/28/22 0815    ipratropium-albuterol (DUONEB) nebulizer solution 1 ampule  1 ampule Inhalation Q4H WA Gleda Flick, APRN - CNP   1 ampule at 03/27/22 2110    insulin glargine-yfgn (SEMGLEE-YFGN) injection vial 12 Units  0.15 Units/kg SubCUTAneous Nightly Gleda Flick, APRN - CNP   12 Units at 03/27/22 2117    glucose (GLUTOSE) 40 % oral gel 15 g  15 g Oral PRN Gleda Flick, APRN - CNP        dextrose 50 % IV solution  12.5 g IntraVENous PRN Gleda Flick, APRN - CNP        glucagon (rDNA) injection 1 mg  1 mg IntraMUSCular PRN Gleda Flick, APRN - CNP        dextrose 5 % solution  100 mL/hr IntraVENous PRN Gleda Flick, APRN - CNP        tamsulosin (FLOMAX) capsule 0.4 mg  0.4 mg Oral Daily Gleda Flick, APRN - CNP   0.4 mg at 03/28/22 0292    ampicillin 2000 mg ivpb mini bag  2,000 mg IntraVENous 6 times per day Gleda Flick, APRN - CNP   Stopped at 03/28/22 5937     Review of systems:   Heart: as above   Lungs: as above   Eyes: denies changes in vision or discharge. Ears: denies changes in hearing or pain. Nose: denies epistaxis or masses   Throat: denies sore throat or trouble swallowing. Neuro: denies numbness, tingling, tremors. Skin: denies rashes or itching. : denies hematuria, dysuria   GI: denies vomiting, diarrhea   Psych: denies mood changed, anxiety, depression.     Physical Exam   BP (!) 103/59   Pulse 81   Temp 97.5 °F (36.4 °C)   Resp 18   Ht 5' 9\" (1.753 m)   Wt 187 lb 3.2 oz (84.9 kg)   SpO2 95%   BMI 27.64 kg/m²   Constitutional: Oriented to person, place, and time. No distress. Well developed. Head: Normocephalic and atraumatic. Neck: Neck supple. No hepatojugular reflux. No JVD present. Carotid bruit is not present. No tracheal deviation present. No thyromegaly present. Cardiovascular: Normal rate, regular rhythm, normal heart sounds. intact distal pulses. No gallop and no friction rub. No murmur heard. Pulmonary: Breath sounds normal. No respiratory distress. No wheezes. No rales. Chest: Effort normal. No tenderness. Abdominal: Soft. Bowel sounds are normal. No distension or mass. No tenderness, rebound or guarding. Musculoskeletal: . No tenderness. No clubbing or cyanosis. Extremitites: Intact distal pulses. No edema  Neurological: Alert and oriented to person, place, and time. Skin: Skin is warm and dry. No rash noted. Not diaphoretic. No erythema. Psychiatric: Normal mood and affect. Behavior is normal.   Lymphadenopathy: No cervical adenopathy. No groin adenopathy.     CBC:   Lab Results   Component Value Date    WBC 8.7 03/28/2022    RBC 2.79 03/28/2022    HGB 7.9 03/28/2022    HCT 25.4 03/28/2022    HCT 28.0 03/23/2022    MCV 91.0 03/28/2022    MCH 28.3 03/28/2022    MCHC 31.1 03/28/2022    RDW 15.9 03/28/2022     03/28/2022    MPV 10.8 03/28/2022     BMP:   Lab Results   Component Value Date     03/28/2022    K 4.0 03/28/2022    K 4.2 03/15/2022    CL 99 03/28/2022    CO2 28 03/28/2022    BUN 20 03/28/2022    LABALBU 3.1 03/24/2022    CREATININE 0.9 03/28/2022    CALCIUM 8.5 03/28/2022    GFRAA >60 03/28/2022    LABGLOM >60 03/28/2022     Magnesium:    Lab Results   Component Value Date    MG 2.4 03/28/2022     Cardiac Enzymes:   Lab Results   Component Value Date    TROPHS 43 (H) 03/14/2022    TROPHS 42 (H) 03/14/2022      PT/INR:    Lab Results   Component Value Date    PROTIME 17.3 03/23/2022    INR 1.6 03/23/2022     TSH:    Lab Results Component Value Date    TSH 1.880 03/14/2022     Rhythm Strip: normal sinus rhythm. Echo Summary 3/15/2022:   Atrial fibrillation noted. Ejection fraction is visually estimated at 50-55%. Right ventricle global systolic function is reduced. Severe biatrial dilation. Mild aortic valve regurgitation. Mild tricuspid regurgitation. Critical findings   Partial flail posterior mitral leaflet with failure of leaflet coaptation, and evidence of ruptured chordae tendineae. Absolutely torrential, eccentric mitral regurgitation directed anteriorly. There is a small mobile echodensity noted on the ventricular aspect of the posterior mitral valve, likely ruptured chordae or papillary muscle, though in the context of positive blood cultures a vegetation is also suspected which likely led to chordal rupture. Recommend: CT surgery consult, ANITRA     Echo Summary 3/17/2022:   Ejection fraction is visually estimated at 55%. Severe holosystolic prolapse of the posterior leaflet with a flail P1/P2 segment due to ruptured chordae tendineae. Small mobile echodensity suspicious for vegetation on ventricular surface of posterior leaflet. Failure of leaflet coaptation. No definitive papillary muscle rupture. Torrential mitral regurgitation, eccentric jet directed anteriorly. Angiographic Results/findings 3/18/2022:  Left Main: No angiographically significant stenosis. LAD: No angiographically significant stenosis. D1: Bifurcating vessel. No angiographically significant stenosis. Cx: Dominant vessel. Mid to distal diffuse 10% stenosis. OM1: Bifurcating vessel. No angiographically significant stenosis. OM2: Proximal 10% stenosis. OM 3: No angiographically significant stenosis. OM 4: No angiographically significant stenosis. Ramus: Absent. RCA: non-Dominant. Mild diffuse luminal irregularities.     ASSESSMENT & PLAN:    Patient Active Problem List   Diagnosis    Atrial fibrillation with rapid ventricular response (HCC)    Sepsis ([facility] [address] present on admission    JANA (acute kidney injury) ([facility] [address]    Bacteremia    Severe mitral regurgitation    Suspected endocarditis    Subacute bacterial endocarditis    Ruptured chordae tendineae (HCC)    Mitral valve disease    Paroxysmal atrial fibrillation (HCC)    Acute diastolic (congestive) heart failure (HCC)    Cardiac insufficiency (HCC)    Acute pulmonary insufficiency    Acute blood loss anemia     1. VHD/Endocarditis/ID issues:    Stable s/p MVRp (P2 triangular resection, P3/P3 perforation repair, 34mm Future band), MAZE procedure, 40mm Atriclip. 2. Acute diastolic CHF: Post op ANITRA reveals 30% EF    TTE pending to establish baseline and need for life vest on D/C. Coreg. Add afterload reduction as indicated and able. Follow volume. 3. PAF: S/p MAZE procedure, 40mm Atriclip     Prophylactic amio started/BB. Monitor. 4. Anemia: Follow labs. Whitney Pablo D.O.   Cardiologist  Cardiology, [facility]

## 2022-03-28 NOTE — CARE COORDINATION
POD#5 MV repair. On RA. Wires cut. Picc line on 03/19. IV ampicillin 2000mg q 4 hrs for 4 weeks. Met with pt and pt's sister, Antonino, in room. Discharge plan per prior cm was shaun. However, pt now ambulating 400ft. Plan is home and pt will be staying at [facility] in [facility]. Will check into [facility]. They had no preference for [facility] agency and was agreeable to [facility]. Referral was made to [facility] at [facility]. [facility] is not in network with [employer]. Referral was made to Gisela at [facility] and she will make referral to Earlene Calloway at [facility] for iv ampicillin. [facility] accepted pt, they are attempting to run pt's insurance for benefit coverage. Jolene Holden with [employer] aware of life vest order on chart. 66  Mily was able to run benefits. Earlene Calloway spoke with pt and pt agreeable to cost out of pocket for iv ampicillin.

## 2022-03-29 LAB
ANAEROBIC CULTURE: NORMAL
ANION GAP SERPL CALCULATED.3IONS-SCNC: 7 MMOL/L (ref 7–16)
BUN BLDV-MCNC: 18 MG/DL (ref 6–23)
CALCIUM SERPL-MCNC: 8.4 MG/DL (ref 8.6–10.2)
CHLORIDE BLD-SCNC: 99 MMOL/L (ref 98–107)
CO2: 28 MMOL/L (ref 22–29)
CREAT SERPL-MCNC: 0.9 MG/DL (ref 0.7–1.2)
GFR AFRICAN AMERICAN: >60
GFR NON-AFRICAN AMERICAN: >60 ML/MIN/1.73
GLUCOSE BLD-MCNC: 80 MG/DL (ref 74–99)
HCT VFR BLD CALC: 25.2 % (ref 37–54)
HEMOGLOBIN: 7.7 G/DL (ref 12.5–16.5)
MAGNESIUM: 2.1 MG/DL (ref 1.6–2.6)
MCH RBC QN AUTO: 27.9 PG (ref 26–35)
MCHC RBC AUTO-ENTMCNC: 30.6 % (ref 32–34.5)
MCV RBC AUTO: 91.3 FL (ref 80–99.9)
PDW BLD-RTO: 15.9 FL (ref 11.5–15)
PLATELET # BLD: 259 E9/L (ref 130–450)
PMV BLD AUTO: 10.8 FL (ref 7–12)
POTASSIUM SERPL-SCNC: 3.8 MMOL/L (ref 3.5–5)
RBC # BLD: 2.76 E12/L (ref 3.8–5.8)
SODIUM BLD-SCNC: 134 MMOL/L (ref 132–146)
WBC # BLD: 7.6 E9/L (ref 4.5–11.5)

## 2022-03-29 PROCEDURE — 80048 BASIC METABOLIC PNL TOTAL CA: CPT

## 2022-03-29 PROCEDURE — 6370000000 HC RX 637 (ALT 250 FOR IP): Performed by: INTERNAL MEDICINE

## 2022-03-29 PROCEDURE — 6370000000 HC RX 637 (ALT 250 FOR IP): Performed by: PHYSICIAN ASSISTANT

## 2022-03-29 PROCEDURE — 2580000003 HC RX 258: Performed by: NURSE PRACTITIONER

## 2022-03-29 PROCEDURE — 6370000000 HC RX 637 (ALT 250 FOR IP): Performed by: NURSE PRACTITIONER

## 2022-03-29 PROCEDURE — 94640 AIRWAY INHALATION TREATMENT: CPT

## 2022-03-29 PROCEDURE — 36415 COLL VENOUS BLD VENIPUNCTURE: CPT

## 2022-03-29 PROCEDURE — 2140000000 HC CCU INTERMEDIATE R&B

## 2022-03-29 PROCEDURE — 93798 PHYS/QHP OP CAR RHAB W/ECG: CPT

## 2022-03-29 PROCEDURE — 99233 SBSQ HOSP IP/OBS HIGH 50: CPT | Performed by: INTERNAL MEDICINE

## 2022-03-29 PROCEDURE — 36592 COLLECT BLOOD FROM PICC: CPT

## 2022-03-29 PROCEDURE — 6360000002 HC RX W HCPCS: Performed by: NURSE PRACTITIONER

## 2022-03-29 PROCEDURE — 83735 ASSAY OF MAGNESIUM: CPT

## 2022-03-29 PROCEDURE — 85027 COMPLETE CBC AUTOMATED: CPT

## 2022-03-29 RX ORDER — LOSARTAN POTASSIUM 25 MG/1
12.5 TABLET ORAL DAILY
Qty: 30 TABLET | Refills: 3 | Status: SHIPPED | OUTPATIENT
Start: 2022-03-30

## 2022-03-29 RX ORDER — SPIRONOLACTONE 25 MG/1
12.5 TABLET ORAL DAILY
Status: DISCONTINUED | OUTPATIENT
Start: 2022-03-29 | End: 2022-03-30 | Stop reason: HOSPADM

## 2022-03-29 RX ORDER — LOSARTAN POTASSIUM 25 MG/1
12.5 TABLET ORAL DAILY
Status: DISCONTINUED | OUTPATIENT
Start: 2022-03-29 | End: 2022-03-30 | Stop reason: HOSPADM

## 2022-03-29 RX ORDER — SPIRONOLACTONE 25 MG/1
12.5 TABLET ORAL DAILY
Qty: 30 TABLET | Refills: 3 | Status: SHIPPED | OUTPATIENT
Start: 2022-03-30

## 2022-03-29 RX ADMIN — FOLIC ACID 1 MG: 1 TABLET ORAL at 09:43

## 2022-03-29 RX ADMIN — OXYCODONE 5 MG: 5 TABLET ORAL at 21:34

## 2022-03-29 RX ADMIN — OXYCODONE HYDROCHLORIDE AND ACETAMINOPHEN 500 MG: 500 TABLET ORAL at 21:34

## 2022-03-29 RX ADMIN — LOSARTAN POTASSIUM 12.5 MG: 25 TABLET, FILM COATED ORAL at 10:43

## 2022-03-29 RX ADMIN — SODIUM CHLORIDE, PRESERVATIVE FREE 10 ML: 5 INJECTION INTRAVENOUS at 21:35

## 2022-03-29 RX ADMIN — AMPICILLIN SODIUM 2000 MG: 2 INJECTION, POWDER, FOR SOLUTION INTRAVENOUS at 10:53

## 2022-03-29 RX ADMIN — FUROSEMIDE 20 MG: 20 TABLET ORAL at 09:44

## 2022-03-29 RX ADMIN — OXYCODONE HYDROCHLORIDE AND ACETAMINOPHEN 500 MG: 500 TABLET ORAL at 09:44

## 2022-03-29 RX ADMIN — ASPIRIN 81 MG 81 MG: 81 TABLET ORAL at 09:44

## 2022-03-29 RX ADMIN — IPRATROPIUM BROMIDE AND ALBUTEROL SULFATE 1 AMPULE: .5; 2.5 SOLUTION RESPIRATORY (INHALATION) at 16:43

## 2022-03-29 RX ADMIN — AMIODARONE HYDROCHLORIDE 200 MG: 200 TABLET ORAL at 14:42

## 2022-03-29 RX ADMIN — AMPICILLIN SODIUM 2000 MG: 2 INJECTION, POWDER, FOR SOLUTION INTRAVENOUS at 21:39

## 2022-03-29 RX ADMIN — AMPICILLIN SODIUM 2000 MG: 2 INJECTION, POWDER, FOR SOLUTION INTRAVENOUS at 06:22

## 2022-03-29 RX ADMIN — IPRATROPIUM BROMIDE AND ALBUTEROL SULFATE 1 AMPULE: .5; 2.5 SOLUTION RESPIRATORY (INHALATION) at 20:11

## 2022-03-29 RX ADMIN — FERROUS SULFATE TAB 325 MG (65 MG ELEMENTAL FE) 325 MG: 325 (65 FE) TAB at 17:10

## 2022-03-29 RX ADMIN — AMPICILLIN SODIUM 2000 MG: 2 INJECTION, POWDER, FOR SOLUTION INTRAVENOUS at 02:21

## 2022-03-29 RX ADMIN — TAMSULOSIN HYDROCHLORIDE 0.4 MG: 0.4 CAPSULE ORAL at 09:44

## 2022-03-29 RX ADMIN — PANTOPRAZOLE SODIUM 40 MG: 40 TABLET, DELAYED RELEASE ORAL at 09:45

## 2022-03-29 RX ADMIN — OXYCODONE 5 MG: 5 TABLET ORAL at 02:21

## 2022-03-29 RX ADMIN — AMPICILLIN SODIUM 2000 MG: 2 INJECTION, POWDER, FOR SOLUTION INTRAVENOUS at 18:08

## 2022-03-29 RX ADMIN — ROSUVASTATIN 20 MG: 20 TABLET, FILM COATED ORAL at 21:34

## 2022-03-29 RX ADMIN — AMPICILLIN SODIUM 2000 MG: 2 INJECTION, POWDER, FOR SOLUTION INTRAVENOUS at 14:41

## 2022-03-29 RX ADMIN — CARVEDILOL 3.12 MG: 3.12 TABLET, FILM COATED ORAL at 17:10

## 2022-03-29 RX ADMIN — SODIUM CHLORIDE, PRESERVATIVE FREE 10 ML: 5 INJECTION INTRAVENOUS at 14:42

## 2022-03-29 RX ADMIN — SPIRONOLACTONE 12.5 MG: 25 TABLET ORAL at 10:44

## 2022-03-29 RX ADMIN — IPRATROPIUM BROMIDE AND ALBUTEROL SULFATE 1 AMPULE: .5; 2.5 SOLUTION RESPIRATORY (INHALATION) at 13:11

## 2022-03-29 RX ADMIN — OXYCODONE 5 MG: 5 TABLET ORAL at 06:17

## 2022-03-29 RX ADMIN — CARVEDILOL 3.12 MG: 3.12 TABLET, FILM COATED ORAL at 09:44

## 2022-03-29 RX ADMIN — FERROUS SULFATE TAB 325 MG (65 MG ELEMENTAL FE) 325 MG: 325 (65 FE) TAB at 09:42

## 2022-03-29 RX ADMIN — SODIUM CHLORIDE, PRESERVATIVE FREE 10 ML: 5 INJECTION INTRAVENOUS at 10:55

## 2022-03-29 RX ADMIN — AMIODARONE HYDROCHLORIDE 200 MG: 200 TABLET ORAL at 21:34

## 2022-03-29 RX ADMIN — POTASSIUM CHLORIDE 40 MEQ: 20 TABLET, EXTENDED RELEASE ORAL at 09:42

## 2022-03-29 RX ADMIN — IPRATROPIUM BROMIDE AND ALBUTEROL SULFATE 1 AMPULE: .5; 2.5 SOLUTION RESPIRATORY (INHALATION) at 10:06

## 2022-03-29 RX ADMIN — Medication 400 MG: at 09:42

## 2022-03-29 RX ADMIN — OXYCODONE 5 MG: 5 TABLET ORAL at 10:44

## 2022-03-29 RX ADMIN — AMIODARONE HYDROCHLORIDE 200 MG: 200 TABLET ORAL at 09:44

## 2022-03-29 ASSESSMENT — PAIN DESCRIPTION - DESCRIPTORS: DESCRIPTORS: ACHING

## 2022-03-29 ASSESSMENT — PAIN SCALES - GENERAL
PAINLEVEL_OUTOF10: 0
PAINLEVEL_OUTOF10: 8
PAINLEVEL_OUTOF10: 6
PAINLEVEL_OUTOF10: 0
PAINLEVEL_OUTOF10: 0
PAINLEVEL_OUTOF10: 4
PAINLEVEL_OUTOF10: 0
PAINLEVEL_OUTOF10: 3
PAINLEVEL_OUTOF10: 0

## 2022-03-29 ASSESSMENT — PAIN DESCRIPTION - PAIN TYPE: TYPE: SURGICAL PAIN

## 2022-03-29 ASSESSMENT — PAIN DESCRIPTION - FREQUENCY: FREQUENCY: INTERMITTENT

## 2022-03-29 ASSESSMENT — PAIN DESCRIPTION - ORIENTATION: ORIENTATION: MID

## 2022-03-29 ASSESSMENT — PAIN - FUNCTIONAL ASSESSMENT: PAIN_FUNCTIONAL_ASSESSMENT: PREVENTS OR INTERFERES SOME ACTIVE ACTIVITIES AND ADLS

## 2022-03-29 ASSESSMENT — PAIN DESCRIPTION - ONSET: ONSET: GRADUAL

## 2022-03-29 ASSESSMENT — PAIN DESCRIPTION - LOCATION: LOCATION: STERNUM

## 2022-03-29 ASSESSMENT — PAIN DESCRIPTION - PROGRESSION: CLINICAL_PROGRESSION: GRADUALLY IMPROVING

## 2022-03-29 NOTE — ANESTHESIA POSTPROCEDURE EVALUATION
Department of Anesthesiology  Postprocedure Note    Patient: Alisa Mesa  MRN: [id number]  [place]: 1960  Date of evaluation: 3/29/2022  Time:  1:52 PM     Procedure Summary     Date: 03/23/22 Room / Location: [facility] OR  / [facility]    Anesthesia Start: 5035 Anesthesia Stop: 5842    Procedure: MITRAL VALVE REPAIR VS REPLACEMENT WITH MAZE (N/A ) Diagnosis: (/)    Surgeons: Ester Damon DO Responsible Provider: Eben Millan MD    Anesthesia Type: general ASA Status: 3          Anesthesia Type: general    Alexa Phase I: Alexa Score: 8    Aleax Phase II:      Last vitals: Reviewed and per EMR flowsheets.        Anesthesia Post Evaluation    Patient location during evaluation: ICU  Patient participation: complete - patient participated  Level of consciousness: awake and alert  Airway patency: patent  Nausea & Vomiting: no nausea and no vomiting  Complications: no  Cardiovascular status: hemodynamically stable and blood pressure returned to baseline  Respiratory status: acceptable  Hydration status: euvolemic

## 2022-03-29 NOTE — PLAN OF CARE
Met with patient and discussed our outpatient Phase II Cardiac Rehabilitation program. Reviewed the benefits of cardiac rehabilitation based on their diagnosis and personal risk factors. Patient demonstrates strong interest in Cardiac Rehabilitation at this time. Cardiac Rehabilitation brochure provided to patient/family. The patient may call [facility] [place] [place] at [phone] for additional information or questions. Contact information for [facility] [employer] and other choices close to the patient's residence have been provided in the discharge instructions so that the patient may call and schedule an appointment when cleared by their physician.

## 2022-03-29 NOTE — PROGRESS NOTES
Department of Internal Medicine  Infectious Diseases  Progress  Note      C/C : Streptococcus bacteremia , mitral valve endocarditis     Denied fever , chills   Reports feeling well   Afebrile   Chest tubes removed    Current Facility-Administered Medications   Medication Dose Route Frequency Provider Last Rate Last Admin    losartan (COZAAR) tablet 12.5 mg  12.5 mg Oral Daily [facility], DO   12.5 mg at 03/29/22 1043    spironolactone (ALDACTONE) tablet 12.5 mg  12.5 mg Oral Daily [facility], DO   12.5 mg at 03/29/22 1044    polyethylene glycol (GLYCOLAX) packet 17 g  17 g Oral Daily DAINA Sevilla   17 g at 03/28/22 0482    bisacodyl (DULCOLAX) suppository 10 mg  10 mg Rectal Daily DAINA Sevilla        furosemide (LASIX) tablet 20 mg  20 mg Oral Daily DAINA Sevilla   20 mg at 03/29/22 0944    carvedilol (COREG) tablet 3.125 mg  3.125 mg Oral BID  Mendel Leriche, PA-C   3.125 mg at 03/29/22 0944    rosuvastatin (CRESTOR) tablet 20 mg  20 mg Oral Nightly Gino Paulino MD   20 mg at 03/28/22 2150    aspirin chewable tablet 81 mg  81 mg Oral Daily Mendel Leriche, PA-C   81 mg at 03/29/22 0944    heparin flush 100 UNIT/ML injection 300 Units  300 Units IntraCATHeter PRN ROX Nice CNP   300 Units at 03/25/22 2215    amiodarone (CORDARONE) tablet 200 mg  200 mg Oral TID ROX Nice CNP   200 mg at 03/29/22 0944    ferrous sulfate (IRON 325) tablet 325 mg  325 mg Oral BID  ROX Nice CNP   325 mg at 03/29/22 9416    ascorbic acid (VITAMIN C) tablet 500 mg  500 mg Oral BID ROX Nice CNP   500 mg at 48/02/92 7194    folic acid (FOLVITE) tablet 1 mg  1 mg Oral Daily ROX Nice CNP   1 mg at 03/29/22 9650    potassium chloride (KLOR-CON M) extended release tablet 20 mEq  20 mEq Oral PRN ROX Nice CNP   40 mEq at 03/29/22 4209    diphenhydrAMINE (BENADRYL) tablet 25 mg  25 mg Oral Q8H PRN Elizabeth Merritt, ROX - CNP   25 mg at 03/25/22 2120    pantoprazole (PROTONIX) tablet 40 mg  40 mg Oral Daily Danielle Wong APRN - CNP   40 mg at 03/29/22 0945    bisacodyl (DULCOLAX) suppository 10 mg  10 mg Rectal Daily PRN Danielle Wong APRN - DIMITRIS        amiodarone (CORDARONE) tablet 400 mg  400 mg Oral PRN ROX Flores - DIMITRIS        amiodarone (CORDARONE) 150 mg in dextrose 5 % 100 mL bolus  150 mg IntraVENous PRN Danielle Wong APRN - CNP        magnesium sulfate 2000 mg in 50 mL IVPB premix  2,000 mg IntraVENous PRN Danielle Wong APRN - CNP        morphine (PF) injection 2 mg  2 mg IntraVENous Q4H PRN ROX Flores - CNP        sodium chloride (OCEAN, BABY AYR) 0.65 % nasal spray 1 spray  1 spray Each Nostril PRN Danielle Wong APRN - CNP        trimethobenzamide Margert Majestic) injection 200 mg  200 mg IntraMUSCular Q6H PRN Danielle Wong APRN - CNP        oxyCODONE (ROXICODONE) immediate release tablet 5 mg  5 mg Oral Q4H PRN Betty Garcia PA-C   5 mg at 03/29/22 1044    Or    oxyCODONE HCl (OXY-IR) immediate release tablet 10 mg  10 mg Oral Q4H PRN Betty Garcia PA-C   10 mg at 03/26/22 0654    sodium chloride flush 0.9 % injection 10 mL  10 mL IntraVENous 2 times per day ROX Flores - CNP   10 mL at 03/29/22 1055    sodium chloride flush 0.9 % injection 10 mL  10 mL IntraVENous PRN Danielle Wong APRN - CNP   10 mL at 03/27/22 0400    ondansetron (ZOFRAN-ODT) disintegrating tablet 4 mg  4 mg Oral Q8H PRN ROX Flores CNP        Or    ondansetron [facility]) injection 4 mg  4 mg IntraVENous Q6H PRN Danielle Wong APRN - CNP        magnesium oxide (MAG-OX) tablet 400 mg  400 mg Oral Daily Danielle Wong APRN - CNP   400 mg at 03/29/22 0942    ipratropium-albuterol (DUONEB) nebulizer solution 1 ampule  1 ampule Inhalation Q4H ROX Lozoya - CNP   1 ampule at 03/29/22 1006    glucose (GLUTOSE) 40 % oral gel 15 g  15 g Oral PRN Danielle Wong, APRN - CNP        dextrose 50 % IV solution  12.5 g IntraVENous PRN Buchanan Hickey, APRN - CNP        glucagon (rDNA) injection 1 mg  1 mg IntraMUSCular PRN Seda Hickey, APRN - CNP        dextrose 5 % solution  100 mL/hr IntraVENous PRN Seda Hickey, APRN - CNP        tamsulosin [facility]) capsule 0.4 mg  0.4 mg Oral Daily Buchanan Hickey, APRN - CNP   0.4 mg at 03/29/22 5513    ampicillin 2000 mg ivpb mini bag  2,000 mg IntraVENous 6 times per day Buchanan Hickey, APRN -  mL/hr at 03/29/22 1053 2,000 mg at 03/29/22 1053         REVIEW OF SYSTEMS:    CONSTITUTIONAL:  Denies fever, chill or rigors. HEENT: denies blurring of vision or double vision, denies hearing problem  RESPIRATORY: Chest pain   CARDIOVASCULAR:  Denies palpitation  GASTROINTESTINAL:  Denies abdomen pain, diarrhea or constipation. GENITOURINARY:  Denies burning urination or frequency of urination  INTEGUMENT: denies wound , rash  HEMATOLOGIC/LYMPHATIC:  Denies lymph node swelling, gum bleeding or easy bruising. MUSCULOSKELETAL:  Denies leg pain , joint pain , joint swelling  NEUROLOGICAL:  Fatigue , weakness     PHYSICAL EXAM:      Vitals:     /71   Pulse 89   Temp 97.3 °F (36.3 °C) (Temporal)   Resp 18   Ht 5' 9\" (1.753 m)   Wt 189 lb (85.7 kg)   SpO2 98% Comment: walking  BMI 27.91 kg/m²       General Appearance:    Awake, alert , no acute distress. Head:    Normocephalic, atraumatic   Eyes:    No pallor, no icterus,   Ears:    No obvious deformity or drainage.    Nose:   No nasal drainage   Throat:   Mucosa moist, multiple dental caries    Neck:   Supple, no lymphadenopathy   Lungs:     Clear to auscultation bilaterally   Heart:     Irregular, sternum stable, chest tube    Abdomen:     Soft, non-tender, bowel sounds present    Extremities:   No edema, no cyanosis    Pulses:   Dorsalis pedis palpable    Skin:   no rashes       Lab Results   Component Value Date    WBC 7.6 03/29/2022    RBC 2.76 03/29/2022    HGB 7.7 03/29/2022    HCT 25.2 03/29/2022    HCT 28.0 03/23/2022     03/29/2022    MCV 91.3 03/29/2022    MCH 27.9 03/29/2022    MCHC 30.6 03/29/2022    RDW 15.9 03/29/2022    LYMPHOPCT 20.0 03/21/2022    MONOPCT 5.8 03/21/2022    BASOPCT 0.5 03/21/2022    MONOSABS 0.54 03/21/2022    LYMPHSABS 1.86 03/21/2022    EOSABS 0.32 03/21/2022    BASOSABS 0.05 03/21/2022       CMP     Lab Results   Component Value Date     03/29/2022    K 3.8 03/29/2022    K 4.2 03/15/2022    CL 99 03/29/2022    CO2 28 03/29/2022    BUN 18 03/29/2022    CREATININE 0.9 03/29/2022    GFRAA >60 03/29/2022    LABGLOM >60 03/29/2022    GLUCOSE 80 03/29/2022    PROT 5.6 03/24/2022    LABALBU 3.1 03/24/2022    CALCIUM 8.4 03/29/2022    BILITOT 0.4 03/24/2022    ALKPHOS 95 03/24/2022     03/24/2022    ALT 59 03/24/2022         Hepatic Function Panel:    Lab Results   Component Value Date    ALKPHOS 95 03/24/2022    ALT 59 03/24/2022     03/24/2022    PROT 5.6 03/24/2022    BILITOT 0.4 03/24/2022    BILIDIR 0.3 03/23/2022    IBILI 0.2 03/23/2022    LABALBU 3.1 03/24/2022       PT/INR:    Lab Results   Component Value Date    PROTIME 17.3 03/23/2022    INR 1.6 03/23/2022       TSH:    Lab Results   Component Value Date    TSH 1.880 03/14/2022       U/A:    Lab Results   Component Value Date    COLORU Yellow 03/14/2022    PHUR 5.0 03/14/2022    WBCUA 0-1 03/14/2022    RBCUA 0-1 03/14/2022    BACTERIA MODERATE 03/14/2022    CLARITYU Clear 03/14/2022    SPECGRAV 1.025 03/14/2022    LEUKOCYTESUR Negative 03/14/2022    UROBILINOGEN 4.0 03/14/2022    BILIRUBINUR Negative 03/14/2022    BLOODU Negative 03/14/2022    GLUCOSEU Negative 03/14/2022    AMORPHOUS FEW 03/14/2022       ABG:  No results found for: EOI1HYH, BEART, K1ZAVNWF, PHART, THGBART, MAL9LID, PO2ART, PTN9REL    MICROBIOLOGY:    Blood culture -     Streptococcus mitis / Streptococcus oralis (1)    Antibiotic Interpretation Microscan  Method Status    ampicillin Sensitive <=^0.25 mcg/mL BACTERIAL SUSCEPTIBILITY PANEL BY LANEY     benzylpenicillin Sensitive <=^0.06 mcg/mL BACTERIAL SUSCEPTIBILITY PANEL BY LANEY     cefotaxime Sensitive <=^0.12 mcg/mL BACTERIAL SUSCEPTIBILITY PANEL BY LANEY     cefTRIAXone Sensitive <=^0.12 mcg/mL BACTERIAL SUSCEPTIBILITY PANEL BY LANEY     clindamycin Sensitive <=^0.25 mcg/mL BACTERIAL SUSCEPTIBILITY PANEL BY LANEY     levofloxacin Sensitive ^0.5 mcg/mL BACTERIAL SUSCEPTIBILITY PANEL BY LANEY     linezolid Sensitive <=^2 mcg/mL BACTERIAL SUSCEPTIBILITY PANEL BY LANEY     vancomycin Sensitive ^0.5 mcg/mL BACTERIAL SUSCEPTIBILITY PANEL BY LANEY       Culture, Surgical [[id number]] Collected: 03/23/22 1505   Order Status: Completed Specimen: Tissue Updated: 03/26/22 0740    Culture Surgical Growth not present           Radiology :    CTA chest       Impression:        No evidence of pulmonary embolism or acute pulmonary abnormality. Echo -       Ejection fraction is visually estimated at 55%. Severe holosystolic prolapse of the posterior leaflet with a flail P1/P2   segment due to ruptured chordae tendineae. Small mobile echodensity suspicious for vegetation on ventricular surface   of posterior leaflet. Failure of leaflet coaptation. No definitive papillary muscle rupture. Torrential mitral regurgitation, eccentric jet directed anteriorly. IMPRESSION:     1. Streptococcus bacteremia ( 3/14 and 3/15) ,mitral valve endocarditis ,follow up blood cx neg on ( 3/17) s/p mitral valve repair ( 3/23). Tissue culture negative   RECOMMENDATIONS:      1. Ampicillin 2 grams IV q 4 hrs  ( PCN LANEY <0.06 ) till 4/20/22    2.  ID follow up in 2-03 weeks

## 2022-03-29 NOTE — PLAN OF CARE
Problem: HEMODYNAMIC STATUS  Goal: Patient has stable vital signs and fluid balance  Outcome: Met This Shift     Problem: Discharge Planning:  Goal: Discharged to appropriate level of care  Description: Discharged to appropriate level of care  Outcome: Met This Shift

## 2022-03-29 NOTE — CARE COORDINATION
Discharge order on chart. IV ampicillin script faxed to [facility]. Fax#[phone]. If life vest is fitted today and pt discharges this evening, it is ok to start up iv ampicillin in the am per Dr. Lee Favorite. Will place nursing communication order. [facility] and [facility] updated on pt. Spoke with Radha Mason from [facility] and pt is to be fitted after 4pm today. Pt aware. 1430  Pt was setup with a [facility] pcp- Dr Keesha Alfred. Info placed in Follow Up in Epic and was given to pt.

## 2022-03-29 NOTE — PROGRESS NOTES
Pts sister attempted to fill Rx for d/c @ [address]. [facility] called pts insurance co when medications would not be approved. Apparently his ins co has had the wrong bday for years and this will need to be disputed. They will be unable to assist with paying for any medication. Discussed issue with Cade since pt requires important cardiac meds, OK to hold d/c tonight and have SW assist tomorrow.

## 2022-03-29 NOTE — PROGRESS NOTES
P Quality Flow/Interdisciplinary Rounds Progress Note        Quality Flow Rounds held on March 29, 2022    Disciplines Attending:  Bedside Nurse, ,  and Nursing Unit Leadership    Cale Lozoya was admitted on 3/14/2022  8:06 PM    Anticipated Discharge Date:  Expected Discharge Date: 03/29/22    Disposition:    Alfred Score:  Alfred Scale Score: 21    Readmission Risk              Risk of Unplanned Readmission:  19           Discussed patient goal for the day, patient clinical progression, and barriers to discharge.   The following Goal(s) of the Day/Commitment(s) have been identified:  Discharge - [facility] Ricardo Tripp RN  March 29, 2022

## 2022-03-29 NOTE — PROGRESS NOTES
[facility] Cardiology Inpatient Progress Note    Patient is a 62 y.o. male of No primary care provider on file. seen in hospital follow up. 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 No CP.      Patient Active Problem List   Diagnosis    Atrial fibrillation with rapid ventricular response ([facility]    Sepsis ([facility] present on admission    JANA (acute kidney injury) ([facility]    Bacteremia    Severe mitral regurgitation    Suspected endocarditis    Subacute bacterial endocarditis    Ruptured chordae tendineae (HCC)    Mitral valve disease    Paroxysmal atrial fibrillation (HCC)    Acute diastolic (congestive) heart failure (HCC)    Cardiac insufficiency (HCC)    Acute pulmonary insufficiency    Acute blood loss anemia       No Known Allergies    Current Facility-Administered Medications   Medication Dose Route Frequency Provider Last Rate Last Admin    polyethylene glycol (GLYCOLAX) packet 17 g  17 g Oral Daily DAINA Suresh   17 g at 03/28/22 0951    bisacodyl (DULCOLAX) suppository 10 mg  10 mg Rectal Daily DAINA Suresh        furosemide (LASIX) tablet 20 mg  20 mg Oral Daily DAINA Suresh        carvedilol (COREG) tablet 3.125 mg  3.125 mg Oral BID  Rc Bal PA-C   3.125 mg at 03/28/22 1655    rosuvastatin (CRESTOR) tablet 20 mg  20 mg Oral Nightly Claire Hartman MD   20 mg at 03/28/22 2150    aspirin chewable tablet 81 mg  81 mg Oral Daily Rc Bal PA-C   81 mg at 03/28/22 0815    heparin flush 100 UNIT/ML injection 300 Units  300 Units IntraCATHeter PRN ROX Simon CNP   300 Units at 03/25/22 2215    amiodarone (CORDARONE) tablet 200 mg  200 mg Oral TID ROX Simon CNP   200 mg at 03/28/22 2151    ferrous sulfate (IRON 325) tablet 325 mg  325 mg Oral BID  ROX Simon CNP   325 mg at 03/28/22 1655    ascorbic acid (VITAMIN C) tablet 500 mg 500 mg Oral BID Georgializet Ángela, APRN - CNP   500 mg at 62/45/07 5657    folic acid (FOLVITE) tablet 1 mg  1 mg Oral Daily [facility] Ángela, ROX - CNP   1 mg at 03/28/22 7738    potassium chloride (KLOR-CON M) extended release tablet 20 mEq  20 mEq Oral PRN [facility], APRN - CNP   20 mEq at 03/28/22 6443    diphenhydrAMINE (BENADRYL) tablet 25 mg  25 mg Oral Q8H PRN [facility], APRN - CNP   25 mg at 03/25/22 2120    pantoprazole (PROTONIX) tablet 40 mg  40 mg Oral Daily [facility] [place], APRN - CNP   40 mg at 03/28/22 0815    bisacodyl (DULCOLAX) suppository 10 mg  10 mg Rectal Daily PRN Georgializet Ángela, APRN - DIMITRIS        amiodarone (CORDARONE) tablet 400 mg  400 mg Oral PRN Georgializet Ángela, APRN - DIMITRIS        amiodarone (CORDARONE) 150 mg in dextrose 5 % 100 mL bolus  150 mg IntraVENous PRN Georgializet Ángela, APRN - CNP        magnesium sulfate 2000 mg in 50 mL IVPB premix  2,000 mg IntraVENous PRN Georgializet Ángela, APRN - CNP        morphine (PF) injection 2 mg  2 mg IntraVENous Q4H PRN Georgializet Ángela, APRN - CNP        sodium chloride (OCEAN, BABY AYR) 0.65 % nasal spray 1 spray  1 spray Each Nostril PRN Georgializet Ángela, APRN - CNP        trimethobenzamide Oralia Moreland) injection 200 mg  200 mg IntraMUSCular Q6H PRN Georgiayamileth Motta, APRN - CNP        oxyCODONE (ROXICODONE) immediate release tablet 5 mg  5 mg Oral Q4H PRN Gabby Portillo PA-C   5 mg at 03/29/22 9062    Or    oxyCODONE HCl (OXY-IR) immediate release tablet 10 mg  10 mg Oral Q4H PRN DAINA Pitt-C   10 mg at 03/26/22 0654    sodium chloride flush 0.9 % injection 10 mL  10 mL IntraVENous 2 times per day Ami Ángela, ROX - CNP   10 mL at 03/28/22 2150    sodium chloride flush 0.9 % injection 10 mL  10 mL IntraVENous PRN ROX Flores CNP   10 mL at 03/27/22 0400    ondansetron (ZOFRAN-ODT) disintegrating tablet 4 mg  4 mg Oral Q8H PRN ROX Flores CNP        Or    ondansetron [facility]) injection 4 mg 4 mg IntraVENous Q6H PRN Sherryle Remak, APRN - CNP        magnesium oxide (MAG-OX) tablet 400 mg  400 mg Oral Daily Sherryle Remak, APRN - CNP   400 mg at 03/28/22 0815    ipratropium-albuterol (DUONEB) nebulizer solution 1 ampule  1 ampule Inhalation Q4H WA Sherryle Remak, APRN - CNP   1 ampule at 03/28/22 1554    glucose (GLUTOSE) 40 % oral gel 15 g  15 g Oral PRN Sherryle Remak, APRN - CNP        dextrose 50 % IV solution  12.5 g IntraVENous PRN Sherryle Remak, APRN - CNP        glucagon (rDNA) injection 1 mg  1 mg IntraMUSCular PRN Sherryle Remak, APRN - CNP        dextrose 5 % solution  100 mL/hr IntraVENous PRN Sherryle Remak, APRN - CNP        tamsulosin (FLOMAX) capsule 0.4 mg  0.4 mg Oral Daily Sherryle Remak, APRN - CNP   0.4 mg at 03/28/22 5425    ampicillin 2000 mg ivpb mini bag  2,000 mg IntraVENous 6 times per day Sherryle Remak, APRN - CNP   Stopped at 03/29/22 3949     Review of systems:   Heart: as above   Lungs: as above   Eyes: denies changes in vision or discharge. Ears: denies changes in hearing or pain. Nose: denies epistaxis or masses   Throat: denies sore throat or trouble swallowing. Neuro: denies numbness, tingling, tremors. Skin: denies rashes or itching. : denies hematuria, dysuria   GI: denies vomiting, diarrhea   Psych: denies mood changed, anxiety, depression. Physical Exam   /83   Pulse 86   Temp 97.3 °F (36.3 °C) (Temporal)   Resp 18   Ht 5' 9\" (1.753 m)   Wt 189 lb (85.7 kg)   SpO2 95%   BMI 27.91 kg/m²   Constitutional: Oriented to person, place, and time. No distress. Well developed. Head: Normocephalic and atraumatic. Neck: Neck supple. No hepatojugular reflux. No JVD present. Carotid bruit is not present. No tracheal deviation present. No thyromegaly present. Cardiovascular: Normal rate, regular rhythm, normal heart sounds. intact distal pulses. No gallop and no friction rub. No murmur heard.   Pulmonary: Breath sounds normal. No respiratory distress. No wheezes. No rales. Chest: Effort normal. No tenderness. Abdominal: Soft. Bowel sounds are normal. No distension or mass. No tenderness, rebound or guarding. Musculoskeletal: . No tenderness. No clubbing or cyanosis. Extremitites: Intact distal pulses. No edema  Neurological: Alert and oriented to person, place, and time. Skin: Skin is warm and dry. No rash noted. Not diaphoretic. No erythema. Psychiatric: Normal mood and affect. Behavior is normal.   Lymphadenopathy: No cervical adenopathy. No groin adenopathy. CBC:   Lab Results   Component Value Date    WBC 7.6 03/29/2022    RBC 2.76 03/29/2022    HGB 7.7 03/29/2022    HCT 25.2 03/29/2022    HCT 28.0 03/23/2022    MCV 91.3 03/29/2022    MCH 27.9 03/29/2022    MCHC 30.6 03/29/2022    RDW 15.9 03/29/2022     03/29/2022    MPV 10.8 03/29/2022     BMP:   Lab Results   Component Value Date     03/29/2022    K 3.8 03/29/2022    K 4.2 03/15/2022    CL 99 03/29/2022    CO2 28 03/29/2022    BUN 18 03/29/2022    LABALBU 3.1 03/24/2022    CREATININE 0.9 03/29/2022    CALCIUM 8.4 03/29/2022    GFRAA >60 03/29/2022    LABGLOM >60 03/29/2022     Magnesium:    Lab Results   Component Value Date    MG 2.1 03/29/2022     Cardiac Enzymes:   Lab Results   Component Value Date    TROPHS 43 (H) 03/14/2022    TROPHS 42 (H) 03/14/2022      PT/INR:    Lab Results   Component Value Date    PROTIME 17.3 03/23/2022    INR 1.6 03/23/2022     TSH:    Lab Results   Component Value Date    TSH 1.880 03/14/2022     Rhythm Strip: normal sinus rhythm. Echo Summary 3/15/2022:   Atrial fibrillation noted. Ejection fraction is visually estimated at 50-55%. Right ventricle global systolic function is reduced. Severe biatrial dilation. Mild aortic valve regurgitation. Mild tricuspid regurgitation.   Critical findings   Partial flail posterior mitral leaflet with failure of leaflet coaptation, and evidence of ruptured chordae tendineae. Absolutely torrential, eccentric mitral regurgitation directed anteriorly. There is a small mobile echodensity noted on the ventricular aspect of the posterior mitral valve, likely ruptured chordae or papillary muscle, though in the context of positive blood cultures a vegetation is also suspected which likely led to chordal rupture. Recommend: CT surgery consult, ANITRA     Echo Summary 3/17/2022:   Ejection fraction is visually estimated at 55%. Severe holosystolic prolapse of the posterior leaflet with a flail P1/P2 segment due to ruptured chordae tendineae. Small mobile echodensity suspicious for vegetation on ventricular surface of posterior leaflet. Failure of leaflet coaptation. No definitive papillary muscle rupture. Torrential mitral regurgitation, eccentric jet directed anteriorly. Angiographic Results/findings 3/18/2022:  Left Main: No angiographically significant stenosis. LAD: No angiographically significant stenosis. D1: Bifurcating vessel. No angiographically significant stenosis. Cx: Dominant vessel. Mid to distal diffuse 10% stenosis. OM1: Bifurcating vessel. No angiographically significant stenosis. OM2: Proximal 10% stenosis. OM 3: No angiographically significant stenosis. OM 4: No angiographically significant stenosis. Ramus: Absent. RCA: non-Dominant. Mild diffuse luminal irregularities. Echo Summary 3/28/2022:   Ejection fraction is visually estimated at 25-30%. Overall ejection fraction severely decreased. Normal right ventricle size with reduced function. Left atrial volume index of 68 ml per meters squared BSA. Status - post mitral annular ring insertion. Mean transmitral gradient 2.9 mmHg. Physiologic and/or trace mitral regurgitation is present. Physiologic and/or trace tricuspid regurgitation. No evidence for hemodynamically significant pericardial effusion.     ASSESSMENT & PLAN:    Patient Active Problem List   Diagnosis    Atrial fibrillation with rapid ventricular response (HCC)    Sepsis ([facility] present on admission    JANA (acute kidney injury) ([facility]    Bacteremia    Severe mitral regurgitation    Suspected endocarditis    Subacute bacterial endocarditis    Ruptured chordae tendineae (HCC)    Mitral valve disease    Paroxysmal atrial fibrillation (HCC)    Acute diastolic (congestive) heart failure (HCC)    Cardiac insufficiency (HCC)    Acute pulmonary insufficiency    Acute blood loss anemia     1. VHD/Endocarditis/ID issues:    Stable s/p MVRp (P2 triangular resection, P3/P3 perforation repair, 34mm Future band), MAZE procedure, 40mm Atriclip. 2. Acute diastolic CHF: Post op ANITRA reveals 30% EF    TTE with LVEF 25-30%. Coreg. Add ARB and aldactone. If BP adequate then transition ARB to entresto. Lifevest on D/c.     3. PAF: S/p MAZE procedure, 40mm Atriclip     Prophylactic amio started/BB. Monitor. 4. Anemia: Follow labs. Pee Grider D.O.   Cardiologist  Cardiology, [address] [facility] [place]

## 2022-03-29 NOTE — PROGRESS NOTES
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 No other overnight issues reported. Patient seen and examined  Records reviewed. No acute complaints  Awaiting Life vest  Possible dc today      Temp (24hrs), Av.4 °F (36.3 °C), Min:97.2 °F (36.2 °C), Max:97.7 °F (36.5 °C)    DIET: ADULT ORAL NUTRITION SUPPLEMENT; Breakfast, Lunch, Dinner; Low Calorie/High Protein Oral Supplement  ADULT DIET; Regular; Low Fat/Low Chol/High Fiber/EPHRAIM  CODE: Full Code    Intake/Output Summary (Last 24 hours) at 3/29/2022 1046  Last data filed at 3/29/2022 1002  Gross per 24 hour   Intake 300 ml   Output 1800 ml   Net -1500 ml       Review of Systems  All bolded are positive; please see HPI  General:  Fever, chills, diaphoresis, fatigue, malaise, night sweats, weight loss  Psychological:  Anxiety, disorientation, hallucinations. ENT:  Epistaxis, headaches, vertigo, visual changes. Cardiovascular:  Chest pain, irregular heartbeats, palpitations, paroxysmal nocturnal dyspnea. Respiratory:  Shortness of breath, coughing, sputum production, hemoptysis, wheezing, orthopnea.   Gastrointestinal:  Nausea, vomiting, diarrhea, heartburn, constipation, abdominal pain, hematemesis, hematochezia, melena, acholic stools  Genito-Urinary:  Dysuria, urgency, frequency, hematuria  Musculoskeletal:  Joint pain, joint stiffness, joint swelling, muscle pain  Neurology:  Headache, focal neurological deficits, weakness, numbness, paresthesia  Derm:  Rashes, ulcers, excoriations, bruising  Extremities:  Decreased ROM, peripheral edema, mottling      OBJECTIVE:    /71   Pulse 89   Temp 97.3 °F (36.3 °C) (Temporal)   Resp 18   Ht 5' 9\" (1.753 m)   Wt 189 lb (85.7 kg)   SpO2 98% Comment: walking  BMI 27.91 kg/m²     General appearance:  awake, alert, and oriented to person, place, time, and purpose; appears stated age and cooperative; no apparent distress no labored breathing  HEENT:  Conjunctivae/corneas clear. Neck: Supple. No jugular venous distention. Respiratory: symmetrical; clear to auscultation bilaterally; no wheezes; no rhonchi; no rales  Cardiovascular: rhythm regular; rate controlled; no murmurs chest incision cdi  Abdomen: Soft, nontender, nondistended  Extremities:  peripheral pulses present; no peripheral edema; no ulcers  Musculoskeletal: No clubbing, cyanosis, no bilateral lower extremity edema. Brisk capillary refill. Skin:  No rashes  on visible skin  Neurologic: awake, alert and following commands     ASSESSMENT and PLAN:    · VHD/Endocarditis- Stable s/p MVRp (P2 triangular resection, P3/P3 perforation repair, 34mm Future band), MAZE procedure, 40mm Atriclip. ID following. · Streptococcus bacteremia- Ampicillin 2 grams IV q 4 hrs  ( PCN LANEY <0.06 ) till 4/20/22  · Expected acute pulmonary insufficiency in the setting following surgery  · Acute diastolic CHF-    GDMT as hemodynmics allow - start as outpatient when BP improves. EF 25-30% LlifeVest ordered today  · PAF: S/p MAZE procedure, 40mm Atriclip. Prophylactic amio started/BB.  discussed possible need for [address] , not needed, continue asa per CTS  · Anemia: Follow labs.     Possible discharge today pending LifeVest per CTS  Will sign off    Medications:  REVIEWED DAILY    Infusion Medications    dextrose       Scheduled Medications    losartan  12.5 mg Oral Daily    spironolactone  12.5 mg Oral Daily    polyethylene glycol  17 g Oral Daily    bisacodyl  10 mg Rectal Daily    furosemide  20 mg Oral Daily    carvedilol  3.125 mg Oral BID WC    rosuvastatin  20 mg Oral Nightly    aspirin  81 mg Oral Daily    amiodarone  200 mg Oral TID    ferrous sulfate  325 mg Oral BID WC    vitamin C  500 mg Oral BID    folic acid  1 mg Oral Daily    pantoprazole  40 mg Oral Daily    sodium chloride flush  10 mL IntraVENous 2 times per day    magnesium oxide  400 mg Oral Daily    ipratropium-albuterol  1 ampule Inhalation Q4H WA    tamsulosin  0.4 mg Oral Daily    ampicillin IV  2,000 mg IntraVENous 6 times per day     PRN Meds: heparin flush, potassium chloride, diphenhydrAMINE, bisacodyl, amiodarone, amiodarone bolus, magnesium sulfate, morphine, sodium chloride, trimethobenzamide, oxyCODONE **OR** oxyCODONE, sodium chloride flush, ondansetron **OR** ondansetron, glucose, dextrose, glucagon (rDNA), dextrose    Labs:     Recent Labs     03/27/22  0619 03/28/22  0704 03/29/22  0625   WBC 9.1 8.7 7.6   HGB 7.8* 7.9* 7.7*   HCT 25.4* 25.4* 25.2*    213 259       Recent Labs     03/27/22 0619 03/28/22  0704 03/29/22  0625    135 134   K 4.2 4.0 3.8   CL 99 99 99   CO2 25 28 28   BUN 17 20 18   CREATININE 0.8 0.9 0.9   CALCIUM 8.7 8.5* 8.4*       No results for input(s): PROT, ALB, ALKPHOS, ALT, AST, BILITOT, AMYLASE, LIPASE in the last 72 hours. No results for input(s): INR in the last 72 hours. No results for input(s): Jill Ends in the last 72 hours. Chronic labs:    Lab Results   Component Value Date    CHOL 133 03/15/2022    TRIG 115 03/15/2022    HDL 20 03/15/2022    LDLCALC 90 03/15/2022    TSH 1.880 03/14/2022    INR 1.6 03/23/2022    LABA1C 5.6 03/15/2022       Radiology: REVIEWED DAILY    +++++++++++++++++++++++++++++++++++++++++++++++++  DO Shelby Tyler Physician - [facility], [place]  +++++++++++++++++++++++++++++++++++++++++++++++++  NOTE: This report was transcribed using voice recognition software. Every effort was made to ensure accuracy; however, inadvertent computerized transcription errors may be present.

## 2022-03-29 NOTE — PLAN OF CARE
Problem: Infection - Surgical Site:  Goal: Will show no infection signs and symptoms  Description: Will show no infection signs and symptoms  Outcome: Met This Shift     Problem: Skin Integrity:  Goal: Will show no infection signs and symptoms  Description: Will show no infection signs and symptoms  Outcome: Met This Shift     Problem: Skin Integrity:  Goal: Absence of new skin breakdown  Description: Absence of new skin breakdown  Outcome: Met This Shift     Problem: OXYGENATION/RESPIRATORY FUNCTION  Goal: Patient will maintain patent airway  Outcome: Met This Shift

## 2022-03-29 NOTE — DISCHARGE INSTR - COC
Continuity of Care Form    Patient Name: Yonathan Jones   :  1960  MRN:  [id number]    Admit date:  3/14/2022  Discharge date:  ***    Code Status Order: Full Code   Advance Directives:      Admitting Physician:  Kaushik Bowens DO  PCP: No primary care provider on file. Discharging Nurse: [facility] Unit/Room#: 1946/2115-Q  Discharging Unit Phone Number: ***    Emergency Contact:   Extended Emergency Contact Information  Primary Emergency Contact: Xin Middleton  Mobile Phone: [phone]  Relation: Brother/Sister  Preferred language: English   needed? No    Past Surgical History:  Past Surgical History:   Procedure Laterality Date    MITRAL VALVE MAZE PROCEDURE N/A 3/23/2022    MITRAL VALVE REPAIR VS REPLACEMENT WITH MAZE performed by Kaushik Bowens DO at [facility] OR    TRANSESOPHAGEAL ECHOCARDIOGRAM  2022    Dr. Adalberto Esparza       Immunization History: There is no immunization history on file for this patient.     Active Problems:  Patient Active Problem List   Diagnosis Code    Atrial fibrillation with rapid ventricular response (HCC) I48.91    Sepsis ([facility] present on admission A41.9    JANA (acute kidney injury) ([facility] N17.9    Bacteremia R78.81    Severe mitral regurgitation I34.0    Suspected endocarditis R09.89    Subacute bacterial endocarditis I33.0    Ruptured chordae tendineae ([facility]) I51.1    Mitral valve disease I05.9    Paroxysmal atrial fibrillation ([facility]) G34.2    Acute diastolic (congestive) heart failure ([facility]) I50.31    Cardiac insufficiency ([facility]) I50.9    Acute pulmonary insufficiency J98.4    Acute blood loss anemia D62       Isolation/Infection:   Isolation            No Isolation          Patient Infection Status       Infection Onset Added Last Indicated Last Indicated By Review Planned Expiration Resolved Resolved By    None active    Resolved    COVID-19 (Rule Out) 22 Respiratory Panel, Molecular, with COVID-19 (Restricted: peds pts or suitable admitted adults) (Ordered)   03/15/22 Rule-Out Test Resulted    COVID-19 (Rule Out) 22 COVID-19, Rapid (Ordered)   22 Rule-Out Test Resulted            Nurse Assessment:  Last Vital Signs: /61   Pulse 82   Temp 98.7 °F (37.1 °C) (Oral)   Resp 18   Ht 5' 9\" (1.753 m)   Wt 189 lb (85.7 kg)   SpO2 100%   BMI 27.91 kg/m²     Last documented pain score (0-10 scale): Pain Level: 0  Last Weight:   Wt Readings from Last 1 Encounters:   22 189 lb (85.7 kg)     Mental Status:  {IP PT MENTAL STATUS:}    IV Access:  { BHARGAV IV ACCESS:219657748}    Nursing Mobility/ADLs:  Walking   {CHP DME RZNF:581697195}  Transfer  {P DME PFO}  Bathing  {P DME JJKF:831107013}  Dressing  {CHP DME UAZW:515988616}  Toileting  {P DME NJTI:242584699}  Feeding  {[facility] DME FLML:080594408}  Med Admin  {[facility] DME SNYM:616521785}  Med Delivery   { BHARGAV MED Delivery:394480574}    Wound Care Documentation and Therapy:        Elimination:  Continence: Bowel: {YES / IB:88645}  Bladder: {YES / WQ:18797}  Urinary Catheter: {Urinary Catheter:739976180}   Colostomy/Ileostomy/Ileal Conduit: {YES / ES:88026}       Date of Last BM: ***    Intake/Output Summary (Last 24 hours) at 3/29/2022 1455  Last data filed at 3/29/2022 1345  Gross per 24 hour   Intake 700 ml   Output 1700 ml   Net -1000 ml     I/O last 3 completed shifts:   In: 1100 [P.O.:900; IV Piggyback:200]  Out:  [Urine:]    Safety Concerns:     [address] [employer] Safety Concerns:198152830}    Impairments/Disabilities:      [address] [employer] Impairments/Disabilities:142876236}    Nutrition Therapy:  Current Nutrition Therapy:   [employer] Diet List:914249872}    Routes of Feeding: {CHP DME Other Feedings:059372935}  Liquids: {Slp liquid thickness:30387}  Daily Fluid Restriction: {CHP DME Yes amt example:578372318}  Last Modified Barium Swallow with Video (Video Swallowing Test): {Done Not Done KAIR:538653297}    Treatments at the Time of Hospital Discharge:   Respiratory Treatments: ***  Oxygen Therapy:  {Therapy; copd oxygen:08767}  Ventilator:    {MH CC Vent RNRE:379397963}    Rehab Therapies: {THERAPEUTIC INTERVENTION:9420056113}  Weight Bearing Status/Restrictions: [employer] Weight Bearin}  Other Medical Equipment (for information only, NOT a DME order):  {EQUIPMENT:326414852}  Other Treatments: ***    Patient's personal belongings (please select all that are sent with patient):  {[facility] DME Belongings:419628787}    RN SIGNATURE:  {Esignature:893169871}    CASE MANAGEMENT/SOCIAL WORK SECTION    Inpatient Status Date: ***    Readmission Risk Assessment Score:  Readmission Risk              Risk of Unplanned Readmission:  19           Discharging to Facility/ Agency   Name:   Address:  Phone:  Fax:    Dialysis Facility (if applicable)   Name:  Address:  Dialysis Schedule:  Phone:  Fax:    / signature: {Esignature:229506387}    PHYSICIAN SECTION    Prognosis: {Prognosis:6036107852}    Condition at Discharge: [employer] [place] Patient Condition:115933957}    Rehab Potential (if transferring to Rehab): {Prognosis:2243794132}    Recommended Labs or Other Treatments After Discharge: ***    Physician Certification: I certify the above information and transfer of Castillo Anthony  is necessary for the continuing treatment of the diagnosis listed and that he requires {Admit to Appropriate Level of Care:77431} for {GREATER/LESS:938022111} 30 days.      Update Admission H&P: {P DME Changes in ENIIJ:699686076}    PHYSICIAN SIGNATURE:  {Esignature:305355186}

## 2022-03-29 NOTE — PROGRESS NOTES
Hospitalist Progress Note      SYNOPSIS: Patient admitted on 3/14/2022 for Atrial fibrillation with rapid ventricular response ([facility]      SUBJECTIVE:    Patient seen and examined  Records reviewed.    Problem list  afib rvr  Sepsis/bacteremia-streptococcus  Mitral valve endocarditis  --Stable s/pmitral valve repair (P2 triangular resection, P3/P3 perforation repair, 34mm Future band), MAZE procedure Atriclip       Tte/echo today  Ef 25%    Per rn patient has glucoscans but has not needed coverage for several days    Rev of systems  gen = den fever  Heent= den oral sores  Gi= den abd pain  Neuro= den hallucinations  Derm= den pruritus      OBJECTIVE:95 % on room air  Afebrile past 24 hrs  /63   Pulse 78   Temp 97.7 °F (36.5 °C) (Temporal)   Resp 18   Ht 5' 9\" (1.753 m)   Wt 187 lb 3.2 oz (84.9 kg)   SpO2 95%   BMI 27.64 kg/m²     General appearance: appears comfortable not diaphoretic  HEENT:  No thrush     Respiratory: Clear to auscultation bilaterally, no wheeze unlabored respiratory effort  Cardiovascular: pulse reg audible S1-S2  [place] Grade III/VI   Abdomen: bowel sounds present  nondistended    Neurologic: awake, alert and attentive  Speech is fluid   ASSESSMENT:    Mitral valve endocarditis  /bacteremia-streptococcus  --Stable s/ mitral valve repair (P2 triangular resection, P3/P3 perforation repair, 34mm Future band), MAZE procedure  Atriclip   lv dysfunction and pulmon insufficiency- responding to treatment   anemia   Post op constipation /nausea -resolving    PLAN:    antibx regimen per inect dise-ampicillin  respirat protocol and EZ pap  Diuresis -furosemide  Per card team he will need Life vest at discharge  agree with discontin of accuchecks  DISPOSITION: cc working for [employer]  And are indicating that will need approval from his insurance as [employer] not in network    Medications:  REVIEWED DAILY    Infusion Medications    dextrose       Scheduled Medications    polyethylene glycol  17 g Oral Daily    bisacodyl  10 mg Rectal Daily    furosemide  20 mg Oral Daily    carvedilol  3.125 mg Oral BID     rosuvastatin  20 mg Oral Nightly    aspirin  81 mg Oral Daily    amiodarone  200 mg Oral TID    ferrous sulfate  325 mg Oral BID     vitamin C  500 mg Oral BID    folic acid  1 mg Oral Daily    pantoprazole  40 mg Oral Daily    sodium chloride flush  10 mL IntraVENous 2 times per day    magnesium oxide  400 mg Oral Daily    ipratropium-albuterol  1 ampule Inhalation Q4H WA    tamsulosin  0.4 mg Oral Daily    ampicillin IV  2,000 mg IntraVENous 6 times per day     PRN Meds: heparin flush, potassium chloride, diphenhydrAMINE, bisacodyl, amiodarone, amiodarone bolus, magnesium sulfate, morphine, sodium chloride, trimethobenzamide, oxyCODONE **OR** oxyCODONE, sodium chloride flush, ondansetron **OR** ondansetron, glucose, dextrose, glucagon (rDNA), dextrose    Labs:     Recent Labs     03/26/22  0600 03/27/22  0619 03/28/22  0704   WBC 11.8* 9.1 8.7   HGB 7.7* 7.8* 7.9*   HCT 24.4* 25.4* 25.4*    171 213       Recent Labs     03/26/22  0600 03/27/22  0619 03/28/22  0704    134 135   K 4.1 4.2 4.0   CL 99 99 99   CO2 23 25 28   BUN 19 17 20   CREATININE 0.8 0.8 0.9   CALCIUM 8.5* 8.7 8.5*       Radiology:    cxr 03/28/2022  Elevation in L hemidiaphragm which radiolog confirms in body of the read  Radiology read  Right upper extremity PICC terminating in the SVC.          +++++++++++++++++++++++++++++++++++++++++++++++++  Sita Pete DO  [address]  +++++++++++++++++++++++++++++++++++++++++++++++++  NOTE: This report was transcribed using voice recognition software. Every effort was made to ensure accuracy; however, inadvertent computerized transcription errors may be present.

## 2022-03-30 ENCOUNTER — TELEPHONE (OUTPATIENT)
Dept: CARDIOLOGY CLINIC | Age: 62
End: 2022-03-30

## 2022-03-30 VITALS
SYSTOLIC BLOOD PRESSURE: 99 MMHG | WEIGHT: 193 LBS | DIASTOLIC BLOOD PRESSURE: 63 MMHG | OXYGEN SATURATION: 100 % | BODY MASS INDEX: 28.58 KG/M2 | HEART RATE: 77 BPM | HEIGHT: 69 IN | TEMPERATURE: 97.7 F | RESPIRATION RATE: 14 BRPM

## 2022-03-30 LAB
ANION GAP SERPL CALCULATED.3IONS-SCNC: 8 MMOL/L (ref 7–16)
BUN BLDV-MCNC: 16 MG/DL (ref 6–23)
CALCIUM SERPL-MCNC: 8.6 MG/DL (ref 8.6–10.2)
CHLORIDE BLD-SCNC: 100 MMOL/L (ref 98–107)
CO2: 27 MMOL/L (ref 22–29)
CREAT SERPL-MCNC: 1 MG/DL (ref 0.7–1.2)
GFR AFRICAN AMERICAN: >60
GFR NON-AFRICAN AMERICAN: >60 ML/MIN/1.73
GLUCOSE BLD-MCNC: 86 MG/DL (ref 74–99)
HCT VFR BLD CALC: 25.5 % (ref 37–54)
HEMOGLOBIN: 7.9 G/DL (ref 12.5–16.5)
MAGNESIUM: 2.1 MG/DL (ref 1.6–2.6)
MCH RBC QN AUTO: 28.3 PG (ref 26–35)
MCHC RBC AUTO-ENTMCNC: 31 % (ref 32–34.5)
MCV RBC AUTO: 91.4 FL (ref 80–99.9)
PDW BLD-RTO: 15.9 FL (ref 11.5–15)
PLATELET # BLD: 252 E9/L (ref 130–450)
PMV BLD AUTO: 9.9 FL (ref 7–12)
POTASSIUM SERPL-SCNC: 4.2 MMOL/L (ref 3.5–5)
RBC # BLD: 2.79 E12/L (ref 3.8–5.8)
SODIUM BLD-SCNC: 135 MMOL/L (ref 132–146)
WBC # BLD: 9.5 E9/L (ref 4.5–11.5)

## 2022-03-30 PROCEDURE — 36415 COLL VENOUS BLD VENIPUNCTURE: CPT

## 2022-03-30 PROCEDURE — 93798 PHYS/QHP OP CAR RHAB W/ECG: CPT

## 2022-03-30 PROCEDURE — 80048 BASIC METABOLIC PNL TOTAL CA: CPT

## 2022-03-30 PROCEDURE — 6370000000 HC RX 637 (ALT 250 FOR IP): Performed by: NURSE PRACTITIONER

## 2022-03-30 PROCEDURE — 83735 ASSAY OF MAGNESIUM: CPT

## 2022-03-30 PROCEDURE — 6370000000 HC RX 637 (ALT 250 FOR IP): Performed by: PHYSICIAN ASSISTANT

## 2022-03-30 PROCEDURE — 2580000003 HC RX 258: Performed by: NURSE PRACTITIONER

## 2022-03-30 PROCEDURE — 94640 AIRWAY INHALATION TREATMENT: CPT

## 2022-03-30 PROCEDURE — 6360000002 HC RX W HCPCS: Performed by: NURSE PRACTITIONER

## 2022-03-30 PROCEDURE — 6370000000 HC RX 637 (ALT 250 FOR IP): Performed by: INTERNAL MEDICINE

## 2022-03-30 PROCEDURE — 85027 COMPLETE CBC AUTOMATED: CPT

## 2022-03-30 PROCEDURE — 99233 SBSQ HOSP IP/OBS HIGH 50: CPT | Performed by: INTERNAL MEDICINE

## 2022-03-30 RX ADMIN — Medication 400 MG: at 08:17

## 2022-03-30 RX ADMIN — OXYCODONE HYDROCHLORIDE AND ACETAMINOPHEN 500 MG: 500 TABLET ORAL at 08:17

## 2022-03-30 RX ADMIN — FUROSEMIDE 20 MG: 20 TABLET ORAL at 08:18

## 2022-03-30 RX ADMIN — AMPICILLIN SODIUM 2000 MG: 2 INJECTION, POWDER, FOR SOLUTION INTRAVENOUS at 02:09

## 2022-03-30 RX ADMIN — CARVEDILOL 3.12 MG: 3.12 TABLET, FILM COATED ORAL at 08:18

## 2022-03-30 RX ADMIN — OXYCODONE 5 MG: 5 TABLET ORAL at 05:39

## 2022-03-30 RX ADMIN — IPRATROPIUM BROMIDE AND ALBUTEROL SULFATE 1 AMPULE: .5; 2.5 SOLUTION RESPIRATORY (INHALATION) at 16:18

## 2022-03-30 RX ADMIN — AMPICILLIN SODIUM 2000 MG: 2 INJECTION, POWDER, FOR SOLUTION INTRAVENOUS at 14:35

## 2022-03-30 RX ADMIN — CARVEDILOL 3.12 MG: 3.12 TABLET, FILM COATED ORAL at 17:19

## 2022-03-30 RX ADMIN — Medication 300 UNITS: at 08:18

## 2022-03-30 RX ADMIN — AMIODARONE HYDROCHLORIDE 200 MG: 200 TABLET ORAL at 08:18

## 2022-03-30 RX ADMIN — IPRATROPIUM BROMIDE AND ALBUTEROL SULFATE 1 AMPULE: .5; 2.5 SOLUTION RESPIRATORY (INHALATION) at 12:06

## 2022-03-30 RX ADMIN — AMPICILLIN SODIUM 2000 MG: 2 INJECTION, POWDER, FOR SOLUTION INTRAVENOUS at 11:00

## 2022-03-30 RX ADMIN — AMIODARONE HYDROCHLORIDE 200 MG: 200 TABLET ORAL at 14:31

## 2022-03-30 RX ADMIN — Medication 300 UNITS: at 18:55

## 2022-03-30 RX ADMIN — PANTOPRAZOLE SODIUM 40 MG: 40 TABLET, DELAYED RELEASE ORAL at 08:17

## 2022-03-30 RX ADMIN — TAMSULOSIN HYDROCHLORIDE 0.4 MG: 0.4 CAPSULE ORAL at 08:17

## 2022-03-30 RX ADMIN — OXYCODONE 5 MG: 5 TABLET ORAL at 14:31

## 2022-03-30 RX ADMIN — FERROUS SULFATE TAB 325 MG (65 MG ELEMENTAL FE) 325 MG: 325 (65 FE) TAB at 08:17

## 2022-03-30 RX ADMIN — AMPICILLIN SODIUM 2000 MG: 2 INJECTION, POWDER, FOR SOLUTION INTRAVENOUS at 18:20

## 2022-03-30 RX ADMIN — AMPICILLIN SODIUM 2000 MG: 2 INJECTION, POWDER, FOR SOLUTION INTRAVENOUS at 05:46

## 2022-03-30 RX ADMIN — FERROUS SULFATE TAB 325 MG (65 MG ELEMENTAL FE) 325 MG: 325 (65 FE) TAB at 17:19

## 2022-03-30 RX ADMIN — SODIUM CHLORIDE, PRESERVATIVE FREE 10 ML: 5 INJECTION INTRAVENOUS at 08:18

## 2022-03-30 RX ADMIN — OXYCODONE 5 MG: 5 TABLET ORAL at 02:09

## 2022-03-30 RX ADMIN — SPIRONOLACTONE 12.5 MG: 25 TABLET ORAL at 08:18

## 2022-03-30 RX ADMIN — FOLIC ACID 1 MG: 1 TABLET ORAL at 08:18

## 2022-03-30 RX ADMIN — LOSARTAN POTASSIUM 12.5 MG: 25 TABLET, FILM COATED ORAL at 08:18

## 2022-03-30 RX ADMIN — POLYETHYLENE GLYCOL 3350 17 G: 17 POWDER, FOR SOLUTION ORAL at 08:17

## 2022-03-30 RX ADMIN — ASPIRIN 81 MG 81 MG: 81 TABLET ORAL at 08:17

## 2022-03-30 ASSESSMENT — PAIN DESCRIPTION - PROGRESSION
CLINICAL_PROGRESSION: GRADUALLY IMPROVING
CLINICAL_PROGRESSION: GRADUALLY IMPROVING
CLINICAL_PROGRESSION: GRADUALLY WORSENING

## 2022-03-30 ASSESSMENT — PAIN DESCRIPTION - DESCRIPTORS
DESCRIPTORS: ACHING;DISCOMFORT;SORE
DESCRIPTORS: SORE;TENDER
DESCRIPTORS: ACHING;SORE

## 2022-03-30 ASSESSMENT — PAIN SCALES - GENERAL
PAINLEVEL_OUTOF10: 5
PAINLEVEL_OUTOF10: 6
PAINLEVEL_OUTOF10: 0
PAINLEVEL_OUTOF10: 4
PAINLEVEL_OUTOF10: 0

## 2022-03-30 ASSESSMENT — PAIN - FUNCTIONAL ASSESSMENT
PAIN_FUNCTIONAL_ASSESSMENT: PREVENTS OR INTERFERES SOME ACTIVE ACTIVITIES AND ADLS

## 2022-03-30 ASSESSMENT — PAIN DESCRIPTION - PAIN TYPE
TYPE: SURGICAL PAIN

## 2022-03-30 ASSESSMENT — PAIN DESCRIPTION - FREQUENCY
FREQUENCY: INTERMITTENT

## 2022-03-30 ASSESSMENT — PAIN DESCRIPTION - ORIENTATION
ORIENTATION: LOWER;MID
ORIENTATION: MID
ORIENTATION: MID

## 2022-03-30 ASSESSMENT — PAIN DESCRIPTION - ONSET
ONSET: GRADUAL

## 2022-03-30 ASSESSMENT — PAIN DESCRIPTION - LOCATION
LOCATION: INCISION
LOCATION: STERNUM
LOCATION: STERNUM

## 2022-03-30 NOTE — CARE COORDINATION
[facility] and [facility] updated on pt. Met with pt in room. He said his insurance had the wrong birthdate and he was unable to get his medications. Pt is in process of getting bday corrected. Discharge order on chart. 1430  Met with pt again in room. Pt stated he should know approx. 6pm whether or not his bday has been corrected with his insurance and he can get his meds. Pt's nurse to notify me if pt is not discharging this evening. Gisela at [facility] aware.

## 2022-03-30 NOTE — PROGRESS NOTES
Department of Emergency Medicine   ED  Provider Note  Admit Date/RoomTime: 10/16/2020  8:54 AM  ED Room: [facility]          [facility] [address] Dementia ([facility] [address] Diverticulitis, DVT (deep venous thrombosis) ([facility] Valve card given to pts sister Alecmichael Boykin. Axis 71 degrees    R Axis 78 degrees    T Axis [facility]        DISPOSITION  Disposition: Discharge to home  Patient condition is stable        NOTE: This report was transcribed using voice recognition software.  Every effort was made to ensure accuracy; however, inadvertent computerized transcription errors may be present       Zaid Camp DO  10/18/20 6620

## 2022-03-30 NOTE — PROGRESS NOTES
[facility] Cardiology Inpatient Progress Note    Patient is a 62 y.o. male of No primary care provider on file. seen in hospital follow up. 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 No CP.      Patient Active Problem List   Diagnosis    Atrial fibrillation with rapid ventricular response ([facility]    Sepsis ([facility] present on admission    JANA (acute kidney injury) ([facility] [facility]    Bacteremia    Severe mitral regurgitation    Suspected endocarditis    Subacute bacterial endocarditis    Ruptured chordae tendineae (HCC)    Mitral valve disease    Paroxysmal atrial fibrillation (HCC)    Acute diastolic (congestive) heart failure (HCC)    Cardiac insufficiency (HCC)    Acute pulmonary insufficiency    Acute blood loss anemia       No Known Allergies    Current Facility-Administered Medications   Medication Dose Route Frequency Provider Last Rate Last Admin    losartan (COZAAR) tablet 12.5 mg  12.5 mg Oral Daily Jose Colbert, DO   12.5 mg at 03/30/22 0818    spironolactone (ALDACTONE) tablet 12.5 mg  12.5 mg Oral Daily Jose Colbert, DO   12.5 mg at 03/30/22 0818    polyethylene glycol (GLYCOLAX) packet 17 g  17 g Oral Daily Joseluis Green PA   17 g at 03/30/22 2717    bisacodyl (DULCOLAX) suppository 10 mg  10 mg Rectal Daily DAINA Lacey        furosemide (LASIX) tablet 20 mg  20 mg Oral Daily DAINA Lacey   20 mg at 03/30/22 0818    carvedilol (COREG) tablet 3.125 mg  3.125 mg Oral BID  Marko Mas PA-C   3.125 mg at 03/30/22 0818    rosuvastatin (CRESTOR) tablet 20 mg  20 mg Oral Nightly Mayco Soares MD   20 mg at 03/29/22 2134    aspirin chewable tablet 81 mg  81 mg Oral Daily Marko Mas PA-C   81 mg at 03/30/22 0817    heparin flush 100 UNIT/ML injection 300 Units  300 Units IntraCATHeter PRN ROX Catalan - CNP   300 Units at 03/30/22 0818    amiodarone (CORDARONE) tablet 200 mg 200 mg Oral TID Alferd Miguel Ángel, APRN - CNP   200 mg at 03/30/22 0818    ferrous sulfate (IRON 325) tablet 325 mg  325 mg Oral BID WC Alferd Miguel Ángel, APRN - CNP   325 mg at 03/30/22 8998    ascorbic acid (VITAMIN C) tablet 500 mg  500 mg Oral BID Alferd Miguel Ángel, APRN - CNP   500 mg at 32/06/93 3823    folic acid (FOLVITE) tablet 1 mg  1 mg Oral Daily Alferd Miguel Ángel, APRN - CNP   1 mg at 03/30/22 0818    potassium chloride (KLOR-CON M) extended release tablet 20 mEq  20 mEq Oral PRN Alferd Miguel Ángel, APRN - CNP   40 mEq at 03/29/22 9382    diphenhydrAMINE (BENADRYL) tablet 25 mg  25 mg Oral Q8H PRN Alferd Miguel Ángel, APRN - CNP   25 mg at 03/25/22 2120    pantoprazole (PROTONIX) tablet 40 mg  40 mg Oral Daily Alferd Miguel Ángel, APRN - CNP   40 mg at 03/30/22 4204    bisacodyl (DULCOLAX) suppository 10 mg  10 mg Rectal Daily PRN Alferd Miguel Ángel, APRN - CNP        amiodarone (CORDARONE) tablet 400 mg  400 mg Oral PRN Alferd Miguelá Ngel, APRN - CNP        amiodarone (CORDARONE) 150 mg in dextrose 5 % 100 mL bolus  150 mg IntraVENous PRN Alferd Miguel Ángel, APRN - CNP        magnesium sulfate 2000 mg in 50 mL IVPB premix  2,000 mg IntraVENous PRN Alferd Miguel Ángel, APRN - CNP        morphine (PF) injection 2 mg  2 mg IntraVENous Q4H PRN Alferd Miguel Ángel, APRN - CNP        sodium chloride (OCEAN, BABY AYR) 0.65 % nasal spray 1 spray  1 spray Each Nostril PRN Alferd Miguel Ángel, APRN - CNP        trimethobenzamide Gene Barcenas) injection 200 mg  200 mg IntraMUSCular Q6H PRN Alferd Miguel Ángel, APRN - CNP        oxyCODONE (ROXICODONE) immediate release tablet 5 mg  5 mg Oral Q4H PRN Larrylo Persons, PA-C   5 mg at 03/30/22 3431    Or    oxyCODONE HCl (OXY-IR) immediate release tablet 10 mg  10 mg Oral Q4H PRN Harlo Persons, PA-C   10 mg at 03/26/22 0654    sodium chloride flush 0.9 % injection 10 mL  10 mL IntraVENous 2 times per day ROX Chowdhury CNP   10 mL at 03/30/22 0818    sodium chloride flush 0.9 % injection 10 mL  10 mL IntraVENous PRN ROX Mckeon CNP   10 mL at 03/29/22 1442    ondansetron (ZOFRAN-ODT) disintegrating tablet 4 mg  4 mg Oral Q8H PRN ROX Mckeon CNP        Or    ondansetron [facility]) injection 4 mg  4 mg IntraVENous Q6H PRN ROX Mckeon CNP        magnesium oxide (MAG-OX) tablet 400 mg  400 mg Oral Daily ROX Mckeon CNP   400 mg at 03/30/22 0817    ipratropium-albuterol (DUONEB) nebulizer solution 1 ampule  1 ampule Inhalation Q4H WA ROX Mckeon CNP   1 ampule at 03/30/22 1206    glucose (GLUTOSE) 40 % oral gel 15 g  15 g Oral PRN ROX Mckeon CNP        dextrose 50 % IV solution  12.5 g IntraVENous PRN ROX Mckeon CNP        glucagon (rDNA) injection 1 mg  1 mg IntraMUSCular PRN ROX Mckeon CNP        dextrose 5 % solution  100 mL/hr IntraVENous PRN ROX Mckeon CNP        tamsulosin (FLOMAX) capsule 0.4 mg  0.4 mg Oral Daily ROX Mckeon CNP   0.4 mg at 03/30/22 4542    ampicillin 2000 mg ivpb mini bag  2,000 mg IntraVENous 6 times per day ROX Mckeon CNP   Stopped at 03/30/22 9202     Review of systems:   Heart: as above   Lungs: as above   Eyes: denies changes in vision or discharge. Ears: denies changes in hearing or pain. Nose: denies epistaxis or masses   Throat: denies sore throat or trouble swallowing. Neuro: denies numbness, tingling, tremors. Skin: denies rashes or itching. : denies hematuria, dysuria   GI: denies vomiting, diarrhea   Psych: denies mood changed, anxiety, depression. Physical Exam   /72   Pulse 74   Temp 98.8 °F (37.1 °C) (Oral)   Resp 17   Ht 5' 9\" (1.753 m)   Wt 193 lb (87.5 kg)   SpO2 96%   BMI 28.50 kg/m²   Constitutional: Oriented to person, place, and time. No distress. Well developed. Head: Normocephalic and atraumatic. Neck: Neck supple. No hepatojugular reflux. No JVD present.  Carotid bruit is not present. No tracheal deviation present. No thyromegaly present. Cardiovascular: Normal rate, regular rhythm, normal heart sounds. intact distal pulses. No gallop and no friction rub. No murmur heard. Pulmonary: Breath sounds normal. No respiratory distress. No wheezes. No rales. Chest: Effort normal. No tenderness. Abdominal: Soft. Bowel sounds are normal. No distension or mass. No tenderness, rebound or guarding. Musculoskeletal: . No tenderness. No clubbing or cyanosis. Extremitites: Intact distal pulses. No edema  Neurological: Alert and oriented to person, place, and time. Skin: Skin is warm and dry. No rash noted. Not diaphoretic. No erythema. Psychiatric: Normal mood and affect. Behavior is normal.   Lymphadenopathy: No cervical adenopathy. No groin adenopathy. CBC:   Lab Results   Component Value Date    WBC 9.5 03/30/2022    RBC 2.79 03/30/2022    HGB 7.9 03/30/2022    HCT 25.5 03/30/2022    HCT 28.0 03/23/2022    MCV 91.4 03/30/2022    MCH 28.3 03/30/2022    MCHC 31.0 03/30/2022    RDW 15.9 03/30/2022     03/30/2022    MPV 9.9 03/30/2022     BMP:   Lab Results   Component Value Date     03/30/2022    K 4.2 03/30/2022    K 4.2 03/15/2022     03/30/2022    CO2 27 03/30/2022    BUN 16 03/30/2022    LABALBU 3.1 03/24/2022    CREATININE 1.0 03/30/2022    CALCIUM 8.6 03/30/2022    GFRAA >60 03/30/2022    LABGLOM >60 03/30/2022     Magnesium:    Lab Results   Component Value Date    MG 2.1 03/30/2022     Cardiac Enzymes:   Lab Results   Component Value Date    TROPHS 43 (H) 03/14/2022    TROPHS 42 (H) 03/14/2022      PT/INR:    Lab Results   Component Value Date    PROTIME 17.3 03/23/2022    INR 1.6 03/23/2022     TSH:    Lab Results   Component Value Date    TSH 1.880 03/14/2022     Rhythm Strip: normal sinus rhythm. Echo Summary 3/15/2022:   Atrial fibrillation noted. Ejection fraction is visually estimated at 50-55%.    Right ventricle global systolic function is reduced. Severe biatrial dilation. Mild aortic valve regurgitation. Mild tricuspid regurgitation. Critical findings   Partial flail posterior mitral leaflet with failure of leaflet coaptation, and evidence of ruptured chordae tendineae. Absolutely torrential, eccentric mitral regurgitation directed anteriorly. There is a small mobile echodensity noted on the ventricular aspect of the posterior mitral valve, likely ruptured chordae or papillary muscle, though in the context of positive blood cultures a vegetation is also suspected which likely led to chordal rupture. Recommend: CT surgery consult, ANITRA     Echo Summary 3/17/2022:   Ejection fraction is visually estimated at 55%. Severe holosystolic prolapse of the posterior leaflet with a flail P1/P2 segment due to ruptured chordae tendineae. Small mobile echodensity suspicious for vegetation on ventricular surface of posterior leaflet. Failure of leaflet coaptation. No definitive papillary muscle rupture. Torrential mitral regurgitation, eccentric jet directed anteriorly. Angiographic Results/findings 3/18/2022:  Left Main: No angiographically significant stenosis. LAD: No angiographically significant stenosis. D1: Bifurcating vessel. No angiographically significant stenosis. Cx: Dominant vessel. Mid to distal diffuse 10% stenosis. OM1: Bifurcating vessel. No angiographically significant stenosis. OM2: Proximal 10% stenosis. OM 3: No angiographically significant stenosis. OM 4: No angiographically significant stenosis. Ramus: Absent. RCA: non-Dominant. Mild diffuse luminal irregularities. Echo Summary 3/28/2022:   Ejection fraction is visually estimated at 25-30%. Overall ejection fraction severely decreased. Normal right ventricle size with reduced function. Left atrial volume index of 68 ml per meters squared BSA. Status - post mitral annular ring insertion. Mean transmitral gradient 2.9 mmHg.    Physiologic and/or trace mitral regurgitation is present. Physiologic and/or trace tricuspid regurgitation. No evidence for hemodynamically significant pericardial effusion. ASSESSMENT & PLAN:    Patient Active Problem List   Diagnosis    Atrial fibrillation with rapid ventricular response ([facility]    Sepsis ([facility] [address] present on admission    JANA (acute kidney injury) ([facility] [facility]    Bacteremia    Severe mitral regurgitation    Suspected endocarditis    Subacute bacterial endocarditis    Ruptured chordae tendineae (HCC)    Mitral valve disease    Paroxysmal atrial fibrillation (HCC)    Acute diastolic (congestive) heart failure (HCC)    Cardiac insufficiency (HCC)    Acute pulmonary insufficiency    Acute blood loss anemia     1. VHD/Endocarditis/ID issues:    Stable s/p MVRp (P2 triangular resection, P3/P3 perforation repair, 34mm Future band), MAZE procedure, 40mm Atriclip. 2. Acute diastolic CHF: Post op ANITRA reveals 30% EF    TTE with LVEF 25-30%. Coreg/ARB/aldactone. If BP adequate then transition ARB to entresto. Lifevest on D/c.     3. PAF: S/p MAZE procedure, 40mm Atriclip     Prophylactic amio started/BB. Monitor. 4. Anemia: Follow labs. Radha Price D.O.   Cardiologist  Cardiology, [address] [facility] [place]

## 2022-03-30 NOTE — TELEPHONE ENCOUNTER
----- Message from Tae.  HAKEEM Tate sent at 3/30/2022  2:46 PM EDT -----  Will need follow up with Dr Dg Irby  Thanks

## 2022-03-30 NOTE — PROGRESS NOTES
POD#7 Awake, alert. No complaints other than feeling frustrated over insurance conflict. Denies CP, palpitations, SOB at rest, dizziness/lightheadedness. Vitals:    03/29/22 2326 03/30/22 0521 03/30/22 0527 03/30/22 0750   BP: 111/77 107/71  128/77   Pulse: 81 83  92   Resp: 18 16  14   Temp: 98.1 °F (36.7 °C) 97.2 °F (36.2 °C)  97.7 °F (36.5 °C)   TempSrc: Temporal Temporal  Temporal   SpO2: 98% 99%  98%   Weight:   193 lb (87.5 kg)    Height:         O2: none      Intake/Output Summary (Last 24 hours) at 3/30/2022 0758  Last data filed at 3/30/2022 0528  Gross per 24 hour   Intake 1280 ml   Output 2520 ml   Net -1240 ml         +BM on 3/29    UO: 920mL/8hr       Recent Labs     03/28/22  0704 03/29/22  0625 03/30/22  0542   WBC 8.7 7.6 9.5   HGB 7.9* 7.7* 7.9*   HCT 25.4* 25.2* 25.5*    259 252      Recent Labs     03/28/22  0704 03/29/22  0625 03/30/22  0542   BUN 20 18 16   CREATININE 0.9 0.9 1.0          Telemetry: SR        PE  Cardiac: RRR  Lungs: decreased bases  Chest incision C/D/I, approximated, no erythema. Sternum stable. Prior chest tube site incisions C/D/I, no erythema with intact sutures.    Abd: Soft, nontender, +BS  Ext: Left groin cannulation site soft and intact               A/P: POD#7     1. severe mitral regurgitation, streptococcus bacteremia, mitral valve endocarditis, atrial fibrillation  --Stable s/p ANITRA, mitral valve repair (P2 triangular resection, P3/P3 perforation repair, 34mm Future band), MAZE procedure, 40mm Atriclip   --Post op ANITRA reveals 30% EF  --will order TTE prior to discharge to establish baseline and need for life vest on DC - ordered on 3/26 - resulted with EF 25-30%--WCD fitted and patient is wearing  --Scr stable 1.0  --ASA/statin   --reinforce sternal precautions        2. Expected acute blood loss anemia secondary to open heart surgery  --stable- hgb 7.9 no active bleeding, cont to monitor   --[facility] 3/24         3. Atrial fibrillation s/p MAZE procedure, 40mm Atriclip   - SR, prophylactic amio started- currently on amio 200mg tid   - Continue low dose BB today  --afib pre-op only, none since surgery  --discussed possible need for [address] on dc with attending, not needed, continue asa         4. Expected acute pulmonary insufficiency in the setting following surgery  --continue duonebs with ezpap  --encourage C&DB, SMI  --currently on RA  -- Lasix daily         5. streptococcus bacteremia; MV Endocarditis  - Antibiotics per ID; Ampicillin 2grams q4 hours          6. Cardiac Insufficiency  - EF 30% post CPB, requiring epinephrine gtt for hemodynamic support  - Off Epi. SBP ~100. SBP improved - low dose coreg started 3/27, tolerating with improved BP  - GDMT as hemodynamics allow - low dose cozaar and spironolactone added 3/29 per cardiology         7. Constipation--expected delayed return of bowel function  --secondary to anesthesia, narcotics, decreased oral intake, and decreased physical activity   --resolved  --Encouraged continued increase in oral intake and activity.          8. Expected deconditioning in the setting following surgery  --Increase activity as tolerated  --PT/OT  --currently ambulating 400ft            DVT prophylaxis:  --continue bilateral knee high GODWIN hose  --continue PCDs  --continue progressive ambulation  --continue knee high GODWIN hose/pcds/progressive ambulation        Dispo: home today after insurance conflict resolved so he can receive his medications.  SW to aid as able       This patient's case and care plan was discussed with the attending surgeon

## 2022-03-30 NOTE — PROGRESS NOTES
AVS reviewed with patient and family. Instructed importance of incision care, daily wts, SMI, med compliance, WCD, activity progression and sternal precautions.

## 2022-03-30 NOTE — PATIENT CARE CONFERENCE
[facility] Quality Flow/Interdisciplinary Rounds Progress Note        Quality Flow Rounds held on March 30, 2022    Disciplines Attending:  Bedside Nurse, ,  and Nursing Unit Leadership    Parker Mckeon was admitted on 3/14/2022  8:06 PM    Anticipated Discharge Date:  Expected Discharge Date: 03/29/22    Disposition:    Alfred Score:  Alfred Scale Score: 21    Readmission Risk              Risk of Unplanned Readmission:  17           Discussed patient goal for the day, patient clinical progression, and barriers to discharge.   The following Goal(s) of the Day/Commitment(s) have been identified:  Diagnostics - Report Results      Lexi Bliss RN  March 30, 2022

## 2022-03-31 NOTE — PROCEDURES
[address]                  [address] [facility]  [facility]                               PULMONARY FUNCTION    PATIENT NAME: Lasha Maria                    :        1960  MED REC NO:   [id number]                            ROOM:       [facility]  ACCOUNT NO:   [de-identified]                           ADMIT DATE: 2022  PROVIDER:     DO Michael    DATE OF PROCEDURE:  2022    HEIGHT:  69 inches. WEIGHT:  179 pounds. DIAGNOSIS:  Preop testing. TOBACCO USE:  The patient reports he never smoked. SPIROMETRY:  FVC is 3.35 liters which is 75% of predicted. FEV1 is 2.90  liters which was 84% of predicted. FEV1 to FVC ratio is 87. MVV is 125  which is 91% of predicted. LUNG VOLUMES:  SVC is 2.96 which is 66% of predicted. DIFFUSION:  Diffusion is 18.69 which is 60% of predicted, corrected for  alveolar ventilation is 131% of predicted. FLOW VOLUME LOOP:  The patient was coughing during flow volume loop,  which is uninterpretable. OVERALL INTERPRETATION:  The patient's pulmonary function tests show  neither obstruction nor restriction. Flow volume loop is  uninterpretable due to coughing. Please clinically correlate.         Yohana Alvarez DO    D: 2022 14:46:29       T: 2022 14:48:42     [email or web address]  Job#: [id number]     Doc#: [id number]    CC:

## 2022-03-31 NOTE — ADT AUTH CERT
Cardiac Valve Replacement or Repair - Care Day 7 (3/30/2022) by Gracie Abad RN       Review Status Review Entered   Completed 3/30/2022 16:03      Criteria Review      Care Day: 7 Care Date: 3/30/2022 Level of Care: Intermediate Care    Guideline Day 2    Level Of Care    ( ) Telemetry    3/30/2022 4:03 PM EDT by Edilberto Hayden      IMCU    Clinical Status    (X) * Procedure completed    3/30/2022 4:03 PM EDT by Sada Griffith noted    (X) * Mechanical ventilation absent    3/30/2022 4:03 PM EDT by Edilberto Hayden      RA    (X) * Hemodynamic stability    3/30/2022 4:03 PM EDT by Edilberto Hayden      97.7, 77, 14, 99/63    Activity    (X) * Limited ambulation    Routes    (X) Parenteral or oral medications    3/30/2022 4:03 PM EDT by Sada Griffith noted    (X) Advance diet as tolerated    3/30/2022 4:03 PM EDT by Edilberto Hayden      reg    Interventions    (X) * Arterial line absent    3/30/2022 4:03 PM EDT by Sada Griffith not noted    Medications    (X) Possible beta-blocker    3/30/2022 4:03 PM EDT by Sada Griffith coreg    (X) Oral analgesics    3/30/2022 4:03 PM EDT by Edilberto Hayden      roxicodone    * Milestone   Additional Notes   3/30/22       VS- temp 97.7, HR 77, RR 14, BP 99/63, O2 100%          Labs- Hgb 7.9      Cardiothoracic-   POD#7 Awake, alert. No complaints other than feeling frustrated over insurance conflict.  Denies CP, palpitations, SOB at rest, dizziness/lightheadedness.        A/P: POD#7       1. severe mitral regurgitation, streptococcus bacteremia, mitral valve endocarditis, atrial fibrillation   --Stable s/p ANITRA, mitral valve repair (P2 triangular resection, P3/P3 perforation repair, 34mm Future band), MAZE procedure, 40mm Atriclip    --Post op ANITRA reveals 30% EF   --will order TTE prior to discharge to establish baseline and need for life vest on DC - ordered on 3/26 - resulted with EF 25-30%--WCD fitted and patient is wearing --Scr stable 1.0   --ASA/statin    --reinforce sternal precautions       2. Expected acute blood loss anemia secondary to open heart surgery   --stable- hgb 7.9 no active bleeding, cont to monitor    --[facility] 3/24        3. Atrial fibrillation s/p MAZE procedure, 40mm Atriclip    - SR, prophylactic amio started- currently on amio 200mg tid    - Continue low dose BB today   --afib pre-op only, none since surgery   --discussed possible need for [address] on dc with attending, not needed, continue asa        4. Expected acute pulmonary insufficiency in the setting following surgery   --continue duonebs with ezpap   --encourage C&DB, SMI   --currently on RA   -- Lasix daily        5. streptococcus bacteremia; MV Endocarditis   - Antibiotics per ID; Ampicillin 2grams q4 hours         6. Cardiac Insufficiency   - EF 30% post CPB, requiring epinephrine gtt for hemodynamic support   - Off Epi. SBP ~100. SBP improved - low dose coreg started 3/27, tolerating with improved BP   - GDMT as hemodynamics allow - low dose cozaar and spironolactone added 3/29 per cardiology        7. Constipation--expected delayed return of bowel function   --secondary to anesthesia, narcotics, decreased oral intake, and decreased physical activity    --resolved   --Encouraged continued increase in oral intake and activity.         8. Expected deconditioning in the setting following surgery   --Increase activity as tolerated   --PT/OT   --currently ambulating 400ft        DVT prophylaxis:   --continue bilateral knee high GODWIN hose   --continue PCDs   --continue progressive ambulation   --continue knee high GODWIN hose/pcds/progressive ambulation           Dispo: home today after insurance conflict resolved so he can receive his medications. SW to aid as able                   Cardiology-   1. VHD/Endocarditis/ID issues:       Stable s/p MVRp (P2 triangular resection, P3/P3 perforation repair, 34mm Future band), MAZE procedure, 40mm Atriclip.       2.  Acute diastolic CHF: Post op ANITRA reveals 30% EF       TTE with LVEF 25-30%.     Coreg/ARB/aldactone. If BP adequate then transition ARB to entresto.       Lifevest on D/c.        3. PAF: S/p MAZE procedure, 40mm Atriclip        Prophylactic amio started/BB.        Monitor.        4.  Anemia: Follow labs.             Summary- Patient in IMCU          Patient receiving Cordarone 200mg PO TID, Ampicillin 2000mg IV 6xday, Vitamin C 500mg PO BID, ASA 81mg PO x1, Coreg 3.125mg PO BID, Iron 325mg PO BID, FOlvite 1mg PO x1, Lasix 20mg PO x1, Duoneb inh Q4hr, Cozaar 12.5mg PO x1, Mag ox 400mg PO x1, Protonix 40mg PO x1, Glycolax 17g PO x1, Crestor 20mg PO daily, Aldactone 12.5mg PO x1, Flomax 0.4mg PO x1, Roxicodone 5mg PO x3,             Cardiac Valve Replacement or Repair - Care Day 5 (3/28/2022) by Issac Baird RN       Review Status Review Entered   Completed 3/30/2022 15:49      Criteria Review      Care Day: 5 Care Date: 3/28/2022 Level of Care: Intermediate Care    Guideline Day 2    Level Of Care    ( ) Telemetry    3/30/2022 3:49 PM EDT by Baljinder Lala      [facility]    Clinical Status    (X) * Procedure completed    3/30/2022 3:49 PM EDT by Lilliana Andrew noted    (X) * Mechanical ventilation absent    3/30/2022 3:49 PM EDT by Baljinder Lala      RA    ( ) * Hemodynamic stability    3/30/2022 3:49 PM EDT by Baljinder Lala      97.7, 81, 23, 110/68    Activity    (X) * Limited ambulation    3/30/2022 3:49 PM EDT by Lilliana Andrew as carlyn    Routes    (X) Parenteral or oral medications    3/30/2022 3:49 PM EDT by Lilliana Andrew noted    (X) Advance diet as tolerated    3/30/2022 3:49 PM EDT by Lilliana Andrew reg    Interventions    (X) * Arterial line absent    3/30/2022 3:49 PM EDT by Lilliana Andrew not noted    Medications    (X) Possible beta-blocker    3/30/2022 3:49 PM EDT by Baljinder isabel    (X) Possible epidural or parenteral analgesics or PCA (X) Oral analgesics    3/30/2022 3:49 PM EDT by Brittany olivera    * Milestone   Additional Notes   3/28/22       VS- temp 97.7, HR 81, RR 23, /68, O2 95%          Labs- Ca 8.5, Glucose 109, Hgb 7.9,       CXR-   Right upper extremity PICC terminating in the SVC.       ECHO-   Ejection fraction is visually estimated at 25-30%. Overall ejection fraction severely decreased. Normal right ventricle size with reduced function. Left atrial volume index of 68 ml per meters squared BSA. Status - post mitral annular ring insertion. Mean transmitral gradient 2.9   mmHg. Physiologic and/or trace mitral regurgitation is present. Physiologic and/or trace tricuspid regurgitation. No evidence for hemodynamically significant pericardial effusion.          Cardiothoracic-   +BM pre-op - increased bowel regimen ordered today       UO: 200mL/8hr       A/P: POD#5    1. severe mitral regurgitation, streptococcus bacteremia, mitral valve endocarditis, atrial fibrillation   --Stable s/p ANITRA, mitral valve repair (P2 triangular resection, P3/P3 perforation repair, 34mm Future band), MAZE procedure, 40mm Atriclip    --Post op ANITRA reveals 30% EF   --will order TTE prior to discharge to establish baseline and need for life vest on DC - ordered on 3/26 - in process currently   --Scr stable 0.9   --ASA/statin    --reinforce sternal precautions   --continue epicardial pacing wires - will remove today   --CTs removed   --Left groin suture removed without difficulty        2. Expected acute blood loss anemia secondary to open heart surgery   --stable- hgb 7.9 no active bleeding, cont to monitor    --[facility] 3/24        3. Atrial fibrillation s/p MAZE procedure, 40mm Atriclip    - SR, prophylactic amio started- currently on amio 200mg tid    - Continue low dose BB today       4.  Expected acute pulmonary insufficiency in the setting following surgery   --continue duonebs with ezpap   --encourage C&DB, JUANPABLO NORIEGA [facility] --currently on RA   -- Lasix daily        5. streptococcus bacteremia; MV Endocarditis   - Antibiotics per ID Ampicillin 2grams q4 hours         6. Cardiac Insufficiency   - EF 30% post CPB, requiring epinephrine gtt for hemodynamic support   - Off Epi. SBP ~100. SBP improved to 110s today - low dose coreg started 3/27 and monitor - /59 this morning   - GDMT as hemodynamics allow - start as outpatient when BP improves       7. Constipation--expected delayed return of bowel function   --secondary to anesthesia, narcotics, decreased oral intake, and decreased physical activity    --no BM since prior to surgery - increased bowel reg today   --Continue daily MOM/oral bisacodyl until +BM and senna-s as scheduled. --Will give daily suppository until +BM starting POD 3 if no result by then    --Encouraged continued increase in oral intake and activity.         8. Expected deconditioning in the setting following surgery   --Increase activity as tolerated   --PT/OT   --currently ambulating 400ft        DVT prophylaxis:   --continue bilateral knee high GODWIN hose   --continue PCDs   --continue progressive ambulation   --continue knee high GODWIN hose/pcds/progressive ambulation           Dispo: Home tomorrow after echo is read and BM       Addendum: epicardial pacing wires cut without difficulty. TASHA dressing removed. Sternal incision C/D/I without signs of infection           Cardiology-   1. VHD/Endocarditis/ID issues:       Stable s/p MVRp (P2 triangular resection, P3/P3 perforation repair, 34mm Future band), MAZE procedure, 40mm Atriclip.       2. Acute diastolic CHF: Post op ANITRA reveals 30% EF       TTE pending to establish baseline and need for life vest on D/C.       Coreg. Add afterload reduction as indicated and able. Follow volume.       3. PAF: S/p MAZE procedure, 40mm Atriclip        Prophylactic amio started/BB.        Monitor.        4. Anemia: Follow labs.                ID-   IMPRESSION:        1.  Streptococcus bacteremia ( 3/14 and 3/15) ,mitral valve endocarditis ,follow up blood cx neg on ( 3/17) s/p mitral valve repair ( 3/23). Tissue culture negative   RECOMMENDATIONS:         1. Ampicillin 2 grams IV q 4 hrs  ( PCN LANEY <0.06 ) till 4/20/22                     Internal medicine-   Patient seen and examined   Records reviewed.    Problem list   afib rvr   Sepsis/bacteremia-streptococcus   Mitral valve endocarditis   --Stable s/pmitral valve repair (P2 triangular resection, P3/P3 perforation repair, 34mm Future band), MAZE procedure Atriclip       Mitral valve endocarditis   /bacteremia-streptococcus   --Stable s/ mitral valve repair (P2 triangular resection, P3/P3 perforation repair, 34mm Future band), MAZE procedure   Atriclip    lv dysfunction and pulmon insufficiency- responding to treatment   anemia    Post op constipation /nausea -resolving       PLAN:   antibx regimen per inect dise-ampicillin   respirat protocol and EZ pap   Diuresis -furosemide   Per card team he will need Life vest at discharge   agree with discontin of accuchecks   DISPOSITION: cc working for [facility]   And are indicating that will need approval from his insurance as [facility] not in network             Summary- Patient in IMCU          Patient received Cordarone 200mg PO TID, Ampicillin 2000mg IV x6, Vitamin C 500mg PO x2, ASA 81mg PO x1, Coreg 3.125mg PO x2, Iron 325mg PO x2, Folvite 1mg PO x1, Lasix 20mg IV x1, Duoneb inh x2, Mag ox 400mg PO x1, Protonix 40mg PO x1, Glycolax 17g PO x1, Crestor 20mg PO x1, Flomax 0.4mg PO x1, [id number] PO x3, Klor con 20mEQ PO x1,

## 2022-04-11 ENCOUNTER — APPOINTMENT (OUTPATIENT)
Dept: GENERAL RADIOLOGY | Age: 62
End: 2022-04-11
Payer: COMMERCIAL

## 2022-04-11 ENCOUNTER — HOSPITAL ENCOUNTER (EMERGENCY)
Age: 62
Discharge: LEFT BEFORE DISCHARGE | End: 2022-04-11
Attending: STUDENT IN AN ORGANIZED HEALTH CARE EDUCATION/TRAINING PROGRAM
Payer: COMMERCIAL

## 2022-04-11 VITALS
SYSTOLIC BLOOD PRESSURE: 138 MMHG | BODY MASS INDEX: 26.66 KG/M2 | TEMPERATURE: 97.2 F | HEART RATE: 80 BPM | WEIGHT: 180 LBS | DIASTOLIC BLOOD PRESSURE: 84 MMHG | RESPIRATION RATE: 16 BRPM | OXYGEN SATURATION: 99 % | HEIGHT: 69 IN

## 2022-04-11 DIAGNOSIS — M79.89 LEG SWELLING: Primary | ICD-10-CM

## 2022-04-11 LAB
ALBUMIN SERPL-MCNC: 3.9 G/DL (ref 3.5–5.2)
ALP BLD-CCNC: 113 U/L (ref 40–129)
ALT SERPL-CCNC: 20 U/L (ref 0–40)
ANION GAP SERPL CALCULATED.3IONS-SCNC: 12 MMOL/L (ref 7–16)
AST SERPL-CCNC: 17 U/L (ref 0–39)
BASOPHILS ABSOLUTE: 0.06 E9/L (ref 0–0.2)
BASOPHILS RELATIVE PERCENT: 0.9 % (ref 0–2)
BILIRUB SERPL-MCNC: 0.3 MG/DL (ref 0–1.2)
BUN BLDV-MCNC: 17 MG/DL (ref 6–23)
CALCIUM SERPL-MCNC: 9.1 MG/DL (ref 8.6–10.2)
CHLORIDE BLD-SCNC: 101 MMOL/L (ref 98–107)
CO2: 25 MMOL/L (ref 22–29)
CREAT SERPL-MCNC: 1.1 MG/DL (ref 0.7–1.2)
D DIMER: 850 NG/ML DDU
EOSINOPHILS ABSOLUTE: 0.58 E9/L (ref 0.05–0.5)
EOSINOPHILS RELATIVE PERCENT: 8.4 % (ref 0–6)
GFR AFRICAN AMERICAN: >60
GFR NON-AFRICAN AMERICAN: >60 ML/MIN/1.73
GLUCOSE BLD-MCNC: 99 MG/DL (ref 74–99)
HCT VFR BLD CALC: 30.8 % (ref 37–54)
HEMOGLOBIN: 9.7 G/DL (ref 12.5–16.5)
IMMATURE GRANULOCYTES #: 0.02 E9/L
IMMATURE GRANULOCYTES %: 0.3 % (ref 0–5)
LYMPHOCYTES ABSOLUTE: 0.89 E9/L (ref 1.5–4)
LYMPHOCYTES RELATIVE PERCENT: 12.9 % (ref 20–42)
MCH RBC QN AUTO: 28 PG (ref 26–35)
MCHC RBC AUTO-ENTMCNC: 31.5 % (ref 32–34.5)
MCV RBC AUTO: 89 FL (ref 80–99.9)
MONOCYTES ABSOLUTE: 0.46 E9/L (ref 0.1–0.95)
MONOCYTES RELATIVE PERCENT: 6.7 % (ref 2–12)
NEUTROPHILS ABSOLUTE: 4.89 E9/L (ref 1.8–7.3)
NEUTROPHILS RELATIVE PERCENT: 70.8 % (ref 43–80)
PDW BLD-RTO: 15.3 FL (ref 11.5–15)
PLATELET # BLD: 229 E9/L (ref 130–450)
PMV BLD AUTO: 9.8 FL (ref 7–12)
POTASSIUM REFLEX MAGNESIUM: 4.5 MMOL/L (ref 3.5–5)
RBC # BLD: 3.46 E12/L (ref 3.8–5.8)
SEDIMENTATION RATE, ERYTHROCYTE: 88 MM/HR (ref 0–15)
SODIUM BLD-SCNC: 138 MMOL/L (ref 132–146)
TOTAL PROTEIN: 7.6 G/DL (ref 6.4–8.3)
URIC ACID, SERUM: 4.3 MG/DL (ref 3.4–7)
WBC # BLD: 6.9 E9/L (ref 4.5–11.5)

## 2022-04-11 PROCEDURE — 73610 X-RAY EXAM OF ANKLE: CPT

## 2022-04-11 PROCEDURE — 73630 X-RAY EXAM OF FOOT: CPT

## 2022-04-11 PROCEDURE — 80053 COMPREHEN METABOLIC PANEL: CPT

## 2022-04-11 PROCEDURE — 85651 RBC SED RATE NONAUTOMATED: CPT

## 2022-04-11 PROCEDURE — 84550 ASSAY OF BLOOD/URIC ACID: CPT

## 2022-04-11 PROCEDURE — 86140 C-REACTIVE PROTEIN: CPT

## 2022-04-11 PROCEDURE — 99284 EMERGENCY DEPT VISIT MOD MDM: CPT

## 2022-04-11 PROCEDURE — 85378 FIBRIN DEGRADE SEMIQUANT: CPT

## 2022-04-11 PROCEDURE — 6370000000 HC RX 637 (ALT 250 FOR IP): Performed by: STUDENT IN AN ORGANIZED HEALTH CARE EDUCATION/TRAINING PROGRAM

## 2022-04-11 PROCEDURE — 85025 COMPLETE CBC W/AUTO DIFF WBC: CPT

## 2022-04-11 PROCEDURE — 36415 COLL VENOUS BLD VENIPUNCTURE: CPT

## 2022-04-11 RX ORDER — HYDROCODONE BITARTRATE AND ACETAMINOPHEN 5; 325 MG/1; MG/1
1 TABLET ORAL ONCE
Status: COMPLETED | OUTPATIENT
Start: 2022-04-11 | End: 2022-04-11

## 2022-04-11 RX ORDER — HEPARIN SODIUM (PORCINE) LOCK FLUSH IV SOLN 100 UNIT/ML 100 UNIT/ML
SOLUTION INTRAVENOUS
Status: DISCONTINUED
Start: 2022-04-11 | End: 2022-04-11 | Stop reason: HOSPADM

## 2022-04-11 RX ADMIN — HYDROCODONE BITARTRATE AND ACETAMINOPHEN 1 TABLET: 5; 325 TABLET ORAL at 19:46

## 2022-04-11 ASSESSMENT — PAIN SCALES - GENERAL
PAINLEVEL_OUTOF10: 4
PAINLEVEL_OUTOF10: 2

## 2022-04-11 NOTE — ED PROVIDER NOTES
Department of Emergency Medicine   ED  Provider Note  Admit Date/RoomTime: 4/11/2022  5:33 PM  ED Room: 06/06          History of Present Illness:  4/11/22, Time: 7:26 PM EDT  Chief Complaint   Patient presents with    Leg Swelling     had open heart surgery march 23rd. 5 days ago pain swelling began in left great toe and worsened. no calf pain       Alexys Emerson is a 62 y.o. male presenting to the ED for left foot pain and swelling. The patient states that 5 to 6 days ago he began having pain of the base of the left great toe. It is a throbbing sensation worse with movement or walking. He denies any trauma to the region. The pain has spread to his ankle. He last took a Norco at 2 PM.  He is on this medication as he recently had valve replacement surgery on March 23. The patient is noting some swelling of the left foot. No history of blood clots, recent travel or hormone use. He does feel some numbness and tingling of the toes. Denies any weakness. No chest pain, shortness of breath, fevers or cough. Patient is on ampicillin infusions which she has been receiving. The patient denies history of gout. He has not had increased red meat or alcohol intake. Review of Systems:   Constitutional symptoms: Denies fever  Eyes: Denies eye pain  Ears, Nose, Mouth, Throat: Denies ear pain  Cardiovascular: Denies chest pain  Respiratory: Denies shortness of breath  Gastrointestinal: Denies blood per rectum  Genitourinary: Denies hematuria  Skin: Denies rashes  Neurological: Denies headache  Musculoskeletal: Positive for left foot pain and swelling, positive for left ankle pain    --------------------------------------------- PAST HISTORY ---------------------------------------------  Past Medical History:  has a past medical history of Acute diastolic (congestive) heart failure ([address]     Past Surgical History:  has a past surgical history that includes transesophageal echocardiogram (03/16/2022) and Mitral valve maze procedure (N/A, 3/23/2022). Social History:  reports that he has been smoking cigars. He has never used smokeless tobacco. He reports current alcohol use. He reports that he does not use drugs. Family History: family history is not on file. . Unless otherwise noted, family history is non contributory    The patients home medications have been reviewed. Allergies: Patient has no known allergies. I have reviewed the past medical history, past surgical history, social history, and family history    ---------------------------------------------------PHYSICAL EXAM--------------------------------------    General: The patient is conversational and lying comfortably in bed. Head: Normocephalic and atraumatic. Eyes: Sclera non-icteric and no conjunctival injection  ENT: Nasal and oral membranes moist  Neck: Trachea midline. No JVD  Respiratory: Lungs clear to auscultation bilaterally. Patient speaking in full sentences. Cardiovascular: Regular rate. No pedal edema. 2+ DP pulses  Gastrointestinal:  Abdomen is soft and nondistended. Bowel sounds present. There is no tenderness. There is no guarding or rebound. Musculoskeletal: No deformity. Patient does have swelling of the left foot when compared to right. No bony tenderness of the femur, patella, tib-fib. Pelvis stable. Mild tenderness of the lateral malleolus and first MTP joint. No tenderness with compression of the forefoot. Patient able to wiggle his toes, plantarflex and dorsiflex at the ankle. Able to flex and extend at the knee symmetrically. No tenderness of the calf  Skin: Skin warm and dry. Patient has mild erythema of the left MTP first joint. No vesicles or lesions. No warmth or fluctuance. Neurologic: No gross motor deficits. No aphasia.   Sensation intact to light touch  Psychiatric: Normal affect.    -------------------------------------------------- RESULTS -------------------------------------------------  I have personally reviewed all laboratory and imaging results for this patient. Results are listed below.      LABS: (Lab results interpreted by me)  Results for orders placed or performed during the hospital encounter of 04/11/22   CBC with Auto Differential   Result Value Ref Range    WBC 6.9 4.5 - 11.5 E9/L    RBC 3.46 (L) 3.80 - 5.80 E12/L    Hemoglobin 9.7 (L) 12.5 - 16.5 g/dL    Hematocrit 30.8 (L) 37.0 - 54.0 %    MCV 89.0 80.0 - 99.9 fL    MCH 28.0 26.0 - 35.0 pg    MCHC 31.5 (L) 32.0 - 34.5 %    RDW 15.3 (H) 11.5 - 15.0 fL    Platelets 957 228 - 952 E9/L    MPV 9.8 7.0 - 12.0 fL    Neutrophils % 70.8 43.0 - 80.0 %    Immature Granulocytes % 0.3 0.0 - 5.0 %    Lymphocytes % 12.9 (L) 20.0 - 42.0 %    Monocytes % 6.7 2.0 - 12.0 %    Eosinophils % 8.4 (H) 0.0 - 6.0 %    Basophils % 0.9 0.0 - 2.0 %    Neutrophils Absolute 4.89 1.80 - 7.30 E9/L    Immature Granulocytes # 0.02 E9/L    Lymphocytes Absolute 0.89 (L) 1.50 - 4.00 E9/L    Monocytes Absolute 0.46 0.10 - 0.95 E9/L    Eosinophils Absolute 0.58 (H) 0.05 - 0.50 E9/L    Basophils Absolute 0.06 0.00 - 0.20 E9/L   Comprehensive Metabolic Panel w/ Reflex to MG   Result Value Ref Range    Sodium 138 132 - 146 mmol/L    Potassium reflex Magnesium 4.5 3.5 - 5.0 mmol/L    Chloride 101 98 - 107 mmol/L    CO2 25 22 - 29 mmol/L    Anion Gap 12 7 - 16 mmol/L    Glucose 99 74 - 99 mg/dL    BUN 17 6 - 23 mg/dL    CREATININE 1.1 0.7 - 1.2 mg/dL    GFR Non-African American >60 >=60 mL/min/1.73    GFR African American >60     Calcium 9.1 8.6 - 10.2 mg/dL    Total Protein 7.6 6.4 - 8.3 g/dL    Albumin 3.9 3.5 - 5.2 g/dL    Total Bilirubin 0.3 0.0 - 1.2 mg/dL    Alkaline Phosphatase 113 40 - 129 U/L    ALT 20 0 - 40 U/L    AST 17 0 - 39 U/L   Sedimentation Rate   Result Value Ref Range    Sed Rate 88 (H) 0 - 15 mm/Hr   Uric acid   Result Value Ref Range    Uric Acid, Serum 4.3 3.4 - 7.0 mg/dL   D-dimer, quantitative   Result Value Ref Range    D-Dimer, Quant 850 ng/mL DDU   , RADIOLOGY:  Interpreted by Radiologist unless otherwise specified  XR FOOT LEFT (MIN 3 VIEWS)   Final Result   No acute osseous abnormality. XR ANKLE LEFT (MIN 3 VIEWS)   Final Result      No acute abnormality of the ankle. Diffuse soft tissue swelling. US DUP LOWER EXTREMITY LEFT ALYSIA    (Results Pending)       ------------------------- NURSING NOTES AND VITALS REVIEWED ---------------------------   The nursing notes within the ED encounter and vital signs as below have been reviewed by myself  /84   Pulse 80   Temp 97.2 °F (36.2 °C) (Temporal)   Resp 16   Ht 5' 9\" (1.753 m)   Wt 180 lb (81.6 kg)   SpO2 99%   BMI 26.58 kg/m²     Oxygen Saturation Interpretation: Normal    The patients available past medical records and past encounters were reviewed. ------------------------------ ED COURSE/MEDICAL DECISION MAKING----------------------  Medications   albumin human 5 % IV solution (has no administration in time range)   propofol 1000 MG/100ML injection (has no administration in time range)   HYDROcodone-acetaminophen (NORCO) 5-325 MG per tablet 1 tablet (1 tablet Oral Given 4/11/22 1946)       Medical Decision Making: This is a 62 y.o. male presenting to the ED for left foot pain and swelling. On initial presentation, the patient's vital signs are interpreted as normotensive, afebrile saturating well on room air. Based on history and physical exam, we have a broad differential.  The patient does have evidence of possible gout or inflammatory arthritis. However, with his recent surgery I cannot exclude blood clot. We also considered bony abnormality such as fracture contusion. Patient denies history of trauma. He is distally neurovascularly intact. X-ray of the left foot and x-ray of the left ankle obtained. Patient is due for Norco and will be administered a dose. Laboratory studies obtained.   Ultrasound is not available so we will obtain D-dimer at this time as we are at the stand-alone emergency department. Laboratory studies show electrolytes within normal limits. LFTs normal.  No leukocytosis. Patient is anemic however review of EMR shows hemoglobin stable. Sedimentation rate mildly increased. Nursing staff informing that the patient plans to leave [address] This patient has chosen to leave against medical advice. I, Dr. Michael Ring, the Emergency Physician has personally explained to them that choosing to do so may result in permanent bodily harm, disability, disfigurement, or death. I discussed at length that without further evaluation and monitoring there may be unforeseen circumstances and deterioration causing permanent bodily harm or death as a result of their choice. They are alert, oriented, and have the capacity and are competent at this time to make this decision. They state that they are aware of the serious risks as explained, but they continue to wish to leave against medical advice. I urged the patient to return to the hospital as soon as possible to complete their evaluation and treatment. This was witnessed by nursing staff. Patient sister is also present. We have discussed that his D-dimer is not yet resulted. He request that I call him with this result. I have stressed that I would recommend likely anticoagulation however would prefer to speak with his cardiologist as he had recent surgery. He does not want a wait for this. I will provide him with outpatient prescription for an ultrasound and have stressed the importance of returning to the emergency department. Patient plans to present to [facility]. After the patient left the emergency department his D-dimer is significantly elevated. I did call him at [phone] and urged him to return for ultrasound and further evaluation.     This patient's ED course included: a personal history and physicial examination and re-evaluation prior to disposition    This patient has remained hemodynamically stable during their ED course. Consultations:  None    Oxygen Saturation Interpretation: 99 % on room air. Normal    Counseling:   I have spoken with the patient and family member sister and discussed todays results, in addition to providing specific details for the plan of care and counseling regarding the diagnosis and prognosis. Questions are answered at this time and they are agreeable with the plan.     --------------------------------- IMPRESSION AND DISPOSITION ---------------------------------    IMPRESSION  1. Leg swelling        DISPOSITION  Disposition: Left AMA  Patient condition is fair    I, Dr. Quique Gomes, am the primary provider of record    NOTE: This report was transcribed using voice recognition software.  Every effort was made to ensure accuracy; however, inadvertent computerized transcription errors may be present        Quique Gomes MD  04/12/22 4687

## 2022-04-12 ENCOUNTER — HOSPITAL ENCOUNTER (EMERGENCY)
Age: 62
Discharge: HOME | End: 2022-04-12
Attending: STUDENT IN AN ORGANIZED HEALTH CARE EDUCATION/TRAINING PROGRAM
Payer: COMMERCIAL

## 2022-04-12 ENCOUNTER — APPOINTMENT (OUTPATIENT)
Dept: ULTRASOUND IMAGING | Age: 62
End: 2022-04-12
Payer: COMMERCIAL

## 2022-04-12 ENCOUNTER — APPOINTMENT (OUTPATIENT)
Dept: GENERAL RADIOLOGY | Age: 62
End: 2022-04-12
Payer: COMMERCIAL

## 2022-04-12 VITALS
SYSTOLIC BLOOD PRESSURE: 120 MMHG | RESPIRATION RATE: 16 BRPM | HEART RATE: 76 BPM | OXYGEN SATURATION: 99 % | TEMPERATURE: 98.8 F | DIASTOLIC BLOOD PRESSURE: 69 MMHG

## 2022-04-12 DIAGNOSIS — L03.90 CELLULITIS, UNSPECIFIED CELLULITIS SITE: Primary | ICD-10-CM

## 2022-04-12 LAB
C-REACTIVE PROTEIN: 2.7 MG/DL (ref 0–0.4)
EKG ATRIAL RATE: 77 BPM
EKG P AXIS: 42 DEGREES
EKG P-R INTERVAL: 196 MS
EKG Q-T INTERVAL: 428 MS
EKG QRS DURATION: 114 MS
EKG QTC CALCULATION (BAZETT): 484 MS
EKG R AXIS: 20 DEGREES
EKG T AXIS: 114 DEGREES
EKG VENTRICULAR RATE: 77 BPM

## 2022-04-12 PROCEDURE — 6370000000 HC RX 637 (ALT 250 FOR IP): Performed by: STUDENT IN AN ORGANIZED HEALTH CARE EDUCATION/TRAINING PROGRAM

## 2022-04-12 PROCEDURE — 93971 EXTREMITY STUDY: CPT

## 2022-04-12 PROCEDURE — 93971 EXTREMITY STUDY: CPT | Performed by: RADIOLOGY

## 2022-04-12 PROCEDURE — 93005 ELECTROCARDIOGRAM TRACING: CPT

## 2022-04-12 PROCEDURE — 71045 X-RAY EXAM CHEST 1 VIEW: CPT

## 2022-04-12 PROCEDURE — 99285 EMERGENCY DEPT VISIT HI MDM: CPT

## 2022-04-12 RX ORDER — LIDOCAINE 4 G/G
1 PATCH TOPICAL DAILY
Qty: 10 PATCH | Refills: 0 | Status: SHIPPED | OUTPATIENT
Start: 2022-04-12 | End: 2022-04-22

## 2022-04-12 RX ORDER — SULFAMETHOXAZOLE AND TRIMETHOPRIM 800; 160 MG/1; MG/1
1 TABLET ORAL ONCE
Status: COMPLETED | OUTPATIENT
Start: 2022-04-12 | End: 2022-04-12

## 2022-04-12 RX ORDER — SULFAMETHOXAZOLE AND TRIMETHOPRIM 800; 160 MG/1; MG/1
1 TABLET ORAL 2 TIMES DAILY
Qty: 14 TABLET | Refills: 0 | Status: SHIPPED | OUTPATIENT
Start: 2022-04-12 | End: 2022-04-19

## 2022-04-12 RX ORDER — HYDROCODONE BITARTRATE AND ACETAMINOPHEN 5; 325 MG/1; MG/1
1 TABLET ORAL ONCE
Status: COMPLETED | OUTPATIENT
Start: 2022-04-12 | End: 2022-04-12

## 2022-04-12 RX ADMIN — HYDROCODONE BITARTRATE AND ACETAMINOPHEN 1 TABLET: 5; 325 TABLET ORAL at 12:00

## 2022-04-12 RX ADMIN — SULFAMETHOXAZOLE AND TRIMETHOPRIM 1 TABLET: 800; 160 TABLET ORAL at 13:10

## 2022-04-12 ASSESSMENT — PAIN SCALES - GENERAL
PAINLEVEL_OUTOF10: 8
PAINLEVEL_OUTOF10: 7

## 2022-04-12 ASSESSMENT — PAIN DESCRIPTION - ORIENTATION: ORIENTATION: LEFT

## 2022-04-12 ASSESSMENT — PAIN DESCRIPTION - DESCRIPTORS: DESCRIPTORS: BURNING

## 2022-04-12 ASSESSMENT — PAIN DESCRIPTION - LOCATION: LOCATION: LEG

## 2022-04-12 NOTE — ED NOTES
D dimer drawn. Patient and spouse upset test was not already drawn. Apologized for the delay. Patient and spouse wish to leave before results.   Dr. Fior Barillas notified and speaking with them     Jeanne Cedeno RN  04/11/22 2133       Jeanne Cedeno RN  04/11/22 2134

## 2022-04-12 NOTE — ED NOTES
Department of Emergency Medicine  FIRST PROVIDER TRIAGE NOTE             Independent MLP           4/12/22  10:54 AM EDT    Date of Encounter: 4/12/22   MRN: [id number]      HPI: Sharri Rodriguez is a 62 y.o. male who presents to the ED for Other (possible clot left leg was at [facility] last night )     Patient is a 58-year-old that is presenting with a possible clot in his leg versus his chest.  Patient had an elevated D-dimer of 850. Patient had open heart on March 23 and patient has a LifeVest with ampicillin. Patient was seen at [facility] and they had the unavailability of ultrasound therefore D-dimer was ordered and found to be elevated. Patient was called at 11:00 at night to inform them of the elevated D-dimer due to the patient and family signing out AMA due to the length of stay. They were directed to come immediately back to the ER for a full work-up    ROS: Negative for abd pain, back pain, fever or cough. PE: Gen Appearance/Constitutional: alert  HEENT: NC/NT. PERRLA,  Airway patent. Neck: supple     Initial Plan of Care: All treatment areas with department are currently occupied. Plan to order/Initiate the following while awaiting opening in ED: labs, EKG and imaging studies.   Initiate Treatment-Testing, Proceed toTreatment Area When Bed Available for ED Attending/MLP to Continue Care    Electronically signed by Dalene Bumpers, PA-C   DD: 4/12/22         Dalene Bumpers, PA-C  04/12/22 1950

## 2022-04-12 NOTE — ED PROVIDER NOTES
Department of Emergency Medicine   ED  Provider Note  Admit Date/RoomTime: 4/12/2022 11:14 AM  ED Room: 36[facility]          History of Present Illness:  4/12/22, Time: 11:43 AM EDT  Chief Complaint   Patient presents with    Other     possible clot left leg was at [place] last night      Adina Steinberg is a 62 y.o. male presenting to the ED for Left lower extremity swelling and redness. Is been present for several days. Nothing is better or worse. The patient was seen in outside facility yesterday recommended left lower extremity ultrasound. His D-dimer is elevated. He was at the time of the stand-alone emergency department and sent in for further evaluation today and had signed out AMA yesterday. Patient has continued redness as well as swelling in the left dorsal foot. Denies any calf pain although he does have some calf venous engorgement. No pain with ambulation. No claudication he describes. The redness has not been rapidly progressive. Denies any history of gout. No fever chills or myalgias. Of note, the patient is on ampicillin for a infection that he describes postoperatively after mitral valve endocarditis. Review of Systems:  Review of systems obtained and negative unless stated otherwise above in the HPI.    --------------------------------------------- PAST HISTORY ---------------------------------------------  Past Medical History:  has a past medical history of Acute diastolic (congestive) heart failure ([facility] [address] Past Surgical History:  has a past surgical history that includes transesophageal echocardiogram (03/16/2022) and Mitral valve maze procedure (N/A, 3/23/2022). Social History:  reports that he has been smoking cigars. He has never used smokeless tobacco. He reports current alcohol use. He reports that he does not use drugs. Family History: family history is not on file. . Unless otherwise noted, family history is non contributory    The patients home medications have been reviewed. Allergies: Patient has no known allergies. I have reviewed the past medical history, past surgical history, social history, and family history    ---------------------------------------------------PHYSICAL EXAM--------------------------------------    Constitutional: Appears in no distress  Head: Normocephalic  Eyes: No conjunctial injection  ENT: Moist mucous membranes  Neck: Trachea midline  Respiratory: Nonlabored respirations, no tachypnea  Cardiovascular: Brisk capillary refill  Gastrointestinal: Nonsistendended abdomen. Musculoskeletal: Moves all extremities  Skin: Redness on the dorsum of the foot, it is warm, there are blurred margins, is not been rapidly progressive, there is no crepitus, 24 dorsalis pedis pulses, there is some left lower extremity venous engorgement without calf tenderness to palpation  Neurologic: Alert, symmetric facies, no aphasia  Psychiatric: Acting appropriately    -------------------------------------------------- RESULTS -------------------------------------------------  I have personally reviewed all laboratory and imaging results for this patient. Results are listed below. LABS: (Lab results interpreted by me)  No results found for this visit on 04/12/22.,       RADIOLOGY:  Interpreted by Radiologist unless otherwise specified  US DUP LOWER EXTREMITY LEFT ALYSIA   Final Result   Within the visualized vessels there is no evidence for deep venous   thrombosis               XR CHEST PORTABLE   Final Result   No acute process. ------------------------- NURSING NOTES AND VITALS REVIEWED ---------------------------   The nursing notes within the ED encounter and vital signs as below have been reviewed by myself  /69   Pulse 76   Temp 98.8 °F (37.1 °C) (Oral)   Resp 16   SpO2 99%     Oxygen Saturation Interpretation: Normal    The patients available past medical records and past encounters were reviewed. ------------------------------ ED COURSE/MEDICAL DECISION MAKING----------------------  Medications   sulfamethoxazole-trimethoprim (BACTRIM DS;SEPTRA DS) 800-160 MG per tablet 1 tablet (has no administration in time range)   HYDROcodone-acetaminophen (NORCO) 5-325 MG per tablet 1 tablet (1 tablet Oral Given 4/12/22 1200)        Re-Evaluations: This patient's ED course included:a personal history and physicial examination    This patient has remained hemodynamically stable and improved during their ED course. Consultations:  None    Medical Decision Making:   Patient presents emergency room with a forementioned complaint. Will obtain left lower extremity ultrasound. I reviewed his lab studies from yesterday. Elevated CRP but no leukocytosis noted. Will obtain left lower extremity ultrasound. He is given a regular dose of his pain medication. Procedure note:  I performed bedside soft tissue ultrasound. There is cobblestoning of the left dorsal foot. No abscess, no fluctuance    Imaging reviewed no evidence of DVT. We will treat as a cellulitis. He will be treated with Bactrim in addition to his nIV ampicillin. This will cover MRSA. Patient will follow-up as an outpatient. Counseling: The emergency provider has spoken with the patient and discussed todays results, in addition to providing specific details for the plan of care and counseling regarding the diagnosis and prognosis. Questions are answered at this time and they are agreeable with the plan.       --------------------------------- IMPRESSION AND DISPOSITION ---------------------------------    IMPRESSION  1. Cellulitis, unspecified cellulitis site        DISPOSITION  Disposition: Discharge to home  Patient condition is stable    IDr. Nilda, am the primary provider of record    Nilda Wooten DO  Emergency Medicine    NOTE: This report was transcribed using voice recognition software.  Every effort was made to ensure accuracy; however, inadvertent computerized transcription errors may be present         Ranjith Rubio DO  04/12/22 1585

## 2022-04-21 ENCOUNTER — HOSPITAL ENCOUNTER (OUTPATIENT)
Dept: CARDIAC REHAB | Age: 62
Setting detail: THERAPIES SERIES
Discharge: HOME | End: 2022-04-21

## 2022-04-22 ENCOUNTER — HOSPITAL ENCOUNTER (OUTPATIENT)
Dept: CARDIAC REHAB | Age: 62
Setting detail: THERAPIES SERIES
Discharge: HOME | End: 2022-04-22

## 2022-04-25 ENCOUNTER — HOSPITAL ENCOUNTER (OUTPATIENT)
Dept: CARDIAC REHAB | Age: 62
Setting detail: THERAPIES SERIES
Discharge: HOME | End: 2022-04-25

## 2022-04-25 LAB
FUNGUS (MYCOLOGY) CULTURE: NORMAL
FUNGUS STAIN: NORMAL

## 2022-04-25 NOTE — PROGRESS NOTES
Subjective:      Patient ID: Melba Nino is a 62 y.o. male. CC: surgery follow up, MV endocarditis, PAF    He presents for routine 4 week follow up s/p:  URGENT STERNOTOMY  URGENT RADICAL MITRAL VALVE REPAIR (P2 TRIANGULAR RESECTION, P2/P3 PERFORATION REPAIR, 34MM FUTURE BAND)  REMOVAL OF INTERNAL CARDIAC LESION   RIGHT AND LEFT ATRIAL CRYOABLATION AND BIPOLAR MAZE PROCEDURE  LEFT ATRIAL APPENDAGE LIGATION WITH 40MM ATRICLIP  RIGID STERNAL FIXATION WITH FRANCE STERNAL PLATE X2 AND STERNAL WIRES    He did well following surgery and was sent home on POD 7. He has not had any readmission since discharge. Offers no major complaints today. Has followed up with ID for ABx maintenance. Denies incision issues or sternal instability.          HPI    Review of Systems   Constitutional: Negative for chills and fever. All other systems reviewed and are negative. Objective:   Physical Exam  Constitutional:       General: He is not in acute distress. Cardiovascular:      Rate and Rhythm: Normal rate and regular rhythm. Pulmonary:      Effort: Pulmonary effort is normal. No respiratory distress. Comments: midsternal and chest tube incisions well healed without evidence of infection. Sternum stable. Abdominal:      Palpations: Abdomen is soft. Tenderness: There is no guarding. Skin:     General: Skin is warm and dry. Neurological:      General: No focal deficit present. Mental Status: He is alert and oriented to person, place, and time. Psychiatric:         Mood and Affect: Mood normal.         Behavior: Behavior normal.         Assessment:      MV endocarditis, s/p MV repair, Maze, Kitty exclusion      Plan:      Remove sternal precautions 5/4/2022  Cardiac rehab referral  Continue follow up with PCP, Cardiology, ID as scheduled. Encouraged to call office with any questions, concerns. Otherwise no further follow up necessary from CTS standpoint.

## 2022-04-26 ENCOUNTER — OFFICE VISIT (OUTPATIENT)
Dept: CARDIOTHORACIC SURGERY | Age: 62
End: 2022-04-26

## 2022-04-26 VITALS
DIASTOLIC BLOOD PRESSURE: 85 MMHG | SYSTOLIC BLOOD PRESSURE: 126 MMHG | BODY MASS INDEX: 26.66 KG/M2 | WEIGHT: 180 LBS | HEART RATE: 86 BPM | HEIGHT: 69 IN

## 2022-04-26 DIAGNOSIS — Z98.890 S/P MVR (MITRAL VALVE REPAIR): ICD-10-CM

## 2022-04-26 DIAGNOSIS — I05.9 MITRAL VALVE DISEASE: Primary | ICD-10-CM

## 2022-04-26 PROCEDURE — 99024 POSTOP FOLLOW-UP VISIT: CPT

## 2022-04-27 ENCOUNTER — HOSPITAL ENCOUNTER (OUTPATIENT)
Dept: CARDIAC REHAB | Age: 62
Setting detail: THERAPIES SERIES
Discharge: HOME | End: 2022-04-27

## 2022-04-29 ENCOUNTER — HOSPITAL ENCOUNTER (OUTPATIENT)
Dept: CARDIAC REHAB | Age: 62
Setting detail: THERAPIES SERIES
Discharge: HOME | End: 2022-04-29

## 2022-05-06 ENCOUNTER — HOSPITAL ENCOUNTER (OUTPATIENT)
Dept: CARDIAC REHAB | Age: 62
Setting detail: THERAPIES SERIES
Discharge: HOME | End: 2022-05-06
Payer: COMMERCIAL

## 2022-05-06 VITALS
OXYGEN SATURATION: 98 % | HEIGHT: 69 IN | RESPIRATION RATE: 16 BRPM | BODY MASS INDEX: 27.57 KG/M2 | DIASTOLIC BLOOD PRESSURE: 70 MMHG | WEIGHT: 186.13 LBS | SYSTOLIC BLOOD PRESSURE: 118 MMHG | HEART RATE: 65 BPM

## 2022-05-06 PROCEDURE — 93797 PHYS/QHP OP CAR RHAB WO ECG: CPT

## 2022-05-06 PROCEDURE — 93798 PHYS/QHP OP CAR RHAB W/ECG: CPT

## 2022-05-06 ASSESSMENT — PATIENT HEALTH QUESTIONNAIRE - PHQ9
SUM OF ALL RESPONSES TO PHQ QUESTIONS 1-9: 0
SUM OF ALL RESPONSES TO PHQ9 QUESTIONS 1 & 2: 0
2. FEELING DOWN, DEPRESSED OR HOPELESS: 0
SUM OF ALL RESPONSES TO PHQ QUESTIONS 1-9: 0
SUM OF ALL RESPONSES TO PHQ QUESTIONS 1-9: 0
1. LITTLE INTEREST OR PLEASURE IN DOING THINGS: 0
SUM OF ALL RESPONSES TO PHQ QUESTIONS 1-9: 0

## 2022-05-06 ASSESSMENT — EXERCISE STRESS TEST
PEAK_RPE: 13
PEAK_HR: 78
PEAK_BP: 124/70

## 2022-05-06 ASSESSMENT — LIFESTYLE VARIABLES
ALCOHOL_USE: SPECIAL
SMOKELESS_TOBACCO: NO

## 2022-05-06 ASSESSMENT — EJECTION FRACTION: EF_VALUE: 25

## 2022-05-06 NOTE — CARDIO/PULMONARY
Patient states he had a heart murmur with no other cardiac history; presented to ER with A fib RVR. Patient had an MVR and MAZE procedure on 3/23/22. Sternal incision is SHA and well approximated with no drainage noted. Patient states he had gout and a left foot infection post-op, now resolved, with no edema noted. Foot is . He denies any other orthopedic issues except for some lower back soreness. [facility] saw patient at his home; he is walking and doing stairs. He has exercised in the past by doing sit-ups, push-ups, squats and weights. He is retired from the [employer]. He denies signs and symptoms of depression. He wears glasses, no hearing aids. He also is wearing a Life Vest at this time since surgery.

## 2022-05-09 ENCOUNTER — HOSPITAL ENCOUNTER (OUTPATIENT)
Dept: CARDIAC REHAB | Age: 62
Setting detail: THERAPIES SERIES
Discharge: HOME | End: 2022-05-09
Payer: COMMERCIAL

## 2022-05-09 PROCEDURE — 93798 PHYS/QHP OP CAR RHAB W/ECG: CPT

## 2022-05-11 ENCOUNTER — HOSPITAL ENCOUNTER (OUTPATIENT)
Dept: CARDIAC REHAB | Age: 62
Setting detail: THERAPIES SERIES
Discharge: HOME | End: 2022-05-11
Payer: COMMERCIAL

## 2022-05-11 PROCEDURE — 93798 PHYS/QHP OP CAR RHAB W/ECG: CPT

## 2022-05-13 ENCOUNTER — HOSPITAL ENCOUNTER (OUTPATIENT)
Dept: CARDIAC REHAB | Age: 62
Setting detail: THERAPIES SERIES
Discharge: HOME | End: 2022-05-13
Payer: COMMERCIAL

## 2022-05-13 PROCEDURE — 93798 PHYS/QHP OP CAR RHAB W/ECG: CPT

## 2022-05-16 ENCOUNTER — HOSPITAL ENCOUNTER (OUTPATIENT)
Dept: CARDIAC REHAB | Age: 62
Setting detail: THERAPIES SERIES
Discharge: HOME | End: 2022-05-16
Payer: COMMERCIAL

## 2022-05-16 PROCEDURE — 93798 PHYS/QHP OP CAR RHAB W/ECG: CPT

## 2022-05-18 ENCOUNTER — HOSPITAL ENCOUNTER (OUTPATIENT)
Dept: CARDIAC REHAB | Age: 62
Setting detail: THERAPIES SERIES
Discharge: HOME | End: 2022-05-18
Payer: COMMERCIAL

## 2022-05-18 ENCOUNTER — OFFICE VISIT (OUTPATIENT)
Dept: CARDIOLOGY CLINIC | Age: 62
End: 2022-05-18
Payer: COMMERCIAL

## 2022-05-18 VITALS
HEIGHT: 69 IN | BODY MASS INDEX: 28.29 KG/M2 | RESPIRATION RATE: 12 BRPM | WEIGHT: 191 LBS | HEART RATE: 89 BPM | SYSTOLIC BLOOD PRESSURE: 118 MMHG | DIASTOLIC BLOOD PRESSURE: 84 MMHG

## 2022-05-18 DIAGNOSIS — I48.91 ATRIAL FIBRILLATION WITH RAPID VENTRICULAR RESPONSE (HCC): Primary | ICD-10-CM

## 2022-05-18 DIAGNOSIS — Z98.890 H/O MITRAL VALVE REPAIR: ICD-10-CM

## 2022-05-18 DIAGNOSIS — I42.9 CARDIOMYOPATHY, UNSPECIFIED TYPE (HCC): ICD-10-CM

## 2022-05-18 DIAGNOSIS — I50.42 CHRONIC COMBINED SYSTOLIC AND DIASTOLIC CONGESTIVE HEART FAILURE (HCC): ICD-10-CM

## 2022-05-18 PROCEDURE — 99215 OFFICE O/P EST HI 40 MIN: CPT | Performed by: INTERNAL MEDICINE

## 2022-05-18 PROCEDURE — 93798 PHYS/QHP OP CAR RHAB W/ECG: CPT

## 2022-05-18 PROCEDURE — 93000 ELECTROCARDIOGRAM COMPLETE: CPT | Performed by: INTERNAL MEDICINE

## 2022-05-18 RX ORDER — COLCHICINE 0.6 MG/1
CAPSULE ORAL
COMMUNITY
Start: 2022-05-03 | End: 2022-05-18

## 2022-05-18 RX ORDER — CARVEDILOL 6.25 MG/1
6.25 TABLET ORAL 2 TIMES DAILY WITH MEALS
Qty: 60 TABLET | Refills: 3 | Status: SHIPPED | OUTPATIENT
Start: 2022-05-18 | End: 2022-05-20 | Stop reason: SDUPTHER

## 2022-05-18 NOTE — PROGRESS NOTES
OUTPATIENT CARDIOLOGY FOLLOW-UP    Name: Michele Page    Age: 65 y.o. Primary Care Physician: Kaitlynn Jiménez MD    Date of Service: 5/18/2022    Chief Complaint:   Chief Complaint   Patient presents with    Follow-Up from Hospital    Dizziness        Interim History:   Here for hospital follow-up after mitral valve repair surgery due to strep bacteremia. He presented with new onset atrial fibrillation and severe MR. Underwent successful repair, postoperative EF was low he was discharged with a wearable cardioverter defibrillator. He would not discharge on anticoagulation but appears to be back in sinus rhythm. He is feeling great. He is participating in cardiac rehab. He denies chest pain, shortness of breath. He feels occasional palpitation. Sometimes gets dizzy when he stands quickly.     Review of Systems:   Negative except as described above    Past Medical History:  Past Medical History:   Diagnosis Date    Acute diastolic (congestive) heart failure ([address]     Hypertension        Past Surgical History:  Past Surgical History:   Procedure Laterality Date    CARDIAC VALVE SURGERY  03/23/2022    MVR and MAZE procedure    MITRAL VALVE MAZE PROCEDURE N/A 3/23/2022    MITRAL VALVE REPAIR VS REPLACEMENT WITH MAZE performed by Dereje Jo DO at [facility]    TRANSESOPHAGEAL ECHOCARDIOGRAM  03/16/2022    Dr. Allegra Guillen       Family History:  Family History   Problem Relation Age of Onset    Heart Failure Father        Social History:  Social History     Tobacco Use    Smoking status: Current Some Day Smoker     Types: Cigars    Smokeless tobacco: Never Used    Tobacco comment: occ   Vaping Use    Vaping Use: Never used   Substance Use Topics    Alcohol use: Yes     Comment: rarely    Drug use: Never        Allergies:  No Known Allergies    Current Medications:    Current Outpatient Medications:     carvedilol (COREG) 6.25 MG tablet, Take 1 tablet by mouth 2 times daily (with meals), Disp: 60 tablet, Rfl: 3    apixaban (ELIQUIS) 5 MG TABS tablet, Take 1 tablet by mouth 2 times daily, Disp: 60 tablet, Rfl: 5    losartan (COZAAR) 25 MG tablet, Take 0.5 tablets by mouth daily (Patient taking differently: Take 12.5 mg by mouth 2 times daily ), Disp: 30 tablet, Rfl: 3    spironolactone (ALDACTONE) 25 MG tablet, Take 0.5 tablets by mouth daily (Patient taking differently: Take 12.5 mg by mouth 2 times daily ), Disp: 30 tablet, Rfl: 3    rosuvastatin (CRESTOR) 20 MG tablet, Take 1 tablet by mouth nightly, Disp: 30 tablet, Rfl: 3    furosemide (LASIX) 20 MG tablet, Take 1 tablet by mouth daily, Disp: 60 tablet, Rfl: 1    potassium chloride (KLOR-CON M) 10 MEQ extended release tablet, Take 1 tablet by mouth daily, Disp: 90 tablet, Rfl: 1    Physical Exam:  /84   Pulse 89   Resp 12   Ht 5' 9\" (1.753 m)   Wt 191 lb (86.6 kg)   BMI 28.21 kg/m²   Wt Readings from Last 3 Encounters:   05/18/22 191 lb (86.6 kg)   05/06/22 186 lb 2 oz (84.4 kg)   04/26/22 180 lb (81.6 kg)     Appearance: Awake, alert and oriented x 3, no acute respiratory distress  Skin: Intact, no rash  Head: Normocephalic, atraumatic  Eyes: EOMI, no conjunctival erythema  ENMT: No pharyngeal erythema, MMM, no rhinorrhea  Neck: Supple, no elevated JVP, no carotid bruits  Lungs: Clear to auscultation bilaterally. No wheezes, rales, or rhonchi. Cardiac: Regular rate and rhythm, +[id number], 3/6 holosystolic murmur at the apex.   Wearable cardioverter defibrillator in place  Abdomen: Soft, nontender, +bowel sounds  Extremities: Moves all extremities x 4, no lower extremity edema  Neurologic: No focal motor deficits apparent, normal mood and affect, alert and oriented x 3  Peripheral Pulses: Intact posterior tibial pulses bilaterally    Laboratory Tests:  Lab Results   Component Value Date    CREATININE 1.1 04/11/2022    BUN 17 04/11/2022     04/11/2022    K 4.5 04/11/2022     04/11/2022    CO2 25 04/11/2022     Lab Results   Component Value Date    MG 2.1 2022     Lab Results   Component Value Date    WBC 6.9 2022    HGB 9.7 (L) 2022    HCT 30.8 (L) 2022    MCV 89.0 2022     2022     Lab Results   Component Value Date    ALT 20 2022    AST 17 2022    ALKPHOS 113 2022    BILITOT 0.3 2022     No results found for: CKTOTAL, CKMB, CKMBINDEX, TROPONINI  Lab Results   Component Value Date    INR 1.6 2022    INR 1.3 2022    INR 1.5 2022    PROTIME 17.3 (H) 2022    PROTIME 14.4 (H) 2022    PROTIME 15.9 (H) 2022     Lab Results   Component Value Date    TSH 1.880 2022     Lab Results   Component Value Date    LABA1C 5.6 03/15/2022     No results found for: EAG  Lab Results   Component Value Date    CHOL 133 03/15/2022     Lab Results   Component Value Date    TRIG 115 03/15/2022     Lab Results   Component Value Date    HDL 20 03/15/2022     Lab Results   Component Value Date    LDLCALC 90 03/15/2022     Lab Results   Component Value Date    LABVLDL 23 03/15/2022     No results found for: CHOLHDLRATIO  No results for input(s): PROBNP in the last 72 hours. Cardiac Tests:  EC2022: Sinus rhythm 89 bpm.  Normal axis. IVCD QRS duration 114 ms. Nonspecific T wave changes. Echocardiogram:   TTE 3/28/22   Summary   Ejection fraction is visually estimated at 25-30%. Overall ejection fraction severely decreased. Normal right ventricle size with reduced function. Left atrial volume index of 68 ml per meters squared BSA. Status - post mitral annular ring insertion. Mean transmitral gradient 2.9   mmHg. Physiologic and/or trace mitral regurgitation is present. Physiologic and/or trace tricuspid regurgitation. No evidence for hemodynamically significant pericardial effusion. ANITRA 3/16/22   Summary   Ejection fraction is visually estimated at 55%.    Severe holosystolic prolapse of the posterior leaflet with a flail P1/P2   segment due to ruptured chordae tendineae. Small mobile echodensity suspicious for vegetation on ventricular surface   of posterior leaflet. Failure of leaflet coaptation. No definitive papillary muscle rupture. Torrential mitral regurgitation, eccentric jet directed anteriorly. Stress test:      Cardiac catheterization:  [facility] 3/18/22 Akira   Angiographic Results/findings:  Left Main: No angiographically significant stenosis. LAD: No angiographically significant stenosis. D1: Bifurcating vessel. No angiographically significant stenosis. Cx: Dominant vessel. Mid to distal diffuse 10% stenosis. OM1: Bifurcating vessel. No angiographically significant stenosis. OM2: Proximal 10% stenosis. OM 3: No angiographically significant stenosis. OM 4: No angiographically significant stenosis. Ramus: Absent. RCA: non-Dominant. Mild diffuse luminal irregularities. Operation: ANITRA, mitral valve repair (P2 triangular resection, P3/P3 perforation repair, 34mm Future band), MAZE procedure, 40mm Atriclip     Orders Placed This Encounter   Procedures    EKG 12 lead    ECHO COMPLETE        Requested Prescriptions     Signed Prescriptions Disp Refills    carvedilol (COREG) 6.25 MG tablet 60 tablet 3     Sig: Take 1 tablet by mouth 2 times daily (with meals)    apixaban (ELIQUIS) 5 MG TABS tablet 60 tablet 5     Sig: Take 1 tablet by mouth 2 times daily        ASSESSMENT / PLAN:  1. Mitral valve endocarditis (Streptococcus) s/p mitral repair/maze/BEATRIS exclusion with atrial clip 3/23/2022 Dr Melba Doyle. 2. Paroxysmal atrial fibrillation in the context above. Currently in sinus. [id number] 1 (CHF)  3. Nonischemic cardiomyopathy, likely valvular. EF 25-30%  4. Chronic HFrEF, euvolemic  5. Lower extremity cellulitis, resolved    Recommendations:  He is doing very well from a cardiac standpoint.     · Though he is maintaining sinus, given his depressed EF and prosthetic valve material, he has high risk of recurrent AF and I recommend anticoagulation  · Discontinue aspirin, start apixaban 5 mg twice daily  · Optimize GDMT for cardiomyopathy  · Increase carvedilol 6.25 mg twice daily  · Continue losartan, transition to sacubitril/valsartan next visit if blood pressure tolerates  · Continue spironolactone  · Continue wearable cardioverter defibrillator for now  · Repeat echo after 3 to 6 months optimize GDMT  · If EF remains <35%, would recommend primary prevention ICD  · Somewhat prominent murmur noted, thus will perform echo sooner rather than later to make sure valve functioning okay  · Continue cardiac rehab  · Antibiotic prophylaxis for dental procedures  · Aggressive risk factor modification  · Follow-up in 1 month for medication titration    Greater than 40 minutes spent on this encounter    The patient's current medication list, allergies, problem list and results of all previously ordered testing were reviewed at today's visit.     Alexandro Del Rosario MD, [facility] Cardiology

## 2022-05-19 ENCOUNTER — HOSPITAL ENCOUNTER (OUTPATIENT)
Age: 62
Discharge: HOME | End: 2022-05-19
Payer: COMMERCIAL

## 2022-05-19 DIAGNOSIS — I33.0 SUBACUTE BACTERIAL ENDOCARDITIS: ICD-10-CM

## 2022-05-19 PROCEDURE — 87040 BLOOD CULTURE FOR BACTERIA: CPT

## 2022-05-19 PROCEDURE — 36415 COLL VENOUS BLD VENIPUNCTURE: CPT

## 2022-05-20 ENCOUNTER — HOSPITAL ENCOUNTER (OUTPATIENT)
Dept: CARDIAC REHAB | Age: 62
Setting detail: THERAPIES SERIES
Discharge: HOME | End: 2022-05-20
Payer: COMMERCIAL

## 2022-05-20 PROCEDURE — 93798 PHYS/QHP OP CAR RHAB W/ECG: CPT

## 2022-05-20 RX ORDER — CARVEDILOL 6.25 MG/1
6.25 TABLET ORAL 2 TIMES DAILY WITH MEALS
Qty: 60 TABLET | Refills: 3 | Status: SHIPPED
Start: 2022-05-20 | End: 2022-09-15 | Stop reason: ALTCHOICE

## 2022-05-23 ENCOUNTER — HOSPITAL ENCOUNTER (OUTPATIENT)
Dept: CARDIAC REHAB | Age: 62
Setting detail: THERAPIES SERIES
Discharge: HOME | End: 2022-05-23
Payer: COMMERCIAL

## 2022-05-23 PROCEDURE — 93798 PHYS/QHP OP CAR RHAB W/ECG: CPT

## 2022-05-24 LAB
BLOOD CULTURE, ROUTINE: NORMAL
CULTURE, BLOOD 2: NORMAL

## 2022-05-25 ENCOUNTER — HOSPITAL ENCOUNTER (OUTPATIENT)
Dept: CARDIAC REHAB | Age: 62
Setting detail: THERAPIES SERIES
Discharge: HOME | End: 2022-05-25
Payer: COMMERCIAL

## 2022-05-25 PROCEDURE — 93798 PHYS/QHP OP CAR RHAB W/ECG: CPT

## 2022-05-27 ENCOUNTER — HOSPITAL ENCOUNTER (OUTPATIENT)
Dept: CARDIAC REHAB | Age: 62
Setting detail: THERAPIES SERIES
Discharge: HOME | End: 2022-05-27
Payer: COMMERCIAL

## 2022-05-27 PROCEDURE — 93798 PHYS/QHP OP CAR RHAB W/ECG: CPT

## 2022-06-01 ENCOUNTER — HOSPITAL ENCOUNTER (OUTPATIENT)
Dept: CARDIAC REHAB | Age: 62
Setting detail: THERAPIES SERIES
Discharge: HOME | End: 2022-06-01
Payer: COMMERCIAL

## 2022-06-01 PROCEDURE — 93798 PHYS/QHP OP CAR RHAB W/ECG: CPT

## 2022-06-03 ENCOUNTER — HOSPITAL ENCOUNTER (OUTPATIENT)
Dept: CARDIAC REHAB | Age: 62
Setting detail: THERAPIES SERIES
Discharge: HOME | End: 2022-06-03
Payer: COMMERCIAL

## 2022-06-03 PROCEDURE — 93798 PHYS/QHP OP CAR RHAB W/ECG: CPT

## 2022-06-06 ENCOUNTER — HOSPITAL ENCOUNTER (OUTPATIENT)
Dept: CARDIAC REHAB | Age: 62
Setting detail: THERAPIES SERIES
Discharge: HOME | End: 2022-06-06
Payer: COMMERCIAL

## 2022-06-06 PROCEDURE — 93798 PHYS/QHP OP CAR RHAB W/ECG: CPT

## 2022-06-08 ENCOUNTER — HOSPITAL ENCOUNTER (OUTPATIENT)
Dept: CARDIAC REHAB | Age: 62
Setting detail: THERAPIES SERIES
Discharge: HOME | End: 2022-06-08
Payer: COMMERCIAL

## 2022-06-08 PROCEDURE — 93798 PHYS/QHP OP CAR RHAB W/ECG: CPT

## 2022-06-09 ENCOUNTER — TELEPHONE (OUTPATIENT)
Dept: CARDIAC REHAB | Age: 62
End: 2022-06-09

## 2022-06-09 NOTE — TELEPHONE ENCOUNTER
6/9/2022 1430 Nutrition: Left HIPPA appropriate message regarding appointment reminder for 6/13/2022 at  10:00 am. Will remain available.  Electronically signed by Taniya Tony RD, LD on 6/9/22 at 2:33 PM EDT

## 2022-06-10 ENCOUNTER — HOSPITAL ENCOUNTER (OUTPATIENT)
Dept: CARDIAC REHAB | Age: 62
Setting detail: THERAPIES SERIES
Discharge: HOME | End: 2022-06-10
Payer: COMMERCIAL

## 2022-06-10 PROCEDURE — 93798 PHYS/QHP OP CAR RHAB W/ECG: CPT

## 2022-06-13 ENCOUNTER — HOSPITAL ENCOUNTER (OUTPATIENT)
Dept: CARDIAC REHAB | Age: 62
Setting detail: THERAPIES SERIES
Discharge: HOME | End: 2022-06-13
Payer: COMMERCIAL

## 2022-06-13 VITALS — BODY MASS INDEX: 28.47 KG/M2 | WEIGHT: 192.2 LBS | HEIGHT: 69 IN

## 2022-06-13 PROCEDURE — 93797 PHYS/QHP OP CAR RHAB WO ECG: CPT

## 2022-06-13 PROCEDURE — 93798 PHYS/QHP OP CAR RHAB W/ECG: CPT

## 2022-06-13 NOTE — PROGRESS NOTES
CARDIAC REHABILITATION NUTRITION ASSESSMENT     NAME: Edith Robin : 1960 AGE: 65 y.o. GENDER: male    CARDIAC REHAB ADMITTING DIAGNOSIS:S/{  MVR and Maze procedure     PROBLEM LIST:    Patient Active Problem List   Diagnosis    Atrial fibrillation with [facility] ventricular response [facility]    Sepsis ([facility] [address] [facility] present on admission    JANA (acute kidney injury) ([facility] [facility]    Bacteremia    Severe mitral regurgitation    Suspected endocarditis    Subacute bacterial endocarditis    Ruptured chordae tendineae (HCC)    Mitral valve disease    Paroxysmal atrial fibrillation (HCC)    Acute diastolic (congestive) heart failure (HCC)    Cardiac insufficiency (HCC)    Acute pulmonary insufficiency    Acute blood loss anemia     Additional pertinent past medical/surgical history: history of acute CHF     LABS:    Hemoglobin A1C   Date Value Ref Range Status   03/15/2022 5.6 4.0 - 5.6 % Final       Lab Results   Component Value Date    CHOL 133 03/15/2022    HDL 20 03/15/2022    LDLCALC 90 03/15/2022    TRIG 115 03/15/2022    LABVLDL 23 03/15/2022        MEDICATIONS/SUPPLEMENTS:      Prior to Admission medications    Medication Sig Start Date End Date Taking?  Authorizing Provider   apixaban (ELIQUIS) 5 MG TABS tablet Take 1 tablet by mouth 2 times daily 22   Lars Win MD   carvedilol (COREG) 6.25 MG tablet Take 1 tablet by mouth 2 times daily (with meals) 22   Lars Win MD   losartan (COZAAR) 25 MG tablet Take 0.5 tablets by mouth daily  Patient taking differently: Take 12.5 mg by mouth 2 times daily  3/30/22   ROX Gonzalez CNP   spironolactone (ALDACTONE) 25 MG tablet Take 0.5 tablets by mouth daily  Patient taking differently: Take 12.5 mg by mouth 2 times daily  3/30/22   ROX Gonzalez CNP   rosuvastatin (CRESTOR) 20 MG tablet Take 1 tablet by mouth nightly 3/28/22   DAINA Quintero   furosemide (LASIX) 20 MG tablet Take 1 tablet by mouth daily 3/28/22   Teja YA DAINA Dunn   potassium chloride (KLOR-CON M) 10 MEQ extended release tablet Take 1 tablet by mouth daily 3/28/22   DAINA Hendricks       Patient reports he takes all medications. Pertinent lipid lowering medications: Crestor per order- low cholesterol diet is indicated. Patient verbalized understanidng. ANTHROPOMETRICS:    Ht Readings from Last 1 Encounters:   06/13/22 5' 9\" (1.753 m)      Wt Readings from Last 10 Encounters:   06/13/22 192 lb 3.2 oz (87.2 kg)   05/18/22 191 lb (86.6 kg)   05/06/22 186 lb 2 oz (84.4 kg)   04/26/22 180 lb (81.6 kg)   04/11/22 180 lb (81.6 kg)   03/30/22 193 lb (87.5 kg)       BMI Readings from Last 3 Encounters:   06/13/22 28.38 kg/m²   05/18/22 28.21 kg/m²   05/06/22 27.49 kg/m²           IBW:160 lbs +/- 10%       %IBW: 120%              BMI 28.38 kg/M2 Category: Adjusted body weight: 168 pounds     Waist: 39.5  inches  RD and patient discussed waist circumference for males under 40 inches. Waist circumference under 40 inches. Patient verbalized understanding. Reported Wt Hx:Patient reports usual weight: 190 pounds. Patient ls lost  15 pounds when ill and looked sickly. Patient has regained weight. Patient reports saw physician and advised weight should be under 200 pounds. Patient cardiac rehab weight admit weight: 186 pounds. Weight today: 192 pounds. Patient gained 6 pounds over 5 weeks indicating 3.2% weight gain. Weight gain beneficial. Will continue to monitor.       Reported Diet Hx:Patient reports appetite is good; patient is eatign 2 to 3 meals and most days eats 2 meals; no food allergies; no cultural, Pentecostal or ethnic food preferences; patient cooks and grocery shops; no chewing problems and no swallowing problems; rarely eats fast food restaurants ( likes [employer] salad) eats at sit down restaurants infrequently; does not eat refined sugar or processed food       Rate Your Plate Score: pending    (Score 58-72: Making many healthy choices; 41-57: Some choices need improving 24-40: many choices need improving)       24 HOUR DIET RECALL    Breakfast: 10:00 am  At home,  4- 6  ounces regular  yogurt with granola with blueberries and strawberries,1  Large  Cup decaf black  tea with honey or water ( alternate: eggs with seeded toasted bread)      Lunch: none ( alternate: snack)      Dinner 8:00 pm,  At home,  2 cups  Salad ( Spring mix, with carrots, broccoli,  cauliflower and berries, with dry mix salad dressing ( makes with apple cider vinegar and  Olive oil)  1 piece baked salmon and 1  bottled unflavored  water ( with lemon)  ( eats salad with side  5 days per week- sides include fish, shrimp or pasta and steamed vegetables or tuna)      Snacks: 10:00 pm,   At home , 1 ounce omega 3 trail mix ( almonds, cashews and walnuts, seeds and dried cranberries     Beverages: 1 gallon water - no soda pop and 2 cups decaf hot tea       Lynne Salas is participating in rehab. Environmental/Social:drinks very little alcohol; less than one drink per month; has adequate funds for groceries;         NUTRITION INTERVENTION:    Nutrition 30/ 60 minute one-on-one education & goal setting with Lynne Salas       Reviewed with Lynne Salas relevant labs compared to ideals. Reviewed weight history and patient's verbalized weight goal as well as any real or perceived barriers to obtaining the goal. Collaborated with patient to set a specific short and long term weight goal.    Conducted a verbal diet recall.   Assessed for environmental, financial, psychosocial, physical and comorbidities that may impact the food and eating patterns / behaviors of Aixa Bell with patient to set specific nutrient goals as well as specific food / behavior changes that will help patient meet the overall goal of following a heart healthy eating pattern (using guidelines as set forth by the American Heart Association and modeled after healthful eating patterns as recognized by the USDA Dietary Guidelines such as DASH, Mediterranean or plant-based). Briefly reviewed with Geovani Hoang the nutrition information: My Plate, Dietary Resolutions, 3 day food log, MNT Heart Healthy Low Sodium and rate your plate directions and encouraged Geovani Hoang to read thoroughly, ask questions as needed, and use for future reference for heart healthy nutrition information. Geovani Hoang is not scheduled to participate in Cardiac Rehab group nutrition classes. PATIENT GOALS:    Weight Goals:Patient will try to lose 1 pound every week. Short Term Weight Goal: 188 bs    Long Term Weight Goal: 180 to 185 pounds lbs     Nutrition Goals:Patient will try to follow my plate. Daily Recommendations:Patient will try to follow my plate dietary guidelines. Calories: 2200 calories/day  Minus calories for weight loss     (using [address] ( [address] ) with AF 1.3 )    Estimated Total Protein needs: 72 to 80 grams/day ( based on 1 to 1.1  grams/kg IBW)      Estimated Total Fluid needs: 2100- 2600 ml/day ( based on 25 to 30 ml/kg actual weight)     Saturated Fat: no more than 15  g/day    Trans Fat: 0 g/day    Sodium: no more than 1500  mg/day    Fruit: 3  cups / day    Vegetables: 2 cups/day         Other:    - read and compare food labels    - increase water production    - have snack mid day    Keeping a food diary was recommended.      -Complete rate your plate form and return at next available time. Questions addressed. Follow-up plans discussed. Patient scheduled for follow up on July 11, 2022 at 10:00 am. Contact information provided. Geovani Hoang verbalized understanding. Laila Snell MA, RDN, LD  Contact Phone: [phone]. Addendum: 6/13/2022 11:26 am Nutrition: Patient is a phase II participant and ITP done by exercise physiologist. Will remain available.  emergent status

## 2022-06-15 ENCOUNTER — HOSPITAL ENCOUNTER (OUTPATIENT)
Dept: CARDIAC REHAB | Age: 62
Setting detail: THERAPIES SERIES
Discharge: HOME | End: 2022-06-15
Payer: COMMERCIAL

## 2022-06-15 PROCEDURE — 93798 PHYS/QHP OP CAR RHAB W/ECG: CPT

## 2022-06-17 ENCOUNTER — HOSPITAL ENCOUNTER (OUTPATIENT)
Dept: CARDIAC REHAB | Age: 62
Setting detail: THERAPIES SERIES
Discharge: HOME | End: 2022-06-17
Payer: COMMERCIAL

## 2022-06-17 PROCEDURE — 93798 PHYS/QHP OP CAR RHAB W/ECG: CPT

## 2022-06-20 ENCOUNTER — HOSPITAL ENCOUNTER (OUTPATIENT)
Dept: CARDIAC REHAB | Age: 62
Setting detail: THERAPIES SERIES
Discharge: HOME | End: 2022-06-20
Payer: COMMERCIAL

## 2022-06-20 PROCEDURE — 93798 PHYS/QHP OP CAR RHAB W/ECG: CPT

## 2022-06-22 ENCOUNTER — OFFICE VISIT (OUTPATIENT)
Dept: CARDIOLOGY CLINIC | Age: 62
End: 2022-06-22
Payer: COMMERCIAL

## 2022-06-22 ENCOUNTER — HOSPITAL ENCOUNTER (OUTPATIENT)
Dept: CARDIAC REHAB | Age: 62
Setting detail: THERAPIES SERIES
Discharge: HOME | End: 2022-06-22
Payer: COMMERCIAL

## 2022-06-22 VITALS
DIASTOLIC BLOOD PRESSURE: 82 MMHG | HEART RATE: 77 BPM | RESPIRATION RATE: 14 BRPM | WEIGHT: 196 LBS | HEIGHT: 69 IN | SYSTOLIC BLOOD PRESSURE: 122 MMHG | BODY MASS INDEX: 29.03 KG/M2

## 2022-06-22 DIAGNOSIS — I48.0 PAF (PAROXYSMAL ATRIAL FIBRILLATION) (HCC): Primary | ICD-10-CM

## 2022-06-22 DIAGNOSIS — Z98.890 S/P MVR (MITRAL VALVE REPAIR): ICD-10-CM

## 2022-06-22 DIAGNOSIS — I33.0 SUBACUTE BACTERIAL ENDOCARDITIS: ICD-10-CM

## 2022-06-22 DIAGNOSIS — I45.10 RBBB: ICD-10-CM

## 2022-06-22 DIAGNOSIS — I50.42 CHRONIC COMBINED SYSTOLIC AND DIASTOLIC CONGESTIVE HEART FAILURE (HCC): ICD-10-CM

## 2022-06-22 DIAGNOSIS — I34.0 NONRHEUMATIC MITRAL VALVE REGURGITATION: ICD-10-CM

## 2022-06-22 DIAGNOSIS — I42.8 NICM (NONISCHEMIC CARDIOMYOPATHY) (HCC): ICD-10-CM

## 2022-06-22 PROBLEM — I48.91 ATRIAL FIBRILLATION WITH RAPID VENTRICULAR RESPONSE (HCC): Status: RESOLVED | Noted: 2022-03-14 | Resolved: 2022-06-22

## 2022-06-22 PROCEDURE — 93798 PHYS/QHP OP CAR RHAB W/ECG: CPT

## 2022-06-22 PROCEDURE — 99214 OFFICE O/P EST MOD 30 MIN: CPT | Performed by: INTERNAL MEDICINE

## 2022-06-22 PROCEDURE — 93000 ELECTROCARDIOGRAM COMPLETE: CPT | Performed by: INTERNAL MEDICINE

## 2022-06-22 RX ORDER — ASPIRIN 81 MG/1
TABLET ORAL
COMMUNITY
Start: 2022-05-02 | End: 2022-08-02 | Stop reason: HOSPADM

## 2022-06-22 NOTE — PROGRESS NOTES
OUTPATIENT CARDIOLOGY FOLLOW-UP    Name: Twana Cheadle    Age: 65 y.o. Primary Care Physician: Kasey Mooney MD    Date of Service: 6/22/2022    Chief Complaint:   Chief Complaint   Patient presents with    1 Month Follow-Up        Interim History:   Here for follow-up regarding mitral valve repair surgery due to strep bacteremia. He presented with new onset atrial fibrillation and severe MR 3/2022. Underwent successful repair, postoperative EF was low he was discharged with a wearable cardioverter defibrillator. He was not discharged on anticoagulation but appears to be maintaining sinus rhythm. He is feeling great. He is participating in cardiac rehab. He denies chest pain, shortness of breath, palpitations. He is compliant with a wearable defibrillator. Denies any alarms or device discharges.     Review of Systems:   Negative except as described above    Past Medical History:  Past Medical History:   Diagnosis Date    Acute diastolic (congestive) heart failure ([address]     Hypertension        Past Surgical History:  Past Surgical History:   Procedure Laterality Date    CARDIAC VALVE SURGERY  03/23/2022    MVR and MAZE procedure    MITRAL VALVE MAZE PROCEDURE N/A 3/23/2022    MITRAL VALVE REPAIR VS REPLACEMENT WITH MAZE [facility]    TRANSESOPHAGEAL ECHOCARDIOGRAM  03/16/2022    Dr. Raul Horowitz       Family History:  Family History   Problem Relation Age of Onset    Heart Failure Father        Social History:  Social History     Tobacco Use    Smoking status: Current Some Day Smoker     Types: Cigars    Smokeless tobacco: Never Used    Tobacco comment: occ   Vaping Use    Vaping Use: Never used   Substance Use Topics    Alcohol use: Yes     Comment: rarely    Drug use: Never        Allergies:  No Known Allergies    Current Medications:    Current Outpatient Medications:     aspirin 81 MG EC tablet, Take by mouth, Disp: , Rfl:     carvedilol (COREG) 6.25 MG tablet, Take 1 tablet by mouth 2 times daily (with meals), Disp: 60 tablet, Rfl: 3    losartan (COZAAR) 25 MG tablet, Take 0.5 tablets by mouth daily (Patient taking differently: Take 12.5 mg by mouth 2 times daily ), Disp: 30 tablet, Rfl: 3    spironolactone (ALDACTONE) 25 MG tablet, Take 0.5 tablets by mouth daily (Patient taking differently: Take 12.5 mg by mouth 2 times daily ), Disp: 30 tablet, Rfl: 3    rosuvastatin (CRESTOR) 20 MG tablet, Take 1 tablet by mouth nightly, Disp: 30 tablet, Rfl: 3    furosemide (LASIX) 20 MG tablet, Take 1 tablet by mouth daily, Disp: 60 tablet, Rfl: 1    potassium chloride (KLOR-CON M) 10 MEQ extended release tablet, Take 1 tablet by mouth daily, Disp: 90 tablet, Rfl: 1    apixaban (ELIQUIS) 5 MG TABS tablet, Take 1 tablet by mouth 2 times daily (Patient not taking: Reported on 6/22/2022), Disp: 60 tablet, Rfl: 5    Physical Exam:  /82   Pulse 77   Resp 14   Ht 5' 9\" (1.753 m)   Wt 196 lb (88.9 kg)   BMI 28.94 kg/m²   Wt Readings from Last 3 Encounters:   06/22/22 196 lb (88.9 kg)   06/13/22 192 lb 3.2 oz (87.2 kg)   05/18/22 191 lb (86.6 kg)     Appearance: Awake, alert and oriented x 3, no acute respiratory distress  Skin: Intact, no rash  Head: Normocephalic, atraumatic  Eyes: EOMI, no conjunctival erythema  ENMT: No pharyngeal erythema, MMM, no rhinorrhea  Neck: Supple, no elevated JVP, no carotid bruits  Lungs: Clear to auscultation bilaterally. No wheezes, rales, or rhonchi. Cardiac: Regular rate and rhythm, +L0N2, 3/6 holosystolic murmur at the apex.   Wearable cardioverter defibrillator in place  Abdomen: Soft, nontender, +bowel sounds  Extremities: Moves all extremities x 4, no lower extremity edema  Neurologic: No focal motor deficits apparent, normal mood and affect, alert and oriented x 3  Peripheral Pulses: Intact posterior tibial pulses bilaterally    Laboratory Tests:  Lab Results   Component Value Date    CREATININE 1.1 04/11/2022 BUN 17 2022     2022    K 4.5 2022     2022    CO2 25 2022     Lab Results   Component Value Date    MG 2.1 2022     Lab Results   Component Value Date    WBC 6.9 2022    HGB 9.7 (L) 2022    HCT 30.8 (L) 2022    MCV 89.0 2022     2022     Lab Results   Component Value Date    ALT 20 2022    AST 17 2022    ALKPHOS 113 2022    BILITOT 0.3 2022     No results found for: CKTOTAL, CKMB, CKMBINDEX, TROPONINI  Lab Results   Component Value Date    INR 1.6 2022    INR 1.3 2022    INR 1.5 2022    PROTIME 17.3 (H) 2022    PROTIME 14.4 (H) 2022    PROTIME 15.9 (H) 2022     Lab Results   Component Value Date    TSH 1.880 2022     Lab Results   Component Value Date    LABA1C 5.6 03/15/2022     No results found for: EAG  Lab Results   Component Value Date    CHOL 133 03/15/2022     Lab Results   Component Value Date    TRIG 115 03/15/2022     Lab Results   Component Value Date    HDL 20 03/15/2022     Lab Results   Component Value Date    LDLCALC 90 03/15/2022     Lab Results   Component Value Date    LABVLDL 23 03/15/2022     No results found for: CHOLHDLRATIO  No results for input(s): PROBNP in the last 72 hours. Cardiac Tests:  EC2022: Sinus rhythm 89 bpm.  Normal axis. IVCD QRS duration 114 ms. Nonspecific T wave changes. 2022: Sinus rhythm 77 beats minute. Right bundle branch block QRS duration 125 ms. Echocardiogram:   TTE 3/28/22   Summary   Ejection fraction is visually estimated at 25-30%. Overall ejection fraction severely decreased. Normal right ventricle size with reduced function. Left atrial volume index of 68 ml per meters squared BSA. Status - post mitral annular ring insertion. Mean transmitral gradient 2.9   mmHg. Physiologic and/or trace mitral regurgitation is present. Physiologic and/or trace tricuspid regurgitation. No evidence for hemodynamically significant pericardial effusion. ANITRA 3/16/22   Summary   Ejection fraction is visually estimated at 55%. Severe holosystolic prolapse of the posterior leaflet with a flail P1/P2   segment due to ruptured chordae tendineae. Small mobile echodensity suspicious for vegetation on ventricular surface   of posterior leaflet. Failure of leaflet coaptation. No definitive papillary muscle rupture. Torrential mitral regurgitation, eccentric jet directed anteriorly. Stress test:      Cardiac catheterization:  [facility] 3/18/22 [place]   Angiographic Results/findings:  Left Main: No angiographically significant stenosis. LAD: No angiographically significant stenosis. D1: Bifurcating vessel. No angiographically significant stenosis. Cx: Dominant vessel. Mid to distal diffuse 10% stenosis. OM1: Bifurcating vessel. No angiographically significant stenosis. OM2: Proximal 10% stenosis. OM 3: No angiographically significant stenosis. OM 4: No angiographically significant stenosis. Ramus: Absent. RCA: non-Dominant. Mild diffuse luminal irregularities. Operation: ANITRA, mitral valve repair (P2 triangular resection, P3/P3 perforation repair, 34mm Future band), MAZE procedure, 40mm Atriclip     Orders Placed This Encounter   Procedures    EKG 12 lead        Requested Prescriptions      No prescriptions requested or ordered in this encounter        ASSESSMENT / PLAN:  1. 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 2. Paroxysmal atrial fibrillation in the context of above. Currently in sinus. [id number] 1 (CHF)  3. Nonischemic cardiomyopathy, likely valvular. EF 25-30%  4. Chronic HFrEF, euvolemic. NYHA class I ACC stage C  5. Mild nonobstructive CAD  6. Right bundle branch block  7.  Lower extremity cellulitis, resolved    Recommendations:  He is doing very well from a cardiac standpoint. · Though he is maintaining sinus, given his depressed EF and prosthetic valve material, he has high risk of recurrent AF and I recommend anticoagulation  · Continue apixaban 5 mg twice daily -was supposed to get a prior authorization has not gone through yet  · Optimize GDMT for cardiomyopathy  · Continue carvedilol 6.25 mg twice daily  · Continue losartan, consider transition to sacubitril/valsartan if EF remains depressed  · Continue spironolactone  · Consider SGLT2 inhibitor  · Continue wearable cardioverter defibrillator for now  · Repeat echo now  · If EF remains <35%, may give another 3 months of optimizing medical therapy prior to considering ICD  · Somewhat prominent murmur noted, thus will perform echo now instead of waiting till completely optimal doses of GDMT  · Continue cardiac rehab  · Antibiotic prophylaxis for dental procedures  · Aggressive risk factor modification  · Follow-up in 3 months or sooner if need arises    The patient's current medication list, allergies, problem list and results of all previously ordered testing were reviewed at today's visit.     Layne Givens MD, [facility]

## 2022-06-24 ENCOUNTER — HOSPITAL ENCOUNTER (OUTPATIENT)
Dept: CARDIAC REHAB | Age: 62
Setting detail: THERAPIES SERIES
Discharge: HOME | End: 2022-06-24
Payer: COMMERCIAL

## 2022-06-24 PROCEDURE — 93798 PHYS/QHP OP CAR RHAB W/ECG: CPT

## 2022-06-27 ENCOUNTER — HOSPITAL ENCOUNTER (OUTPATIENT)
Dept: CARDIAC REHAB | Age: 62
Setting detail: THERAPIES SERIES
Discharge: HOME | End: 2022-06-27
Payer: COMMERCIAL

## 2022-06-27 PROCEDURE — 93798 PHYS/QHP OP CAR RHAB W/ECG: CPT

## 2022-06-29 ENCOUNTER — HOSPITAL ENCOUNTER (OUTPATIENT)
Dept: CARDIAC REHAB | Age: 62
Setting detail: THERAPIES SERIES
Discharge: HOME | End: 2022-06-29
Payer: COMMERCIAL

## 2022-06-29 ENCOUNTER — HOSPITAL ENCOUNTER (OUTPATIENT)
Dept: CARDIOLOGY | Age: 62
Discharge: HOME | End: 2022-06-29
Payer: COMMERCIAL

## 2022-06-29 DIAGNOSIS — I50.42 CHRONIC COMBINED SYSTOLIC AND DIASTOLIC CONGESTIVE HEART FAILURE (HCC): ICD-10-CM

## 2022-06-29 DIAGNOSIS — Z98.890 H/O MITRAL VALVE REPAIR: ICD-10-CM

## 2022-06-29 LAB
LV EF: 38 %
LVEF MODALITY: NORMAL

## 2022-06-29 PROCEDURE — 93306 TTE W/DOPPLER COMPLETE: CPT

## 2022-06-29 PROCEDURE — 93798 PHYS/QHP OP CAR RHAB W/ECG: CPT

## 2022-07-01 ENCOUNTER — HOSPITAL ENCOUNTER (OUTPATIENT)
Dept: CARDIAC REHAB | Age: 62
Setting detail: THERAPIES SERIES
Discharge: HOME | End: 2022-07-01
Payer: COMMERCIAL

## 2022-07-01 PROCEDURE — 93798 PHYS/QHP OP CAR RHAB W/ECG: CPT

## 2022-07-04 ENCOUNTER — HOSPITAL ENCOUNTER (OUTPATIENT)
Dept: CARDIAC REHAB | Age: 62
Setting detail: THERAPIES SERIES
End: 2022-07-04
Payer: COMMERCIAL

## 2022-07-06 ENCOUNTER — HOSPITAL ENCOUNTER (OUTPATIENT)
Dept: CARDIAC REHAB | Age: 62
Setting detail: THERAPIES SERIES
Discharge: HOME | End: 2022-07-06
Payer: COMMERCIAL

## 2022-07-06 PROCEDURE — 93798 PHYS/QHP OP CAR RHAB W/ECG: CPT

## 2022-07-06 NOTE — RESULT ENCOUNTER NOTE
Please let him know I reviewed the echo. There is good news and not so good news. His EF has improved above 35% and he can discontinue the vest.    The mitral valve however still appears to be leaking to at least a moderate degree. Unfortunately when there is infection of the valve it can literally chew up the valve making a repair difficult. I think we should repeat his ANITRA to get a better look at the repair and to more accurately quantify how much the valve is leaking.

## 2022-07-07 ENCOUNTER — TELEPHONE (OUTPATIENT)
Dept: CARDIOLOGY CLINIC | Age: 62
End: 2022-07-07

## 2022-07-07 ENCOUNTER — TELEPHONE (OUTPATIENT)
Dept: NON INVASIVE DIAGNOSTICS | Age: 62
End: 2022-07-07

## 2022-07-07 NOTE — TELEPHONE ENCOUNTER
Medication: Eliquis 5mg BID    Dx: Paroxysmal atrial fibrillation I48.0    Provider: Yanna Carlos    Pharmacy: [address] ph: [phone]    pending

## 2022-07-07 NOTE — TELEPHONE ENCOUNTER
Patient's sister returned our call. She was given results and recommendations per Dr. Raghav Butcher. She verbalized understanding. Patient to be scheduled for ANITRA with Dr. Raghav Butcher the week of 8/1/2022 at [facility].

## 2022-07-07 NOTE — TELEPHONE ENCOUNTER
Patient scheduled for ANITRA with Dr. Casey Zamudio at [facility] on 8/2/2022. All instructions given to patient's sister, Jeni Resendiz, and she will relay information to patient. Patient indicates he has not yet begun taking Eliquis (authorization has not been obtained, working on it today). Should patient hold Eliquis for ANITRA? Please advise.

## 2022-07-08 ENCOUNTER — HOSPITAL ENCOUNTER (OUTPATIENT)
Dept: CARDIAC REHAB | Age: 62
Setting detail: THERAPIES SERIES
Discharge: HOME | End: 2022-07-08
Payer: COMMERCIAL

## 2022-07-08 PROCEDURE — 93798 PHYS/QHP OP CAR RHAB W/ECG: CPT

## 2022-07-11 ENCOUNTER — HOSPITAL ENCOUNTER (OUTPATIENT)
Dept: CARDIAC REHAB | Age: 62
Setting detail: THERAPIES SERIES
Discharge: HOME | End: 2022-07-11
Payer: COMMERCIAL

## 2022-07-11 PROCEDURE — 93798 PHYS/QHP OP CAR RHAB W/ECG: CPT

## 2022-07-13 ENCOUNTER — HOSPITAL ENCOUNTER (OUTPATIENT)
Dept: CARDIAC REHAB | Age: 62
Setting detail: THERAPIES SERIES
Discharge: HOME | End: 2022-07-13
Payer: COMMERCIAL

## 2022-07-13 PROCEDURE — 93798 PHYS/QHP OP CAR RHAB W/ECG: CPT

## 2022-07-15 ENCOUNTER — HOSPITAL ENCOUNTER (OUTPATIENT)
Dept: CARDIAC REHAB | Age: 62
Setting detail: THERAPIES SERIES
Discharge: HOME | End: 2022-07-15
Payer: COMMERCIAL

## 2022-07-15 PROCEDURE — 93798 PHYS/QHP OP CAR RHAB W/ECG: CPT

## 2022-07-18 ENCOUNTER — HOSPITAL ENCOUNTER (OUTPATIENT)
Dept: CARDIAC REHAB | Age: 62
Setting detail: THERAPIES SERIES
Discharge: HOME | End: 2022-07-18
Payer: COMMERCIAL

## 2022-07-18 PROCEDURE — 93798 PHYS/QHP OP CAR RHAB W/ECG: CPT

## 2022-07-20 ENCOUNTER — HOSPITAL ENCOUNTER (OUTPATIENT)
Dept: CARDIAC REHAB | Age: 62
Setting detail: THERAPIES SERIES
Discharge: HOME | End: 2022-07-20
Payer: COMMERCIAL

## 2022-07-20 ENCOUNTER — TELEPHONE (OUTPATIENT)
Dept: CARDIOLOGY CLINIC | Age: 62
End: 2022-07-20

## 2022-07-20 PROCEDURE — 93798 PHYS/QHP OP CAR RHAB W/ECG: CPT

## 2022-07-20 NOTE — TELEPHONE ENCOUNTER
If he is feeling okay, just have him come in tomorrow for EKG and blood pressure. If symptomatic with shortness of breath, lightheadedness, chest pain should go to the ER. Please make sure he is taking the apixaban as recommended. If he remains in A. fib we can do a cardioversion if needed at the time of the ANITRA that should already be scheduled.

## 2022-07-20 NOTE — TELEPHONE ENCOUNTER
Patient in cardiac rehab. Presently in AF with rate of 144 bpm, /60. Patient has been resting for 10 minutes and afib won't break. Please advise.

## 2022-07-20 NOTE — TELEPHONE ENCOUNTER
Contacted patient with recommendations per Dr. Josseline Birmingham. He verbalized understanding. He is taking Eliquis as prescribed. EKG and BP check scheduled for 1:20 pm 7/21/2022.

## 2022-07-21 ENCOUNTER — TELEPHONE (OUTPATIENT)
Dept: CARDIOLOGY CLINIC | Age: 62
End: 2022-07-21

## 2022-07-21 ENCOUNTER — NURSE ONLY (OUTPATIENT)
Dept: CARDIOLOGY CLINIC | Age: 62
End: 2022-07-21
Payer: COMMERCIAL

## 2022-07-21 DIAGNOSIS — I48.0 PAROXYSMAL ATRIAL FIBRILLATION (HCC): Primary | ICD-10-CM

## 2022-07-21 PROCEDURE — 93000 ELECTROCARDIOGRAM COMPLETE: CPT | Performed by: INTERNAL MEDICINE

## 2022-07-21 RX ORDER — DIGOXIN 250 MCG
TABLET ORAL
Qty: 20 TABLET | Refills: 0
Start: 2022-07-21 | End: 2022-08-02

## 2022-07-21 NOTE — TELEPHONE ENCOUNTER
Contacted patient with recommendations per Dr. Summer Chan. Patient verbalized understanding. Script forwarded to Dr. Summer Chan for signature.    ----- Message from Kelli Cortez MD sent at 7/21/2022  2:44 PM EDT -----  EKG showing A. fib in the 140s. However seated heart rate was in the 70s. He was asymptomatic and felt well. Place 14-day ZIO AT today    If develops symptoms of shortness of breath, dizziness, chest pain should go to the ER, may need more urgent cardioversion. If remains stable with reasonably well-controlled rates, will plan to perform outpatient cardioversion at the time of his ANITRA scheduled early in August.    Given low blood pressure, no room to uptitrate carvedilol. Add digoxin 250 mcg daily, take 500 mcg on day 1. This will only be for short-term use so no need to prescribe more than a several week supply until his cardioversion.

## 2022-07-21 NOTE — RESULT ENCOUNTER NOTE
EKG showing A. fib in the 140s. However seated heart rate was in the 70s. He was asymptomatic and felt well. Place 14-day ZIO AT today    If develops symptoms of shortness of breath, dizziness, chest pain should go to the ER, may need more urgent cardioversion. If remains stable with reasonably well-controlled rates, will plan to perform outpatient cardioversion at the time of his ANITRA scheduled early in August.    Given low blood pressure, no room to uptitrate carvedilol. Add digoxin 250 mcg daily, take 500 mcg on day 1. This will only be for short-term use so no need to prescribe more than a several week supply until his cardioversion.

## 2022-07-21 NOTE — PROGRESS NOTES
Patient was in today for EKG and BP/Check per Dr. Debby Omalley, MA         Sitting BP: 110/70    Sitting P: 76      Standing BP: 98/62    Standing P: Eboni Harper

## 2022-07-22 ENCOUNTER — HOSPITAL ENCOUNTER (OUTPATIENT)
Dept: CARDIAC REHAB | Age: 62
Setting detail: THERAPIES SERIES
Discharge: HOME | End: 2022-07-22
Payer: COMMERCIAL

## 2022-07-22 PROCEDURE — 93798 PHYS/QHP OP CAR RHAB W/ECG: CPT

## 2022-07-25 ENCOUNTER — HOSPITAL ENCOUNTER (OUTPATIENT)
Dept: CARDIAC REHAB | Age: 62
Setting detail: THERAPIES SERIES
Discharge: HOME | End: 2022-07-25
Payer: COMMERCIAL

## 2022-07-25 PROCEDURE — 93798 PHYS/QHP OP CAR RHAB W/ECG: CPT

## 2022-07-27 ENCOUNTER — HOSPITAL ENCOUNTER (OUTPATIENT)
Dept: CARDIAC REHAB | Age: 62
Setting detail: THERAPIES SERIES
Discharge: HOME | End: 2022-07-27
Payer: COMMERCIAL

## 2022-07-27 PROCEDURE — 93798 PHYS/QHP OP CAR RHAB W/ECG: CPT

## 2022-07-27 ASSESSMENT — LIFESTYLE VARIABLES
ALCOHOL_USE: SPECIAL
SMOKELESS_TOBACCO: NO

## 2022-07-27 ASSESSMENT — EJECTION FRACTION: EF_VALUE: 25

## 2022-07-27 ASSESSMENT — EXERCISE STRESS TEST
PEAK_RPE: 14
PEAK_HR: 146
PEAK_BP: 126/66
PEAK_BP: 129/75

## 2022-07-27 NOTE — PROGRESS NOTES
Josesito Keys has completed 36/36 telemetry monitored exercise sessions. Using a variety of cardiovascular equipment, he is able to sustain an average of 60 minutes, at 3.7-5.4 MET level of intensity, with proper hemodynamic response. He has lost 2 pounds since beginning Cardiac Rehab. He has been asymptomatic during his exercise sessions. A home exercise program handout was given and reviewed. He plans to continue exercise in our Phase 3 program.  Upon request, a detailed summary of his session readings can be sent for your review. Please call [facility] at [phone]. Thank you for allowing us to care for your patient.

## 2022-08-01 ENCOUNTER — TELEPHONE (OUTPATIENT)
Dept: NON INVASIVE DIAGNOSTICS | Age: 62
End: 2022-08-01

## 2022-08-01 NOTE — TELEPHONE ENCOUNTER
Reminded patient of scheduled procedure on 8/2. Instructions given and COVID questionnaire completed.

## 2022-08-02 ENCOUNTER — TELEPHONE (OUTPATIENT)
Dept: CARDIOLOGY CLINIC | Age: 62
End: 2022-08-02

## 2022-08-02 ENCOUNTER — ANESTHESIA EVENT (OUTPATIENT)
Dept: CARDIAC CATH/INVASIVE PROCEDURES | Age: 62
End: 2022-08-02

## 2022-08-02 ENCOUNTER — ANESTHESIA (OUTPATIENT)
Dept: CARDIAC CATH/INVASIVE PROCEDURES | Age: 62
End: 2022-08-02

## 2022-08-02 ENCOUNTER — HOSPITAL ENCOUNTER (OUTPATIENT)
Dept: CARDIAC CATH/INVASIVE PROCEDURES | Age: 62
Discharge: HOME | End: 2022-08-02
Payer: COMMERCIAL

## 2022-08-02 VITALS
HEIGHT: 69 IN | TEMPERATURE: 97 F | DIASTOLIC BLOOD PRESSURE: 72 MMHG | SYSTOLIC BLOOD PRESSURE: 111 MMHG | OXYGEN SATURATION: 95 % | RESPIRATION RATE: 16 BRPM | WEIGHT: 195 LBS | BODY MASS INDEX: 28.88 KG/M2 | HEART RATE: 110 BPM

## 2022-08-02 LAB
EKG ATRIAL RATE: 71 BPM
EKG P AXIS: 69 DEGREES
EKG P-R INTERVAL: 200 MS
EKG Q-T INTERVAL: 388 MS
EKG QRS DURATION: 128 MS
EKG QTC CALCULATION (BAZETT): 421 MS
EKG R AXIS: 0 DEGREES
EKG T AXIS: 71 DEGREES
EKG VENTRICULAR RATE: 71 BPM
LV EF: 43 %
LVEF MODALITY: NORMAL

## 2022-08-02 PROCEDURE — 93325 DOPPLER ECHO COLOR FLOW MAPG: CPT | Performed by: INTERNAL MEDICINE

## 2022-08-02 PROCEDURE — 6370000000 HC RX 637 (ALT 250 FOR IP): Performed by: INTERNAL MEDICINE

## 2022-08-02 PROCEDURE — 93325 DOPPLER ECHO COLOR FLOW MAPG: CPT

## 2022-08-02 PROCEDURE — 93321 DOPPLER ECHO F-UP/LMTD STD: CPT

## 2022-08-02 PROCEDURE — 92960 CARDIOVERSION ELECTRIC EXT: CPT | Performed by: INTERNAL MEDICINE

## 2022-08-02 PROCEDURE — 3700000000 HC ANESTHESIA ATTENDED CARE

## 2022-08-02 PROCEDURE — 2709999900 HC NON-CHARGEABLE SUPPLY

## 2022-08-02 PROCEDURE — 93005 ELECTROCARDIOGRAM TRACING: CPT | Performed by: INTERNAL MEDICINE

## 2022-08-02 PROCEDURE — 3700000001 HC ADD 15 MINUTES (ANESTHESIA)

## 2022-08-02 PROCEDURE — 93312 ECHO TRANSESOPHAGEAL: CPT | Performed by: INTERNAL MEDICINE

## 2022-08-02 PROCEDURE — 92960 CARDIOVERSION ELECTRIC EXT: CPT

## 2022-08-02 PROCEDURE — 93320 DOPPLER ECHO COMPLETE: CPT | Performed by: INTERNAL MEDICINE

## 2022-08-02 PROCEDURE — 93312 ECHO TRANSESOPHAGEAL: CPT

## 2022-08-02 RX ORDER — SODIUM CHLORIDE 9 MG/ML
INJECTION, SOLUTION INTRAVENOUS CONTINUOUS PRN
Status: DISCONTINUED | OUTPATIENT
Start: 2022-08-02 | End: 2022-08-02 | Stop reason: SDUPTHER

## 2022-08-02 RX ORDER — FUROSEMIDE 20 MG/1
20 TABLET ORAL DAILY PRN
Qty: 30 TABLET | Refills: 2 | Status: SHIPPED | OUTPATIENT
Start: 2022-08-02 | End: 2022-11-01

## 2022-08-02 RX ORDER — PROPOFOL 10 MG/ML
INJECTION, EMULSION INTRAVENOUS PRN
Status: DISCONTINUED | OUTPATIENT
Start: 2022-08-02 | End: 2022-08-02 | Stop reason: SDUPTHER

## 2022-08-02 RX ADMIN — PROPOFOL 300 MG: 10 INJECTION, EMULSION INTRAVENOUS at 08:26

## 2022-08-02 RX ADMIN — APIXABAN 5 MG: 5 TABLET, FILM COATED ORAL at 09:06

## 2022-08-02 RX ADMIN — SODIUM CHLORIDE: 9 INJECTION, SOLUTION INTRAVENOUS at 08:26

## 2022-08-02 NOTE — PROCEDURES
PROCEDURE NOTE    Attending Cardiologist: Mitch Hickman MD    Date of Service: 8/2/2022    Procedure: Transesophageal Echocardiogram and Direct Current Cardioversion    Indication: Persistent atrial fibrillationand atrial flutter    Anticoagulant: Apixaban    ANITRA: Surgically ligated BEATRIS (Atriclip) - no residual pouch, no thromus, severe MR    Antiarrhythmic drug(s): Digoxin and Carvedilol    Description of procedure:  Patient presented in a fasting and well hydrated state. Informed consent obtained from patient. Risks, benefits and alternatives to ANITRA and DC cardioversion were explained to the patient in detail, and the patient acknowledged understanding. Cardiac rhythm, arterial oxygen saturation, and blood pressure were continuously monitored. Deep sedation with propofol administered by the Department of Anesthesiology. ANITRA was performed and there was no evidence of LA or BEATRIS thrombus. See full ANITRA report in Epic. After removal of the probe and verification of adequate sedation, direct current cardioversion was performed as described:    Patch placement: Anterior/posterior  Energy: 200 Joules, synchronized  Number of shocks: 1  Outcome: Sinus rhythm  Complications: None    Impression:  Successful ANITRA-guided DC cardioversion of atrial flutter to normal sinus rhythm.     Mitch Hickman MD, [facility]

## 2022-08-02 NOTE — TELEPHONE ENCOUNTER
Unable to leave message - mailbox full.   Referral to [facility] sent.    ----- Message from Payton Frank MD sent at 8/2/2022  9:46 AM EDT -----  EKG next week post [facility]  Referral to [facility] CT surgery for opinion on severe MR post mitral valve repair at patient's request

## 2022-08-02 NOTE — H&P
H&P    Name: Allegra Medrano    Age: 65 y.o. Primary Care Physician: Taniya Cosme MD    Date of Service: 8/2/2022    Chief Complaint:   MR, Mvr, AF       Interim History:   Here for follow-up regarding mitral valve repair surgery due to strep bacteremia. He presented with new onset atrial fibrillation and severe MR 3/2022. Underwent successful repair, postoperative EF was low he was discharged with a wearable cardioverter defibrillator. He was not discharged on anticoagulation but appears to be maintaining sinus rhythm. He is feeling great. He is participating in cardiac rehab. He denies chest pain, shortness of breath, palpitations. He is compliant with a wearable defibrillator. Denies any alarms or device discharges.     8/2/22: Here for ANITRA cardioversion    Review of Systems:   Negative except as described above    Past Medical History:  Past Medical History:   Diagnosis Date    Acute diastolic (congestive) heart failure ([address]     Hypertension        Past Surgical History:  Past Surgical History:   Procedure Laterality Date    CARDIAC VALVE SURGERY  03/23/2022    MVR and MAZE procedure    MITRAL VALVE MAZE PROCEDURE N/A 3/23/2022    MITRAL VALVE REPAIR VS REPLACEMENT WITH MAZE performed by Clementina Uribe DO at [facility] OR    TRANSESOPHAGEAL ECHOCARDIOGRAM  03/16/2022    Dr. Wilner Mariee       Family History:  Family History   Problem Relation Age of Onset    Heart Failure Father        Social History:  Social History     Tobacco Use    Smoking status: Some Days     Types: Cigars    Smokeless tobacco: Never    Tobacco comments:     occ   Vaping Use    Vaping Use: Never used   Substance Use Topics    Alcohol use: Yes     Comment: rarely    Drug use: Never        Allergies:  No Known Allergies    Current Medications:    Current Outpatient Medications:     digoxin (LANOXIN) 250 MCG tablet, Take 500 mcg  (2 tablets) on day 1, then take 250 mcg (1 tablet) daily, Disp: 20 tablet, Rfl: 0    apixaban (ELIQUIS) 5 MG TABS tablet, Take 1 tablet by mouth 2 times daily, Disp: 60 tablet, Rfl: 5    aspirin 81 MG EC tablet, Take by mouth, Disp: , Rfl:     carvedilol (COREG) 6.25 MG tablet, Take 1 tablet by mouth 2 times daily (with meals), Disp: 60 tablet, Rfl: 3    losartan (COZAAR) 25 MG tablet, Take 0.5 tablets by mouth daily (Patient taking differently: Take 12.5 mg by mouth in the morning and 12.5 mg in the evening.), Disp: 30 tablet, Rfl: 3    spironolactone (ALDACTONE) 25 MG tablet, Take 0.5 tablets by mouth daily (Patient taking differently: Take 12.5 mg by mouth 2 times daily ), Disp: 30 tablet, Rfl: 3    rosuvastatin (CRESTOR) 20 MG tablet, Take 1 tablet by mouth nightly, Disp: 30 tablet, Rfl: 3    furosemide (LASIX) 20 MG tablet, Take 1 tablet by mouth daily, Disp: 60 tablet, Rfl: 1    potassium chloride (KLOR-CON M) 10 MEQ extended release tablet, Take 1 tablet by mouth daily, Disp: 90 tablet, Rfl: 1    Physical Exam:  BP (!) 147/105   Pulse (!) 110   Temp 97 °F (36.1 °C)   Resp 18   Ht 5' 9\" (1.753 m)   Wt 195 lb (88.5 kg)   BMI 28.80 kg/m²   Wt Readings from Last 3 Encounters:   08/02/22 195 lb (88.5 kg)   06/22/22 196 lb (88.9 kg)   06/13/22 192 lb 3.2 oz (87.2 kg)     Appearance: Awake, alert and oriented x 3, no acute respiratory distress  Skin: Intact, no rash  Head: Normocephalic, atraumatic  Eyes: EOMI, no conjunctival erythema  ENMT: No pharyngeal erythema, MMM, no rhinorrhea  Neck: Supple, no elevated JVP, no carotid bruits  Lungs: Clear to auscultation bilaterally. No wheezes, rales, or rhonchi.   Cardiac: Irregular rhythm with elevated rate, +[id number], 3/6 holosystolic murmur at the apex  Abdomen: Soft, nontender, +bowel sounds  Extremities: Moves all extremities x 4, no lower extremity edema  Neurologic: No focal motor deficits apparent, normal mood and affect, alert and oriented x 3  Peripheral Pulses: Intact posterior tibial pulses bilaterally    Laboratory Tests:  Lab Results   Component Value Date    CREATININE 1.1 2022    BUN 17 2022     2022    K 4.5 2022     2022    CO2 25 2022     Lab Results   Component Value Date/Time    MG 2.1 2022 05:42 AM     Lab Results   Component Value Date    WBC 6.9 2022    HGB 9.7 (L) 2022    HCT 30.8 (L) 2022    MCV 89.0 2022     2022     Lab Results   Component Value Date    ALT 20 2022    AST 17 2022    ALKPHOS 113 2022    BILITOT 0.3 2022     No results found for: CKTOTAL, CKMB, CKMBINDEX, TROPONINI  Lab Results   Component Value Date    INR 1.6 2022    INR 1.3 2022    INR 1.5 2022    PROTIME 17.3 (H) 2022    PROTIME 14.4 (H) 2022    PROTIME 15.9 (H) 2022     Lab Results   Component Value Date    TSH 1.880 2022     Lab Results   Component Value Date    LABA1C 5.6 03/15/2022     No results found for: EAG  Lab Results   Component Value Date    CHOL 133 03/15/2022     Lab Results   Component Value Date    TRIG 115 03/15/2022     Lab Results   Component Value Date    HDL 20 03/15/2022     Lab Results   Component Value Date    LDLCALC 90 03/15/2022     Lab Results   Component Value Date    LABVLDL 23 03/15/2022     No results found for: CHOLHDLRATIO  No results for input(s): PROBNP in the last 72 hours. Cardiac Tests:  EC2022: Sinus rhythm 89 bpm.  Normal axis. IVCD QRS duration 114 ms. Nonspecific T wave changes. 2022: Sinus rhythm 77 beats minute. Right bundle branch block QRS duration 125 ms. Echocardiogram:   TTE 3/28/22   Summary   Ejection fraction is visually estimated at 25-30%. Overall ejection fraction severely decreased. Normal right ventricle size with reduced function. Left atrial volume index of 68 ml per meters squared BSA. Status - post mitral annular ring insertion. Mean transmitral gradient 2.9   mmHg.    Physiologic and/or trace mitral regurgitation is present. Physiologic and/or trace tricuspid regurgitation. No evidence for hemodynamically significant pericardial effusion. ANITRA 3/16/22   Summary   Ejection fraction is visually estimated at 55%. Severe holosystolic prolapse of the posterior leaflet with a flail P1/P2   segment due to ruptured chordae tendineae. Small mobile echodensity suspicious for vegetation on ventricular surface   of posterior leaflet. Failure of leaflet coaptation. No definitive papillary muscle rupture. Torrential mitral regurgitation, eccentric jet directed anteriorly. Stress test:      Cardiac catheterization:  [address] [place] [address] 3/18/22 [facility]   Angiographic Results/findings:  Left Main: No angiographically significant stenosis. LAD: No angiographically significant stenosis. D1: Bifurcating vessel. No angiographically significant stenosis. Cx: Dominant vessel. Mid to distal diffuse 10% stenosis. OM1: Bifurcating vessel. No angiographically significant stenosis. OM2: Proximal 10% stenosis. OM 3: No angiographically significant stenosis. OM 4: No angiographically significant stenosis. Ramus: Absent. RCA: non-Dominant. Mild diffuse luminal irregularities. 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 Residual moderate to severe MR  Nonischemic cardiomyopathy, likely valvular. EF 25-30%  Chronic HFrEF, euvolemic.   NYHA class I ACC stage C  Mild nonobstructive CAD  Right bundle branch block  Lower extremity cellulitis, resolved    Recommendations:    ANITRA cardioversion    Risk and benefits of ANITRA cardioversion explained to patient, including risk of esophageal perforation, respiratory failure, CVA, failure to restore sinus rhythm, recurrence of atrial arrhythmias. He understands and agrees to proceed.     Tangela Banuelos MD, [facility]

## 2022-08-02 NOTE — ANESTHESIA PRE PROCEDURE
Department of Anesthesiology  Preprocedure Note       Name:  Rodney Tran   Age:  65 y.o.  :  1960                                          MRN:  [id number]         Date:  2022      Surgeon: * Surgery not found *    Procedure:     Medications prior to admission:   Prior to Admission medications    Medication Sig Start Date End Date Taking?  Authorizing Provider   digoxin (LANOXIN) 250 MCG tablet Take 500 mcg  (2 tablets) on day 1, then take 250 mcg (1 tablet) daily 22   Ahsan Morgan MD   apixaban Sherolyn Stare) 5 MG TABS tablet Take 1 tablet by mouth 2 times daily 22   Ahsan Morgan MD   aspirin 81 MG EC tablet Take by mouth 22   Historical Provider, MD   carvedilol (COREG) 6.25 MG tablet Take 1 tablet by mouth 2 times daily (with meals) 22   Ahsan Morgan MD   losartan (COZAAR) 25 MG tablet Take 0.5 tablets by mouth daily  Patient taking differently: Take 12.5 mg by mouth 2 times daily  3/30/22   ROX Trevino - CNP   spironolactone (ALDACTONE) 25 MG tablet Take 0.5 tablets by mouth daily  Patient taking differently: Take 12.5 mg by mouth 2 times daily  3/30/22   ROX Trevino CNP   rosuvastatin (CRESTOR) 20 MG tablet Take 1 tablet by mouth nightly 3/28/22   DAINA Lopez   furosemide (LASIX) 20 MG tablet Take 1 tablet by mouth daily 3/28/22   DAINA Lopez   potassium chloride (KLOR-CON M) 10 MEQ extended release tablet Take 1 tablet by mouth daily 3/28/22   DAINA Lopez       Current medications:    Current Outpatient Medications   Medication Sig Dispense Refill    digoxin (LANOXIN) 250 MCG tablet Take 500 mcg  (2 tablets) on day 1, then take 250 mcg (1 tablet) daily 20 tablet 0    apixaban (ELIQUIS) 5 MG TABS tablet Take 1 tablet by mouth 2 times daily 60 tablet 5    aspirin 81 MG EC tablet Take by mouth      carvedilol (COREG) 6.25 MG tablet Take 1 tablet by mouth 2 times daily (with meals) 60 tablet 3    losartan (COZAAR) 25 MG tablet Take 0.5 tablets by mouth daily (Patient taking differently: Take 12.5 mg by mouth 2 times daily ) 30 tablet 3    spironolactone (ALDACTONE) 25 MG tablet Take 0.5 tablets by mouth daily (Patient taking differently: Take 12.5 mg by mouth 2 times daily ) 30 tablet 3    rosuvastatin (CRESTOR) 20 MG tablet Take 1 tablet by mouth nightly 30 tablet 3    furosemide (LASIX) 20 MG tablet Take 1 tablet by mouth daily 60 tablet 1    potassium chloride (KLOR-CON M) 10 MEQ extended release tablet Take 1 tablet by mouth daily 90 tablet 1     No current facility-administered medications for this encounter. Allergies:  No Known Allergies    Problem List:    Patient Active Problem List   Diagnosis Code    Sepsis ([facility] [address] present on admission A41.9    JANA (acute kidney injury) ([facility] [address] N17.9    Bacteremia R78.81    Mitral valve disease I05.9    Paroxysmal atrial fibrillation (HCC) I48.0    Acute diastolic (congestive) heart failure (HCC) I50.31    Cardiac insufficiency (HCC) I50.9    Acute pulmonary insufficiency J98.4    Acute blood loss anemia D62       Past Medical History:        Diagnosis Date    Acute diastolic (congestive) heart failure (HCC)     Hypertension        Past Surgical History:        Procedure Laterality Date    CARDIAC VALVE SURGERY  03/23/2022    MVR and MAZE procedure    MITRAL VALVE MAZE PROCEDURE N/A 3/23/2022    MITRAL VALVE REPAIR VS REPLACEMENT WITH MAZE performed by Karen Briones DO at [place] [address] TRANSESOPHAGEAL ECHOCARDIOGRAM  03/16/2022    Dr. Summer Chan       Social History:    Social History     Tobacco Use    Smoking status: Some Days     Types: Cigars    Smokeless tobacco: Never    Tobacco comments:     occ   Substance Use Topics    Alcohol use: Yes     Comment: rarely                                Ready to quit: Not Answered  Counseling given: Not Answered  Tobacco comments: occ      Vital Signs (Current): There were no vitals filed for this visit. BP Readings from Last 3 Encounters:   06/22/22 122/82   05/18/22 118/84   05/06/22 118/70       NPO Status:  per pt >8hours                                                                               BMI:   Wt Readings from Last 3 Encounters:   06/22/22 196 lb (88.9 kg)   06/13/22 192 lb 3.2 oz (87.2 kg)   05/18/22 191 lb (86.6 kg)     There is no height or weight on file to calculate BMI.    CBC:   Lab Results   Component Value Date/Time    WBC 6.9 04/11/2022 06:20 PM    RBC 3.46 04/11/2022 06:20 PM    HGB 9.7 04/11/2022 06:20 PM    HCT 30.8 04/11/2022 06:20 PM    HCT 28.0 03/23/2022 05:41 PM    MCV 89.0 04/11/2022 06:20 PM    RDW 15.3 04/11/2022 06:20 PM     04/11/2022 06:20 PM       CMP:   Lab Results   Component Value Date/Time     04/11/2022 06:20 PM    K 4.5 04/11/2022 06:20 PM     04/11/2022 06:20 PM    CO2 25 04/11/2022 06:20 PM    BUN 17 04/11/2022 06:20 PM    CREATININE 1.1 04/11/2022 06:20 PM    GFRAA >60 04/11/2022 06:20 PM    LABGLOM >60 04/11/2022 06:20 PM    GLUCOSE 99 04/11/2022 06:20 PM    PROT 7.6 04/11/2022 06:20 PM    CALCIUM 9.1 04/11/2022 06:20 PM    BILITOT 0.3 04/11/2022 06:20 PM    ALKPHOS 113 04/11/2022 06:20 PM    AST 17 04/11/2022 06:20 PM    ALT 20 04/11/2022 06:20 PM       POC Tests: No results for input(s): POCGLU, POCNA, POCK, POCCL, POCBUN, POCHEMO, POCHCT in the last 72 hours.     Coags:   Lab Results   Component Value Date/Time    PROTIME 17.3 03/23/2022 06:25 PM    INR 1.6 03/23/2022 06:25 PM    APTT 30.2 03/23/2022 06:25 PM       HCG (If Applicable): No results found for: PREGTESTUR, PREGSERUM, HCG, HCGQUANT     ABGs: No results found for: PHART, PO2ART, ONY8LBE, LXP0IZR, BEART, O0BIBWEM     Type & Screen (If Applicable):  No results found for: LABABO, LABRH    Drug/Infectious Status (If Applicable):  No results found for: HIV, HEPCAB    COVID-19 Screening (If Applicable):   Lab Results   Component Value Date/Time    COVID19 Not Detected 03/14/2022 11:19 PM    COVID19 Not Detected 03/14/2022 08:45 PM       ECHO 6/29/22     Summary   Normal left ventricular size. LV systolic function is moderately reduced. Ejection fraction is visually estimated at 35-40%. Indeterminate diastolic function. No regional wall motion abnormalities seen. Mild asymmetric septal hypertrophy. Right ventricle global systolic function is reduced. The left atrium is moderately dilated. s/p mitral valve repair (P2 triangular resection, P3/P3 perforation   repair, 34 mm Future annuloplasty band 3/2022). Moderate eccentric mitral regurgitation directed anteriorly. Compared to postoperative study from 3/18/22, LVEF has improved, but MR   appears worse. Anesthesia Evaluation  Patient summary reviewed and Nursing notes reviewed no history of anesthetic complications:   Airway: Mallampati: II  TM distance: >3 FB   Neck ROM: full  Mouth opening: > = 3 FB   Dental:          Pulmonary: breath sounds clear to auscultation                             Cardiovascular:    (+) hypertension:, valvular problems/murmurs: MR, dysrhythmias ( paroxysmal afib ): atrial fibrillation, CHF: systolic and diastolic, hyperlipidemia      ECG reviewed  Rhythm: irregular  Rate: abnormal  Echocardiogram reviewed    Cleared by cardiology     Beta Blocker:  Dose within 24 Hrs      ROS comment: S/p mitral valve repair/maze 3/22  eval of worsening MR/DCCV    7/21/22  Atrial fibrillation   -Incomplete right bundle branch block. -ST depression  -Nondiagnostic. ABNORMAL        Neuro/Psych:               GI/Hepatic/Renal:             Endo/Other:    (+) blood dyscrasia ( on eliquis): anticoagulation therapy:., .                 Abdominal:             Vascular: Other Findings:           Anesthesia Plan      MAC     ASA 3       Induction: intravenous. Anesthetic plan and risks discussed with patient. Plan discussed with attending.                     Tamanna Dhaliwal, APRN - CRNA   8/2/2022

## 2022-08-02 NOTE — DISCHARGE INSTRUCTIONS
No driving today- continue same home medications (except stop lanoxin)-follow up with DR Cb Crawford

## 2022-08-02 NOTE — ANESTHESIA POSTPROCEDURE EVALUATION
Department of Anesthesiology  Postprocedure Note    Patient: Etta Santos  MRN: [id number]  YOB: 1960  Date of evaluation: 8/2/2022      Procedure Summary     Date: 08/02/22 Room / Location: [facility] CATH LAB; [facility] ECHO    Anesthesia Start: 3649 Anesthesia Stop: 4721    Procedure: SEY ANITRA CARDIOVERSION W/ ANES Diagnosis: Nonrheumatic mitral (valve) insufficiency    Scheduled Providers: ROX Vásquez - CRNA; Paul Fox MD Responsible Provider: Paul Fox MD    Anesthesia Type: MAC ASA Status: 3          Anesthesia Type: No value filed.     Alexa Phase I:      Alexa Phase II:        Anesthesia Post Evaluation    Patient location during evaluation: PACU  Patient participation: complete - patient participated  Level of consciousness: awake and alert  Airway patency: patent  Nausea & Vomiting: no nausea and no vomiting  Complications: no  Cardiovascular status: blood pressure returned to baseline and hemodynamically stable  Respiratory status: acceptable and spontaneous ventilation  Hydration status: euvolemic  Multimodal analgesia pain management approach

## 2022-08-03 RX ORDER — ROSUVASTATIN CALCIUM 20 MG/1
20 TABLET, COATED ORAL NIGHTLY
Qty: 30 TABLET | Refills: 5 | Status: SHIPPED | OUTPATIENT
Start: 2022-08-03

## 2022-08-09 ENCOUNTER — NURSE ONLY (OUTPATIENT)
Dept: CARDIOLOGY CLINIC | Age: 62
End: 2022-08-09
Payer: COMMERCIAL

## 2022-08-09 DIAGNOSIS — I48.0 PAROXYSMAL ATRIAL FIBRILLATION (HCC): Primary | ICD-10-CM

## 2022-08-09 PROCEDURE — 93000 ELECTROCARDIOGRAM COMPLETE: CPT | Performed by: INTERNAL MEDICINE

## 2022-08-09 RX ORDER — DIGOXIN 250 MCG
TABLET ORAL
Qty: 20 TABLET | Refills: 0 | OUTPATIENT
Start: 2022-08-09

## 2022-08-09 NOTE — RESULT ENCOUNTER NOTE
Still in normal rhythm.   Cont current plan f/u as scheduled  Referral to [facility] as previously discussed

## 2022-08-10 ENCOUNTER — TELEPHONE (OUTPATIENT)
Dept: CARDIOLOGY CLINIC | Age: 62
End: 2022-08-10

## 2022-08-10 NOTE — TELEPHONE ENCOUNTER
Patient contacted. He verbalized understanding.    ----- Message from Nicho Barlow MD sent at 8/9/2022  1:15 PM EDT -----  Still in normal rhythm.   Cont current plan f/u as scheduled  Referral to [facility] as previously discussed

## 2022-08-24 ENCOUNTER — TELEPHONE (OUTPATIENT)
Dept: CARDIOLOGY CLINIC | Age: 62
End: 2022-08-24

## 2022-08-24 DIAGNOSIS — I48.0 PAROXYSMAL ATRIAL FIBRILLATION (HCC): ICD-10-CM

## 2022-08-24 NOTE — TELEPHONE ENCOUNTER
Contacted patient with monitor results and recommendations per Dr. Mely Aburto. Patient verbalized understanding.    ----- Message from Dionisio Quiroz MD sent at 8/23/2022  9:56 PM EDT -----  Monitor reviewed, showed he was in atrial flutter and atrial fibrillation 84% of the time, essentially the whole time he was wearing the monitor until we did the cardioversion. He seemed to maintain normal rhythm after the cardioversion with very brief nonsustained episodes of SVT that could have been nonsustained atrial flutter. There were rare extra beat (PVCs and PACs). There was one episode of nonsustained ventricular tachycardia 7 beats. There was a 4.3-second pause which occurred when we did the cardioversion. Triggered events correlated with atrial fibrillation, atrial flutter, and PVCs. Continue current treatment including anticoagulation, and will await his evaluation at [facility]. Have him let us know if he feels he may be out of rhythm again we can always bring him in for an EKG, or if any worsening shortness of breath needs to let me know right away.

## 2022-09-09 ENCOUNTER — TELEPHONE (OUTPATIENT)
Dept: CARDIOLOGY CLINIC | Age: 62
End: 2022-09-09

## 2022-09-09 NOTE — TELEPHONE ENCOUNTER
Contacted patient with recommendations per Dr. Stephy Umaña. He states he has not seen the doctor yet, but has spoken to his nurse and they have scheduled his surgery for 10/19/2022. He will contact Dr. Yousuf Toledo to schedule pre-op heart catheterization at [facility].

## 2022-09-09 NOTE — TELEPHONE ENCOUNTER
Best to be done in [place]. He did have a heart catheterization prior to his surgery here in March, not sure what else they are looking for perhaps a right heart catheterization. Nonetheless I feel it is best done up in [place].

## 2022-09-09 NOTE — TELEPHONE ENCOUNTER
Patient called stating he was seen at [facility] and is scheduled for mitral surgery on 10/19/2022. He states that he needs a heart catheterization prior to surgery and would like to have you order and schedule this here. (Patient states that if he cannot have this done here, he will notify [facility] and make arrangements to have cath done there).   Please advise

## 2022-09-15 ENCOUNTER — TELEPHONE (OUTPATIENT)
Dept: CARDIOLOGY CLINIC | Age: 62
End: 2022-09-15

## 2022-09-15 ENCOUNTER — NURSE ONLY (OUTPATIENT)
Dept: CARDIOLOGY CLINIC | Age: 62
End: 2022-09-15
Payer: COMMERCIAL

## 2022-09-15 DIAGNOSIS — I48.0 PAROXYSMAL ATRIAL FIBRILLATION (HCC): Primary | ICD-10-CM

## 2022-09-15 PROCEDURE — 93000 ELECTROCARDIOGRAM COMPLETE: CPT | Performed by: INTERNAL MEDICINE

## 2022-09-15 RX ORDER — METOPROLOL SUCCINATE 50 MG/1
50 TABLET, EXTENDED RELEASE ORAL DAILY
Qty: 30 TABLET | Refills: 5 | Status: SHIPPED | OUTPATIENT
Start: 2022-09-15

## 2022-09-15 RX ORDER — DIGOXIN 250 MCG
250 TABLET ORAL DAILY
Qty: 30 TABLET | Refills: 5 | Status: SHIPPED | OUTPATIENT
Start: 2022-09-15

## 2022-09-15 NOTE — TELEPHONE ENCOUNTER
Contacted patient with results and recommendations per Dr. Anai Graham. Patient verbalized understanding.    ----- Message from Cr Mujica MD sent at 9/15/2022  3:35 PM EDT -----  He is back in atrial fibrillation, not too surprising given his ongoing mitral valve leakage. At the time of surgery they should be able to do a surgical maze procedure to reduce the risk of recurrent AF after surgery. In the meantime I would not richardson to try to shock him back into sinus before his surgery, would just focus on getting heart rates controlled. Change carvedilol to metoprolol succinate 50 mg daily. Resume digoxin 250 mcg daily. EKG next week (with vitals). ER if symptoms worsen.

## 2022-09-15 NOTE — RESULT ENCOUNTER NOTE
He is back in atrial fibrillation, not too surprising given his ongoing mitral valve leakage. At the time of surgery they should be able to do a surgical maze procedure to reduce the risk of recurrent AF after surgery. In the meantime I would not richardson to try to shock him back into sinus before his surgery, would just focus on getting heart rates controlled. Change carvedilol to metoprolol succinate 50 mg daily. Resume digoxin 250 mcg daily. EKG next week (with vitals). ER if symptoms worsen.

## 2022-09-19 RX ORDER — CARVEDILOL 6.25 MG/1
TABLET ORAL
Qty: 60 TABLET | Refills: 3 | OUTPATIENT
Start: 2022-09-19

## 2022-09-20 RX ORDER — LOSARTAN POTASSIUM 25 MG/1
TABLET ORAL
Qty: 15 TABLET | OUTPATIENT
Start: 2022-09-20

## 2022-09-22 ENCOUNTER — TELEPHONE (OUTPATIENT)
Dept: CARDIOLOGY CLINIC | Age: 62
End: 2022-09-22

## 2022-09-22 ENCOUNTER — NURSE ONLY (OUTPATIENT)
Dept: CARDIOLOGY CLINIC | Age: 62
End: 2022-09-22
Payer: COMMERCIAL

## 2022-09-22 DIAGNOSIS — I48.0 PAROXYSMAL ATRIAL FIBRILLATION (HCC): Primary | ICD-10-CM

## 2022-09-22 PROCEDURE — 93000 ELECTROCARDIOGRAM COMPLETE: CPT | Performed by: INTERNAL MEDICINE

## 2022-09-22 NOTE — PROGRESS NOTES
Patient was seen in the office today for a blood pressure and EKG check per . Namraat Minaya    Standing BP:138/74  Standing P:134  Sitting BP:142/78  Sitting P:71

## 2022-09-22 NOTE — TELEPHONE ENCOUNTER
Contacted patient with recommendations per Dr. Jennifer Caceres. Patient verbalized understanding. Patient scheduled to see Dr. Ochoa Rowe in [place] on 9/28/2022.

## 2022-09-22 NOTE — TELEPHONE ENCOUNTER
Increase Toprol-XL to 50 mg p.o. twice daily for rate control. EKG showed atrial fibrillation with rapid medical response. Follow-up with Dr. David Flanagan next week.

## 2022-09-28 ENCOUNTER — OFFICE VISIT (OUTPATIENT)
Dept: CARDIOLOGY CLINIC | Age: 62
End: 2022-09-28
Payer: COMMERCIAL

## 2022-09-28 VITALS
OXYGEN SATURATION: 97 % | SYSTOLIC BLOOD PRESSURE: 102 MMHG | WEIGHT: 199 LBS | HEART RATE: 110 BPM | BODY MASS INDEX: 29.47 KG/M2 | DIASTOLIC BLOOD PRESSURE: 60 MMHG | RESPIRATION RATE: 16 BRPM | HEIGHT: 69 IN

## 2022-09-28 DIAGNOSIS — I34.0 SEVERE MITRAL REGURGITATION: ICD-10-CM

## 2022-09-28 DIAGNOSIS — I48.19 PERSISTENT ATRIAL FIBRILLATION (HCC): Primary | ICD-10-CM

## 2022-09-28 DIAGNOSIS — I45.10 RBBB: ICD-10-CM

## 2022-09-28 DIAGNOSIS — I42.8 NONISCHEMIC CARDIOMYOPATHY (HCC): ICD-10-CM

## 2022-09-28 DIAGNOSIS — Z98.890 S/P MVR (MITRAL VALVE REPAIR): ICD-10-CM

## 2022-09-28 PROCEDURE — 93000 ELECTROCARDIOGRAM COMPLETE: CPT | Performed by: INTERNAL MEDICINE

## 2022-09-28 PROCEDURE — 99214 OFFICE O/P EST MOD 30 MIN: CPT | Performed by: INTERNAL MEDICINE

## 2022-09-28 NOTE — PROGRESS NOTES
Heart Failure Father        Social History:  Social History     Tobacco Use    Smoking status: Some Days     Types: Cigars    Smokeless tobacco: Never    Tobacco comments:     occ   Vaping Use    Vaping Use: Never used   Substance Use Topics    Alcohol use: Yes     Comment: rarely    Drug use: Never        Allergies:  No Known Allergies    Current Medications:    Current Outpatient Medications:     metoprolol succinate (TOPROL XL) 50 MG extended release tablet, Take 1 tablet by mouth daily (Patient taking differently: Take 50 mg by mouth in the morning and at bedtime), Disp: 30 tablet, Rfl: 5    digoxin (LANOXIN) 250 MCG tablet, Take 1 tablet by mouth daily Take 500 mcg  (2 tablets) on day 1, then take 250 mcg (1 tablet) daily, Disp: 30 tablet, Rfl: 5    rosuvastatin (CRESTOR) 20 MG tablet, Take 1 tablet by mouth nightly, Disp: 30 tablet, Rfl: 5    furosemide (LASIX) 20 MG tablet, Take 1 tablet by mouth daily as needed (leg swelling, shortness of breath), Disp: 30 tablet, Rfl: 2    apixaban (ELIQUIS) 5 MG TABS tablet, Take 1 tablet by mouth 2 times daily, Disp: 60 tablet, Rfl: 5    losartan (COZAAR) 25 MG tablet, Take 0.5 tablets by mouth daily (Patient taking differently: Take 12.5 mg by mouth in the morning and 12.5 mg in the evening.), Disp: 30 tablet, Rfl: 3    spironolactone (ALDACTONE) 25 MG tablet, Take 0.5 tablets by mouth daily, Disp: 30 tablet, Rfl: 3    potassium chloride (KLOR-CON M) 10 MEQ extended release tablet, Take 1 tablet by mouth daily, Disp: 90 tablet, Rfl: 1    Physical Exam:  /60   Pulse (!) 110   Resp 16   Ht 5' 9\" (1.753 m)   Wt 199 lb (90.3 kg)   SpO2 97%   BMI 29.39 kg/m²   Wt Readings from Last 3 Encounters:   09/28/22 199 lb (90.3 kg)   08/02/22 195 lb (88.5 kg)   06/22/22 196 lb (88.9 kg)     Appearance: Awake, alert and oriented x 3, no acute respiratory distress  Skin: Intact, no rash  Head: Normocephalic, atraumatic  Eyes: EOMI, no conjunctival erythema  ENMT: No pharyngeal erythema, MMM, no rhinorrhea  Neck: Supple, no elevated JVP, no carotid bruits  Lungs: Clear to auscultation bilaterally. No wheezes, rales, or rhonchi. Cardiac: Irregular rhythm with a normal rate, +H2J3, 3/6 holosystolic murmur at the apex. Abdomen: Soft, nontender, +bowel sounds  Extremities: Moves all extremities x 4, no lower extremity edema  Neurologic: No focal motor deficits apparent, normal mood and affect, alert and oriented x 3  Peripheral Pulses: Intact posterior tibial pulses bilaterally    Laboratory Tests:  Lab Results   Component Value Date    CREATININE 1.1 2022    BUN 17 2022     2022    K 4.5 2022     2022    CO2 25 2022     Lab Results   Component Value Date/Time    MG 2.1 2022 05:42 AM     Lab Results   Component Value Date    WBC 6.9 2022    HGB 9.7 (L) 2022    HCT 30.8 (L) 2022    MCV 89.0 2022     2022     Lab Results   Component Value Date    ALT 20 2022    AST 17 2022    ALKPHOS 113 2022    BILITOT 0.3 2022     No results found for: CKTOTAL, CKMB, CKMBINDEX, TROPONINI  Lab Results   Component Value Date    INR 1.6 2022    INR 1.3 2022    INR 1.5 2022    PROTIME 17.3 (H) 2022    PROTIME 14.4 (H) 2022    PROTIME 15.9 (H) 2022     Lab Results   Component Value Date    TSH 1.880 2022     Lab Results   Component Value Date    LABA1C 5.6 03/15/2022     No results found for: EAG  Lab Results   Component Value Date    CHOL 133 03/15/2022     Lab Results   Component Value Date    TRIG 115 03/15/2022     Lab Results   Component Value Date    HDL 20 03/15/2022     Lab Results   Component Value Date    LDLCALC 90 03/15/2022     Lab Results   Component Value Date    LABVLDL 23 03/15/2022     No results found for: CHOLHDLRATIO  No results for input(s): PROBNP in the last 72 hours.     Cardiac Tests:  EC2022: Atrial fibrillation 110 bpm.  Normal axis. Right bundle branch block QRS duration 122 ms. Nonspecific ST changes    Echocardiogram:   ANITRA 8/2/22   Summary   Ejection fraction is visually estimated at 40-45%. Absent LA appendage (s/p ligation with AtriClip device). No evidence of thrombus within left atrium. s/p mitral valve repair (P2 triangular resection, P2/P3 perforation   repair, 34 mm Future annuloplasty band 3/2022). There is residual prolapse of the posterior leaflet which overrides the   anterior leaflet. Severe eccentric mitral regurgitation, directed anteriorly. TTE 6/26/22   Summary   Normal left ventricular size. LV systolic function is moderately reduced. Ejection fraction is visually estimated at 35-40%. Indeterminate diastolic function. No regional wall motion abnormalities seen. Mild asymmetric septal hypertrophy. Right ventricle global systolic function is reduced. The left atrium is moderately dilated. s/p mitral valve repair (P2 triangular resection, P3/P3 perforation   repair, 34 mm Future annuloplasty band 3/2022). Moderate eccentric mitral regurgitation directed anteriorly. Compared to postoperative study from 3/18/22, LVEF has improved, but MR   appears worse. TTE 3/28/22   Summary   Ejection fraction is visually estimated at 25-30%. Overall ejection fraction severely decreased. Normal right ventricle size with reduced function. Left atrial volume index of 68 ml per meters squared BSA. Status - post mitral annular ring insertion. Mean transmitral gradient 2.9   mmHg. Physiologic and/or trace mitral regurgitation is present. Physiologic and/or trace tricuspid regurgitation. No evidence for hemodynamically significant pericardial effusion. ANITRA 3/16/22   Summary   Ejection fraction is visually estimated at 55%. Severe holosystolic prolapse of the posterior leaflet with a flail P1/P2   segment due to ruptured chordae tendineae.    Small mobile month and recurrent A. fib with improved rate control. Continue rate control for now and will reassess after his redo surgery; not sure if you would benefit from a repeat maze  Continue apixaban 5 mg twice daily -change to rivaroxaban or warfarin if needed for insurance purposes  Optimize GDMT for cardiomyopathy  Continue metoprolol succinate 50 mg twice daily  Continue losartan, consider transition to sacubitril/valsartan if EF remains depressed  Continue spironolactone  Consider SGLT2 inhibitor  Follow-up with [facility] for redo surgery  Apparently they are requesting another heart catheterization, I advised him to make sure they are aware he had a heart cath with minimal CAD prior to his surgery in March  Antibiotic prophylaxis for dental procedures  Aggressive risk factor modification  Follow-up in 3 months or sooner if need arises    The patient's current medication list, allergies, problem list and results of all previously ordered testing were reviewed at today's visit.     Yossi Llamas MD, [address] [facility] Card[facility]

## 2022-10-17 RX ORDER — POTASSIUM CHLORIDE 750 MG/1
TABLET, FILM COATED, EXTENDED RELEASE ORAL
Qty: 30 TABLET | OUTPATIENT
Start: 2022-10-17

## 2022-11-01 RX ORDER — FUROSEMIDE 20 MG/1
TABLET ORAL
Qty: 30 TABLET | Refills: 2 | Status: SHIPPED | OUTPATIENT
Start: 2022-11-01

## 2022-12-06 ENCOUNTER — OFFICE VISIT (OUTPATIENT)
Dept: CARDIOLOGY CLINIC | Age: 62
End: 2022-12-06
Payer: COMMERCIAL

## 2022-12-06 VITALS
HEART RATE: 98 BPM | SYSTOLIC BLOOD PRESSURE: 122 MMHG | HEIGHT: 69 IN | RESPIRATION RATE: 18 BRPM | WEIGHT: 198.8 LBS | BODY MASS INDEX: 29.44 KG/M2 | DIASTOLIC BLOOD PRESSURE: 78 MMHG

## 2022-12-06 DIAGNOSIS — I42.8 NICM (NONISCHEMIC CARDIOMYOPATHY) (HCC): ICD-10-CM

## 2022-12-06 DIAGNOSIS — I50.42 CHRONIC COMBINED SYSTOLIC AND DIASTOLIC CONGESTIVE HEART FAILURE (HCC): ICD-10-CM

## 2022-12-06 DIAGNOSIS — I48.0 PAROXYSMAL ATRIAL FIBRILLATION (HCC): Primary | ICD-10-CM

## 2022-12-06 DIAGNOSIS — Z98.890 S/P MVR (MITRAL VALVE REPAIR): ICD-10-CM

## 2022-12-06 DIAGNOSIS — Z79.01 ON APIXABAN THERAPY: ICD-10-CM

## 2022-12-06 PROCEDURE — 93000 ELECTROCARDIOGRAM COMPLETE: CPT | Performed by: INTERNAL MEDICINE

## 2022-12-06 PROCEDURE — 99214 OFFICE O/P EST MOD 30 MIN: CPT | Performed by: INTERNAL MEDICINE

## 2022-12-06 RX ORDER — ASPIRIN 81 MG/1
81 TABLET ORAL DAILY
COMMUNITY

## 2022-12-06 NOTE — PROGRESS NOTES
OUTPATIENT CARDIOLOGY FOLLOW-UP    Name: Cecilia Qeuen    Age: 65 y.o. Primary Care Physician: Bessy Caruso MD    Date of Service: 12/6/2022    Chief Complaint:   Chief Complaint   Patient presents with    Follow-up     3 month ov        Interim History:   Here for follow-up regarding mitral valve repair surgery due to strep endocarditis. He initially presented with new onset atrial fibrillation and severe MR 3/2022, found to be bacteremic with mitral vegetations. Underwent successful repair. However he had ongoing significant murmur and repeat echocardiogram and ANITRA showed residual severe MR. I referred him to [facility] and he is scheduled for redo mitral valve surgery in October which happened 10/19/2022 with another repair. He has been cleared of sternal precautions. He presents today for follow-up. He feels well. Exercising on his own. Denies chest pain or shortness of breath, palpitations, edema. States has a watch that tracks his heart rate and it is pretty consistently in the 80s and 90s. Underlying rhythm is not clear on today's EKG but I cannot rule out ongoing flutter.     Review of Systems:   Negative except as described above    Past Medical History:  Past Medical History:   Diagnosis Date    Acute diastolic (congestive) heart failure ([address]     Hypertension        Past Surgical History:  Past Surgical History:   Procedure Laterality Date    CARDIAC VALVE SURGERY  03/23/2022    MVR and MAZE procedure    CARDIOVERSION  08/02/2022    DR Kelly Sanders    MITRAL VALVE MAZE PROCEDURE N/A 03/23/2022    MITRAL VALVE REPAIR VS REPLACEMENT WITH MAZE performed by Casandra Grayson DO at [facility] OR    TRANSESOPHAGEAL ECHOCARDIOGRAM  03/16/2022    Dr. Kelly Sanders    TRANSESOPHAGEAL ECHOCARDIOGRAM  08/02/2022    Dr Kelly Sanders       Family History:  Family History   Problem Relation Age of Onset    Heart Failure Father        Social History:  Social History     Tobacco Use    Smoking status: Some Days     Types: sounds  Extremities: Moves all extremities x 4, no lower extremity edema  Neurologic: No focal motor deficits apparent, normal mood and affect, alert and oriented x 3  Peripheral Pulses: Intact posterior tibial pulses bilaterally    Laboratory Tests:  Lab Results   Component Value Date    CREATININE 1.1 2022    BUN 17 2022     2022    K 4.5 2022     2022    CO2 25 2022     Lab Results   Component Value Date/Time    MG 2.1 2022 05:42 AM     Lab Results   Component Value Date    WBC 6.9 2022    HGB 9.7 (L) 2022    HCT 30.8 (L) 2022    MCV 89.0 2022     2022     Lab Results   Component Value Date    ALT 20 2022    AST 17 2022    ALKPHOS 113 2022    BILITOT 0.3 2022     No results found for: CKTOTAL, CKMB, CKMBINDEX, TROPONINI  Lab Results   Component Value Date    INR 1.6 2022    INR 1.3 2022    INR 1.5 2022    PROTIME 17.3 (H) 2022    PROTIME 14.4 (H) 2022    PROTIME 15.9 (H) 2022     Lab Results   Component Value Date    TSH 1.880 2022     Lab Results   Component Value Date    LABA1C 5.6 03/15/2022     No results found for: EAG  Lab Results   Component Value Date    CHOL 133 03/15/2022     Lab Results   Component Value Date    TRIG 115 03/15/2022     Lab Results   Component Value Date    HDL 20 03/15/2022     Lab Results   Component Value Date    LDLCALC 90 03/15/2022     Lab Results   Component Value Date    LABVLDL 23 03/15/2022     No results found for: CHOLHDLRATIO  No results for input(s): PROBNP in the last 72 hours. Cardiac Tests:  EC2022: Atrial rhythm 98 bpm.  Right bundle branch block with left axis QRS duration 138 ms    Echocardiogram:   TTE post op 10/25/22 [facility]  CONCLUSIONS:   - Technically difficult exam due to post op and bandages/chest tubes/wound. - Exam indication: S/P MVr redo   - The left ventricle is normal in size.  Left ventricular systolic function is   moderately decreased. EF = 40 ± 5% (visual est.) Left ventricular diastolic   function was not evaluated due to mitral valve surgery. - The right ventricle is normal in size. Hailee Mcneal Mitral Ring (size #32). There is mild (1+ - 2+) mitral valve   regurgitation. The peak gradient is 13 mmHg and the mean gradient is 5 mmHg. - Exam was compared with the prior  echocardiographic exam performed on   10/14/2022. Intraop study     ANITRA 8/2/22   Summary   Ejection fraction is visually estimated at 40-45%. Absent LA appendage (s/p ligation with AtriClip device). No evidence of thrombus within left atrium. s/p mitral valve repair (P2 triangular resection, P2/P3 perforation   repair, 34 mm Future annuloplasty band 3/2022). There is residual prolapse of the posterior leaflet which overrides the   anterior leaflet. Severe eccentric mitral regurgitation, directed anteriorly. TTE 6/26/22   Summary   Normal left ventricular size. LV systolic function is moderately reduced. Ejection fraction is visually estimated at 35-40%. Indeterminate diastolic function. No regional wall motion abnormalities seen. Mild asymmetric septal hypertrophy. Right ventricle global systolic function is reduced. The left atrium is moderately dilated. s/p mitral valve repair (P2 triangular resection, P3/P3 perforation   repair, 34 mm Future annuloplasty band 3/2022). Moderate eccentric mitral regurgitation directed anteriorly. Compared to postoperative study from 3/18/22, LVEF has improved, but MR   appears worse. TTE 3/28/22   Summary   Ejection fraction is visually estimated at 25-30%. Overall ejection fraction severely decreased. Normal right ventricle size with reduced function. Left atrial volume index of 68 ml per meters squared BSA. Status - post mitral annular ring insertion. Mean transmitral gradient 2.9   mmHg.    Physiologic and/or trace mitral regurgitation is present. Physiologic and/or trace tricuspid regurgitation. No evidence for hemodynamically significant pericardial effusion. ANITRA 3/16/22   Summary   Ejection fraction is visually estimated at 55%. Severe holosystolic prolapse of the posterior leaflet with a flail P1/P2   segment due to ruptured chordae tendineae. Small mobile echodensity suspicious for vegetation on ventricular surface   of posterior leaflet. Failure of leaflet coaptation. No definitive papillary muscle rupture. Torrential mitral regurgitation, eccentric jet directed anteriorly. Stress test:      Cardiac catheterization:  [facility] 3/18/22 [facility]   Angiographic Results/findings:  Left Main: No angiographically significant stenosis. LAD: No angiographically significant stenosis. D1: Bifurcating vessel. No angiographically significant stenosis. Cx: Dominant vessel. Mid to distal diffuse 10% stenosis. OM1: Bifurcating vessel. No angiographically significant stenosis. OM2: Proximal 10% stenosis. OM 3: No angiographically significant stenosis. OM 4: No angiographically significant stenosis. Ramus: Absent. RCA: non-Dominant. Mild diffuse luminal irregularities. 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 Residual severe mitral regurgitation s/p redo MVr 10/19/22 32mm Louis band and P2 neochordae 10/19/22 [facility]   Persistent atrial fibrillation/flutter. S/p maze at time of initial mitral surgery.   Recurrence noted 7/2022, underwent ANITRA cardioversion 8/2/2022. Again had subsequent recurrence before second open heart, possible ongoing flutter currently  Nonischemic cardiomyopathy, likely valvular. EF 25-30% -> 40-45%  Chronic HFrEF, euvolemic. NYHA class I ACC stage C  Mild nonobstructive CAD  Right bundle branch block  Lower extremity cellulitis, resolved    Recommendations:  Doing well from a cardiac standpoint after redo mitral valve repair. 3-day monitor to assess rhythm-if still in atrial flutter will arrange cardioversion  Continue apixaban 5 mg twice daily  Optimize GDMT for cardiomyopathy  Continue metoprolol succinate 100 mg in the morning and 50 mg in the evening which is how he has been taking it  Continue losartan, consider transition to sacubitril/valsartan if EF remains depressed  Continue spironolactone  Consider SGLT2 inhibitor  Antibiotic prophylaxis for dental procedures  Exercise regimen  Aggressive risk factor modification  Follow-up in 6 months or sooner if need arises    The patient's current medication list, allergies, problem list and results of all previously ordered testing were reviewed at today's visit.     Lani Hernadez MD, [facility] Cardiology

## 2022-12-06 NOTE — PROGRESS NOTES
Patient was seen today and a 3 day  monitor was placed. Monitor was ordered by Dr. Ramírez. The monitor was applied, instructions were given to the patient. Patient stated understanding and gave verbalize readback.    Monitor company: [employer]  Serial number: [id number]

## 2022-12-19 ENCOUNTER — TELEPHONE (OUTPATIENT)
Dept: CARDIOLOGY CLINIC | Age: 62
End: 2022-12-19

## 2022-12-19 DIAGNOSIS — I48.0 PAROXYSMAL ATRIAL FIBRILLATION (HCC): ICD-10-CM

## 2022-12-19 NOTE — TELEPHONE ENCOUNTER
Okay, continue same medications, his heart rate is reasonably well controlled. If he has any concerns while in [place] please let us know. Schedule cardioversion when he returns. Please remind him not to miss any doses of apixaban in the 3 weeks prior to the cardioversion and if he does to please let us know.

## 2022-12-19 NOTE — TELEPHONE ENCOUNTER
Left message for patient to contact office. ----- Message from Wen Carter MD sent at 12/16/2022  4:18 PM EST -----  Monitor shows he is in persistent atrial flutter. If he has not missed any doses of apixaban in the past 3 weeks, please schedule him for DC cardioversion with me next week without ANITRA. I am hoping that since his mitral valve has been fixed, we should be able to keep him in normal rhythm. If he has another recurrence of atrial fibrillation or atrial flutter after this cardioversion I will refer him to EP to consider ablation or antiarrhythmic drug.

## 2022-12-19 NOTE — TELEPHONE ENCOUNTER
Patient returned our call. He was given monitor results and recommendations per Dr. Kezia Choudhury. Patient verbalized understanding, but states he is leaving for [place] today and will not be back until after the 1st of the year. DCCV will be postponed until his return.

## 2023-01-31 RX ORDER — FUROSEMIDE 20 MG/1
TABLET ORAL
Qty: 30 TABLET | Refills: 2 | Status: SHIPPED | OUTPATIENT
Start: 2023-01-31

## 2023-02-10 ENCOUNTER — TELEPHONE (OUTPATIENT)
Dept: CARDIAC CATH/INVASIVE PROCEDURES | Age: 63
End: 2023-02-10

## 2023-02-13 ENCOUNTER — ANESTHESIA EVENT (OUTPATIENT)
Dept: CARDIAC CATH/INVASIVE PROCEDURES | Age: 63
End: 2023-02-13

## 2023-02-13 ENCOUNTER — ANESTHESIA (OUTPATIENT)
Dept: CARDIAC CATH/INVASIVE PROCEDURES | Age: 63
End: 2023-02-13

## 2023-02-13 ENCOUNTER — HOSPITAL ENCOUNTER (OUTPATIENT)
Dept: CARDIAC CATH/INVASIVE PROCEDURES | Age: 63
Discharge: HOME | End: 2023-02-13
Payer: COMMERCIAL

## 2023-02-13 VITALS
HEIGHT: 69 IN | SYSTOLIC BLOOD PRESSURE: 130 MMHG | HEART RATE: 101 BPM | RESPIRATION RATE: 20 BRPM | DIASTOLIC BLOOD PRESSURE: 70 MMHG | TEMPERATURE: 97 F | WEIGHT: 197 LBS | BODY MASS INDEX: 29.18 KG/M2

## 2023-02-13 LAB
EKG ATRIAL RATE: 75 BPM
EKG P AXIS: 23 DEGREES
EKG P-R INTERVAL: 176 MS
EKG Q-T INTERVAL: 414 MS
EKG QRS DURATION: 140 MS
EKG QTC CALCULATION (BAZETT): 462 MS
EKG R AXIS: -12 DEGREES
EKG T AXIS: 23 DEGREES
EKG VENTRICULAR RATE: 75 BPM

## 2023-02-13 PROCEDURE — 2580000003 HC RX 258: Performed by: INTERNAL MEDICINE

## 2023-02-13 PROCEDURE — 93010 ELECTROCARDIOGRAM REPORT: CPT | Performed by: INTERNAL MEDICINE

## 2023-02-13 PROCEDURE — 92960 CARDIOVERSION ELECTRIC EXT: CPT

## 2023-02-13 PROCEDURE — 2580000003 HC RX 258: Performed by: ANESTHESIOLOGIST ASSISTANT

## 2023-02-13 PROCEDURE — 2709999900 HC NON-CHARGEABLE SUPPLY

## 2023-02-13 PROCEDURE — 93005 ELECTROCARDIOGRAM TRACING: CPT | Performed by: INTERNAL MEDICINE

## 2023-02-13 PROCEDURE — 2500000003 HC RX 250 WO HCPCS: Performed by: ANESTHESIOLOGIST ASSISTANT

## 2023-02-13 PROCEDURE — 6360000002 HC RX W HCPCS: Performed by: ANESTHESIOLOGIST ASSISTANT

## 2023-02-13 PROCEDURE — 3700000000 HC ANESTHESIA ATTENDED CARE

## 2023-02-13 PROCEDURE — 3700000001 HC ADD 15 MINUTES (ANESTHESIA)

## 2023-02-13 RX ORDER — MAGNESIUM OXIDE 400 MG/1
400 TABLET ORAL DAILY
COMMUNITY

## 2023-02-13 RX ORDER — SODIUM CHLORIDE 9 MG/ML
INJECTION, SOLUTION INTRAVENOUS CONTINUOUS PRN
Status: DISCONTINUED | OUTPATIENT
Start: 2023-02-13 | End: 2023-02-13 | Stop reason: SDUPTHER

## 2023-02-13 RX ORDER — SODIUM CHLORIDE 9 MG/ML
INJECTION, SOLUTION INTRAVENOUS ONCE
Status: COMPLETED | OUTPATIENT
Start: 2023-02-13 | End: 2023-02-13

## 2023-02-13 RX ORDER — LIDOCAINE HYDROCHLORIDE 20 MG/ML
INJECTION, SOLUTION INFILTRATION; PERINEURAL PRN
Status: DISCONTINUED | OUTPATIENT
Start: 2023-02-13 | End: 2023-02-13 | Stop reason: SDUPTHER

## 2023-02-13 RX ORDER — PROPOFOL 10 MG/ML
INJECTION, EMULSION INTRAVENOUS PRN
Status: DISCONTINUED | OUTPATIENT
Start: 2023-02-13 | End: 2023-02-13 | Stop reason: SDUPTHER

## 2023-02-13 RX ADMIN — SODIUM CHLORIDE: 9 INJECTION, SOLUTION INTRAVENOUS at 07:56

## 2023-02-13 RX ADMIN — SODIUM CHLORIDE: 9 INJECTION, SOLUTION INTRAVENOUS at 07:28

## 2023-02-13 RX ADMIN — LIDOCAINE HYDROCHLORIDE 60 MG: 20 INJECTION, SOLUTION INFILTRATION; PERINEURAL at 08:07

## 2023-02-13 RX ADMIN — PROPOFOL 120 MG: 10 INJECTION, EMULSION INTRAVENOUS at 08:07

## 2023-02-13 NOTE — ANESTHESIA POSTPROCEDURE EVALUATION
Department of Anesthesiology  Postprocedure Note    Patient: Silva Kidd  MRN: [id number]  YOB: 1960  Date of evaluation: 2/13/2023      Procedure Summary     Date: 02/13/23 [place] [facility] [place]    Anesthesia Start: 2662 Anesthesia Stop: 3267    Procedure: CARDIOVERSION WITH ANESTHESIA Diagnosis: Paroxysmal atrial fibrillation    Scheduled Providers:  Responsible Provider: Celia De La Cruz MD    Anesthesia Type: MAC ASA Status: 3          Anesthesia Type: MAC    Alexa Phase I:      Alexa Phase II:        Anesthesia Post Evaluation    Patient location during evaluation: PACU  Patient participation: complete - patient participated  Level of consciousness: awake and alert  Airway patency: patent  Nausea & Vomiting: no nausea and no vomiting  Complications: no  Cardiovascular status: blood pressure returned to baseline and hemodynamically stable  Respiratory status: acceptable and spontaneous ventilation  Hydration status: euvolemic  Multimodal analgesia pain management approach

## 2023-02-13 NOTE — ANESTHESIA PRE PROCEDURE
Department of Anesthesiology  Preprocedure Note       Name:  Hector Sullivan   Age:  65 y.o.  :  1960                                          MRN:  [id number]         Date:  2023      Surgeon:     Procedure: Cardioversion    Medications prior to admission:   Prior to Admission medications    Medication Sig Start Date End Date Taking?  Authorizing Provider   furosemide (LASIX) 20 MG tablet take 1 tablet by mouth once daily if needed 23   Sayda Gage MD   aspirin 81 MG EC tablet Take 81 mg by mouth daily    Historical Provider, MD   apixaban (ELIQUIS) 5 MG TABS tablet Take 1 tablet by mouth 2 times daily 22   Sayda Gage MD   metoprolol succinate (TOPROL XL) 50 MG extended release tablet Take 1 tablet by mouth daily  Patient taking differently: Take 50 mg by mouth in the morning and at bedtime 9/15/22   Sayda Gage MD   rosuvastatin (CRESTOR) 20 MG tablet Take 1 tablet by mouth nightly 8/3/22   Sayda Gage MD   losartan (COZAAR) 25 MG tablet Take 0.5 tablets by mouth daily 3/30/22   ROX Abebe CNP   spironolactone (ALDACTONE) 25 MG tablet Take 0.5 tablets by mouth daily 3/30/22   ROX Abebe CNP   potassium chloride (KLOR-CON M) 10 MEQ extended release tablet Take 1 tablet by mouth daily 3/28/22   Reinbeck, PA       Current medications:    Current Outpatient Medications   Medication Sig Dispense Refill    furosemide (LASIX) 20 MG tablet take 1 tablet by mouth once daily if needed 30 tablet 2    aspirin 81 MG EC tablet Take 81 mg by mouth daily      apixaban (ELIQUIS) 5 MG TABS tablet Take 1 tablet by mouth 2 times daily 180 tablet 3    metoprolol succinate (TOPROL XL) 50 MG extended release tablet Take 1 tablet by mouth daily (Patient taking differently: Take 50 mg by mouth in the morning and at bedtime) 30 tablet 5    rosuvastatin (CRESTOR) 20 MG tablet Take 1 tablet by mouth nightly 30 tablet 5    losartan (COZAAR) 25 MG tablet Take 0.5 tablets by mouth daily 30 tablet 3    spironolactone (ALDACTONE) 25 MG tablet Take 0.5 tablets by mouth daily 30 tablet 3    potassium chloride (KLOR-CON M) 10 MEQ extended release tablet Take 1 tablet by mouth daily 90 tablet 1     No current facility-administered medications for this encounter. Allergies:  No Known Allergies    Problem List:    Patient Active Problem List   Diagnosis Code    Sepsis ([facility] [address] present on admission A41.9    JANA (acute kidney injury) ([facility] [address] N17.9    Bacteremia R78.81    Mitral valve disease I05.9    Paroxysmal atrial fibrillation (HCC) I48.0    Acute diastolic (congestive) heart failure (HCC) I50.31    Cardiac insufficiency (HCC) I50.9    Acute pulmonary insufficiency J98.4    Acute blood loss anemia D62       Past Medical History:        Diagnosis Date    Acute diastolic (congestive) heart failure ([facility] [address]     Hypertension        Past Surgical History:        Procedure Laterality Date    CARDIAC VALVE SURGERY  03/23/2022    MVR and MAZE procedure    CARDIOVERSION  08/02/2022    DR Edwards Form    MITRAL VALVE MAZE PROCEDURE N/A 03/23/2022    MITRAL VALVE REPAIR VS REPLACEMENT WITH MAZE performed by Saranya Pacheco DO at [place] [address] TRANSESOPHAGEAL ECHOCARDIOGRAM  03/16/2022    Dr. Payam Grider TRANSESOPHAGEAL ECHOCARDIOGRAM  08/02/2022    Dr Edwards Form       Social History:    Social History     Tobacco Use    Smoking status: Some Days     Types: Cigars    Smokeless tobacco: Never    Tobacco comments:     occ   Substance Use Topics    Alcohol use: Yes     Comment: rarely                                Ready to quit: Not Answered  Counseling given: Not Answered  Tobacco comments: occ      Vital Signs (Current): There were no vitals filed for this visit.                                            BP Readings from Last 3 Encounters:   12/06/22 122/78   09/28/22 102/60   08/02/22 111/72       NPO Status: BMI:   Wt Readings from Last 3 Encounters:   12/06/22 198 lb 12.8 oz (90.2 kg)   09/28/22 199 lb (90.3 kg)   08/02/22 195 lb (88.5 kg)     There is no height or weight on file to calculate BMI.    CBC:   Lab Results   Component Value Date/Time    WBC 6.9 04/11/2022 06:20 PM    RBC 3.46 04/11/2022 06:20 PM    HGB 9.7 04/11/2022 06:20 PM    HCT 30.8 04/11/2022 06:20 PM    HCT 28.0 03/23/2022 05:41 PM    MCV 89.0 04/11/2022 06:20 PM    RDW 15.3 04/11/2022 06:20 PM     04/11/2022 06:20 PM       CMP:   Lab Results   Component Value Date/Time     04/11/2022 06:20 PM    K 4.5 04/11/2022 06:20 PM     04/11/2022 06:20 PM    CO2 25 04/11/2022 06:20 PM    BUN 17 04/11/2022 06:20 PM    CREATININE 1.1 04/11/2022 06:20 PM    GFRAA >60 04/11/2022 06:20 PM    LABGLOM >60 04/11/2022 06:20 PM    GLUCOSE 99 04/11/2022 06:20 PM    PROT 7.6 04/11/2022 06:20 PM    CALCIUM 9.1 04/11/2022 06:20 PM    BILITOT 0.3 04/11/2022 06:20 PM    ALKPHOS 113 04/11/2022 06:20 PM    AST 17 04/11/2022 06:20 PM    ALT 20 04/11/2022 06:20 PM       POC Tests: No results for input(s): POCGLU, POCNA, POCK, POCCL, POCBUN, POCHEMO, POCHCT in the last 72 hours.     Coags:   Lab Results   Component Value Date/Time    PROTIME 17.3 03/23/2022 06:25 PM    INR 1.6 03/23/2022 06:25 PM    APTT 30.2 03/23/2022 06:25 PM       HCG (If Applicable): No results found for: PREGTESTUR, PREGSERUM, HCG, HCGQUANT     ABGs: No results found for: PHART, PO2ART, XPI3XTP, WMM2YIY, BEART, O7QKIUMA     Type & Screen (If Applicable):  No results found for: LABABO, LABRH    Drug/Infectious Status (If Applicable):  No results found for: HIV, HEPCAB    COVID-19 Screening (If Applicable):   Lab Results   Component Value Date/Time    COVID19 Not Detected 03/14/2022 11:19 PM    COVID19 Not Detected 03/14/2022 08:45 PM           Anesthesia Evaluation  Patient summary reviewed  Airway: Mallampati: II          Dental: normal exam         Pulmonary:Negative Pulmonary ROS breath sounds clear to auscultation                             Cardiovascular:  Exercise tolerance: good (>4 METS),   (+) hypertension: mild, valvular problems/murmurs: MR,       ECG reviewed  Rhythm: irregular  Rate: abnormal  Echocardiogram reviewed         Beta Blocker:  Dose within 24 Hrs      ROS comment: MVR 10/2022 & maze     Neuro/Psych:   Negative Neuro/Psych ROS              GI/Hepatic/Renal:             Endo/Other:                     Abdominal:             Vascular: Other Findings:           Anesthesia Plan      MAC     ASA 3       Induction: intravenous. Anesthetic plan and risks discussed with patient.       Plan discussed with attending.            npo>mn        Ortiz Wilkins 78   2/13/2023

## 2023-02-13 NOTE — PROCEDURES
PROCEDURE NOTE    Attending Cardiologist:  Yanci Hopper MD    Date of Service: 2/13/2023    Procedure: Direct Current Cardioversion    Indication: Atrial flutter    Anticoagulant: Apixaban    Antiarrhythmic drug(s): Metoprolol    Description of procedure:  Patient presented in a fasting and well hydrated state. Informed consent obtained from patient. Risks, benefits and alternatives to DC cardioversion were explained to the patient in detail, and the patient acknowledged understanding. Cardiac rhythm, arterial oxygen saturation, and blood pressure were continuously monitored. Deep sedation with propofol administered by the Department of Anesthesiology. Patch placement: Anterior/posterior  Energy: 200 Joules, synchronized  Number of shocks: 1  Outcome: Sinus rhythm  Complications: None    Impression:  Successful direct current cardioversion of atrial flutter to normal sinus rhythm.     Yanci Hopper MD, [facility]

## 2023-02-13 NOTE — H&P
H&P    Name: Chaz Aggarwal    Age: 65 y.o. Primary Care Physician: Alfred Azul MD    Date of Service: 2/13/2023    Chief Complaint:   Atrial flutter     Interim History:   Here for follow-up regarding mitral valve repair surgery due to strep endocarditis. He initially presented with new onset atrial fibrillation and severe MR 3/2022, found to be bacteremic with mitral vegetations. Underwent successful repair. However he had ongoing significant murmur and repeat echocardiogram and ANITRA showed residual severe MR. I referred him to [facility] and he is scheduled for redo mitral valve surgery in October which happened 10/19/2022 with another repair. He has been cleared of sternal precautions. He presents today for follow-up. He feels well. Exercising on his own. Denies chest pain or shortness of breath, palpitations, edema. States has a watch that tracks his heart rate and it is pretty consistently in the 80s and 90s. Underlying rhythm is not clear on today's EKG but I cannot rule out ongoing flutter. 2/13/23: Here for cardioversion. Compliant with apixaban.     Review of Systems:   Negative except as described above    Past Medical History:  Past Medical History:   Diagnosis Date    Acute diastolic (congestive) heart failure ([address]     Hypertension        Past Surgical History:  Past Surgical History:   Procedure Laterality Date    CARDIAC VALVE SURGERY  03/23/2022    MVR and MAZE procedure    CARDIOVERSION  08/02/2022    DR Nury Myles    MITRAL VALVE MAZE PROCEDURE N/A 03/23/2022    MITRAL VALVE REPAIR VS REPLACEMENT WITH MAZE performed by Damien Boykin DO at [facility] OR    TRANSESOPHAGEAL ECHOCARDIOGRAM  03/16/2022    Dr. Nury Myles    TRANSESOPHAGEAL ECHOCARDIOGRAM  08/02/2022    Dr Nury Myles       Family History:  Family History   Problem Relation Age of Onset    Heart Failure Father        Social History:  Social History     Tobacco Use    Smoking status: Some Days     Types: Cigars    Smokeless tobacco: Never    Tobacco comments:     occ   Vaping Use    Vaping Use: Never used   Substance Use Topics    Alcohol use: Yes     Comment: rarely    Drug use: Never        Allergies:  No Known Allergies    Current Medications:    Current Outpatient Medications:     magnesium oxide (MAG-OX) 400 MG tablet, Take 400 mg by mouth daily, Disp: , Rfl:     furosemide (LASIX) 20 MG tablet, take 1 tablet by mouth once daily if needed, Disp: 30 tablet, Rfl: 2    aspirin 81 MG EC tablet, Take 81 mg by mouth daily, Disp: , Rfl:     apixaban (ELIQUIS) 5 MG TABS tablet, Take 1 tablet by mouth 2 times daily, Disp: 180 tablet, Rfl: 3    metoprolol succinate (TOPROL XL) 50 MG extended release tablet, Take 1 tablet by mouth daily (Patient taking differently: Take 50 mg by mouth in the morning and at bedtime), Disp: 30 tablet, Rfl: 5    rosuvastatin (CRESTOR) 20 MG tablet, Take 1 tablet by mouth nightly, Disp: 30 tablet, Rfl: 5    losartan (COZAAR) 25 MG tablet, Take 0.5 tablets by mouth daily, Disp: 30 tablet, Rfl: 3    spironolactone (ALDACTONE) 25 MG tablet, Take 0.5 tablets by mouth daily, Disp: 30 tablet, Rfl: 3    Physical Exam:  /70   Pulse (!) 101   Temp 97 °F (36.1 °C)   Resp 20   Ht 5' 9\" (1.753 m)   Wt 197 lb (89.4 kg)   BMI 29.09 kg/m²   Wt Readings from Last 3 Encounters:   02/13/23 197 lb (89.4 kg)   12/06/22 198 lb 12.8 oz (90.2 kg)   09/28/22 199 lb (90.3 kg)     Appearance: Well-appearing, awake, alert and oriented x 3, no acute respiratory distress  Skin: Intact, no rash  Head: Normocephalic, atraumatic  Eyes: EOMI, no conjunctival erythema  ENMT: No pharyngeal erythema, MMM, no rhinorrhea  Neck: Supple, no elevated JVP, no carotid bruits  Lungs: Clear to auscultation bilaterally. No wheezes, rales, or rhonchi. Cardiac: Regular rhythm with a normal rate, +N4I6, 1/6 holosystolic murmur at the apex.   Well-healed sternotomy scars  Abdomen: Soft, nontender, +bowel sounds  Extremities: Moves all extremities x 4, no lower extremity edema  Neurologic: No focal motor deficits apparent, normal mood and affect, alert and oriented x 3  Peripheral Pulses: Intact posterior tibial pulses bilaterally    Laboratory Tests:  Lab Results   Component Value Date    CREATININE 1.1 2022    BUN 17 2022     2022    K 4.5 2022     2022    CO2 25 2022     Lab Results   Component Value Date/Time    MG 2.1 2022 05:42 AM     Lab Results   Component Value Date    WBC 6.9 2022    HGB 9.7 (L) 2022    HCT 30.8 (L) 2022    MCV 89.0 2022     2022     Lab Results   Component Value Date    ALT 20 2022    AST 17 2022    ALKPHOS 113 2022    BILITOT 0.3 2022     No results found for: CKTOTAL, CKMB, CKMBINDEX, TROPONINI  Lab Results   Component Value Date    INR 1.6 2022    INR 1.3 2022    INR 1.5 2022    PROTIME 17.3 (H) 2022    PROTIME 14.4 (H) 2022    PROTIME 15.9 (H) 2022     Lab Results   Component Value Date    TSH 1.880 2022     Lab Results   Component Value Date    LABA1C 5.6 03/15/2022     No results found for: EAG  Lab Results   Component Value Date    CHOL 133 03/15/2022     Lab Results   Component Value Date    TRIG 115 03/15/2022     Lab Results   Component Value Date    HDL 20 03/15/2022     Lab Results   Component Value Date    LDLCALC 90 03/15/2022     Lab Results   Component Value Date    LABVLDL 23 03/15/2022     No results found for: CHOLHDLRATIO  No results for input(s): PROBNP in the last 72 hours. Cardiac Tests:  EC2022: Atrial rhythm 98 bpm.  Right bundle branch block with left axis QRS duration 138 ms    Echocardiogram:   TTE post op 10/25/22 [facility]  CONCLUSIONS:   - Technically difficult exam due to post op and bandages/chest tubes/wound. - Exam indication: S/P MVr redo   - The left ventricle is normal in size.  Left ventricular systolic function is   moderately decreased. EF = 40 ± 5% (visual est.) Left ventricular diastolic   function was not evaluated due to mitral valve surgery. - The right ventricle is normal in size. Abby Daunt Mitral Ring (size #32). There is mild (1+ - 2+) mitral valve   regurgitation. The peak gradient is 13 mmHg and the mean gradient is 5 mmHg. - Exam was compared with the prior  echocardiographic exam performed on   10/14/2022. Intraop study     ANITRA 8/2/22   Summary   Ejection fraction is visually estimated at 40-45%. Absent LA appendage (s/p ligation with AtriClip device). No evidence of thrombus within left atrium. s/p mitral valve repair (P2 triangular resection, P2/P3 perforation   repair, 34 mm Future annuloplasty band 3/2022). There is residual prolapse of the posterior leaflet which overrides the   anterior leaflet. Severe eccentric mitral regurgitation, directed anteriorly. TTE 6/26/22   Summary   Normal left ventricular size. LV systolic function is moderately reduced. Ejection fraction is visually estimated at 35-40%. Indeterminate diastolic function. No regional wall motion abnormalities seen. Mild asymmetric septal hypertrophy. Right ventricle global systolic function is reduced. The left atrium is moderately dilated. s/p mitral valve repair (P2 triangular resection, P3/P3 perforation   repair, 34 mm Future annuloplasty band 3/2022). Moderate eccentric mitral regurgitation directed anteriorly. Compared to postoperative study from 3/18/22, LVEF has improved, but MR   appears worse. TTE 3/28/22   Summary   Ejection fraction is visually estimated at 25-30%. Overall ejection fraction severely decreased. Normal right ventricle size with reduced function. Left atrial volume index of 68 ml per meters squared BSA. Status - post mitral annular ring insertion. Mean transmitral gradient 2.9   mmHg. Physiologic and/or trace mitral regurgitation is present.    Physiologic and/or trace tricuspid regurgitation. No evidence for hemodynamically significant pericardial effusion. ANITRA 3/16/22   Summary   Ejection fraction is visually estimated at 55%. Severe holosystolic prolapse of the posterior leaflet with a flail P1/P2   segment due to ruptured chordae tendineae. Small mobile echodensity suspicious for vegetation on ventricular surface   of posterior leaflet. Failure of leaflet coaptation. No definitive papillary muscle rupture. Torrential mitral regurgitation, eccentric jet directed anteriorly. Stress test:      Cardiac catheterization:  [facility] 3/18/22 [place]   Angiographic Results/findings:  Left Main: No angiographically significant stenosis. LAD: No angiographically significant stenosis. D1: Bifurcating vessel. No angiographically significant stenosis. Cx: Dominant vessel. Mid to distal diffuse 10% stenosis. OM1: Bifurcating vessel. No angiographically significant stenosis. OM2: Proximal 10% stenosis. OM 3: No angiographically significant stenosis. OM 4: No angiographically significant stenosis. Ramus: Absent. RCA: non-Dominant. Mild diffuse luminal irregularities. Operation 3/23/22: ANITRA, mitral valve repair (P2 triangular resection, P3/P3 perforation repair, 34mm Future band), MAZE procedure, 40mm Atriclip     No orders of the defined types were placed in this encounter. Requested Prescriptions      No prescriptions requested or ordered in this encounter        ASSESSMENT / PLAN:  Mitral valve endocarditis (Streptococcus) s/p mitral repair (P2 triangular resection, P2/P3 perforation repair, 34mm Future band)/maze/BEATRIS exclusion with atrial clip 3/23/2022 Dr Harpreet Flower. Residual severe mitral regurgitation s/p redo MVr 10/19/22 32mm Louis band and P2 neochordae 10/19/22 [facility]   Persistent atrial fibrillation/flutter. S/p maze at time of initial mitral surgery. Recurrence noted 7/2022, underwent ANITRA cardioversion 8/2/2022.    Recurrence 12/2022 in post op follow up 100% AFL burden on monitor  Nonischemic cardiomyopathy, likely valvular. EF 25-30% -> 40-45%  Chronic HFrEF, euvolemic. NYHA class I ACC stage C  Mild nonobstructive CAD  Right bundle branch block  Lower extremity cellulitis, resolved    Recommendations:    Proceed with DC cardioversion    Risks and benefits of cardioversion explained to patient, including but not limited to respiratory failure, CVA, failure to restore sinus rhythm, recurrence of atrial or other arrhythmias, skins burns, and rarely death. They voiced understanding and agree to proceed.      Kassy Prasad MD, [facility]

## 2023-02-20 ENCOUNTER — NURSE ONLY (OUTPATIENT)
Dept: CARDIOLOGY CLINIC | Age: 63
End: 2023-02-20
Payer: COMMERCIAL

## 2023-02-20 DIAGNOSIS — I48.0 PAROXYSMAL ATRIAL FIBRILLATION (HCC): Primary | ICD-10-CM

## 2023-02-20 PROCEDURE — 93000 ELECTROCARDIOGRAM COMPLETE: CPT | Performed by: INTERNAL MEDICINE

## 2023-02-20 NOTE — RESULT ENCOUNTER NOTE
EKG showing normal rhythm after the cardioversion. Hopefully it stays that way. Continue same and follow-up as scheduled.

## 2023-02-21 ENCOUNTER — TELEPHONE (OUTPATIENT)
Dept: CARDIOLOGY CLINIC | Age: 63
End: 2023-02-21

## 2023-02-21 NOTE — TELEPHONE ENCOUNTER
----- Message from Guadlupe Severs, MD sent at 2/20/2023  4:54 PM EST -----  EKG showing normal rhythm after the cardioversion. Hopefully it stays that way. Continue same and follow-up as scheduled.

## (undated) DEVICE — COR-KNOT MINI® COMBO KITBASE PACKAGE TYPE - KITEACH STERILE PACKAGE KIT CONTAINS (2) SINGLE PATIENT USE COR-KNOT MINI® DEVICES AND (12) COR-KNOT® QUICK LOADS®.: Brand: COR-KNOT MINI®

## (undated) DEVICE — SOLUTION IV 1000ML 0.9% SOD CHL PH 5 INJ USP VIAFLX PLAS

## (undated) DEVICE — BASIC SINGLE BASIN 1-LF: Brand: MEDLINE INDUSTRIES, INC.

## (undated) DEVICE — WYE PIPE 1/2X1/2X3/8IN REDUC CONN

## (undated) DEVICE — GLOVE SURG SZ 65 THK91MIL LTX FREE SYN POLYISOPRENE

## (undated) DEVICE — SOLUTION IV 500ML 3% SOD CHL PLAS CONT USP VIAFLX

## (undated) DEVICE — INTENDED FOR TISSUE SEPARATION, AND OTHER PROCEDURES THAT REQUIRE A SHARP SURGICAL BLADE TO PUNCTURE OR CUT.: Brand: BARD-PARKER ® STAINLESS STEEL BLADES

## (undated) DEVICE — STERILE LATEX POWDER FREE SURGICAL GLOVES WITH HYDROGEL COATING: Brand: PROTEXIS

## (undated) DEVICE — SET CARDIAC II

## (undated) DEVICE — ALCOHOL RUBBING ISO 16OZ 70%

## (undated) DEVICE — BLADE CLIPPER GEN PURP NS

## (undated) DEVICE — SET SURG BASIN OPEN HEART NO  1 REUSABLE

## (undated) DEVICE — GLOVE ORANGE PI 7   MSG9070

## (undated) DEVICE — DOUBLE BASIN SET: Brand: MEDLINE INDUSTRIES, INC.

## (undated) DEVICE — EZ GLIDE AORTIC CANNULA: Brand: EDWARDS LIFESCIENCES EZ GLIDE AORTIC CANNULA

## (undated) DEVICE — Z INACTIVE USE 2735373 APPLICATOR FBR LAIN COT WOOD TIP ECONOMICAL

## (undated) DEVICE — 3M™ TEGADERM™ CHG DRESSING 25/CARTON 4 CARTONS/CASE 1657: Brand: TEGADERM™

## (undated) DEVICE — CARDIAC STRYKER STERNAL SAW

## (undated) DEVICE — TRAP,MUCUS SPECIMEN,40CC: Brand: MEDLINE

## (undated) DEVICE — GLOVE ORANGE PI 7 1/2   MSG9075

## (undated) DEVICE — ADHESIVE SKIN CLOSURE TOP 36 CC HI VISC DERMBND MINI

## (undated) DEVICE — 1.5L THIN WALL CAN: Brand: CRD

## (undated) DEVICE — KIT ART LN 20GA L12CM FEP RADPQ 0.025X13.75IN SPR GWIRE

## (undated) DEVICE — 6 FOOT DISPOSABLE EXTENSION CABLE WITH SAFE CONNECT / SCREW-DOWN

## (undated) DEVICE — SURGICAL PROCEDURE PACK CRD SEHC

## (undated) DEVICE — BLOOD TRANSFUSION FILTER: Brand: HAEMONETICS

## (undated) DEVICE — SET CARDIAC I

## (undated) DEVICE — DRESSING TRNSPAR W4XL55IN ACRYL SUP FLM W ADH WTRPRF OPSITE

## (undated) DEVICE — SOLUTION IRRIG 1000ML 0.9% SOD CHL USP POUR PLAS BTL

## (undated) DEVICE — SOLUTION IRRIG 1000ML STRL H2O USP PLAS POUR BTL

## (undated) DEVICE — PUMP SUC IRR TBNG L10FT W/ HNDPC ASSEMB STRYKEFLOW 2

## (undated) DEVICE — Device

## (undated) DEVICE — CLOTH SURG PREP PREOPERATIVE CHLORHEXIDINE GLUC 2% READYPREP

## (undated) DEVICE — COVER PRB W14XL147CM TELESCOPICALLY FLD EXT LEN CIV-FLEX

## (undated) DEVICE — TTL1LYR 16FR10ML 100%SIL TMPST TR: Brand: MEDLINE

## (undated) DEVICE — TUBING PERF PMP L10FT OD0.25IN ID1/16IN MED CLASS VI PRECUT

## (undated) DEVICE — PROBE CRYOABLATION L10CM ALUM SMOOTH MAL RETRCT HND FLX TB

## (undated) DEVICE — TOWEL,OR,DSP,ST,BLUE,DLX,4/PK,20PK/CS: Brand: MEDLINE

## (undated) DEVICE — SUMP INTCARD SUCT AD 20FR PERICARD MAYO STYL FLX VERSATILE

## (undated) DEVICE — DRAIN CHN 19FR L0.25MM DIA6.3MM SIL RND HUBLESS FULL FLUT

## (undated) DEVICE — LABEL MED CARD SURG 4 IN PANEL STRL

## (undated) DEVICE — CANNULA PERF 7FR L5.5IN AORT ROOT RADPQ STD TIP W/ VENT LN

## (undated) DEVICE — SOLUTION IRRIG 3000ML 0.9% SOD CHL USP UROMATIC PLAS CONT

## (undated) DEVICE — CATHETER THOR 32FR L23IN PVC 6 EYELET STR ATRAUM

## (undated) DEVICE — CATHETER,URETHRAL,REDRUBBER,STERILE,20FR: Brand: MEDLINE

## (undated) DEVICE — SET CARDIAC VALVE EXTRAS

## (undated) DEVICE — SOLUTION IV 50ML 0.9% SOD CHL PLAS CONT USP VIAFLX

## (undated) DEVICE — CONNECTOR PERF W0.5XH3/8XL3/8IN BASE UNEQUAL Y SHP W/O

## (undated) DEVICE — DRAPE THER FLUID WARMING 66X44 IN FLAT SLUSH DBL DISC ORS

## (undated) DEVICE — SET BASIN OPEN HEART VALVE

## (undated) DEVICE — TOWEL,OR,DSP,ST,WHITE,DLX,4/PK,20PK/CS: Brand: MEDLINE

## (undated) DEVICE — CATHETER,URETHRAL,REDRUBBER,STRL,18FR: Brand: MEDLINE

## (undated) DEVICE — SET SURG BASIN MAYO REUSABLE

## (undated) DEVICE — OPEN HEART: Brand: MEDLINE INDUSTRIES, INC.

## (undated) DEVICE — DRAIN SURG SGL COLL PT TB FOR ATS BG OASIS

## (undated) DEVICE — PACK OPEN HRT DRP

## (undated) DEVICE — Device: Brand: RAP FEMORAL VENOUS CANNULA

## (undated) DEVICE — TUBING, SUCTION, 3/16" X 12', STRAIGHT: Brand: MEDLINE

## (undated) DEVICE — GLOVE SURG SZ 7.5 L11.73IN FNGR THK9.8MIL STRW LTX POLYMER

## (undated) DEVICE — BATTERY PACK FOR VARISPEED: Brand: STRYKER VARISPEED

## (undated) DEVICE — PERFUSION PACK CUST OPN HRT

## (undated) DEVICE — AGENT HEMSTAT W4XL8IN OXIDIZED REGENERATED CELOS ABSRB

## (undated) DEVICE — PADDLE INTERN DEFIB CHILD

## (undated) DEVICE — E-Z CLEAN, NON-STICK, PTFE COATED, ELECTROSURGICAL BLADE ELECTRODE, MODIFIED EXTENDED INSULATION, 2.5 INCH (6.35 CM): Brand: MEGADYNE

## (undated) DEVICE — AGENT HEMSTAT W4XL4IN OXIDIZED REGENERATED CELOS STRUCTURED

## (undated) DEVICE — CLAMP ABLAT JAW L65MM L CRV 2 ELECTRD BPLR

## (undated) DEVICE — SOLUTION IV 1000ML 140MEQ/L SOD 5MEQ/L K 3MEQ/L MG 27MEQ/L